# Patient Record
Sex: MALE | Race: WHITE | NOT HISPANIC OR LATINO | URBAN - METROPOLITAN AREA
[De-identification: names, ages, dates, MRNs, and addresses within clinical notes are randomized per-mention and may not be internally consistent; named-entity substitution may affect disease eponyms.]

---

## 2017-04-18 ENCOUNTER — OUTPATIENT (OUTPATIENT)
Dept: OUTPATIENT SERVICES | Facility: HOSPITAL | Age: 72
LOS: 1 days | End: 2017-04-18
Payer: MEDICARE

## 2017-04-18 VITALS
HEIGHT: 70 IN | SYSTOLIC BLOOD PRESSURE: 143 MMHG | RESPIRATION RATE: 14 BRPM | WEIGHT: 248.02 LBS | DIASTOLIC BLOOD PRESSURE: 81 MMHG | HEART RATE: 66 BPM | TEMPERATURE: 98 F

## 2017-04-18 DIAGNOSIS — M17.12 UNILATERAL PRIMARY OSTEOARTHRITIS, LEFT KNEE: ICD-10-CM

## 2017-04-18 DIAGNOSIS — Z01.818 ENCOUNTER FOR OTHER PREPROCEDURAL EXAMINATION: ICD-10-CM

## 2017-04-18 DIAGNOSIS — Z98.1 ARTHRODESIS STATUS: Chronic | ICD-10-CM

## 2017-04-18 DIAGNOSIS — Z98.890 OTHER SPECIFIED POSTPROCEDURAL STATES: Chronic | ICD-10-CM

## 2017-04-18 LAB
ALBUMIN SERPL ELPH-MCNC: 3.6 G/DL — SIGNIFICANT CHANGE UP (ref 3.3–5)
ALP SERPL-CCNC: 54 U/L — SIGNIFICANT CHANGE UP (ref 40–120)
ALT FLD-CCNC: 17 U/L — SIGNIFICANT CHANGE UP (ref 12–78)
ANION GAP SERPL CALC-SCNC: 4 MMOL/L — LOW (ref 5–17)
APTT BLD: 49.7 SEC — HIGH (ref 27.5–37.4)
AST SERPL-CCNC: 21 U/L — SIGNIFICANT CHANGE UP (ref 15–37)
BILIRUB SERPL-MCNC: 0.6 MG/DL — SIGNIFICANT CHANGE UP (ref 0.2–1.2)
BUN SERPL-MCNC: 26 MG/DL — HIGH (ref 7–23)
CALCIUM SERPL-MCNC: 9.2 MG/DL — SIGNIFICANT CHANGE UP (ref 8.5–10.1)
CHLORIDE SERPL-SCNC: 104 MMOL/L — SIGNIFICANT CHANGE UP (ref 96–108)
CO2 SERPL-SCNC: 33 MMOL/L — HIGH (ref 22–31)
CREAT SERPL-MCNC: 1.2 MG/DL — SIGNIFICANT CHANGE UP (ref 0.5–1.3)
GLUCOSE SERPL-MCNC: 75 MG/DL — SIGNIFICANT CHANGE UP (ref 70–99)
HCT VFR BLD CALC: 43.5 % — SIGNIFICANT CHANGE UP (ref 39–50)
HGB BLD-MCNC: 13.7 G/DL — SIGNIFICANT CHANGE UP (ref 13–17)
INR BLD: 1.22 RATIO — HIGH (ref 0.88–1.16)
MCHC RBC-ENTMCNC: 30.4 PG — SIGNIFICANT CHANGE UP (ref 27–34)
MCHC RBC-ENTMCNC: 31.5 GM/DL — LOW (ref 32–36)
MCV RBC AUTO: 96.5 FL — SIGNIFICANT CHANGE UP (ref 80–100)
MRSA PCR RESULT.: SIGNIFICANT CHANGE UP
PLATELET # BLD AUTO: 239 K/UL — SIGNIFICANT CHANGE UP (ref 150–400)
POTASSIUM SERPL-MCNC: 5.1 MMOL/L — SIGNIFICANT CHANGE UP (ref 3.5–5.3)
POTASSIUM SERPL-SCNC: 5.1 MMOL/L — SIGNIFICANT CHANGE UP (ref 3.5–5.3)
PROT SERPL-MCNC: 6.9 G/DL — SIGNIFICANT CHANGE UP (ref 6–8.3)
PROTHROM AB SERPL-ACNC: 13.3 SEC — HIGH (ref 9.8–12.7)
RBC # BLD: 4.5 M/UL — SIGNIFICANT CHANGE UP (ref 4.2–5.8)
RBC # FLD: 12.9 % — SIGNIFICANT CHANGE UP (ref 10.3–14.5)
S AUREUS DNA NOSE QL NAA+PROBE: SIGNIFICANT CHANGE UP
SODIUM SERPL-SCNC: 141 MMOL/L — SIGNIFICANT CHANGE UP (ref 135–145)
WBC # BLD: 6.5 K/UL — SIGNIFICANT CHANGE UP (ref 3.8–10.5)
WBC # FLD AUTO: 6.5 K/UL — SIGNIFICANT CHANGE UP (ref 3.8–10.5)

## 2017-04-18 PROCEDURE — 71020: CPT | Mod: 26

## 2017-04-18 PROCEDURE — 73560 X-RAY EXAM OF KNEE 1 OR 2: CPT | Mod: 26,LT

## 2017-04-18 PROCEDURE — 93010 ELECTROCARDIOGRAM REPORT: CPT | Mod: NC

## 2017-04-18 NOTE — H&P PST ADULT - ANESTHESIA, PREVIOUS REACTION, PROFILE
pt also notes "tend to fight" however notes this happened once age 16 after receiving Sodium Pentathol and has never happened since then but does get nausea/nausea/vomiting

## 2017-04-18 NOTE — H&P PST ADULT - PMH
Afib    Bilateral leg edema    GERD (gastroesophageal reflux disease)    Herniated lumbar intervertebral disc  10 years ago  Hyperlipidemia  diet controlled  Hypertension    Osteoarthrosis    PC (prostate cancer)  s/p radiation 61y/o  Phlebitis  1977 - right leg related to wound  Pulmonary embolism  related to trauma - 1977, leg injury  Seasonal allergies    Spinal stenosis    Unilateral primary osteoarthritis, left knee    Unilateral primary osteoarthritis, right knee

## 2017-04-18 NOTE — H&P PST ADULT - FAMILY HISTORY
Mother  Still living? No  Family history of basal cell carcinoma, Age at diagnosis: Age Unknown     Father  Still living? No  Hypertension, Age at diagnosis: Age Unknown

## 2017-04-18 NOTE — H&P PST ADULT - PROBLEM SELECTOR PLAN 1
PST Labs; CBC, CMP, Type & Screen, Coags, Nose Culture, EKG, (CXR on chart from October 2016) - medical and cardiac clearance needed - appts scheduled - pre-op instructions as well as pre-op wash instructions given to pt with understanding verbalized -

## 2017-04-18 NOTE — H&P PST ADULT - HISTORY OF PRESENT ILLNESS
71 year old male presents for PST prior to LEFT Total Knee Replacement with Dr Darryl Chau on 5/1/17 - pt notes h/o osteoarthritis for many years - pt is s/p right knee replacement October 19 2016  which he notes was supposed to be followed by the left knee replacement however notes he developed cellulitis on right leg post op therefore LEFT Knee Replacement was postponed at that time - pt is now electing for scheduled procedure.

## 2017-04-18 NOTE — H&P PST ADULT - MUSCULOSKELETAL
details… detailed exam no joint warmth/no calf tenderness/decreased ROM due to pain/diminished strength/no joint erythema

## 2017-04-18 NOTE — H&P PST ADULT - RS GEN PE MLT RESP DETAILS PC
good air movement/breath sounds equal/airway patent/clear to auscultation bilaterally/respirations non-labored

## 2017-04-18 NOTE — H&P PST ADULT - PRO PAIN LIFE ADAPT
inability to enjoy life/decreased activity level/inability or reluctance to perform ADLs/inability to sleep

## 2017-04-18 NOTE — H&P PST ADULT - PSH
arthroscopy  bilateral knees 58y/o  arthroscopy  right shoulder 2005  bunionectomy  right 2009  H/O colonoscopy    H/O endoscopy    H/O knee surgery  RIGHT Total Knee Replacement October 2016  H/O spinal fusion  September 15th 2014  hip replacement  left 2007, RIGHT May 2015  History of shoulder replacement  right 6/2012 - [facility]  Left 2013 - [facility]  inguinal hernia repair  right 15y/o, 57y/o, left 45y/o

## 2017-04-18 NOTE — H&P PST ADULT - NSANTHOSAYNRD_GEN_A_CORE
No. FRANKIE screening performed.  STOP BANG Legend: 0-2 = LOW Risk; 3-4 = INTERMEDIATE Risk; 5-8 = HIGH Risk

## 2017-04-18 NOTE — H&P PST ADULT - ASSESSMENT
71 year old male with  Unilateral Primary Osteoarthritis; LEFT Knee - scheduled for LEFT Total Knee Replacement with Dr Darryl Chau on 5/1/17

## 2017-04-30 ENCOUNTER — RESULT REVIEW (OUTPATIENT)
Age: 72
End: 2017-04-30

## 2017-04-30 PROCEDURE — 86901 BLOOD TYPING SEROLOGIC RH(D): CPT

## 2017-04-30 PROCEDURE — 73560 X-RAY EXAM OF KNEE 1 OR 2: CPT

## 2017-04-30 PROCEDURE — 85027 COMPLETE CBC AUTOMATED: CPT

## 2017-04-30 PROCEDURE — 86920 COMPATIBILITY TEST SPIN: CPT

## 2017-04-30 PROCEDURE — 71046 X-RAY EXAM CHEST 2 VIEWS: CPT

## 2017-04-30 PROCEDURE — 86900 BLOOD TYPING SEROLOGIC ABO: CPT

## 2017-04-30 PROCEDURE — 86850 RBC ANTIBODY SCREEN: CPT

## 2017-04-30 PROCEDURE — 85610 PROTHROMBIN TIME: CPT

## 2017-04-30 PROCEDURE — 80053 COMPREHEN METABOLIC PANEL: CPT

## 2017-04-30 PROCEDURE — 87640 STAPH A DNA AMP PROBE: CPT

## 2017-04-30 PROCEDURE — 85730 THROMBOPLASTIN TIME PARTIAL: CPT

## 2017-04-30 PROCEDURE — 93005 ELECTROCARDIOGRAM TRACING: CPT

## 2017-04-30 PROCEDURE — G0463: CPT

## 2017-05-01 ENCOUNTER — TRANSCRIPTION ENCOUNTER (OUTPATIENT)
Age: 72
End: 2017-05-01

## 2017-05-01 ENCOUNTER — INPATIENT (INPATIENT)
Facility: HOSPITAL | Age: 72
LOS: 2 days | Discharge: HOME HEALTH | DRG: 470 | End: 2017-05-04
Attending: ORTHOPAEDIC SURGERY | Admitting: ORTHOPAEDIC SURGERY
Payer: MEDICARE

## 2017-05-01 VITALS
HEART RATE: 72 BPM | HEIGHT: 70 IN | WEIGHT: 248.02 LBS | SYSTOLIC BLOOD PRESSURE: 132 MMHG | RESPIRATION RATE: 16 BRPM | DIASTOLIC BLOOD PRESSURE: 61 MMHG | TEMPERATURE: 99 F | OXYGEN SATURATION: 97 %

## 2017-05-01 DIAGNOSIS — Z98.1 ARTHRODESIS STATUS: Chronic | ICD-10-CM

## 2017-05-01 DIAGNOSIS — I48.91 UNSPECIFIED ATRIAL FIBRILLATION: ICD-10-CM

## 2017-05-01 DIAGNOSIS — I10 ESSENTIAL (PRIMARY) HYPERTENSION: ICD-10-CM

## 2017-05-01 DIAGNOSIS — M17.12 UNILATERAL PRIMARY OSTEOARTHRITIS, LEFT KNEE: ICD-10-CM

## 2017-05-01 DIAGNOSIS — Z98.890 OTHER SPECIFIED POSTPROCEDURAL STATES: Chronic | ICD-10-CM

## 2017-05-01 PROCEDURE — 88305 TISSUE EXAM BY PATHOLOGIST: CPT | Mod: 26

## 2017-05-01 PROCEDURE — 73562 X-RAY EXAM OF KNEE 3: CPT | Mod: 26,LT

## 2017-05-01 PROCEDURE — 88311 DECALCIFY TISSUE: CPT | Mod: 26

## 2017-05-01 RX ORDER — FAMOTIDINE 10 MG/ML
20 INJECTION INTRAVENOUS AT BEDTIME
Qty: 0 | Refills: 0 | Status: DISCONTINUED | OUTPATIENT
Start: 2017-05-01 | End: 2017-05-04

## 2017-05-01 RX ORDER — ENOXAPARIN SODIUM 100 MG/ML
40 INJECTION SUBCUTANEOUS DAILY
Qty: 0 | Refills: 0 | Status: DISCONTINUED | OUTPATIENT
Start: 2017-05-02 | End: 2017-05-02

## 2017-05-01 RX ORDER — SODIUM CHLORIDE 9 MG/ML
1000 INJECTION, SOLUTION INTRAVENOUS
Qty: 0 | Refills: 0 | Status: DISCONTINUED | OUTPATIENT
Start: 2017-05-01 | End: 2017-05-02

## 2017-05-01 RX ORDER — GABAPENTIN 400 MG/1
300 CAPSULE ORAL
Qty: 0 | Refills: 0 | Status: DISCONTINUED | OUTPATIENT
Start: 2017-05-01 | End: 2017-05-04

## 2017-05-01 RX ORDER — SODIUM CHLORIDE 9 MG/ML
1000 INJECTION, SOLUTION INTRAVENOUS
Qty: 0 | Refills: 0 | Status: DISCONTINUED | OUTPATIENT
Start: 2017-05-01 | End: 2017-05-01

## 2017-05-01 RX ORDER — HYDROMORPHONE HYDROCHLORIDE 2 MG/ML
0.5 INJECTION INTRAMUSCULAR; INTRAVENOUS; SUBCUTANEOUS
Qty: 0 | Refills: 0 | Status: DISCONTINUED | OUTPATIENT
Start: 2017-05-01 | End: 2017-05-01

## 2017-05-01 RX ORDER — BUPIVACAINE 13.3 MG/ML
20 INJECTION, SUSPENSION, LIPOSOMAL INFILTRATION ONCE
Qty: 0 | Refills: 0 | Status: DISCONTINUED | OUTPATIENT
Start: 2017-05-01 | End: 2017-05-01

## 2017-05-01 RX ORDER — ONDANSETRON 8 MG/1
4 TABLET, FILM COATED ORAL ONCE
Qty: 0 | Refills: 0 | Status: DISCONTINUED | OUTPATIENT
Start: 2017-05-01 | End: 2017-05-01

## 2017-05-01 RX ORDER — ACETAMINOPHEN 500 MG
1000 TABLET ORAL ONCE
Qty: 0 | Refills: 0 | Status: COMPLETED | OUTPATIENT
Start: 2017-05-01 | End: 2017-05-01

## 2017-05-01 RX ORDER — FOLIC ACID 0.8 MG
1 TABLET ORAL DAILY
Qty: 0 | Refills: 0 | Status: DISCONTINUED | OUTPATIENT
Start: 2017-05-01 | End: 2017-05-04

## 2017-05-01 RX ORDER — CELECOXIB 200 MG/1
200 CAPSULE ORAL
Qty: 0 | Refills: 0 | Status: DISCONTINUED | OUTPATIENT
Start: 2017-05-01 | End: 2017-05-04

## 2017-05-01 RX ORDER — HYDROMORPHONE HYDROCHLORIDE 2 MG/ML
4 INJECTION INTRAMUSCULAR; INTRAVENOUS; SUBCUTANEOUS
Qty: 0 | Refills: 0 | Status: DISCONTINUED | OUTPATIENT
Start: 2017-05-01 | End: 2017-05-04

## 2017-05-01 RX ORDER — DOCUSATE SODIUM 100 MG
100 CAPSULE ORAL THREE TIMES A DAY
Qty: 0 | Refills: 0 | Status: DISCONTINUED | OUTPATIENT
Start: 2017-05-01 | End: 2017-05-04

## 2017-05-01 RX ORDER — ASCORBIC ACID 60 MG
500 TABLET,CHEWABLE ORAL
Qty: 0 | Refills: 0 | Status: DISCONTINUED | OUTPATIENT
Start: 2017-05-01 | End: 2017-05-04

## 2017-05-01 RX ORDER — CEFAZOLIN SODIUM 1 G
1000 VIAL (EA) INJECTION EVERY 6 HOURS
Qty: 0 | Refills: 0 | Status: COMPLETED | OUTPATIENT
Start: 2017-05-01 | End: 2017-05-02

## 2017-05-01 RX ORDER — PANTOPRAZOLE SODIUM 20 MG/1
40 TABLET, DELAYED RELEASE ORAL
Qty: 0 | Refills: 0 | Status: DISCONTINUED | OUTPATIENT
Start: 2017-05-01 | End: 2017-05-04

## 2017-05-01 RX ORDER — FERROUS SULFATE 325(65) MG
325 TABLET ORAL
Qty: 0 | Refills: 0 | Status: DISCONTINUED | OUTPATIENT
Start: 2017-05-01 | End: 2017-05-04

## 2017-05-01 RX ORDER — HYDROMORPHONE HYDROCHLORIDE 2 MG/ML
2 INJECTION INTRAMUSCULAR; INTRAVENOUS; SUBCUTANEOUS
Qty: 0 | Refills: 0 | Status: DISCONTINUED | OUTPATIENT
Start: 2017-05-01 | End: 2017-05-04

## 2017-05-01 RX ORDER — VERAPAMIL HCL 240 MG
180 CAPSULE, EXTENDED RELEASE PELLETS 24 HR ORAL DAILY
Qty: 0 | Refills: 0 | Status: DISCONTINUED | OUTPATIENT
Start: 2017-05-01 | End: 2017-05-02

## 2017-05-01 RX ORDER — METOPROLOL TARTRATE 50 MG
25 TABLET ORAL
Qty: 0 | Refills: 0 | Status: DISCONTINUED | OUTPATIENT
Start: 2017-05-01 | End: 2017-05-04

## 2017-05-01 RX ORDER — ONDANSETRON 8 MG/1
4 TABLET, FILM COATED ORAL EVERY 6 HOURS
Qty: 0 | Refills: 0 | Status: DISCONTINUED | OUTPATIENT
Start: 2017-05-01 | End: 2017-05-04

## 2017-05-01 RX ORDER — LACTOBACILLUS ACIDOPHILUS 100MM CELL
1 CAPSULE ORAL
Qty: 0 | Refills: 0 | Status: DISCONTINUED | OUTPATIENT
Start: 2017-05-01 | End: 2017-05-04

## 2017-05-01 RX ORDER — POTASSIUM CHLORIDE 20 MEQ
20 PACKET (EA) ORAL DAILY
Qty: 0 | Refills: 0 | Status: DISCONTINUED | OUTPATIENT
Start: 2017-05-01 | End: 2017-05-02

## 2017-05-01 RX ORDER — COLCHICINE 0.6 MG
0.6 TABLET ORAL DAILY
Qty: 0 | Refills: 0 | Status: DISCONTINUED | OUTPATIENT
Start: 2017-05-01 | End: 2017-05-04

## 2017-05-01 RX ORDER — MORPHINE SULFATE 50 MG/1
2 CAPSULE, EXTENDED RELEASE ORAL EVERY 4 HOURS
Qty: 0 | Refills: 0 | Status: DISCONTINUED | OUTPATIENT
Start: 2017-05-01 | End: 2017-05-04

## 2017-05-01 RX ORDER — CEFAZOLIN SODIUM 1 G
2000 VIAL (EA) INJECTION ONCE
Qty: 0 | Refills: 0 | Status: COMPLETED | OUTPATIENT
Start: 2017-05-01 | End: 2017-05-01

## 2017-05-01 RX ORDER — ACETAMINOPHEN 500 MG
650 TABLET ORAL EVERY 8 HOURS
Qty: 0 | Refills: 0 | Status: DISCONTINUED | OUTPATIENT
Start: 2017-05-03 | End: 2017-05-04

## 2017-05-01 RX ADMIN — SODIUM CHLORIDE 75 MILLILITER(S): 9 INJECTION, SOLUTION INTRAVENOUS at 10:48

## 2017-05-01 RX ADMIN — HYDROMORPHONE HYDROCHLORIDE 0.5 MILLIGRAM(S): 2 INJECTION INTRAMUSCULAR; INTRAVENOUS; SUBCUTANEOUS at 13:25

## 2017-05-01 RX ADMIN — Medication 500 MILLIGRAM(S): at 18:39

## 2017-05-01 RX ADMIN — FAMOTIDINE 20 MILLIGRAM(S): 10 INJECTION INTRAVENOUS at 21:48

## 2017-05-01 RX ADMIN — Medication 100 MILLIGRAM(S): at 19:41

## 2017-05-01 RX ADMIN — Medication 100 MILLIGRAM(S): at 13:30

## 2017-05-01 RX ADMIN — GABAPENTIN 300 MILLIGRAM(S): 400 CAPSULE ORAL at 18:40

## 2017-05-01 RX ADMIN — HYDROMORPHONE HYDROCHLORIDE 0.5 MILLIGRAM(S): 2 INJECTION INTRAMUSCULAR; INTRAVENOUS; SUBCUTANEOUS at 10:46

## 2017-05-01 RX ADMIN — CELECOXIB 200 MILLIGRAM(S): 200 CAPSULE ORAL at 18:41

## 2017-05-01 RX ADMIN — Medication 325 MILLIGRAM(S): at 18:39

## 2017-05-01 RX ADMIN — HYDROMORPHONE HYDROCHLORIDE 0.5 MILLIGRAM(S): 2 INJECTION INTRAMUSCULAR; INTRAVENOUS; SUBCUTANEOUS at 10:25

## 2017-05-01 RX ADMIN — HYDROMORPHONE HYDROCHLORIDE 0.5 MILLIGRAM(S): 2 INJECTION INTRAMUSCULAR; INTRAVENOUS; SUBCUTANEOUS at 11:09

## 2017-05-01 RX ADMIN — Medication 100 MILLIGRAM(S): at 16:05

## 2017-05-01 RX ADMIN — Medication 25 MILLIGRAM(S): at 18:39

## 2017-05-01 RX ADMIN — Medication 100 MILLIGRAM(S): at 21:47

## 2017-05-01 RX ADMIN — Medication 400 MILLIGRAM(S): at 18:28

## 2017-05-01 RX ADMIN — MORPHINE SULFATE 2 MILLIGRAM(S): 50 CAPSULE, EXTENDED RELEASE ORAL at 17:57

## 2017-05-01 RX ADMIN — HYDROMORPHONE HYDROCHLORIDE 0.5 MILLIGRAM(S): 2 INJECTION INTRAMUSCULAR; INTRAVENOUS; SUBCUTANEOUS at 11:05

## 2017-05-01 RX ADMIN — HYDROMORPHONE HYDROCHLORIDE 0.5 MILLIGRAM(S): 2 INJECTION INTRAMUSCULAR; INTRAVENOUS; SUBCUTANEOUS at 10:37

## 2017-05-01 RX ADMIN — SODIUM CHLORIDE 50 MILLILITER(S): 9 INJECTION, SOLUTION INTRAVENOUS at 06:49

## 2017-05-01 RX ADMIN — Medication 1000 MILLIGRAM(S): at 18:31

## 2017-05-01 RX ADMIN — CELECOXIB 200 MILLIGRAM(S): 200 CAPSULE ORAL at 18:39

## 2017-05-01 RX ADMIN — Medication 200 MILLIGRAM(S): at 10:46

## 2017-05-01 RX ADMIN — Medication 1 TABLET(S): at 18:40

## 2017-05-01 RX ADMIN — MORPHINE SULFATE 2 MILLIGRAM(S): 50 CAPSULE, EXTENDED RELEASE ORAL at 18:20

## 2017-05-01 NOTE — PATIENT PROFILE ADULT. - PSH
arthroscopy  bilateral knees 58y/o  arthroscopy  right shoulder 2005  bunionectomy  right 2009  H/O colonoscopy    H/O endoscopy    H/O knee surgery  RIGHT Total Knee Replacement October 2016  H/O spinal fusion  September 15th 2014  hip replacement  left 2007, RIGHT May 2015  History of shoulder replacement  right 6/2012 - [facility]  Left 2013 - [facility]  inguinal hernia repair  right 15y/o, 55y/o, left 45y/o

## 2017-05-01 NOTE — CONSULT NOTE ADULT - SUBJECTIVE AND OBJECTIVE BOX
Patient is a 71y old  Male who presents with a chief complaint of Pt states "I am having my LEFT Knee Replaced." (01 May 2017 06:33)      INTERVAL HPI/OVERNIGHT EVENTS: pod number 0. no complaints  T(C): 36.4, Max: 37.2 (05-01 @ 06:25)  HR: 69 (64 - 78)  BP: 129/76 (105/55 - 134/74)  RR: 18 (12 - 18)  SpO2: 100% (97% - 100%)  Wt(kg): --  I&O's Summary    I & Os for current day (as of 01 May 2017 16:31)  =============================================  IN: 0 ml / OUT: 100 ml / NET: -100 ml      PAST MEDICAL & SURGICAL HISTORY:  Unilateral primary osteoarthritis, left knee  Unilateral primary osteoarthritis, right knee  Herniated lumbar intervertebral disc: 10 years ago  Spinal stenosis  Phlebitis: 1977 - right leg related to wound  Pulmonary embolism: related to trauma - 1977, leg injury  Hyperlipidemia: diet controlled  Seasonal allergies  Bilateral leg edema  Hypertension  GERD (gastroesophageal reflux disease)  Afib  Osteoarthrosis  PC (prostate cancer): s/p radiation 61y/o  H/O knee surgery: RIGHT Total Knee Replacement October 2016  H/O spinal fusion: September 15th 2014  H/O endoscopy  H/O colonoscopy  History of shoulder replacement: right 6/2012 - [facility]  Left 2013 - [facility]  arthroscopy: bilateral knees 58y/o  arthroscopy: right shoulder 2005  bunionectomy: right 2009  hip replacement: left 2007, RIGHT May 2015  inguinal hernia repair: right 15y/o, 57y/o, left 47y/o      SOCIAL HISTORY  Alcohol:  Tobacco:  Illicit substance use:      FAMILY HISTORY:      LABS:                        13.1   x     )-----------( x        ( 01 May 2017 10:48 )             40.6     05-01    147<H>  |  112<H>  |  20  ----------------------------<  123<H>  4.9   |  30  |  1.10    Ca    8.8      01 May 2017 10:48          CAPILLARY BLOOD GLUCOSE            MEDICATIONS  (STANDING):  lactated ringers. 1000milliLiter(s) IV Continuous <Continuous>  celecoxib 200milliGRAM(s) Oral two times a day after meals  docusate sodium 100milliGRAM(s) Oral three times a day  ferrous    sulfate 325milliGRAM(s) Oral three times a day with meals  folic acid 1milliGRAM(s) Oral daily  multivitamin 1Tablet(s) Oral daily  ascorbic acid 500milliGRAM(s) Oral two times a day  ceFAZolin   IVPB 1000milliGRAM(s) IV Intermittent every 6 hours  verapamil SR 180milliGRAM(s) Oral daily  gabapentin 300milliGRAM(s) Oral two times a day  colchicine 0.6milliGRAM(s) Oral daily  metoprolol succinate ER 25milliGRAM(s) Oral two times a day  famotidine    Tablet 20milliGRAM(s) Oral at bedtime  potassium chloride    Tablet ER 20milliEquivalent(s) Oral daily  lactobacillus acidophilus 1Tablet(s) Oral two times a day with meals  pantoprazole    Tablet 40milliGRAM(s) Oral before breakfast  acetaminophen  IVPB. 1000milliGRAM(s) IV Intermittent once    MEDICATIONS  (PRN):  ondansetron Injectable 4milliGRAM(s) IV Push every 6 hours PRN Nausea and/or Vomiting  HYDROmorphone   Tablet 2milliGRAM(s) Oral every 3 hours PRN Moderate Pain (4 - 6)  HYDROmorphone   Tablet 4milliGRAM(s) Oral every 3 hours PRN Severe Pain (7 - 10)  morphine  - Injectable 2milliGRAM(s) IV Push every 4 hours PRN Severe Pain (7 - 10)      REVIEW OF SYSTEMS:  CONSTITUTIONAL: No fever, weight loss, or fatigue  EYES: No eye pain, visual disturbances, or discharge  ENMT:  No difficulty hearing, tinnitus, vertigo; No sinus or throat pain  NECK: No pain or stiffness  RESPIRATORY: No cough, wheezing, chills or hemoptysis; No shortness of breath  CARDIOVASCULAR: No chest pain, palpitations, dizziness, or leg swelling  GASTROINTESTINAL: No abdominal or epigastric pain. No nausea, vomiting, or hematemesis; No diarrhea or constipation. No melena or hematochezia.  GENITOURINARY: No dysuria, frequency, hematuria, or incontinence  NEUROLOGICAL: No headaches, memory loss, loss of strength, numbness, or tremors  SKIN: No itching, burning, rashes, or lesions   LYMPH NODES: No enlarged glands  ENDOCRINE: No heat or cold intolerance; No hair loss  MUSCULOSKELETAL: joint pain sec to severe oa  PSYCHIATRIC: No depression, anxiety, mood swings, or difficulty sleeping  HEME/LYMPH: No easy bruising, or bleeding gums  ALLERY AND IMMUNOLOGIC: No hives or eczema    RADIOLOGY & ADDITIONAL TESTS:    Imaging Personally Reviewed:  [ ] YES  [ ] NO    Consultant(s) Notes Reviewed:  [ ] YES  [ ] NO    PHYSICAL EXAM:  GENERAL: NAD, well-groomed, well-developed  HEAD:  Atraumatic, Normocephalic  EYES: EOMI, PERRLA, conjunctiva and sclera clear  ENMT: No tonsillar erythema, exudates, or enlargement; Moist mucous membranes, Good dentition, No lesions  NECK: Supple, No JVD, Normal thyroid  NERVOUS SYSTEM:  Alert & Oriented X3, Good concentration; Motor Strength 5/5 B/L upper and lower extremities; DTRs 2+ intact and symmetric  CHEST/LUNG: Clear to percussion bilaterally; No rales, rhonchi, wheezing, or rubs  HEART: Regular rate and rhythm; No murmurs, rubs, or gallops  ABDOMEN: Soft, Nontender, Nondistended; Bowel sounds present  EXTREMITIES:  2+ Peripheral Pulses, mild chronic stasis changes  LYMPH: No lymphadenopathy noted  SKIN: No rashes or lesions    Care Discussed with Consultants/Other Providers [X ] YES  [ ] NO

## 2017-05-01 NOTE — PATIENT PROFILE ADULT. - PMH
Afib    Bilateral leg edema    GERD (gastroesophageal reflux disease)    Herniated lumbar intervertebral disc  10 years ago  Hyperlipidemia  diet controlled  Hypertension    Osteoarthrosis    PC (prostate cancer)  s/p radiation 59y/o  Phlebitis  1977 - right leg related to wound  Pulmonary embolism  related to trauma - 1977, leg injury  Seasonal allergies    Spinal stenosis    Unilateral primary osteoarthritis, left knee    Unilateral primary osteoarthritis, right knee

## 2017-05-01 NOTE — CONSULT NOTE ADULT - PROBLEM SELECTOR RECOMMENDATION 2
on pradaxa for afib.  dw ortho- proph dose lovenox tomorrow, then starting post op day 2, if hemostatic status completely stable, can start full dose pradaxa.  if has post op acute blood loss anemia, will likely start half dose pradaxa day 2 and continue til post  op day 5, then increase to full dose.

## 2017-05-01 NOTE — PROGRESS NOTE ADULT - SUBJECTIVE AND OBJECTIVE BOX
Patient seen and examined. Pain controlled.    Physical exam  VS: Afebrile, vital signs stable  Gen: NAD  LLE: Dressing clean, dry, and intact. +EHL/FHL/TA/GSC. SILT L3-S1. +Dorsalis pedis, capillary refill brisk. Compartments soft and nontender.    71M s/p L TKA POD# 0    WBAT  Pain control  DVT prophylaxis  PT  Discharge planning

## 2017-05-01 NOTE — PATIENT PROFILE ADULT. - PRO PAIN LIFE ADAPT
inability to enjoy life/inability or reluctance to perform ADLs/inability to sleep/decreased activity level

## 2017-05-02 ENCOUNTER — TRANSCRIPTION ENCOUNTER (OUTPATIENT)
Age: 72
End: 2017-05-02

## 2017-05-02 DIAGNOSIS — Z71.89 OTHER SPECIFIED COUNSELING: ICD-10-CM

## 2017-05-02 DIAGNOSIS — Z29.9 ENCOUNTER FOR PROPHYLACTIC MEASURES, UNSPECIFIED: ICD-10-CM

## 2017-05-02 LAB
ANION GAP SERPL CALC-SCNC: 7 MMOL/L — SIGNIFICANT CHANGE UP (ref 5–17)
BUN SERPL-MCNC: 23 MG/DL — SIGNIFICANT CHANGE UP (ref 7–23)
CALCIUM SERPL-MCNC: 8.5 MG/DL — SIGNIFICANT CHANGE UP (ref 8.5–10.1)
CHLORIDE SERPL-SCNC: 107 MMOL/L — SIGNIFICANT CHANGE UP (ref 96–108)
CO2 SERPL-SCNC: 29 MMOL/L — SIGNIFICANT CHANGE UP (ref 22–31)
CREAT SERPL-MCNC: 1.2 MG/DL — SIGNIFICANT CHANGE UP (ref 0.5–1.3)
GLUCOSE SERPL-MCNC: 90 MG/DL — SIGNIFICANT CHANGE UP (ref 70–99)
HCT VFR BLD CALC: 36.2 % — LOW (ref 39–50)
HGB BLD-MCNC: 11.6 G/DL — LOW (ref 13–17)
POTASSIUM SERPL-MCNC: 4.2 MMOL/L — SIGNIFICANT CHANGE UP (ref 3.5–5.3)
POTASSIUM SERPL-SCNC: 4.2 MMOL/L — SIGNIFICANT CHANGE UP (ref 3.5–5.3)
SODIUM SERPL-SCNC: 143 MMOL/L — SIGNIFICANT CHANGE UP (ref 135–145)

## 2017-05-02 RX ORDER — ENOXAPARIN SODIUM 100 MG/ML
30 INJECTION SUBCUTANEOUS
Qty: 0 | Refills: 0 | Status: DISCONTINUED | OUTPATIENT
Start: 2017-05-02 | End: 2017-05-04

## 2017-05-02 RX ORDER — ACETAMINOPHEN 500 MG
1000 TABLET ORAL ONCE
Qty: 0 | Refills: 0 | Status: COMPLETED | OUTPATIENT
Start: 2017-05-02 | End: 2017-05-02

## 2017-05-02 RX ORDER — SODIUM CHLORIDE 9 MG/ML
1000 INJECTION INTRAMUSCULAR; INTRAVENOUS; SUBCUTANEOUS ONCE
Qty: 0 | Refills: 0 | Status: COMPLETED | OUTPATIENT
Start: 2017-05-02 | End: 2017-05-02

## 2017-05-02 RX ORDER — SODIUM CHLORIDE 9 MG/ML
1000 INJECTION INTRAMUSCULAR; INTRAVENOUS; SUBCUTANEOUS
Qty: 0 | Refills: 0 | Status: DISCONTINUED | OUTPATIENT
Start: 2017-05-02 | End: 2017-05-03

## 2017-05-02 RX ADMIN — Medication 1 TABLET(S): at 08:45

## 2017-05-02 RX ADMIN — MORPHINE SULFATE 2 MILLIGRAM(S): 50 CAPSULE, EXTENDED RELEASE ORAL at 09:29

## 2017-05-02 RX ADMIN — CELECOXIB 200 MILLIGRAM(S): 200 CAPSULE ORAL at 18:59

## 2017-05-02 RX ADMIN — Medication 100 MILLIGRAM(S): at 01:29

## 2017-05-02 RX ADMIN — Medication 325 MILLIGRAM(S): at 19:00

## 2017-05-02 RX ADMIN — GABAPENTIN 300 MILLIGRAM(S): 400 CAPSULE ORAL at 19:00

## 2017-05-02 RX ADMIN — SODIUM CHLORIDE 100 MILLILITER(S): 9 INJECTION INTRAMUSCULAR; INTRAVENOUS; SUBCUTANEOUS at 16:36

## 2017-05-02 RX ADMIN — Medication 100 MILLIGRAM(S): at 14:27

## 2017-05-02 RX ADMIN — SODIUM CHLORIDE 1000 MILLILITER(S): 9 INJECTION INTRAMUSCULAR; INTRAVENOUS; SUBCUTANEOUS at 00:29

## 2017-05-02 RX ADMIN — CELECOXIB 200 MILLIGRAM(S): 200 CAPSULE ORAL at 10:00

## 2017-05-02 RX ADMIN — Medication 500 MILLIGRAM(S): at 05:05

## 2017-05-02 RX ADMIN — MORPHINE SULFATE 2 MILLIGRAM(S): 50 CAPSULE, EXTENDED RELEASE ORAL at 14:18

## 2017-05-02 RX ADMIN — HYDROMORPHONE HYDROCHLORIDE 4 MILLIGRAM(S): 2 INJECTION INTRAMUSCULAR; INTRAVENOUS; SUBCUTANEOUS at 19:43

## 2017-05-02 RX ADMIN — CELECOXIB 200 MILLIGRAM(S): 200 CAPSULE ORAL at 08:45

## 2017-05-02 RX ADMIN — Medication 100 MILLIGRAM(S): at 05:05

## 2017-05-02 RX ADMIN — Medication 1 MILLIGRAM(S): at 12:13

## 2017-05-02 RX ADMIN — Medication 1 TABLET(S): at 12:13

## 2017-05-02 RX ADMIN — MORPHINE SULFATE 2 MILLIGRAM(S): 50 CAPSULE, EXTENDED RELEASE ORAL at 10:00

## 2017-05-02 RX ADMIN — Medication 1 TABLET(S): at 19:00

## 2017-05-02 RX ADMIN — Medication 325 MILLIGRAM(S): at 12:13

## 2017-05-02 RX ADMIN — Medication 325 MILLIGRAM(S): at 08:45

## 2017-05-02 RX ADMIN — CELECOXIB 200 MILLIGRAM(S): 200 CAPSULE ORAL at 19:44

## 2017-05-02 RX ADMIN — ENOXAPARIN SODIUM 30 MILLIGRAM(S): 100 INJECTION SUBCUTANEOUS at 19:00

## 2017-05-02 RX ADMIN — PANTOPRAZOLE SODIUM 40 MILLIGRAM(S): 20 TABLET, DELAYED RELEASE ORAL at 05:05

## 2017-05-02 RX ADMIN — SODIUM CHLORIDE 100 MILLILITER(S): 9 INJECTION INTRAMUSCULAR; INTRAVENOUS; SUBCUTANEOUS at 05:51

## 2017-05-02 RX ADMIN — GABAPENTIN 300 MILLIGRAM(S): 400 CAPSULE ORAL at 05:05

## 2017-05-02 RX ADMIN — FAMOTIDINE 20 MILLIGRAM(S): 10 INJECTION INTRAVENOUS at 21:37

## 2017-05-02 RX ADMIN — HYDROMORPHONE HYDROCHLORIDE 4 MILLIGRAM(S): 2 INJECTION INTRAMUSCULAR; INTRAVENOUS; SUBCUTANEOUS at 19:00

## 2017-05-02 RX ADMIN — Medication 400 MILLIGRAM(S): at 05:55

## 2017-05-02 RX ADMIN — Medication 1000 MILLIGRAM(S): at 06:30

## 2017-05-02 RX ADMIN — MORPHINE SULFATE 2 MILLIGRAM(S): 50 CAPSULE, EXTENDED RELEASE ORAL at 13:43

## 2017-05-02 RX ADMIN — Medication 500 MILLIGRAM(S): at 19:00

## 2017-05-02 NOTE — DISCHARGE NOTE ADULT - MEDICATION SUMMARY - MEDICATIONS TO TAKE
I will START or STAY ON the medications listed below when I get home from the hospital:    celecoxib 200 mg oral capsule  -- 1 cap(s) by mouth 2 times a day (after meals)  -- Indication: For PAIN    HYDROmorphone 2 mg oral tablet  -- 1 tab(s) by mouth every 6 hours MDD:4  -- Indication: For PAIN    verapamil 180 mg/24 hours oral tablet, extended release  -- 1 tab(s) by mouth once a day  -- Indication: For HOME MEDICATION     Pradaxa 150 mg oral capsule  -- 1 cap(s) by mouth 2 times a day  -- Indication: For HOME MEDICATION     gabapentin 300 mg oral capsule  -- 1 cap(s) by mouth 2 times a day  -- Indication: For HOME MEDICATION     colchicine 0.6 mg oral tablet  -- 1 tab(s) by mouth once  -- Indication: For HOME MEDICATION     metoprolol succinate 25 mg oral tablet, extended release  -- 1 tab(s) by mouth 2 times a day  -- Indication: For HOME MEDICATION     torsemide 20 mg oral tablet  -- 1 tab(s) by mouth once a day  -- Indication: For HOME MEDICATION     Pepcid 20 mg oral tablet  -- 1 tab(s) by mouth once a day (at bedtime)  -- Indication: For HOME MEDICATION     Klor-Con M20 oral tablet, extended release  -- 1 tab(s) by mouth once a day  -- Indication: For HOME MEDICATION     Probiotic Formula oral capsule  -- 1 cap(s) by mouth once a day  -- Indication: For HOME MEDICATION     pantoprazole 40 mg oral delayed release tablet  -- 1 tab(s) by mouth once a day  -- Indication: For HOME MEDICATION     Centrum Silver oral tablet  -- 1 tab(s) by mouth once a day  -- Indication: For HOME MEDICATION     Vitamin D3 2000 intl units oral capsule  -- 1 cap(s) by mouth once a day  -- Indication: For HOME MEDICATION

## 2017-05-02 NOTE — DISCHARGE NOTE ADULT - SECONDARY DIAGNOSIS.
Afib Bilateral leg edema GERD (gastroesophageal reflux disease) Herniated lumbar intervertebral disc Hyperlipidemia Hypertension

## 2017-05-02 NOTE — PROGRESS NOTE ADULT - PROBLEM SELECTOR PLAN 2
holding pradaxa  cont metoprolol w parameters  hold verapamil  will reevaluate consider resuming tomorrow if hh stable holding pradaxa  cont metoprolol w parameters  hold verapamil til bp reevaluated tomorrow  will reevaluate consider resuming tomorrow if hh stable

## 2017-05-02 NOTE — PROGRESS NOTE ADULT - SUBJECTIVE AND OBJECTIVE BOX
YOLIS CARROLL 71y Male  MRN-[id number]       ORTHOPEDIC SURGERY / DR. DEAN    POD # 1    Vital Signs Last 24 Hrs  T(C): 36.8, Max: 37.2 (05-01 @ 06:25)  T(F): 98.2, Max: 99 (05-01 @ 06:25)  HR: 62 (58 - 79)  BP: 91/58 (85/57 - 134/74)  BP(mean): --  RR: 16 (12 - 18)  SpO2: 100% (97% - 100%)      RIGHT KNEE :    DRESSING DRY AND INTACT  GOOD MOTOR TO  RIGHT LOWER EXTREMITY  NEURO-VASCULAR STATUS INTACT  NO CALF TENDERNESS    POST OP    Hemoglobin: 13.1 (05-01 @ 10:48)  Hematocrit: 40.6 (05-01 @ 10:48)    05-01    147<H>  |  112<H>  |  20  ----------------------------<  123<H>  4.9   |  30  |  1.10    Ca    8.8      01 May 2017 10:48      ASSESSMENT AND PLAN  POD # 1 S/P  RIGHT TOTAL KNEE  REPLACEMENT    WEIGHT  BEARING AS TOLERATED, OOB AND AMBULATE, PT  DVT PROPHYLAXIS PATIENT IS ON PRADAXA AT HOME FOR AFIB , AS PER PROTOCOL WILL GIVE LOVENOX 40 MG SQ TODAY, WILL INCREASE LOVENOX DOSE FOR THE NEXT 4 DAYS THEN RESUME PRADAXA   INCENTIVE SPIROMETRY     HYPOTENSION  STAT H/H, START NS , WILL F/U LABS     DISCHARGE PLANNING TO HOME WITH HOME CARE

## 2017-05-02 NOTE — DISCHARGE NOTE ADULT - PLAN OF CARE
PHYSICAL THERAPY WEIGHT BEARING AS TOLERATED.  FOLLOW UP WITH DR. DEAN   THURSDAY 05/10/2017 CALL  [phone] FOR AN APPOINTMENT KEEP KNEE AQUACEL  DRESSING  ON DRY AND CLEAN, DRESSING WILL BE REMOVED OR CHANGED ON YOUR OFFICE VISITS

## 2017-05-02 NOTE — DISCHARGE NOTE ADULT - CARE PLAN
Principal Discharge DX:	Osteoarthritis of left knee, unspecified osteoarthritis type  Goal:	PHYSICAL THERAPY  Instructions for follow-up, activity and diet:	WEIGHT BEARING AS TOLERATED.  FOLLOW UP WITH DR. DEAN   THURSDAY 05/10/2017 CALL  [phone] FOR AN APPOINTMENT KEEP KNEE AQUACEL  DRESSING  ON DRY AND CLEAN, DRESSING WILL BE REMOVED OR CHANGED ON YOUR OFFICE VISITS  Secondary Diagnosis:	Afib  Secondary Diagnosis:	Bilateral leg edema  Secondary Diagnosis:	GERD (gastroesophageal reflux disease)  Secondary Diagnosis:	Herniated lumbar intervertebral disc  Secondary Diagnosis:	Hyperlipidemia  Secondary Diagnosis:	Hypertension Principal Discharge DX:	Osteoarthritis of left knee, unspecified osteoarthritis type  Goal:	PHYSICAL THERAPY  Instructions for follow-up, activity and diet:	WEIGHT BEARING AS TOLERATED.  FOLLOW UP WITH DR. DEAN   THURSDAY 05/10/2017 CALL  [phone] FOR AN APPOINTMENT KEEP KNEE AQUACEL  DRESSING  ON DRY AND CLEAN, DRESSING WILL BE REMOVED OR CHANGED ON YOUR OFFICE VISITS  Secondary Diagnosis:	Afib  Secondary Diagnosis:	Bilateral leg edema  Secondary Diagnosis:	GERD (gastroesophageal reflux disease)  Secondary Diagnosis:	Herniated lumbar intervertebral disc  Secondary Diagnosis:	Hyperlipidemia  Secondary Diagnosis:	Hypertension Principal Discharge DX:	Osteoarthritis of left knee, unspecified osteoarthritis type  Goal:	PHYSICAL THERAPY  Instructions for follow-up, activity and diet:	WEIGHT BEARING AS TOLERATED.  FOLLOW UP WITH DR. DEAN   THURSDAY 05/10/2017 CALL  [phone] FOR AN APPOINTMENT KEEP KNEE AQUACEL  DRESSING  ON DRY AND CLEAN, DRESSING WILL BE REMOVED OR CHANGED ON YOUR OFFICE VISITS  Secondary Diagnosis:	Afib  Secondary Diagnosis:	Bilateral leg edema  Secondary Diagnosis:	GERD (gastroesophageal reflux disease)  Secondary Diagnosis:	Herniated lumbar intervertebral disc  Secondary Diagnosis:	Hyperlipidemia  Secondary Diagnosis:	Hypertension Principal Discharge DX:	Osteoarthritis of left knee, unspecified osteoarthritis type  Goal:	PHYSICAL THERAPY  Instructions for follow-up, activity and diet:	WEIGHT BEARING AS TOLERATED.  FOLLOW UP WITH DR. DEAN   THURSDAY 05/10/2017 CALL  [phone] FOR AN APPOINTMENT KEEP KNEE AQUACEL  DRESSING  ON DRY AND CLEAN, DRESSING WILL BE REMOVED OR CHANGED ON YOUR OFFICE VISITS  Secondary Diagnosis:	Afib  Secondary Diagnosis:	Bilateral leg edema  Secondary Diagnosis:	GERD (gastroesophageal reflux disease)  Secondary Diagnosis:	Herniated lumbar intervertebral disc  Secondary Diagnosis:	Hyperlipidemia  Secondary Diagnosis:	Hypertension Principal Discharge DX:	Osteoarthritis of left knee, unspecified osteoarthritis type  Goal:	PHYSICAL THERAPY  Instructions for follow-up, activity and diet:	WEIGHT BEARING AS TOLERATED.  FOLLOW UP WITH DR. DEAN   THURSDAY 05/10/2017 CALL  [phone] FOR AN APPOINTMENT KEEP KNEE AQUACEL  DRESSING  ON DRY AND CLEAN, DRESSING WILL BE REMOVED OR CHANGED ON YOUR OFFICE VISITS  Secondary Diagnosis:	Afib  Secondary Diagnosis:	Bilateral leg edema  Secondary Diagnosis:	GERD (gastroesophageal reflux disease)  Secondary Diagnosis:	Herniated lumbar intervertebral disc  Secondary Diagnosis:	Hyperlipidemia  Secondary Diagnosis:	Hypertension Principal Discharge DX:	Osteoarthritis of left knee, unspecified osteoarthritis type  Goal:	PHYSICAL THERAPY  Instructions for follow-up, activity and diet:	WEIGHT BEARING AS TOLERATED.  FOLLOW UP WITH DR. DEAN   THURSDAY 05/10/2017 CALL  [phone] FOR AN APPOINTMENT KEEP KNEE AQUACEL  DRESSING  ON DRY AND CLEAN, DRESSING WILL BE REMOVED OR CHANGED ON YOUR OFFICE VISITS  Secondary Diagnosis:	Afib  Secondary Diagnosis:	Bilateral leg edema  Secondary Diagnosis:	GERD (gastroesophageal reflux disease)  Secondary Diagnosis:	Herniated lumbar intervertebral disc  Secondary Diagnosis:	Hyperlipidemia  Secondary Diagnosis:	Hypertension Principal Discharge DX:	Osteoarthritis of left knee, unspecified osteoarthritis type  Goal:	PHYSICAL THERAPY  Instructions for follow-up, activity and diet:	WEIGHT BEARING AS TOLERATED.  FOLLOW UP WITH DR. DEAN   THURSDAY 05/10/2017 CALL  [phone] FOR AN APPOINTMENT KEEP KNEE AQUACEL  DRESSING  ON DRY AND CLEAN, DRESSING WILL BE REMOVED OR CHANGED ON YOUR OFFICE VISITS  Secondary Diagnosis:	Afib  Secondary Diagnosis:	Bilateral leg edema  Secondary Diagnosis:	GERD (gastroesophageal reflux disease)  Secondary Diagnosis:	Herniated lumbar intervertebral disc  Secondary Diagnosis:	Hyperlipidemia  Secondary Diagnosis:	Hypertension

## 2017-05-02 NOTE — PROGRESS NOTE ADULT - SUBJECTIVE AND OBJECTIVE BOX
The patient was interviewed and evaluated  71y Male    T(C): 36.9, Max: 36.9 (05-02 @ 07:27)  HR: 71 (58 - 79)  BP: 100/70 (85/57 - 129/76)  RR: 16 (12 - 18)  SpO2: 99% (97% - 100%)  Wt(kg): --    Pt seen, doing well, no anesthesia complications or complaints noted or reported.   No Nausea    No additional recommendations.     Pain well controlled

## 2017-05-02 NOTE — DISCHARGE NOTE ADULT - PATIENT PORTAL LINK FT
“You can access the [facility] Patient Portal, offered by [facility], by registering with the following website: [email or web address]”

## 2017-05-02 NOTE — DISCHARGE NOTE ADULT - CARE PROVIDER_API CALL
Darryl Chau), Orthopaedic Surgery  [address] [place] [address]  Phone: [phone]  Fax: [phone]

## 2017-05-02 NOTE — PROGRESS NOTE ADULT - PROBLEM SELECTOR PLAN 3
sbp high 90's low 100's   dc torsemide   vs q 4 hrs sbp high 90's low 100's   holding  torsemide and potassium  vs q 4 hrs

## 2017-05-02 NOTE — CHART NOTE - NSCHARTNOTEFT_GEN_A_CORE
called by RN for drop of bp into 85/57   revied vitals: stable, drop in bp corresponds with mild drop in sat   pt asymptomatic, denies CP, SOB, palpitations, abd pain, n.v.d or diziness   was sleeping when vitals were taken   a/p   obese pt s/p sx lap knee replacement on tele monitor continuous pulse ox for afib   started 1 l ns  nc 2l for when sleeping   will monitor

## 2017-05-02 NOTE — PROGRESS NOTE ADULT - SUBJECTIVE AND OBJECTIVE BOX
Patient is a 71y old  Male who presents with a chief complaint of Pt states "I am having my LEFT Knee Replaced." (02 May 2017 05:32)      INTERVAL HPI/OVERNIGHT EVENTS:    MEDICATIONS  (STANDING):  celecoxib 200milliGRAM(s) Oral two times a day after meals  docusate sodium 100milliGRAM(s) Oral three times a day  ferrous    sulfate 325milliGRAM(s) Oral three times a day with meals  folic acid 1milliGRAM(s) Oral daily  multivitamin 1Tablet(s) Oral daily  ascorbic acid 500milliGRAM(s) Oral two times a day  verapamil SR 180milliGRAM(s) Oral daily  gabapentin 300milliGRAM(s) Oral two times a day  colchicine 0.6milliGRAM(s) Oral daily  metoprolol succinate ER 25milliGRAM(s) Oral two times a day  famotidine    Tablet 20milliGRAM(s) Oral at bedtime  potassium chloride    Tablet ER 20milliEquivalent(s) Oral daily  lactobacillus acidophilus 1Tablet(s) Oral two times a day with meals  pantoprazole    Tablet 40milliGRAM(s) Oral before breakfast  sodium chloride 0.9%. 1000milliLiter(s) IV Continuous <Continuous>  enoxaparin Injectable 30milliGRAM(s) SubCutaneous two times a day    MEDICATIONS  (PRN):  ondansetron Injectable 4milliGRAM(s) IV Push every 6 hours PRN Nausea and/or Vomiting  HYDROmorphone   Tablet 2milliGRAM(s) Oral every 3 hours PRN Moderate Pain (4 - 6)  HYDROmorphone   Tablet 4milliGRAM(s) Oral every 3 hours PRN Severe Pain (7 - 10)  morphine  - Injectable 2milliGRAM(s) IV Push every 4 hours PRN Severe Pain (7 - 10)      Allergies    ADHESIVE TAPE - WOULD PREFER PAPER TAPE (Other)  adhesives (Rash)  grass / pollen (Rhinorrhea; Rhinitis; Sneezing; Eye Irritation)  No Known Drug Allergies    Intolerances        REVIEW OF SYSTEMS:  CONSTITUTIONAL: No fever, No chills,No fatigue,No myalgia,No Body ache  EYES: No eye pain, visual disturbances, or discharge  ENMT:  No ear pain, No nose bleed, No vertigo; No sinus or throat pain  NECK: No pain, No stiffness  RESPIRATORY: No cough, wheezing, No  hemoptysis; No shortness of breath  CARDIOVASCULAR: No chest pain, palpitations, leg swelling  GASTROINTESTINAL: No abdominal or epigastric pain. No nausea, No vomiting; No diarrhea or constipation. [ ] BM  GENITOURINARY: No dysuria, No frequency, No urgency, No hematuria, or incontinence  NEUROLOGICAL: alert and oriented x 3,  No headaches, No dizziness, No numbness,  SKIN:   No itching, burning, rashes, or lesions   MUSCULOSKELETAL: No joint pain or swelling; No muscle pain, No back pain, No extremity pain  PSYCHIATRIC: No depression, anxiety, mood swings, or difficulty sleeping  ROS  [ ] Unable to obtain   REST OF REVIEW Of SYSTEM - [ ] Normal     Height (cm): 177.8 (05-01 @ 06:25), 177.8 (04-18 @ 09:58)  Weight (kg): 112.5 (05-01 @ 06:25), 112.5 (04-18 @ 09:58)  BMI (kg/m2): 35.6 (05-01 @ 06:25), 35.6 (04-18 @ 09:58)  BSA (m2): 2.29 (05-01 @ 06:25), 2.29 (04-18 @ 09:58)  Vital Signs Last 24 Hrs  T(C): 37.2, Max: 37.2 (05-02 @ 13:06)  T(F): 98.9, Max: 98.9 (05-02 @ 13:06)  HR: 77 (58 - 79)  BP: 112/65 (85/57 - 112/65)  BP(mean): --  RR: 16 (16 - 16)  SpO2: 100% (97% - 100%)  [ ] room air   [ ] 02    PHYSICAL EXAM:  GENERAL:  No acute distresss, well appearing, [ ] Agitated, [ ] Lethargy, [ ] confused   HEAD:  normal  ENMT: normal  NECK:  normal    NERVOUS SYSTEM:  Alert & Oriented X3, no focal deficits [ ]Confusion  [ ] Encephalopathic [ ] Sedated [ ] Other  CHEST/LUNG: Clear to auscultation bilaterally,  [ ] decreased breath sounds at bases  [ ] wheezing   [ ] rhonchi  [ ] crackles  HEART:  Regular rate and rhythm, No murmurs, rubs, or gallops,  [ ] irregular   ABDOMEN:  soft, nontender, nondistended, positive bowel sounds   [ ] obese  EXTREMITIES: No clubbing, cyanosis or edema  SKIN: [ ] venous stasis skin changes    LABS:                        11.6   x     )-----------( x        ( 02 May 2017 06:20 )             36.2     02 May 2017 06:20    143    |  107    |  23     ----------------------------<  90     4.2     |  29     |  1.20     Ca    8.5        02 May 2017 06:20            CAPILLARY BLOOD GLUCOSE    Cultures          RADIOLOGY & ADDITIONAL TESTS:      Care Discussed with [X] Consultants  [ ] Patient  [ ] Family  [X]   /   [ ] Other; RN  DVT prophylaxis [ ] lovenox   [ ] subq heparin  [ ] coumadin  [ ] venodynes [ ] ambulating frequently at how risk for vte and no pharm         or  mechanical prophylaxis required    [ ] other   Advanced directive:    [ ]pt has hcp     [ ] pt declined to assign hcp  Discussed with pt @ bedside Patient is a 71y old  Male who presents with a chief complaint of Pt states "I am having my LEFT Knee Replaced." (02 May 2017 05:32)  admitted for now s/p left tkr.       INTERVAL HPI/OVERNIGHT EVENTS: c/o pain surgical site     MEDICATIONS  (STANDING):  celecoxib 200milliGRAM(s) Oral two times a day after meals  docusate sodium 100milliGRAM(s) Oral three times a day  ferrous    sulfate 325milliGRAM(s) Oral three times a day with meals  folic acid 1milliGRAM(s) Oral daily  multivitamin 1Tablet(s) Oral daily  ascorbic acid 500milliGRAM(s) Oral two times a day  verapamil SR 180milliGRAM(s) Oral daily  gabapentin 300milliGRAM(s) Oral two times a day  colchicine 0.6milliGRAM(s) Oral daily  metoprolol succinate ER 25milliGRAM(s) Oral two times a day  famotidine    Tablet 20milliGRAM(s) Oral at bedtime  potassium chloride    Tablet ER 20milliEquivalent(s) Oral daily  lactobacillus acidophilus 1Tablet(s) Oral two times a day with meals  pantoprazole    Tablet 40milliGRAM(s) Oral before breakfast  sodium chloride 0.9%. 1000milliLiter(s) IV Continuous <Continuous>  enoxaparin Injectable 30milliGRAM(s) SubCutaneous two times a day    MEDICATIONS  (PRN):  ondansetron Injectable 4milliGRAM(s) IV Push every 6 hours PRN Nausea and/or Vomiting  HYDROmorphone   Tablet 2milliGRAM(s) Oral every 3 hours PRN Moderate Pain (4 - 6)  HYDROmorphone   Tablet 4milliGRAM(s) Oral every 3 hours PRN Severe Pain (7 - 10)  morphine  - Injectable 2milliGRAM(s) IV Push every 4 hours PRN Severe Pain (7 - 10)      Allergies    ADHESIVE TAPE - WOULD PREFER PAPER TAPE (Other)  adhesives (Rash)  grass / pollen (Rhinorrhea; Rhinitis; Sneezing; Eye Irritation)  No Known Drug Allergies    Intolerances        REVIEW OF SYSTEMS:  CONSTITUTIONAL: No fever, No chills,No fatigue,No myalgia,No Body ache  EYES: No eye pain, visual disturbances, or discharge  ENMT:  No ear pain, No nose bleed, No vertigo; No sinus or throat pain  NECK: No pain, No stiffness  RESPIRATORY: No cough, wheezing, No  hemoptysis; No shortness of breath  CARDIOVASCULAR: No chest pain, palpitations, leg swelling  GASTROINTESTINAL: No abdominal or epigastric pain. No nausea, No vomiting; No diarrhea or constipation. [ ] BM  GENITOURINARY: No dysuria, No frequency, No urgency, No hematuria, or incontinence  NEUROLOGICAL: alert and oriented x 3,  No headaches, No dizziness, No numbness,  SKIN:   No itching, burning, rashes, or lesions   MUSCULOSKELETAL: No joint pain or swelling; No muscle pain, No back pain, knee pain  PSYCHIATRIC: No depression, anxiety, mood swings, or difficulty sleeping  ROS  [ ] Unable to obtain   REST OF REVIEW Of SYSTEM - [ ] Normal     Height (cm): 177.8 (05-01 @ 06:25), 177.8 (04-18 @ 09:58)  Weight (kg): 112.5 (05-01 @ 06:25), 112.5 (04-18 @ 09:58)  BMI (kg/m2): 35.6 (05-01 @ 06:25), 35.6 (04-18 @ 09:58)  BSA (m2): 2.29 (05-01 @ 06:25), 2.29 (04-18 @ 09:58)  Vital Signs Last 24 Hrs  T(C): 37.2, Max: 37.2 (05-02 @ 13:06)  T(F): 98.9, Max: 98.9 (05-02 @ 13:06)  HR: 77 (58 - 79)  BP: 112/65 (85/57 - 112/65)  BP(mean): --  RR: 16 (16 - 16)  SpO2: 100% (97% - 100%)  [ ] room air   [x ] 02 2    PHYSICAL EXAM:  GENERAL:  No acute distresss, well appearing, [ ] Agitated, [ ] Lethargy, [ ] confused  mild anxiety  HEAD:  normal  ENMT: normal  NECK:  normal    NERVOUS SYSTEM:  Alert & Oriented X3, no focal deficits [ ]Confusion  [ ] Encephalopathic [ ] Sedated [ ] Other  CHEST/LUNG: Clear to auscultation bilaterally,  [ ] decreased breath sounds at bases  [ ] wheezing   [ ] rhonchi  [ ] crackles  HEART:  Regular rate and rhythm, No murmurs, rubs, or gallops,  [ ] irregular   ABDOMEN:  soft, nontender, nondistended, positive bowel sounds   [ x] obese  EXTREMITIES: No clubbing, cyanosis bilateral lower ext edema l> r  SKIN: [ ] venous stasis skin changes    LABS:                        11.6   x     )-----------( x        ( 02 May 2017 06:20 )             36.2     02 May 2017 06:20    143    |  107    |  23     ----------------------------<  90     4.2     |  29     |  1.20     Ca    8.5        02 May 2017 06:20            CAPILLARY BLOOD GLUCOSE    Cultures          RADIOLOGY & ADDITIONAL TESTS:      Care Discussed with [X] Consultants  [ ] Patient  [ ] Family  []   /   [ ] Other; RN  DVT prophylaxis [ ] lovenox   [ ] subq heparin  [ ] coumadin  [ ] venodynes [ ] ambulating frequently at how risk for vte and no pharm         or  mechanical prophylaxis required    [ ] other   Advanced directive:    [x]pt has hcp     [ ] pt declined to assign hcp  Discussed with pt @ bedside

## 2017-05-02 NOTE — PHARMACY COMMUNICATION NOTE - COMMENTS
Patient with PMH for afib and PE/DVT (1977) post-op TKR.  Patient will be placed on lovenox 30 mg q12h for 2-3 days, and then switched back to Pradaxa 150 mg BID.

## 2017-05-02 NOTE — GOALS OF CARE CONVERSATION - PERSONAL ADVANCE DIRECTIVE - CONVERSATION DETAILS
met pt, has hcp at home, unable to obtain copy at this time,wife is his hcp, she is presently at [facility], hoping she is to be d/c either today or tomorrow. pt son and daughter are on hcp also, unsure if they have a copy available. contact # given , encouraged to look for hcp once home and take picture w phone to email if needed.

## 2017-05-02 NOTE — DISCHARGE NOTE ADULT - HOSPITAL COURSE
THIS IS A CASE OF A 72 YO MALE EVALUATED IN THE OFFICE DUE TO RIGHT / LEFT KNEE PAIN.    HOSPITAL COURSE: AFTER THE RISK AND BENEFITS OF SURGICAL INTERVENTION IN DETAILS WERE DISCUSSED WITH THE PATIENT, A CONSENT WAS OBTAINED. AFTER OBTAINING MEDICAL CLEARANCE AND PREOPERATIVE EVALUATION, THE PATIENT WAS TAKEN TO THE OPERATING ROOM ON----------AND THE PATIENT UNDERWENT A  RIGHT / LEFT TOTAL KNEE REPLACEMENT. POSTOPERATIVE PHASE, THE PATIENT WAS ANTICOAGULATED AND WAS GIVEN 24 HOURS OF IV ANTIBIOTICS. A SOCIAL SERVICE CONSULT WAS OBTAINED FOR DISCHARGE PLANNING. A PHYSICAL THERAPY CONSULT WAS OBTAINED FOR  WEIGHT BEARING AS TOLERATED.   DUE TO ANEMIA OF ACUTE BLOOD LOSS POST OP  IRON SUPPLEMENT GIVEN.   DISPOSITION : HOME/REHAB AND FOLLOW UP WITH DR. DEAN AS OUTPATIENT. THIS IS A CASE OF A 70 YO MALE EVALUATED IN THE OFFICE DUE TO RIGHT  KNEE PAIN.    PAST MEDICAL HISTORY: AFIB, HTN, HYPERLIPIDEMIA, GERD, SPINAL STENOSIS, OA S/P BILATERAL HIP, SHOULDER AND RIGHT  KNEE REPLACEMENT  , HERNIATED LUMBAR DISC, CHRONIC BILATERAL LOWER EXT EDEMA WITH MULTIPLE EPISODES OF PHLEBITIS AND CELLULITIS IN THE PAST, H/O PE , PROSTATE CANCER, SEASONAL ALLERGY     HOSPITAL COURSE: AFTER THE RISK AND BENEFITS OF SURGICAL INTERVENTION IN DETAILS WERE DISCUSSED WITH THE PATIENT, A CONSENT WAS OBTAINED. AFTER OBTAINING MEDICAL CLEARANCE AND PREOPERATIVE EVALUATION, THE PATIENT WAS TAKEN TO THE OPERATING ROOM ON 05/01/2017 AND THE PATIENT UNDERWENT A  LEFT TOTAL KNEE REPLACEMENT. POSTOPERATIVE PHASE, THE PATIENT WAS ANTICOAGULATED WITH LOVENOX  AND WAS GIVEN 24 HOURS OF IV ANTIBIOTICS. A SOCIAL SERVICE CONSULT WAS OBTAINED FOR DISCHARGE PLANNING. A PHYSICAL THERAPY CONSULT WAS OBTAINED FOR  WEIGHT BEARING AS TOLERATED.   DUE TO ANEMIA OF ACUTE BLOOD LOSS POST OP  IRON SUPPLEMENT GIVEN.    PATIENT IS ON PRADAXA AT HOME WITH AFIB, AS PER PROTOCOL LOVENOX 30 CC BY MOUTH  FOR CONSTIPATION MG SC TWICE A DAY WAS GIVEN AND PRADAXA WILL BE RESUMED AT FULL DOSE ON THURSDAY 05/04/2017.    DISPOSITION : HOME AND FOLLOW UP WITH DR. DEAN AS OUTPATIENT. THIS IS A CASE OF A 70 YO MALE EVALUATED IN THE OFFICE DUE TO RIGHT  KNEE PAIN.    PAST MEDICAL HISTORY: AFIB, HTN, HYPERLIPIDEMIA, GERD, SPINAL STENOSIS, OA S/P BILATERAL HIP, SHOULDER AND RIGHT  KNEE REPLACEMENT  , HERNIATED LUMBAR DISC, CHRONIC BILATERAL LOWER EXT EDEMA WITH MULTIPLE EPISODES OF PHLEBITIS AND CELLULITIS IN THE PAST, H/O PE , PROSTATE CANCER, SEASONAL ALLERGY     HOSPITAL COURSE: AFTER THE RISK AND BENEFITS OF SURGICAL INTERVENTION IN DETAILS WERE DISCUSSED WITH THE PATIENT, A CONSENT WAS OBTAINED. AFTER OBTAINING MEDICAL CLEARANCE AND PREOPERATIVE EVALUATION, THE PATIENT WAS TAKEN TO THE OPERATING ROOM ON 05/01/2017 AND THE PATIENT UNDERWENT A  LEFT TOTAL KNEE REPLACEMENT. POSTOPERATIVE PHASE, THE PATIENT WAS ANTICOAGULATED WITH LOVENOX  AND WAS GIVEN 24 HOURS OF IV ANTIBIOTICS. A SOCIAL SERVICE CONSULT WAS OBTAINED FOR DISCHARGE PLANNING. A PHYSICAL THERAPY CONSULT WAS OBTAINED FOR  WEIGHT BEARING AS TOLERATED.   DUE TO ANEMIA OF ACUTE BLOOD LOSS POST OP  IRON SUPPLEMENT GIVEN.    PATIENT IS ON PRADAXA AT HOME WITH AFIB, AS PER PROTOCOL LOVENOX 30 CC BY MOUTH  FOR CONSTIPATION MG SC TWICE A DAY WAS GIVEN AND PRADAXA WILL BE RESUMED AT FULL DOSE ON THURSDAY 05/04/2017.    DISPOSITION : HOME WITH HOME CARE  AND FOLLOW UP WITH DR. DEAN AS OUTPATIENT.

## 2017-05-03 LAB
HCT VFR BLD CALC: 36.7 % — LOW (ref 39–50)
HGB BLD-MCNC: 12.2 G/DL — LOW (ref 13–17)
SURGICAL PATHOLOGY FINAL REPORT - CH: SIGNIFICANT CHANGE UP

## 2017-05-03 RX ORDER — VERAPAMIL HCL 240 MG
180 CAPSULE, EXTENDED RELEASE PELLETS 24 HR ORAL DAILY
Qty: 0 | Refills: 0 | Status: DISCONTINUED | OUTPATIENT
Start: 2017-05-03 | End: 2017-05-04

## 2017-05-03 RX ADMIN — CELECOXIB 200 MILLIGRAM(S): 200 CAPSULE ORAL at 17:51

## 2017-05-03 RX ADMIN — CELECOXIB 200 MILLIGRAM(S): 200 CAPSULE ORAL at 09:25

## 2017-05-03 RX ADMIN — Medication 1 MILLIGRAM(S): at 11:27

## 2017-05-03 RX ADMIN — HYDROMORPHONE HYDROCHLORIDE 2 MILLIGRAM(S): 2 INJECTION INTRAMUSCULAR; INTRAVENOUS; SUBCUTANEOUS at 16:19

## 2017-05-03 RX ADMIN — FAMOTIDINE 20 MILLIGRAM(S): 10 INJECTION INTRAVENOUS at 21:58

## 2017-05-03 RX ADMIN — Medication 1 TABLET(S): at 07:46

## 2017-05-03 RX ADMIN — CELECOXIB 200 MILLIGRAM(S): 200 CAPSULE ORAL at 18:36

## 2017-05-03 RX ADMIN — Medication 25 MILLIGRAM(S): at 17:51

## 2017-05-03 RX ADMIN — Medication 650 MILLIGRAM(S): at 22:58

## 2017-05-03 RX ADMIN — Medication 650 MILLIGRAM(S): at 13:42

## 2017-05-03 RX ADMIN — GABAPENTIN 300 MILLIGRAM(S): 400 CAPSULE ORAL at 06:33

## 2017-05-03 RX ADMIN — SODIUM CHLORIDE 100 MILLILITER(S): 9 INJECTION INTRAMUSCULAR; INTRAVENOUS; SUBCUTANEOUS at 07:44

## 2017-05-03 RX ADMIN — Medication 650 MILLIGRAM(S): at 06:33

## 2017-05-03 RX ADMIN — HYDROMORPHONE HYDROCHLORIDE 2 MILLIGRAM(S): 2 INJECTION INTRAMUSCULAR; INTRAVENOUS; SUBCUTANEOUS at 19:07

## 2017-05-03 RX ADMIN — Medication 100 MILLIGRAM(S): at 06:33

## 2017-05-03 RX ADMIN — Medication 100 MILLIGRAM(S): at 21:58

## 2017-05-03 RX ADMIN — Medication 100 MILLIGRAM(S): at 13:43

## 2017-05-03 RX ADMIN — Medication 1 TABLET(S): at 11:27

## 2017-05-03 RX ADMIN — Medication 25 MILLIGRAM(S): at 06:33

## 2017-05-03 RX ADMIN — HYDROMORPHONE HYDROCHLORIDE 2 MILLIGRAM(S): 2 INJECTION INTRAMUSCULAR; INTRAVENOUS; SUBCUTANEOUS at 12:08

## 2017-05-03 RX ADMIN — ENOXAPARIN SODIUM 30 MILLIGRAM(S): 100 INJECTION SUBCUTANEOUS at 17:51

## 2017-05-03 RX ADMIN — HYDROMORPHONE HYDROCHLORIDE 2 MILLIGRAM(S): 2 INJECTION INTRAMUSCULAR; INTRAVENOUS; SUBCUTANEOUS at 17:23

## 2017-05-03 RX ADMIN — ENOXAPARIN SODIUM 30 MILLIGRAM(S): 100 INJECTION SUBCUTANEOUS at 06:37

## 2017-05-03 RX ADMIN — Medication 1 TABLET(S): at 17:51

## 2017-05-03 RX ADMIN — HYDROMORPHONE HYDROCHLORIDE 4 MILLIGRAM(S): 2 INJECTION INTRAMUSCULAR; INTRAVENOUS; SUBCUTANEOUS at 09:27

## 2017-05-03 RX ADMIN — Medication 650 MILLIGRAM(S): at 07:33

## 2017-05-03 RX ADMIN — HYDROMORPHONE HYDROCHLORIDE 4 MILLIGRAM(S): 2 INJECTION INTRAMUSCULAR; INTRAVENOUS; SUBCUTANEOUS at 07:43

## 2017-05-03 RX ADMIN — Medication 500 MILLIGRAM(S): at 06:33

## 2017-05-03 RX ADMIN — GABAPENTIN 300 MILLIGRAM(S): 400 CAPSULE ORAL at 17:51

## 2017-05-03 RX ADMIN — Medication 650 MILLIGRAM(S): at 21:58

## 2017-05-03 RX ADMIN — PANTOPRAZOLE SODIUM 40 MILLIGRAM(S): 20 TABLET, DELAYED RELEASE ORAL at 06:33

## 2017-05-03 RX ADMIN — HYDROMORPHONE HYDROCHLORIDE 2 MILLIGRAM(S): 2 INJECTION INTRAMUSCULAR; INTRAVENOUS; SUBCUTANEOUS at 13:23

## 2017-05-03 RX ADMIN — Medication 500 MILLIGRAM(S): at 17:51

## 2017-05-03 RX ADMIN — Medication 650 MILLIGRAM(S): at 16:33

## 2017-05-03 RX ADMIN — CELECOXIB 200 MILLIGRAM(S): 200 CAPSULE ORAL at 08:39

## 2017-05-03 NOTE — PROGRESS NOTE ADULT - SUBJECTIVE AND OBJECTIVE BOX
YOLIS CARROLL 71y Male  MRN-[id number]       ORTHOPEDIC SURGERY / DR. DEAN    POD #  2    Vital Signs Last 24 Hrs  T(C): 36.8, Max: 37.2 (05-02 @ 13:06)  T(F): 98.3, Max: 98.9 (05-02 @ 13:06)  HR: 70 (70 - 92)  BP: 138/79 (92/59 - 138/79)  BP(mean): --  RR: 16 (16 - 16)  SpO2: 99% (97% - 100%)      LEFT KNEE :    DRESSING DRY AND INTACT  SOME EDEMA  GOOD MOTOR TO LEFT LOWER EXTREMITY  NEURO-VASCULAR STATUS INTACT  NO CALF TENDERNESS    Hemoglobin: 11.6 (05-02 @ 06:20)  Hemoglobin: 13.1 (05-01 @ 10:48)    Hematocrit: 36.2 (05-02 @ 06:20)  Hematocrit: 40.6 (05-01 @ 10:48)    05-02    143  |  107  |  23  ----------------------------<  90  4.2   |  29  |  1.20    Ca    8.5      02 May 2017 06:20        ASSESSMENT &  PLAN:  POD # 2 S/P LEFT TOTAL KNEE  REPLACEMENT    WEIGHT  BEARING AS TOLERATED, OOB AND AMBULATE, PT  INCENTIVE SPIROMETRY     H/O AFIB ON PRADAXA AT HOME , ON LOVENOX 30 MG BID, WILL CONTINUE AND SWITCH TO FULL DOSE PRADAXA ON THURSDAY/ FRIDAY PENDING H/H RESULTS TODAY      DISCHARGE PLANNING TO HOME WITH HOME CARE

## 2017-05-03 NOTE — PROGRESS NOTE ADULT - PROBLEM SELECTOR PLAN 2
holding pradaxa  cont metoprolol w parameters  hold verapamil til bp reevaluated tomorrow  will reevaluate consider resuming tomorrow if hh stable

## 2017-05-03 NOTE — PROGRESS NOTE ADULT - SUBJECTIVE AND OBJECTIVE BOX
Patient is a 71y old  Male who presents with a chief complaint of Pt states "I am having my LEFT Knee Replaced." (02 May 2017 05:32)      INTERVAL HPI/OVERNIGHT EVENTS: feels well, less pain  T(C): 36.8, Max: 37.2 (05-02 @ 13:06)  HR: 71 (70 - 92)  BP: 121/72 (100/60 - 138/79)  RR: 16 (16 - 16)  SpO2: 98% (97% - 100%)  Wt(kg): --  I&O's Summary  I & Os for 24h ending 03 May 2017 07:00  =============================================  IN: 1360 ml / OUT: 1200 ml / NET: 160 ml    I & Os for current day (as of 03 May 2017 11:50)  =============================================  IN: 240 ml / OUT: 150 ml / NET: 90 ml      LABS:                        12.2   x     )-----------( x        ( 03 May 2017 09:16 )             36.7     05-02    143  |  107  |  23  ----------------------------<  90  4.2   |  29  |  1.20    Ca    8.5      02 May 2017 06:20        MEDICATIONS  (STANDING):  acetaminophen   Tablet. 650milliGRAM(s) Oral every 8 hours  celecoxib 200milliGRAM(s) Oral two times a day after meals  docusate sodium 100milliGRAM(s) Oral three times a day  ferrous    sulfate 325milliGRAM(s) Oral three times a day with meals  folic acid 1milliGRAM(s) Oral daily  multivitamin 1Tablet(s) Oral daily  ascorbic acid 500milliGRAM(s) Oral two times a day  gabapentin 300milliGRAM(s) Oral two times a day  colchicine 0.6milliGRAM(s) Oral daily  metoprolol succinate ER 25milliGRAM(s) Oral two times a day  famotidine    Tablet 20milliGRAM(s) Oral at bedtime  lactobacillus acidophilus 1Tablet(s) Oral two times a day with meals  pantoprazole    Tablet 40milliGRAM(s) Oral before breakfast  enoxaparin Injectable 30milliGRAM(s) SubCutaneous two times a day    MEDICATIONS  (PRN):  ondansetron Injectable 4milliGRAM(s) IV Push every 6 hours PRN Nausea and/or Vomiting  HYDROmorphone   Tablet 2milliGRAM(s) Oral every 3 hours PRN Moderate Pain (4 - 6)  HYDROmorphone   Tablet 4milliGRAM(s) Oral every 3 hours PRN Severe Pain (7 - 10)  morphine  - Injectable 2milliGRAM(s) IV Push every 4 hours PRN Severe Pain (7 - 10)      REVIEW OF SYSTEMS:  CONSTITUTIONAL: No fever, weight loss, or fatigue  EYES: No eye pain, visual disturbances, or discharge  ENMT:  No difficulty hearing, tinnitus, vertigo; No sinus or throat pain  NECK: No pain or stiffness  RESPIRATORY: No cough, wheezing, chills or hemoptysis; No shortness of breath  CARDIOVASCULAR: No chest pain, palpitations, dizziness, or leg swelling  GASTROINTESTINAL: No abdominal or epigastric pain. No nausea, vomiting, or hematemesis; No diarrhea or constipation. No melena or hematochezia.  GENITOURINARY: No dysuria, frequency, hematuria, or incontinence  NEUROLOGICAL: No headaches, memory loss, loss of strength, numbness, or tremors  SKIN: No itching, burning, rashes, or lesions   LYMPH NODES: No enlarged glands  ENDOCRINE: No heat or cold intolerance; No hair loss  MUSCULOSKELETAL: No joint pain or swelling; No muscle, back, or extremity pain  PSYCHIATRIC: No depression, anxiety, mood swings, or difficulty sleeping  HEME/LYMPH: No easy bruising, or bleeding gums  ALLERY AND IMMUNOLOGIC: No hives or eczema    RADIOLOGY & ADDITIONAL TESTS:    Imaging Personally Reviewed:  [ ] YES  [ ] NO    Consultant(s) Notes Reviewed:  [ ] YES  [ ] NO    PHYSICAL EXAM:  GENERAL: NAD, well-groomed, well-developed  HEAD:  Atraumatic, Normocephalic  EYES: EOMI, PERRLA, conjunctiva and sclera clear  ENMT: No tonsillar erythema, exudates, or enlargement; Moist mucous membranes, Good dentition, No lesions  NECK: Supple, No JVD, Normal thyroid  NERVOUS SYSTEM:  Alert & Oriented X3, Good concentration; Motor Strength 5/5 B/L upper and lower extremities; DTRs 2+ intact and symmetric  CHEST/LUNG: Clear to percussion bilaterally; No rales, rhonchi, wheezing, or rubs  HEART: Regular rate and rhythm; No murmurs, rubs, or gallops  ABDOMEN: Soft, Nontender, Nondistended; Bowel sounds present  EXTREMITIES:  2+ Peripheral Pulses, No clubbing, cyanosis, or edema  LYMPH: No lymphadenopathy noted  SKIN: No rashes or lesions    Care Discussed with Consultants/Other Providers [ ] YES  [ ] NO

## 2017-05-04 VITALS
SYSTOLIC BLOOD PRESSURE: 110 MMHG | HEART RATE: 70 BPM | OXYGEN SATURATION: 98 % | DIASTOLIC BLOOD PRESSURE: 77 MMHG | RESPIRATION RATE: 16 BRPM | TEMPERATURE: 98 F

## 2017-05-04 PROCEDURE — 97162 PT EVAL MOD COMPLEX 30 MIN: CPT

## 2017-05-04 PROCEDURE — 73562 X-RAY EXAM OF KNEE 3: CPT

## 2017-05-04 PROCEDURE — 88311 DECALCIFY TISSUE: CPT

## 2017-05-04 PROCEDURE — C1889: CPT

## 2017-05-04 PROCEDURE — 85018 HEMOGLOBIN: CPT

## 2017-05-04 PROCEDURE — 80048 BASIC METABOLIC PNL TOTAL CA: CPT

## 2017-05-04 PROCEDURE — 86920 COMPATIBILITY TEST SPIN: CPT

## 2017-05-04 PROCEDURE — C1713: CPT

## 2017-05-04 PROCEDURE — C1776: CPT

## 2017-05-04 PROCEDURE — 97116 GAIT TRAINING THERAPY: CPT

## 2017-05-04 PROCEDURE — 97530 THERAPEUTIC ACTIVITIES: CPT

## 2017-05-04 PROCEDURE — 88305 TISSUE EXAM BY PATHOLOGIST: CPT

## 2017-05-04 RX ORDER — HYDROMORPHONE HYDROCHLORIDE 2 MG/ML
1 INJECTION INTRAMUSCULAR; INTRAVENOUS; SUBCUTANEOUS
Qty: 30 | Refills: 0
Start: 2017-05-04

## 2017-05-04 RX ORDER — TRAMADOL HYDROCHLORIDE 50 MG/1
0.5 TABLET ORAL
Qty: 0 | Refills: 0 | COMMUNITY

## 2017-05-04 RX ORDER — DABIGATRAN ETEXILATE MESYLATE 150 MG/1
150 CAPSULE ORAL EVERY 12 HOURS
Qty: 0 | Refills: 0 | Status: DISCONTINUED | OUTPATIENT
Start: 2017-05-04 | End: 2017-05-04

## 2017-05-04 RX ADMIN — PANTOPRAZOLE SODIUM 40 MILLIGRAM(S): 20 TABLET, DELAYED RELEASE ORAL at 06:14

## 2017-05-04 RX ADMIN — HYDROMORPHONE HYDROCHLORIDE 4 MILLIGRAM(S): 2 INJECTION INTRAMUSCULAR; INTRAVENOUS; SUBCUTANEOUS at 09:02

## 2017-05-04 RX ADMIN — Medication 100 MILLIGRAM(S): at 06:13

## 2017-05-04 RX ADMIN — Medication 500 MILLIGRAM(S): at 06:13

## 2017-05-04 RX ADMIN — CELECOXIB 200 MILLIGRAM(S): 200 CAPSULE ORAL at 08:01

## 2017-05-04 RX ADMIN — DABIGATRAN ETEXILATE MESYLATE 150 MILLIGRAM(S): 150 CAPSULE ORAL at 06:16

## 2017-05-04 RX ADMIN — CELECOXIB 200 MILLIGRAM(S): 200 CAPSULE ORAL at 09:01

## 2017-05-04 RX ADMIN — Medication 1 TABLET(S): at 08:03

## 2017-05-04 RX ADMIN — Medication 650 MILLIGRAM(S): at 07:14

## 2017-05-04 RX ADMIN — Medication 25 MILLIGRAM(S): at 06:13

## 2017-05-04 RX ADMIN — GABAPENTIN 300 MILLIGRAM(S): 400 CAPSULE ORAL at 06:13

## 2017-05-04 RX ADMIN — Medication 650 MILLIGRAM(S): at 06:14

## 2017-05-04 RX ADMIN — HYDROMORPHONE HYDROCHLORIDE 4 MILLIGRAM(S): 2 INJECTION INTRAMUSCULAR; INTRAVENOUS; SUBCUTANEOUS at 08:03

## 2017-05-04 NOTE — PROGRESS NOTE ADULT - SUBJECTIVE AND OBJECTIVE BOX
YOLIS CARROLL 71y Male  MRN-[id number]       ORTHOPEDIC SURGERY / DR. DEAN    POD # 3    Vital Signs Last 24 Hrs  T(C): 36.7, Max: 36.8 (05-03 @ 07:21)  T(F): 98.1, Max: 98.3 (05-03 @ 07:21)  HR: 70 (70 - 77)  BP: 110/77 (109/72 - 123/73)  BP(mean): --  RR: 16 (16 - 16)  SpO2: 98% (90% - 98%)      LEFT KNEE :    WOUND DRY AND INTACT  SOME EDEMA  GOOD MOTOR TO LEFT LOWER EXTREMITY  NEURO-VASCULAR STATUS INTACT  NO CALF TENDERNESS    Hemoglobin: 12.2 (05-03 @ 09:16)  Hemoglobin: 11.6 (05-02 @ 06:20)  Hemoglobin: 13.1 (05-01 @ 10:48)    Hematocrit: 36.7 (05-03 @ 09:16)  Hematocrit: 36.2 (05-02 @ 06:20)  Hematocrit: 40.6 (05-01 @ 10:48)    05-02    143  |  107  |  23  ----------------------------<  90  4.2   |  29  |  1.20    Ca    8.5      02 May 2017 06:20        ASSESSMENT &  PLAN:  POD # 3  S/P LEFT TOTAL KNEE  REPLACEMENT    WEIGHT  BEARING AS TOLERATED, OOB AND AMBULATE, PT,  INCENTIVE SPIROMETRY     H/O AFIB ON PRADAXA AT HOME, D/C LOVENOX AND RESUME PRADAXA TODAY     DISCHARGE PLANNING TO HOME WITH HOME CARE   NEW AQUACEL DRESSING APPLIED

## 2017-05-04 NOTE — PROGRESS NOTE ADULT - PROBLEM SELECTOR PROBLEM 4
Advanced care planning/counseling discussion

## 2017-05-04 NOTE — PROGRESS NOTE ADULT - PROBLEM SELECTOR PLAN 1
s/p left tkr  pod 2  continue phys therapy  dc planning   ortho follow up
s/p left tkr  pod 3  continue phys therapy  dc planning   ortho follow up
s/p left tkr  pod 1  continue phys therapy  dc planning   ortho follow up

## 2017-05-09 DIAGNOSIS — Z86.711 PERSONAL HISTORY OF PULMONARY EMBOLISM: ICD-10-CM

## 2017-05-09 DIAGNOSIS — K21.9 GASTRO-ESOPHAGEAL REFLUX DISEASE WITHOUT ESOPHAGITIS: ICD-10-CM

## 2017-05-09 DIAGNOSIS — E78.5 HYPERLIPIDEMIA, UNSPECIFIED: ICD-10-CM

## 2017-05-09 DIAGNOSIS — I10 ESSENTIAL (PRIMARY) HYPERTENSION: ICD-10-CM

## 2017-05-09 DIAGNOSIS — I48.91 UNSPECIFIED ATRIAL FIBRILLATION: ICD-10-CM

## 2017-05-09 DIAGNOSIS — Z87.891 PERSONAL HISTORY OF NICOTINE DEPENDENCE: ICD-10-CM

## 2017-05-09 DIAGNOSIS — M17.12 UNILATERAL PRIMARY OSTEOARTHRITIS, LEFT KNEE: ICD-10-CM

## 2017-05-09 DIAGNOSIS — Z85.46 PERSONAL HISTORY OF MALIGNANT NEOPLASM OF PROSTATE: ICD-10-CM

## 2017-05-09 DIAGNOSIS — D62 ACUTE POSTHEMORRHAGIC ANEMIA: ICD-10-CM

## 2017-05-09 DIAGNOSIS — M48.00 SPINAL STENOSIS, SITE UNSPECIFIED: ICD-10-CM

## 2019-02-06 ENCOUNTER — APPOINTMENT (OUTPATIENT)
Dept: GASTROENTEROLOGY | Facility: CLINIC | Age: 74
End: 2019-02-06

## 2019-02-20 ENCOUNTER — APPOINTMENT (OUTPATIENT)
Dept: GASTROENTEROLOGY | Facility: CLINIC | Age: 74
End: 2019-02-20

## 2020-05-22 DIAGNOSIS — Z01.818 ENCOUNTER FOR OTHER PREPROCEDURAL EXAMINATION: ICD-10-CM

## 2020-05-25 ENCOUNTER — APPOINTMENT (OUTPATIENT)
Dept: DISASTER EMERGENCY | Facility: CLINIC | Age: 75
End: 2020-05-25

## 2020-05-25 ENCOUNTER — LABORATORY RESULT (OUTPATIENT)
Age: 75
End: 2020-05-25

## 2022-02-17 NOTE — DISCHARGE NOTE ADULT - NSTOBACCOREFERRAL_GEN_A_CS
Patient called about making an appt with Dr. Michel, he stated he is experiencing headaches, as well as hallucinating, he would like an appt soon. 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 He is having surgery on 02/18/2022 for a hernia. He would like a call back to discuss his headaches and making an appt with Dr. Michel.     Call back number for patient is [phone], [facility] number where patient is currently residing.    Yes Patient declined information

## 2022-12-01 NOTE — PATIENT PROFILE ADULT. - NS TRANSFER DISPOSITION PATIENT BELONGINGS
You can access the FollowMyHealth Patient Portal offered by [facility] by registering at the following website: [email or web address]. By joining [employer]’s FollowMyHealth portal, you will also be able to view your health information using other applications (apps) compatible with our system. with patient/son took cane

## 2023-09-01 NOTE — PHYSICAL THERAPY INITIAL EVALUATION ADULT - RANGE OF MOTION, PT EVAL
Patient yelling and screaming that she cannot breath. Patient assessed. NAD   3-5 Sessions:  Increase knee flexion 20 degrees

## 2023-09-21 NOTE — PHYSICAL THERAPY INITIAL EVALUATION ADULT - WEIGHT-BEARING RESTRICTIONS: SIT/STAND, REHAB EVAL
Eusebio Santizo MD please advise on labs from 9/18/23    Wondering if he needs to see nephrologist   weight-bearing as tolerated

## 2023-10-10 DIAGNOSIS — Z82.49 FAMILY HISTORY OF ISCHEMIC HEART DISEASE AND OTHER DISEASES OF THE CIRCULATORY SYSTEM: ICD-10-CM

## 2023-10-10 DIAGNOSIS — Z86.69 PERSONAL HISTORY OF OTHER DISEASES OF THE NERVOUS SYSTEM AND SENSE ORGANS: ICD-10-CM

## 2023-10-10 DIAGNOSIS — I48.91 UNSPECIFIED ATRIAL FIBRILLATION: ICD-10-CM

## 2023-10-10 DIAGNOSIS — Z83.3 FAMILY HISTORY OF DIABETES MELLITUS: ICD-10-CM

## 2023-10-10 DIAGNOSIS — I25.10 ATHEROSCLEROTIC HEART DISEASE OF NATIVE CORONARY ARTERY W/OUT ANGINA PECTORIS: ICD-10-CM

## 2023-10-10 DIAGNOSIS — J84.9 INTERSTITIAL PULMONARY DISEASE, UNSPECIFIED: ICD-10-CM

## 2023-10-10 DIAGNOSIS — Z86.39 PERSONAL HISTORY OF OTHER ENDOCRINE, NUTRITIONAL AND METABOLIC DISEASE: ICD-10-CM

## 2023-10-10 DIAGNOSIS — I10 ESSENTIAL (PRIMARY) HYPERTENSION: ICD-10-CM

## 2023-10-10 DIAGNOSIS — R60.9 EDEMA, UNSPECIFIED: ICD-10-CM

## 2023-10-10 RX ORDER — ROSUVASTATIN CALCIUM 10 MG/1
10 TABLET, FILM COATED ORAL
Qty: 90 | Refills: 1 | Status: ACTIVE | COMMUNITY
Start: 2023-10-10

## 2023-10-10 RX ORDER — RIBOFLAVIN (VITAMIN B2) 100 MG
100 TABLET ORAL DAILY
Refills: 0 | Status: ACTIVE | COMMUNITY
Start: 2023-10-10

## 2023-10-10 RX ORDER — COLCHICINE 0.6 MG/1
0.6 TABLET ORAL DAILY
Refills: 0 | Status: ACTIVE | COMMUNITY
Start: 2023-10-10

## 2023-10-10 RX ORDER — ALLOPURINOL 300 MG/1
300 TABLET ORAL DAILY
Refills: 0 | Status: ACTIVE | COMMUNITY
Start: 2023-10-10

## 2023-10-10 RX ORDER — TORSEMIDE 10 MG/1
10 TABLET ORAL DAILY
Refills: 0 | Status: ACTIVE | COMMUNITY
Start: 2023-10-10

## 2023-10-10 RX ORDER — DULOXETINE HYDROCHLORIDE 30 MG/1
30 CAPSULE, DELAYED RELEASE PELLETS ORAL DAILY
Refills: 0 | Status: ACTIVE | COMMUNITY
Start: 2023-10-10

## 2023-10-10 RX ORDER — POTASSIUM CHLORIDE 750 MG/1
10 TABLET, FILM COATED, EXTENDED RELEASE ORAL DAILY
Refills: 0 | Status: ACTIVE | COMMUNITY
Start: 2023-10-10

## 2023-10-10 RX ORDER — TRAMADOL HYDROCHLORIDE 50 MG/1
50 TABLET, COATED ORAL 3 TIMES DAILY
Refills: 0 | Status: ACTIVE | COMMUNITY
Start: 2023-10-10

## 2023-10-10 RX ORDER — FAMOTIDINE 20 MG/1
20 TABLET, FILM COATED ORAL
Refills: 0 | Status: ACTIVE | COMMUNITY
Start: 2023-10-10

## 2023-10-10 RX ORDER — METOPROLOL TARTRATE 25 MG/1
25 TABLET, FILM COATED ORAL TWICE DAILY
Qty: 180 | Refills: 3 | Status: ACTIVE | COMMUNITY
Start: 2023-10-10

## 2023-10-10 RX ORDER — GABAPENTIN 300 MG/1
300 CAPSULE ORAL TWICE DAILY
Refills: 0 | Status: ACTIVE | COMMUNITY
Start: 2023-10-10

## 2023-10-10 RX ORDER — DABIGATRAN ETEXILATE MESYLATE 150 MG/1
150 CAPSULE ORAL TWICE DAILY
Refills: 0 | Status: ACTIVE | COMMUNITY
Start: 2023-10-10

## 2023-10-10 RX ORDER — VITAMIN K2 90 MCG
1000 CAPSULE ORAL
Refills: 0 | Status: ACTIVE | COMMUNITY
Start: 2023-10-10

## 2023-10-11 ENCOUNTER — APPOINTMENT (OUTPATIENT)
Dept: ELECTROPHYSIOLOGY | Facility: CLINIC | Age: 78
End: 2023-10-11
Payer: MEDICARE

## 2023-10-11 VITALS
DIASTOLIC BLOOD PRESSURE: 80 MMHG | HEART RATE: 51 BPM | WEIGHT: 250 LBS | SYSTOLIC BLOOD PRESSURE: 134 MMHG | HEIGHT: 70 IN | BODY MASS INDEX: 35.79 KG/M2

## 2023-10-11 PROCEDURE — 93000 ELECTROCARDIOGRAM COMPLETE: CPT

## 2023-10-11 PROCEDURE — 99205 OFFICE O/P NEW HI 60 MIN: CPT

## 2023-11-01 ENCOUNTER — APPOINTMENT (OUTPATIENT)
Dept: CT IMAGING | Facility: CLINIC | Age: 78
End: 2023-11-01
Payer: MEDICARE

## 2023-11-01 PROCEDURE — 75572 CT HRT W/3D IMAGE: CPT

## 2023-11-06 ENCOUNTER — OUTPATIENT (OUTPATIENT)
Dept: OUTPATIENT SERVICES | Facility: HOSPITAL | Age: 78
LOS: 1 days | End: 2023-11-06
Payer: MEDICARE

## 2023-11-06 VITALS
WEIGHT: 264.55 LBS | RESPIRATION RATE: 16 BRPM | SYSTOLIC BLOOD PRESSURE: 130 MMHG | HEIGHT: 70 IN | TEMPERATURE: 97 F | DIASTOLIC BLOOD PRESSURE: 74 MMHG | OXYGEN SATURATION: 96 % | HEART RATE: 96 BPM

## 2023-11-06 DIAGNOSIS — Z96.653 PRESENCE OF ARTIFICIAL KNEE JOINT, BILATERAL: Chronic | ICD-10-CM

## 2023-11-06 DIAGNOSIS — Z96.611 PRESENCE OF RIGHT ARTIFICIAL SHOULDER JOINT: Chronic | ICD-10-CM

## 2023-11-06 DIAGNOSIS — I48.91 UNSPECIFIED ATRIAL FIBRILLATION: ICD-10-CM

## 2023-11-06 DIAGNOSIS — Z01.818 ENCOUNTER FOR OTHER PREPROCEDURAL EXAMINATION: ICD-10-CM

## 2023-11-06 DIAGNOSIS — Z29.9 ENCOUNTER FOR PROPHYLACTIC MEASURES, UNSPECIFIED: ICD-10-CM

## 2023-11-06 DIAGNOSIS — Z98.1 ARTHRODESIS STATUS: Chronic | ICD-10-CM

## 2023-11-06 DIAGNOSIS — Z96.643 PRESENCE OF ARTIFICIAL HIP JOINT, BILATERAL: Chronic | ICD-10-CM

## 2023-11-06 DIAGNOSIS — Z98.890 OTHER SPECIFIED POSTPROCEDURAL STATES: Chronic | ICD-10-CM

## 2023-11-06 LAB
ANION GAP SERPL CALC-SCNC: 6 MMOL/L — SIGNIFICANT CHANGE UP (ref 5–17)
ANION GAP SERPL CALC-SCNC: 6 MMOL/L — SIGNIFICANT CHANGE UP (ref 5–17)
APTT BLD: 58 SEC — HIGH (ref 24.5–35.6)
APTT BLD: 58 SEC — HIGH (ref 24.5–35.6)
BASOPHILS # BLD AUTO: 0.05 K/UL — SIGNIFICANT CHANGE UP (ref 0–0.2)
BASOPHILS # BLD AUTO: 0.05 K/UL — SIGNIFICANT CHANGE UP (ref 0–0.2)
BASOPHILS NFR BLD AUTO: 0.7 % — SIGNIFICANT CHANGE UP (ref 0–2)
BASOPHILS NFR BLD AUTO: 0.7 % — SIGNIFICANT CHANGE UP (ref 0–2)
BLD GP AB SCN SERPL QL: SIGNIFICANT CHANGE UP
BLD GP AB SCN SERPL QL: SIGNIFICANT CHANGE UP
BUN SERPL-MCNC: 17.3 MG/DL — SIGNIFICANT CHANGE UP (ref 8–20)
BUN SERPL-MCNC: 17.3 MG/DL — SIGNIFICANT CHANGE UP (ref 8–20)
CALCIUM SERPL-MCNC: 9.8 MG/DL — SIGNIFICANT CHANGE UP (ref 8.4–10.5)
CALCIUM SERPL-MCNC: 9.8 MG/DL — SIGNIFICANT CHANGE UP (ref 8.4–10.5)
CHLORIDE SERPL-SCNC: 103 MMOL/L — SIGNIFICANT CHANGE UP (ref 96–108)
CHLORIDE SERPL-SCNC: 103 MMOL/L — SIGNIFICANT CHANGE UP (ref 96–108)
CO2 SERPL-SCNC: 33 MMOL/L — HIGH (ref 22–29)
CO2 SERPL-SCNC: 33 MMOL/L — HIGH (ref 22–29)
CREAT SERPL-MCNC: 0.98 MG/DL — SIGNIFICANT CHANGE UP (ref 0.5–1.3)
CREAT SERPL-MCNC: 0.98 MG/DL — SIGNIFICANT CHANGE UP (ref 0.5–1.3)
EGFR: 78 ML/MIN/1.73M2 — SIGNIFICANT CHANGE UP
EGFR: 78 ML/MIN/1.73M2 — SIGNIFICANT CHANGE UP
EOSINOPHIL # BLD AUTO: 0.21 K/UL — SIGNIFICANT CHANGE UP (ref 0–0.5)
EOSINOPHIL # BLD AUTO: 0.21 K/UL — SIGNIFICANT CHANGE UP (ref 0–0.5)
EOSINOPHIL NFR BLD AUTO: 3 % — SIGNIFICANT CHANGE UP (ref 0–6)
EOSINOPHIL NFR BLD AUTO: 3 % — SIGNIFICANT CHANGE UP (ref 0–6)
GLUCOSE SERPL-MCNC: 196 MG/DL — HIGH (ref 70–99)
GLUCOSE SERPL-MCNC: 196 MG/DL — HIGH (ref 70–99)
HCT VFR BLD CALC: 40 % — SIGNIFICANT CHANGE UP (ref 39–50)
HCT VFR BLD CALC: 40 % — SIGNIFICANT CHANGE UP (ref 39–50)
HGB BLD-MCNC: 12.8 G/DL — LOW (ref 13–17)
HGB BLD-MCNC: 12.8 G/DL — LOW (ref 13–17)
IMM GRANULOCYTES NFR BLD AUTO: 0.3 % — SIGNIFICANT CHANGE UP (ref 0–0.9)
IMM GRANULOCYTES NFR BLD AUTO: 0.3 % — SIGNIFICANT CHANGE UP (ref 0–0.9)
INR BLD: 1.23 RATIO — HIGH (ref 0.85–1.18)
INR BLD: 1.23 RATIO — HIGH (ref 0.85–1.18)
LYMPHOCYTES # BLD AUTO: 1.29 K/UL — SIGNIFICANT CHANGE UP (ref 1–3.3)
LYMPHOCYTES # BLD AUTO: 1.29 K/UL — SIGNIFICANT CHANGE UP (ref 1–3.3)
LYMPHOCYTES # BLD AUTO: 18.3 % — SIGNIFICANT CHANGE UP (ref 13–44)
LYMPHOCYTES # BLD AUTO: 18.3 % — SIGNIFICANT CHANGE UP (ref 13–44)
MAGNESIUM SERPL-MCNC: 2.3 MG/DL — SIGNIFICANT CHANGE UP (ref 1.8–2.6)
MAGNESIUM SERPL-MCNC: 2.3 MG/DL — SIGNIFICANT CHANGE UP (ref 1.8–2.6)
MCHC RBC-ENTMCNC: 30.5 PG — SIGNIFICANT CHANGE UP (ref 27–34)
MCHC RBC-ENTMCNC: 30.5 PG — SIGNIFICANT CHANGE UP (ref 27–34)
MCHC RBC-ENTMCNC: 32 GM/DL — SIGNIFICANT CHANGE UP (ref 32–36)
MCHC RBC-ENTMCNC: 32 GM/DL — SIGNIFICANT CHANGE UP (ref 32–36)
MCV RBC AUTO: 95.2 FL — SIGNIFICANT CHANGE UP (ref 80–100)
MCV RBC AUTO: 95.2 FL — SIGNIFICANT CHANGE UP (ref 80–100)
MONOCYTES # BLD AUTO: 0.64 K/UL — SIGNIFICANT CHANGE UP (ref 0–0.9)
MONOCYTES # BLD AUTO: 0.64 K/UL — SIGNIFICANT CHANGE UP (ref 0–0.9)
MONOCYTES NFR BLD AUTO: 9.1 % — SIGNIFICANT CHANGE UP (ref 2–14)
MONOCYTES NFR BLD AUTO: 9.1 % — SIGNIFICANT CHANGE UP (ref 2–14)
NEUTROPHILS # BLD AUTO: 4.85 K/UL — SIGNIFICANT CHANGE UP (ref 1.8–7.4)
NEUTROPHILS # BLD AUTO: 4.85 K/UL — SIGNIFICANT CHANGE UP (ref 1.8–7.4)
NEUTROPHILS NFR BLD AUTO: 68.6 % — SIGNIFICANT CHANGE UP (ref 43–77)
NEUTROPHILS NFR BLD AUTO: 68.6 % — SIGNIFICANT CHANGE UP (ref 43–77)
PLATELET # BLD AUTO: 241 K/UL — SIGNIFICANT CHANGE UP (ref 150–400)
PLATELET # BLD AUTO: 241 K/UL — SIGNIFICANT CHANGE UP (ref 150–400)
POTASSIUM SERPL-MCNC: 4.8 MMOL/L — SIGNIFICANT CHANGE UP (ref 3.5–5.3)
POTASSIUM SERPL-MCNC: 4.8 MMOL/L — SIGNIFICANT CHANGE UP (ref 3.5–5.3)
POTASSIUM SERPL-SCNC: 4.8 MMOL/L — SIGNIFICANT CHANGE UP (ref 3.5–5.3)
POTASSIUM SERPL-SCNC: 4.8 MMOL/L — SIGNIFICANT CHANGE UP (ref 3.5–5.3)
PROTHROM AB SERPL-ACNC: 13.6 SEC — HIGH (ref 9.5–13)
PROTHROM AB SERPL-ACNC: 13.6 SEC — HIGH (ref 9.5–13)
RBC # BLD: 4.2 M/UL — SIGNIFICANT CHANGE UP (ref 4.2–5.8)
RBC # BLD: 4.2 M/UL — SIGNIFICANT CHANGE UP (ref 4.2–5.8)
RBC # FLD: 13.4 % — SIGNIFICANT CHANGE UP (ref 10.3–14.5)
RBC # FLD: 13.4 % — SIGNIFICANT CHANGE UP (ref 10.3–14.5)
SODIUM SERPL-SCNC: 142 MMOL/L — SIGNIFICANT CHANGE UP (ref 135–145)
SODIUM SERPL-SCNC: 142 MMOL/L — SIGNIFICANT CHANGE UP (ref 135–145)
WBC # BLD: 7.06 K/UL — SIGNIFICANT CHANGE UP (ref 3.8–10.5)
WBC # BLD: 7.06 K/UL — SIGNIFICANT CHANGE UP (ref 3.8–10.5)
WBC # FLD AUTO: 7.06 K/UL — SIGNIFICANT CHANGE UP (ref 3.8–10.5)
WBC # FLD AUTO: 7.06 K/UL — SIGNIFICANT CHANGE UP (ref 3.8–10.5)

## 2023-11-06 PROCEDURE — G0463: CPT

## 2023-11-06 PROCEDURE — 93005 ELECTROCARDIOGRAM TRACING: CPT

## 2023-11-06 PROCEDURE — 93010 ELECTROCARDIOGRAM REPORT: CPT

## 2023-11-06 NOTE — H&P PST ADULT - NSICDXPASTMEDICALHX_GEN_ALL_CORE_FT
PAST MEDICAL HISTORY:  Bilateral leg edema     GERD (gastroesophageal reflux disease)     Herniated lumbar intervertebral disc 10 years ago    Hyperlipidemia diet controlled    Hypertension     Osteoarthrosis     PC (prostate cancer) s/p radiation 61y/o    Phlebitis 1977 - right leg related to wound    Pulmonary embolism related to trauma - 1977, leg injury    Seasonal allergies     Spinal stenosis     Unilateral primary osteoarthritis, left knee     Unilateral primary osteoarthritis, right knee     Unspecified atrial fibrillation      PAST MEDICAL HISTORY:  Bilateral leg edema     Chronic kidney disease (CKD)     GERD (gastroesophageal reflux disease)     Herniated lumbar intervertebral disc 10 years ago    History of pulmonary embolism     HLD (hyperlipidemia)     HTN (hypertension)     OA (osteoarthritis)     PC (prostate cancer) s/p radiation 61y/o    Seasonal allergies     Spinal stenosis     Unspecified atrial fibrillation

## 2023-11-06 NOTE — H&P PST ADULT - PROBLEM SELECTOR PLAN 2
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

## 2023-11-06 NOTE — H&P PST ADULT - MS GEN HX ROS MEA POS PC
[facility], [address]  Phone [phone]   Fax [phone]      OFFICE VISIT: 2022    Inez Orlando-: 2002      HPI  Reason For Visit:  Inez Colon is a 20 y.o. No chief complaint on file. Patient presents via Mychart video conference visit  She has multiple issues to discuss. She notes that she fell a week prior to last Saturday. She fell backwards onto her buttock. She felt pain in her lower back and her tailbone. Xray was negative. She notes that she was sore for about 4 days. This is mostly better with some mild soreness in her low back. She has a knot on the back of her Right leg. This is something that her mother felt on the back of her leg. This is in the hamstring area and is sore with walking. She has has some abdominal pain. She describes this as a cramp like pain. She thinks that she was constipated   She has not had any pain or problem since. She does not typically have much trouble with constipation. She is not sleeping at night. Caffeine: minimal  We discussed trying to keep sleep in 1 large block  She does have atarax that she can take for prn insomnia. She is having some panic attacks. She is taking Effexor  mg daily  We discussed increasing this to 225 mg daily. vitals were not taken for this visit. There is no height or weight on file to calculate BMI. I have reviewed the following with the Ms. Orlando   Lab Review  Orders Only on 2022   Component Date Value    WBC 2022 6.7     RBC 2022 4.43     Hemoglobin 2022 13.5     Hematocrit 2022 42.7     MCV 2022 96.4     MCH 2022 30.5     MCHC 2022 31.6*    RDW 2022 14.0     Platelets  358     MPV 2022 9.6     Neutrophils % 2022 60.2     Lymphocytes % 2022 33.0     Monocytes % 2022 5.2     Eosinophils % 2022 0.9     Basophils % 04/14/2022 0.4     Neutrophils Absolute 04/14/2022 4.0     Immature Granulocytes # 04/14/2022 0.0     Lymphocytes Absolute 04/14/2022 2.2     Monocytes Absolute 04/14/2022 0.40     Eosinophils Absolute 04/14/2022 0.10     Basophils Absolute 04/14/2022 0.00     Sodium 04/14/2022 143     Potassium 04/14/2022 4.3     Chloride 04/14/2022 105     CO2 04/14/2022 20*    Anion Gap 04/14/2022 18     Glucose 04/14/2022 90     BUN 04/14/2022 7     CREATININE 04/14/2022 0.7     GFR Non- 04/14/2022 >60     GFR  04/14/2022 >59     Calcium 04/14/2022 9.9     Total Protein 04/14/2022 6.7     Albumin 04/14/2022 4.4     Total Bilirubin 04/14/2022 0.6     Alkaline Phosphatase 04/14/2022 22*    ALT 04/14/2022 14     AST 04/14/2022 16     Cholesterol, Total 04/14/2022 237*    Triglycerides 04/14/2022 88     HDL 04/14/2022 61*    LDL Calculated 04/14/2022 158     Microalbumin, Random Uri* 04/14/2022 4.50     Creatinine, Ur 04/14/2022 500.1     Microalbumin Creatinine * 04/14/2022 9.0     T4 Free 04/14/2022 1.27     TSH 04/14/2022 0.998      Copies of these are in the chart.     Current Outpatient Medications   Medication Sig Dispense Refill    venlafaxine (EFFEXOR XR) 75 MG extended release capsule Take 1 capsule by mouth daily 30 capsule 3    amitriptyline (ELAVIL) 25 MG tablet Take 1 tablet by mouth nightly 30 tablet 0    fluticasone (FLONASE) 50 MCG/ACT nasal spray 2 sprays by Each Nostril route daily 48 g 1    naproxen (NAPROSYN) 500 MG tablet TAKE ONE TABLET BY MOUTH TWICE DAILY 60 tablet 3    amLODIPine (NORVASC) 5 MG tablet TAKE ONE TABLET BY MOUTH IN THE EVENING 180 tablet 0    piroxicam (FELDENE) 20 MG capsule TAKE ONE CAPSULE BY MOUTH DAILY 30 capsule 3    hydrOXYzine (ATARAX) 25 MG tablet TAKE ONE TABLET BY MOUTH EVERY 6 HOURS AS NEEDED FOR ANXIETY 90 tablet 5    omeprazole (PRILOSEC) 40 MG delayed release capsule Take 1 capsule by mouth every morning (before breakfast) 60 capsule 5    methylPREDNISolone (MEDROL, MIRA,) 4 MG tablet Take po as directed 1 kit 0    montelukast (SINGULAIR) 10 MG tablet TAKE ONE TABLET BY MOUTH DAILY 90 tablet 3    atorvastatin (LIPITOR) 40 MG tablet TAKE ONE TABLET BY MOUTH DAILY 90 tablet 3    busPIRone (BUSPAR) 5 MG tablet Take 1 tablet by mouth 2 times daily 180 tablet 3    metoprolol succinate (TOPROL XL) 50 MG extended release tablet Take 1 tablet by mouth daily 30 tablet 11    norgestimate-ethinyl estradiol (PREVIFEM) 0.25-35 MG-MCG per tablet Take 1 tablet by mouth daily 84 tablet 3    ibuprofen (ADVIL;MOTRIN) 800 MG tablet Take 1 tablet by mouth every 8 hours as needed for Pain With food or milk 120 tablet 3    venlafaxine (EFFEXOR XR) 150 MG extended release capsule Take 1 capsule by mouth daily 90 capsule 2    lamoTRIgine (LAMICTAL) 200 MG tablet Take 1 tablet by mouth 2 times daily 60 tablet 11    ipratropium-albuterol (DUONEB) 0.5-2.5 (3) MG/3ML SOLN nebulizer solution Use 1 vial in nebulizer every 6 hours 360 mL 1    Multiple Vitamin (MULTI-DAY VITAMINS PO) Take 1 tablet by mouth daily. No current facility-administered medications for this visit.        Allergies: Eggs or egg-derived products, Bactrim [sulfamethoxazole-trimethoprim], Elemental sulfur, Other, Pcn [penicillins], and Sulfa antibiotics     Past Medical History:   Diagnosis Date    Asthma     History of Chiari malformation     Seizures ([facility] [address]     VSD (ventricular septal defect)        Family History   Problem Relation Age of Onset    Asthma Mother     Lupus Mother     High Blood Pressure Mother     Asthma Maternal Grandmother     Asthma Maternal Grandfather     Cancer Maternal Grandfather     Stroke Maternal Grandfather     Diabetes Maternal Grandfather     Asthma Paternal Grandmother     Stroke Paternal Grandmother     Diabetes Paternal Grandmother     Asthma Paternal Grandfather        Past Surgical History:   Procedure Laterality Date    ADENOIDECTOMY      OTHER SURGICAL HISTORY      chiari decompression    TONSILLECTOMY      TYMPANOSTOMY TUBE PLACEMENT      VENTRICULOPERITONEAL SHUNT         Social History     Tobacco Use    Smoking status: Never Smoker    Smokeless tobacco: Never Used   Substance Use Topics    Alcohol use: No        Review of Systems   Constitutional: Negative for appetite change, chills, fatigue and fever. HENT: Negative for congestion, ear discharge, ear pain, postnasal drip, sinus pressure and sore throat. Eyes: Negative for discharge and visual disturbance. Respiratory: Negative for cough, chest tightness, shortness of breath and wheezing. Asthma is stable. Cardiovascular: Negative for chest pain, palpitations and leg swelling. Blood pressure consistently running high   Gastrointestinal: Negative for abdominal pain, constipation, diarrhea, nausea and vomiting. Genitourinary: Negative for difficulty urinating, dysuria and menstrual problem. Musculoskeletal: Positive for arthralgias (left knee ). Negative for back pain, joint swelling and myalgias. Skin: Negative for rash. Skin lesion on anterior chest wall  Not painful, not itchy, not changing. Neurological: Positive for headaches ( Mostly when blood pressure is up). Negative for dizziness and weakness. She fell but slipped on wet floor. Hematological: Negative for adenopathy. Does not bruise/bleed easily. Psychiatric/Behavioral: Negative for agitation (doing better), sleep disturbance and suicidal ideas. The patient is nervous/anxious (but tolerating). Some rare panic attacks       Physical Exam  PE was not done today as this was a video confernce visit       ASSESSMENT      ICD-10-CM    1. Primary insomnia  F51.01 amitriptyline (ELAVIL) 25 MG tablet   2. Anxiety and depression  F41.9 venlafaxine (EFFEXOR XR) 75 MG extended release capsule    F32. A    3.  Environmental allergies  Z91.09 fluticasone (FLONASE) 50 MCG/ACT nasal spray         PLAN    1. Primary insomnia  We are going to try elavil to see if this is helpful  - amitriptyline (ELAVIL) 25 MG tablet; Take 1 tablet by mouth nightly  Dispense: 30 tablet; Refill: 0    2. Anxiety and depression  Will increase effexor xr to 225 mg daily  - venlafaxine (EFFEXOR XR) 75 MG extended release capsule; Take 1 capsule by mouth daily  Dispense: 30 capsule; Refill: 3    3. Environmental allergies  Will rx flonase.  - fluticasone (FLONASE) 50 MCG/ACT nasal spray; 2 sprays by Each Nostril route daily  Dispense: 48 g; Refill: 1      No orders of the defined types were placed in this encounter. Return in about 1 month (around 7/22/2022) for [facility], was evaluated through a synchronous (real-time) audio-video encounter. The patient (or guardian if applicable) is aware that this is a billable service, which includes applicable co-pays. This Virtual Visit was conducted with patient's (and/or legal guardian's) consent. The visit was conducted pursuant to the emergency declaration under the [address] [place] [address], [address] [place] [address] authority and the [employer] General Act. Patient identification was verified, and a caregiver was present when appropriate. The patient was located at Home: [address] [place] [address]. Total time spent for this encounter: 30+m    --DANIELA Bravo DO on 6/22/2022 at 3:18 PM    An electronic signature was used to authenticate this note. arthritis/back pain

## 2023-11-06 NOTE — H&P PST ADULT - WEIGHT IN KG
Pt called stated gastro did not receive referral. Resent referral and let pt know someone will call once they receive it.   120

## 2023-11-06 NOTE — H&P PST ADULT - MUSCULOSKELETAL
no calf tenderness/decreased ROM/strength 5/5 bilateral upper extremities/strength 5/5 bilateral lower extremities/abnormal gait details…

## 2023-11-06 NOTE — H&P PST ADULT - PROBLEM SELECTOR PROBLEM 2
04-03    133<L>  |  97<L>  |  25<H>  ----------------------------<  170<H>  3.8   |  20<L>  |  1.45<H>    Ca    9.2      03 Apr 2020 16:46    TPro  8.0  /  Alb  3.5  /  TBili  0.6  /  DBili  x   /  AST  58<H>  /  ALT  35<H>  /  AlkPhos  52  04-03                            13.1   7.04  )-----------( 189      ( 03 Apr 2020 16:46 )             36.9             PT/INR - ( 03 Apr 2020 16:46 )   PT: 11.8 SEC;   INR: 1.03          PTT - ( 03 Apr 2020 16:46 )  PTT:31.2 SEC      < from: Xray Chest 1 View-PORTABLE IMMEDIATE (04.03.20 @ 17:27) >    The mediastinal cardiac silhouette is unremarkable.    Bilateral airspace opacities suspicious for infection.    No acute osseous finding.     < end of copied text > 04-03    133<L>  |  97<L>  |  25<H>  ----------------------------<  170<H>  3.8   |  20<L>  |  1.45<H>    Ca    9.2      03 Apr 2020 16:46    TPro  8.0  /  Alb  3.5  /  TBili  0.6  /  DBili  x   /  AST  58<H>  /  ALT  35<H>  /  AlkPhos  52  04-03                            13.1   7.04  )-----------( 189      ( 03 Apr 2020 16:46 )             36.9             PT/INR - ( 03 Apr 2020 16:46 )   PT: 11.8 SEC;   INR: 1.03          PTT - ( 03 Apr 2020 16:46 )  PTT:31.2 SEC      < from: Xray Chest 1 View-PORTABLE IMMEDIATE (04.03.20 @ 17:27) >    The mediastinal cardiac silhouette is unremarkable.    Bilateral airspace opacities suspicious for infection.    No acute osseous finding.     < end of copied text >    EKG tracing reviewed: NSR -[id number] Unspecified atrial fibrillation

## 2023-11-06 NOTE — H&P PST ADULT - EKG AND INTERPRETATION
The patient is a 89y Female complaining of hip pain/injury. afib with slow ventricular response, HR 59, official reading pending

## 2023-11-06 NOTE — H&P PST ADULT - PROBLEM SELECTOR PLAN 1
caprini score risk for dvt, SCD ordered, surgical team to assess for dvt prophylaxis caprini score 12, high risk for dvt, surgical team to assess for dvt prophylaxis

## 2023-11-06 NOTE — H&P PST ADULT - ASSESSMENT
77 yo M PMH of AFib, HTN, CKD (Cr 1.07 in 7/23), obesity, OA s/p multiple orthopedic procedures, presents to PST prior to left atrial appendage occlusion procedure. Patient has long Hx of AFib, which is now considered permanent. He has been on rate control with metoprolol 25 mg bid, and anticoagulation with Pradaxa. He previously was on coumadin but  switched to Pradaxa several years ago. Patient c/o mild exertional dyspnea which has been stable. He has a long history of arthritis and has had multiple hip replacements, bilateral knee replacements and also has neck and back pain, but is unable to take NSAIDs due to bleeding risks. He has had an unsteady gait and uses a cane for assistance. He has had several falls over the years, occurring usually when he is rushed or not being careful. Though he has not had major trauma, he has hit his head and had minor associated injuries. He has easy bruising, but denies prior major bleeding. ECG 10/11/2023: AF with average rate 51 bpm. Current meds include Pradaxa 150 mg bid, metoprolol 25 mg bid, prednisone 5 mg, torsemide 10 mg, tramadol 50 prn, duloxetine, colchicine. Patient now presents in anticipation of elective left atrial appendage occlusion with Watchman device/HARJINDER w/Dr Bobo scheduled for 11/13/2023    Plan:   Labs pending.   Pre-procedure instructions provided (verbal & written) as follows:  Watchman implant 1/13/2023   - Last dose Pradaxa 11/10/2023 PM (NO a/c 2 days prior to procedure).    - NPO after midnight prior except meds w/sips of water.    - Hold the following medications the morning of the procedure:  79 yo M PMH of HTN, HLD, AFib, CKD, obesity, gout, prostate cancer, DVT/PE, OA s/p multiple orthopedic procedures, spinal stenosis, presents to PST prior to left atrial appendage occlusion procedure. Patient has a long Hx of AFib, which is now considered permanent. He is on rate control with metoprolol 25 mg bid, and anticoagulation with Pradaxa. He previously was on coumadin but switched to Pradaxa several years ago. He has a long Hx of arthritis and had multiple hip replacements, bilateral knee replacements and also has neck and back pain, but is unable to take NSAIDs due to bleeding risks. He has an unsteady gait and uses a cane for assistance. He had several falls over the years, occurring when he not being careful. Though he has not had a major trauma, he hit his head on occasions and had minor associated injuries. He reports easy bruising, but denies prior major bleeding. Current meds include Pradaxa 150 mg bid, metoprolol 25 mg bid, prednisone 5 mg, torsemide 10 mg, tramadol 50 mg prn. Today patient c/o generalized fatigue, mild exertional dyspnea and b/l LE edema which has been stable. He denies fevers, chills, chest pain, dizziness, syncope. Patient now presents in anticipation of elective left atrial appendage occlusion with Watchman device/HARJINDER w/Dr Bobo scheduled for 2023    Plan:   Labs pending.   Pre-procedure instructions provided (verbal & written) as follows:  Watchman implant 2023  - Last dose Pradaxa 11/10/2023 PM (NO a/c 2 days prior to procedure per Dr Bobo).   - NPO after midnight prior except meds w/sips of water.   - Hold the following medications the morning of the procedure: torsemide  - Hold multivitamins 7 days prior to surgery    Patient was educated on preop preparation with written and verbal instructions. Pt was educated on NSAIDs, multivitamins and herbals that increase the risk of bleeding and need to be stopped 7 days before procedure. Pt verbalized understanding of the above.     OPIOID RISK TOOL    CHONG EACH BOX THAT APPLIES AND ADD TOTALS AT THE END    FAMILY HISTORY OF SUBSTANCE ABUSE                 FEMALE         MALE                                                Alcohol                             [  ]1 pt          [  ]3pts                                               Illegal Durgs                     [  ]2 pts        [  ]3pts                                               Rx Drugs                           [  ]4 pts        [  ]4 pts    PERSONAL HISTORY OF SUBSTANCE ABUSE                                                                                          Alcohol                             [  ]3 pts       [  ]3 pts                                               Illegal Drugs                     [  ]4 pts        [  ]4 pts                                               Rx Drugs                           [  ]5 pts        [  ]5 pts    AGE BETWEEN 16-45 YEARS                                      [  ]1 pt         [  ]1 pt    HISTORY OF PREADOLESCENT   SEXUAL ABUSE                                                             [  ]3 pts        [  ]0pts    PSYCHOLOGICAL DISEASE                     ADD, OCD, Bipolar, Schizophrenia        [  ]2 pts         [  ]2 pts                      Depression                                               [  ]1 pt           [  ]1 pt           SCORING TOTAL   (add numbers and type here)              ( 0 )                                     A score of 3 or lower indicated LOW risk for future opioid abuse  A score of 4 to 7 indicated moderate risk for future opioid abuse  A score of 8 or higher indicates a high risk for opioid abuse    CAPRINI VTE 2.0 SCORE [CLOT updated 2019]    AGE RELATED RISK FACTORS                                                       MOBILITY RELATED FACTORS  [ ] Age 41-60 years                                            (1 Point)                    [ ] Bed rest                                                        (1 Point)  [ ] Age: 61-74 years                                           (2 Points)                  [ ] Plaster cast                                                   (2 Points)  [ x] Age= 75 years                                              (3 Points)                    [ ] Bed bound for more than 72 hours                 (2 Points)    DISEASE RELATED RISK FACTORS                                               GENDER SPECIFIC FACTORS  [x ] Edema in the lower extremities                       (1 Point)              [ ] Pregnancy                                                     (1 Point)  [ ] Varicose veins                                               (1 Point)                     [ ] Post-partum < 6 weeks                                   (1 Point)             [x ] BMI > 25 Kg/m2                                            (1 Point)                     [ ] Hormonal therapy  or oral contraception          (1 Point)                 [ ] Sepsis (in the previous month)                        (1 Point)               [ ] History of pregnancy complications                 (1 point)  [ ] Pneumonia or serious lung disease                                               [ ] Unexplained or recurrent                     (1 Point)           (in the previous month)                               (1 Point)  [ ] Abnormal pulmonary function test                     (1 Point)                 SURGERY RELATED RISK FACTORS  [ ] Acute myocardial infarction                              (1 Point)               [ ]  Section                                             (1 Point)  [ ] Congestive heart failure (in the previous month)  (1 Point)      [ ] Minor surgery                                                  (1 Point)   [ ] Inflammatory bowel disease                             (1 Point)               [ ] Arthroscopic surgery                                        (2 Points)  [ ] Central venous access                                      (2 Points)                [x ] General surgery lasting more than 45 minutes (2 points)  [x ] Malignancy- Present or previous                   (2 Points)                [ ] Elective arthroplasty                                         (5 points)    [ ] Stroke (in the previous month)                          (5 Points)                                                                                                                                                           HEMATOLOGY RELATED FACTORS                                                 TRAUMA RELATED RISK FACTORS  [x ] Prior episodes of VTE                                     (3 Points)                [ ] Fracture of the hip, pelvis, or leg                       (5 Points)  [ ] Positive family history for VTE                         (3 Points)             [ ] Acute spinal cord injury (in the previous month)  (5 Points)  [ ] Prothrombin 48978 A                                     (3 Points)               [ ] Paralysis  (less than 1 month)                             (5 Points)  [ ] Factor V Leiden                                             (3 Points)                  [ ] Multiple Trauma within 1 month                        (5 Points)  [ ] Lupus anticoagulants                                     (3 Points)                                                           [ ] Anticardiolipin antibodies                               (3 Points)                                                       [ ] High homocysteine in the blood                      (3 Points)                                             [ ] Other congenital or acquired thrombophilia      (3 Points)                                                [ ] Heparin induced thrombocytopenia                  (3 Points)                                     Total Score [    12      ]

## 2023-11-06 NOTE — H&P PST ADULT - NSICDXFAMILYHX_GEN_ALL_CORE_FT
FAMILY HISTORY:  Father  Still living? No  Hypertension, Age at diagnosis: Age Unknown    Mother  Still living? No  Family history of basal cell carcinoma, Age at diagnosis: Age Unknown

## 2023-11-06 NOTE — H&P PST ADULT - HISTORY OF PRESENT ILLNESS
77 yo M PMH of AFib, HTN, CKD (Cr 1.07 in 7/23), obesity, OA s/p multiple orthopedic procedures, presents to PST prior to left atrial appendage occlusion procedure. Patient has long Hx of AFib, which is now considered permanent. He has been on rate control with metoprolol 25 mg bid, and anticoagulation with Pradaxa. He previously was on coumadin but  switched to Pradaxa several years ago. Patient c/o mild exertional dyspnea which has been stable. He has a long history of arthritis and has had multiple hip replacements, bilateral knee replacements and also has neck and back pain, but is unable to take NSAIDs due to bleeding risks. He has had an unsteady gait and uses a cane for assistance. He has had several falls over the years, occurring usually when he is rushed or not being careful. Though he has not had major trauma, he has hit his head and had minor associated injuries. He has easy bruising, but denies prior major bleeding. ECG 10/11/2023: AF with average rate 51 bpm. Current meds include Pradaxa 150 mg bid, metoprolol 25 mg bid, prednisone 5 mg, torsemide 10 mg, tramadol 50 prn, duloxetine, colchicine. Patient now presents in anticipation of elective left atrial appendage occlusion with Watchman device/HARJINDER w/Dr Bobo scheduled for 11/13/2023    Cardiology Summary:       ECG 10/11/2023: atrial fibrillation (fine) 51 bpm, narrow QRS    Stress Test 7/20/2023: normal nuclear stress test, normal LVEF    Echo/TTE 8/9/2023: LVEF 57%, LA 5.3 cm, trace AI, mild MR, mild TR, RVSP <35, diastolic dysfunction, AV sclerosis  77 yo M PMH of HTN, AFib, CKD (Cr 1.07 in 7/23), obesity, prostate cancer, DVT/PE, OA s/p multiple orthopedic procedures, presents to PST prior to left atrial appendage occlusion procedure. Patient has a long Hx of AFib, which is now considered permanent. He has been on rate control with metoprolol 25 mg bid, and anticoagulation with Pradaxa. He previously was on coumadin but switched to Pradaxa several years ago. Patient c/o mild exertional dyspnea which has been stable. He has a long Hx of arthritis and has had multiple hip replacements, bilateral knee replacements and also has neck and back pain, but is unable to take NSAIDs due to bleeding risks. He has had an unsteady gait and uses a cane for assistance. He has had several falls over the years, occurring usually when he is rushed or not being careful. Though he has not had major trauma, he has hit his head and had minor associated injuries. He has easy bruising, but denies prior major bleeding. ECG 10/11/2023: AF with average rate 51 bpm. Current meds include Pradaxa 150 mg bid, metoprolol 25 mg bid, prednisone 5 mg, torsemide 10 mg, tramadol 50 prn, duloxetine, colchicine. Patient now presents in anticipation of elective left atrial appendage occlusion with Watchman device/HARJINDER w/Dr Bobo scheduled for 11/13/2023    Cardiology Summary:       ECG 10/11/2023: atrial fibrillation (fine) 51 bpm, narrow QRS    Stress Test 7/20/2023: normal nuclear stress test, normal LVEF    Echo/TTE 8/9/2023: LVEF 57%, LA 5.3 cm, trace AI, mild MR, mild TR, RVSP <35, diastolic dysfunction, AV sclerosis  77 yo M PMH of HTN, HLD, AFib, CKD, obesity, gout, prostate cancer, DVT/PE, OA s/p multiple orthopedic procedures, spinal stenosis, presents to PST prior to left atrial appendage occlusion procedure. Patient has a long Hx of AFib, which is now considered permanent. He is on rate control with metoprolol 25 mg bid, and anticoagulation with Pradaxa. He previously was on coumadin but switched to Pradaxa several years ago. He has a long Hx of arthritis and had multiple hip replacements, bilateral knee replacements and also has neck and back pain, but is unable to take NSAIDs due to bleeding risks. He has an unsteady gait and uses a cane for assistance. He had several falls over the years, occurring when he not being careful. Though he has not had a major trauma, he hit his head on occasions and had minor associated injuries. He reports easy bruising, but denies prior major bleeding. Current meds include Pradaxa 150 mg bid, metoprolol 25 mg bid, prednisone 5 mg, torsemide 10 mg, tramadol 50 mg prn. Today patient c/o generalized fatigue, mild exertional dyspnea and b/l LE edema which has been stable. He denies fevers, chills, chest pain, dizziness, syncope. Patient now presents in anticipation of elective left atrial appendage occlusion with Watchman device/HARJINDER w/Dr Bobo scheduled for 11/13/2023    Cardiology Summary:       ECG 10/11/2023: atrial fibrillation (fine) 51 bpm, narrow QRS    Stress Test 7/20/2023: normal nuclear stress test, normal LVEF    Echo/TTE 8/9/2023: LVEF 57%, LA 5.3 cm, trace AI, mild MR, mild TR, RVSP <35, diastolic dysfunction, AV sclerosis

## 2023-11-06 NOTE — H&P PST ADULT - NSICDXPASTSURGICALHX_GEN_ALL_CORE_FT
PAST SURGICAL HISTORY:  arthroscopy right shoulder 2005    arthroscopy bilateral knees 58y/o    bunionectomy right 2009    H/O colonoscopy     H/O endoscopy     H/O knee surgery RIGHT Total Knee Replacement October 2016    H/O spinal fusion September 15th 2014    hip replacement left 2007, RIGHT May 2015    History of shoulder replacement right 6/2012 - [facility]  Left 2013 - [facility]    inguinal hernia repair right 15y/o, 55y/o, left 45y/o     PAST SURGICAL HISTORY:  arthroscopy bilateral knees 58y/o    bunionectomy right 2009    H/O bilateral hip replacements     H/O colonoscopy     H/O endoscopy     H/O spinal fusion September 15th 2014    H/O total knee replacement, bilateral     inguinal hernia repair right 17y/o, 57y/o, left 45y/o    S/p bilateral shoulder joint replacement

## 2023-11-13 ENCOUNTER — TRANSCRIPTION ENCOUNTER (OUTPATIENT)
Age: 78
End: 2023-11-13

## 2023-11-13 ENCOUNTER — APPOINTMENT (OUTPATIENT)
Dept: NEUROLOGY | Facility: HOSPITAL | Age: 78
End: 2023-11-13

## 2023-11-13 ENCOUNTER — INPATIENT (INPATIENT)
Facility: HOSPITAL | Age: 78
LOS: 20 days | Discharge: NURSING, REHAB OR LONG-TERM CARE | DRG: 252 | End: 2023-12-04
Attending: STUDENT IN AN ORGANIZED HEALTH CARE EDUCATION/TRAINING PROGRAM | Admitting: STUDENT IN AN ORGANIZED HEALTH CARE EDUCATION/TRAINING PROGRAM
Payer: MEDICARE

## 2023-11-13 VITALS
RESPIRATION RATE: 16 BRPM | SYSTOLIC BLOOD PRESSURE: 133 MMHG | OXYGEN SATURATION: 97 % | HEIGHT: 70 IN | HEART RATE: 66 BPM | DIASTOLIC BLOOD PRESSURE: 79 MMHG | TEMPERATURE: 98 F | WEIGHT: 263.89 LBS

## 2023-11-13 DIAGNOSIS — Z96.653 PRESENCE OF ARTIFICIAL KNEE JOINT, BILATERAL: Chronic | ICD-10-CM

## 2023-11-13 DIAGNOSIS — I48.91 UNSPECIFIED ATRIAL FIBRILLATION: ICD-10-CM

## 2023-11-13 DIAGNOSIS — Z98.1 ARTHRODESIS STATUS: Chronic | ICD-10-CM

## 2023-11-13 DIAGNOSIS — Z96.611 PRESENCE OF RIGHT ARTIFICIAL SHOULDER JOINT: Chronic | ICD-10-CM

## 2023-11-13 DIAGNOSIS — Z96.643 PRESENCE OF ARTIFICIAL HIP JOINT, BILATERAL: Chronic | ICD-10-CM

## 2023-11-13 LAB
ALBUMIN SERPL ELPH-MCNC: 3.9 G/DL — SIGNIFICANT CHANGE UP (ref 3.3–5.2)
ALBUMIN SERPL ELPH-MCNC: 3.9 G/DL — SIGNIFICANT CHANGE UP (ref 3.3–5.2)
ALP SERPL-CCNC: 46 U/L — SIGNIFICANT CHANGE UP (ref 40–120)
ALP SERPL-CCNC: 46 U/L — SIGNIFICANT CHANGE UP (ref 40–120)
ALT FLD-CCNC: 10 U/L — SIGNIFICANT CHANGE UP
ALT FLD-CCNC: 10 U/L — SIGNIFICANT CHANGE UP
ANION GAP SERPL CALC-SCNC: 10 MMOL/L — SIGNIFICANT CHANGE UP (ref 5–17)
ANION GAP SERPL CALC-SCNC: 10 MMOL/L — SIGNIFICANT CHANGE UP (ref 5–17)
ANION GAP SERPL CALC-SCNC: 14 MMOL/L — SIGNIFICANT CHANGE UP (ref 5–17)
ANION GAP SERPL CALC-SCNC: 14 MMOL/L — SIGNIFICANT CHANGE UP (ref 5–17)
APTT BLD: 29.4 SEC — SIGNIFICANT CHANGE UP (ref 24.5–35.6)
APTT BLD: 29.4 SEC — SIGNIFICANT CHANGE UP (ref 24.5–35.6)
AST SERPL-CCNC: 20 U/L — SIGNIFICANT CHANGE UP
AST SERPL-CCNC: 20 U/L — SIGNIFICANT CHANGE UP
BASOPHILS # BLD AUTO: 0.06 K/UL — SIGNIFICANT CHANGE UP (ref 0–0.2)
BASOPHILS # BLD AUTO: 0.06 K/UL — SIGNIFICANT CHANGE UP (ref 0–0.2)
BASOPHILS NFR BLD AUTO: 0.7 % — SIGNIFICANT CHANGE UP (ref 0–2)
BASOPHILS NFR BLD AUTO: 0.7 % — SIGNIFICANT CHANGE UP (ref 0–2)
BILIRUB SERPL-MCNC: 0.4 MG/DL — SIGNIFICANT CHANGE UP (ref 0.4–2)
BILIRUB SERPL-MCNC: 0.4 MG/DL — SIGNIFICANT CHANGE UP (ref 0.4–2)
BLD GP AB SCN SERPL QL: SIGNIFICANT CHANGE UP
BLD GP AB SCN SERPL QL: SIGNIFICANT CHANGE UP
BUN SERPL-MCNC: 13.7 MG/DL — SIGNIFICANT CHANGE UP (ref 8–20)
BUN SERPL-MCNC: 13.7 MG/DL — SIGNIFICANT CHANGE UP (ref 8–20)
BUN SERPL-MCNC: 16.5 MG/DL — SIGNIFICANT CHANGE UP (ref 8–20)
BUN SERPL-MCNC: 16.5 MG/DL — SIGNIFICANT CHANGE UP (ref 8–20)
CALCIUM SERPL-MCNC: 8.4 MG/DL — SIGNIFICANT CHANGE UP (ref 8.4–10.5)
CALCIUM SERPL-MCNC: 8.4 MG/DL — SIGNIFICANT CHANGE UP (ref 8.4–10.5)
CALCIUM SERPL-MCNC: 9.3 MG/DL — SIGNIFICANT CHANGE UP (ref 8.4–10.5)
CALCIUM SERPL-MCNC: 9.3 MG/DL — SIGNIFICANT CHANGE UP (ref 8.4–10.5)
CHLORIDE SERPL-SCNC: 101 MMOL/L — SIGNIFICANT CHANGE UP (ref 96–108)
CHLORIDE SERPL-SCNC: 101 MMOL/L — SIGNIFICANT CHANGE UP (ref 96–108)
CHLORIDE SERPL-SCNC: 103 MMOL/L — SIGNIFICANT CHANGE UP (ref 96–108)
CHLORIDE SERPL-SCNC: 103 MMOL/L — SIGNIFICANT CHANGE UP (ref 96–108)
CK SERPL-CCNC: 60 U/L — SIGNIFICANT CHANGE UP (ref 30–200)
CK SERPL-CCNC: 60 U/L — SIGNIFICANT CHANGE UP (ref 30–200)
CO2 SERPL-SCNC: 26 MMOL/L — SIGNIFICANT CHANGE UP (ref 22–29)
CO2 SERPL-SCNC: 26 MMOL/L — SIGNIFICANT CHANGE UP (ref 22–29)
CO2 SERPL-SCNC: 30 MMOL/L — HIGH (ref 22–29)
CO2 SERPL-SCNC: 30 MMOL/L — HIGH (ref 22–29)
CREAT SERPL-MCNC: 0.97 MG/DL — SIGNIFICANT CHANGE UP (ref 0.5–1.3)
CREAT SERPL-MCNC: 0.97 MG/DL — SIGNIFICANT CHANGE UP (ref 0.5–1.3)
CREAT SERPL-MCNC: 1.07 MG/DL — SIGNIFICANT CHANGE UP (ref 0.5–1.3)
CREAT SERPL-MCNC: 1.07 MG/DL — SIGNIFICANT CHANGE UP (ref 0.5–1.3)
EGFR: 71 ML/MIN/1.73M2 — SIGNIFICANT CHANGE UP
EGFR: 71 ML/MIN/1.73M2 — SIGNIFICANT CHANGE UP
EGFR: 79 ML/MIN/1.73M2 — SIGNIFICANT CHANGE UP
EGFR: 79 ML/MIN/1.73M2 — SIGNIFICANT CHANGE UP
EOSINOPHIL # BLD AUTO: 0.07 K/UL — SIGNIFICANT CHANGE UP (ref 0–0.5)
EOSINOPHIL # BLD AUTO: 0.07 K/UL — SIGNIFICANT CHANGE UP (ref 0–0.5)
EOSINOPHIL NFR BLD AUTO: 0.8 % — SIGNIFICANT CHANGE UP (ref 0–6)
EOSINOPHIL NFR BLD AUTO: 0.8 % — SIGNIFICANT CHANGE UP (ref 0–6)
GLUCOSE BLDC GLUCOMTR-MCNC: 138 MG/DL — HIGH (ref 70–99)
GLUCOSE BLDC GLUCOMTR-MCNC: 138 MG/DL — HIGH (ref 70–99)
GLUCOSE BLDC GLUCOMTR-MCNC: 141 MG/DL — HIGH (ref 70–99)
GLUCOSE BLDC GLUCOMTR-MCNC: 141 MG/DL — HIGH (ref 70–99)
GLUCOSE BLDC GLUCOMTR-MCNC: 160 MG/DL — HIGH (ref 70–99)
GLUCOSE BLDC GLUCOMTR-MCNC: 160 MG/DL — HIGH (ref 70–99)
GLUCOSE SERPL-MCNC: 144 MG/DL — HIGH (ref 70–99)
GLUCOSE SERPL-MCNC: 144 MG/DL — HIGH (ref 70–99)
GLUCOSE SERPL-MCNC: 148 MG/DL — HIGH (ref 70–99)
GLUCOSE SERPL-MCNC: 148 MG/DL — HIGH (ref 70–99)
HCT VFR BLD CALC: 34.9 % — LOW (ref 39–50)
HCT VFR BLD CALC: 34.9 % — LOW (ref 39–50)
HCT VFR BLD CALC: 39 % — SIGNIFICANT CHANGE UP (ref 39–50)
HCT VFR BLD CALC: 39 % — SIGNIFICANT CHANGE UP (ref 39–50)
HGB BLD-MCNC: 11.2 G/DL — LOW (ref 13–17)
HGB BLD-MCNC: 11.2 G/DL — LOW (ref 13–17)
HGB BLD-MCNC: 12.6 G/DL — LOW (ref 13–17)
HGB BLD-MCNC: 12.6 G/DL — LOW (ref 13–17)
IMM GRANULOCYTES NFR BLD AUTO: 0.5 % — SIGNIFICANT CHANGE UP (ref 0–0.9)
IMM GRANULOCYTES NFR BLD AUTO: 0.5 % — SIGNIFICANT CHANGE UP (ref 0–0.9)
INR BLD: 1 RATIO — SIGNIFICANT CHANGE UP (ref 0.85–1.18)
INR BLD: 1 RATIO — SIGNIFICANT CHANGE UP (ref 0.85–1.18)
INR BLD: 1.12 RATIO — SIGNIFICANT CHANGE UP (ref 0.85–1.18)
INR BLD: 1.12 RATIO — SIGNIFICANT CHANGE UP (ref 0.85–1.18)
LYMPHOCYTES # BLD AUTO: 1.18 K/UL — SIGNIFICANT CHANGE UP (ref 1–3.3)
LYMPHOCYTES # BLD AUTO: 1.18 K/UL — SIGNIFICANT CHANGE UP (ref 1–3.3)
LYMPHOCYTES # BLD AUTO: 13.5 % — SIGNIFICANT CHANGE UP (ref 13–44)
LYMPHOCYTES # BLD AUTO: 13.5 % — SIGNIFICANT CHANGE UP (ref 13–44)
MAGNESIUM SERPL-MCNC: 1.9 MG/DL — SIGNIFICANT CHANGE UP (ref 1.6–2.6)
MAGNESIUM SERPL-MCNC: 1.9 MG/DL — SIGNIFICANT CHANGE UP (ref 1.6–2.6)
MCHC RBC-ENTMCNC: 30.4 PG — SIGNIFICANT CHANGE UP (ref 27–34)
MCHC RBC-ENTMCNC: 30.4 PG — SIGNIFICANT CHANGE UP (ref 27–34)
MCHC RBC-ENTMCNC: 30.6 PG — SIGNIFICANT CHANGE UP (ref 27–34)
MCHC RBC-ENTMCNC: 30.6 PG — SIGNIFICANT CHANGE UP (ref 27–34)
MCHC RBC-ENTMCNC: 32.1 GM/DL — SIGNIFICANT CHANGE UP (ref 32–36)
MCHC RBC-ENTMCNC: 32.1 GM/DL — SIGNIFICANT CHANGE UP (ref 32–36)
MCHC RBC-ENTMCNC: 32.3 GM/DL — SIGNIFICANT CHANGE UP (ref 32–36)
MCHC RBC-ENTMCNC: 32.3 GM/DL — SIGNIFICANT CHANGE UP (ref 32–36)
MCV RBC AUTO: 94.7 FL — SIGNIFICANT CHANGE UP (ref 80–100)
MCV RBC AUTO: 94.7 FL — SIGNIFICANT CHANGE UP (ref 80–100)
MCV RBC AUTO: 94.8 FL — SIGNIFICANT CHANGE UP (ref 80–100)
MCV RBC AUTO: 94.8 FL — SIGNIFICANT CHANGE UP (ref 80–100)
MONOCYTES # BLD AUTO: 0.45 K/UL — SIGNIFICANT CHANGE UP (ref 0–0.9)
MONOCYTES # BLD AUTO: 0.45 K/UL — SIGNIFICANT CHANGE UP (ref 0–0.9)
MONOCYTES NFR BLD AUTO: 5.1 % — SIGNIFICANT CHANGE UP (ref 2–14)
MONOCYTES NFR BLD AUTO: 5.1 % — SIGNIFICANT CHANGE UP (ref 2–14)
NEUTROPHILS # BLD AUTO: 6.96 K/UL — SIGNIFICANT CHANGE UP (ref 1.8–7.4)
NEUTROPHILS # BLD AUTO: 6.96 K/UL — SIGNIFICANT CHANGE UP (ref 1.8–7.4)
NEUTROPHILS NFR BLD AUTO: 79.4 % — HIGH (ref 43–77)
NEUTROPHILS NFR BLD AUTO: 79.4 % — HIGH (ref 43–77)
PHOSPHATE SERPL-MCNC: 2.8 MG/DL — SIGNIFICANT CHANGE UP (ref 2.4–4.7)
PHOSPHATE SERPL-MCNC: 2.8 MG/DL — SIGNIFICANT CHANGE UP (ref 2.4–4.7)
PLATELET # BLD AUTO: 186 K/UL — SIGNIFICANT CHANGE UP (ref 150–400)
PLATELET # BLD AUTO: 186 K/UL — SIGNIFICANT CHANGE UP (ref 150–400)
PLATELET # BLD AUTO: 194 K/UL — SIGNIFICANT CHANGE UP (ref 150–400)
PLATELET # BLD AUTO: 194 K/UL — SIGNIFICANT CHANGE UP (ref 150–400)
POTASSIUM SERPL-MCNC: 4 MMOL/L — SIGNIFICANT CHANGE UP (ref 3.5–5.3)
POTASSIUM SERPL-MCNC: 4 MMOL/L — SIGNIFICANT CHANGE UP (ref 3.5–5.3)
POTASSIUM SERPL-MCNC: 4.3 MMOL/L — SIGNIFICANT CHANGE UP (ref 3.5–5.3)
POTASSIUM SERPL-MCNC: 4.3 MMOL/L — SIGNIFICANT CHANGE UP (ref 3.5–5.3)
POTASSIUM SERPL-SCNC: 4 MMOL/L — SIGNIFICANT CHANGE UP (ref 3.5–5.3)
POTASSIUM SERPL-SCNC: 4 MMOL/L — SIGNIFICANT CHANGE UP (ref 3.5–5.3)
POTASSIUM SERPL-SCNC: 4.3 MMOL/L — SIGNIFICANT CHANGE UP (ref 3.5–5.3)
POTASSIUM SERPL-SCNC: 4.3 MMOL/L — SIGNIFICANT CHANGE UP (ref 3.5–5.3)
PROT SERPL-MCNC: 6.3 G/DL — LOW (ref 6.6–8.7)
PROT SERPL-MCNC: 6.3 G/DL — LOW (ref 6.6–8.7)
PROTHROM AB SERPL-ACNC: 11.1 SEC — SIGNIFICANT CHANGE UP (ref 9.5–13)
PROTHROM AB SERPL-ACNC: 11.1 SEC — SIGNIFICANT CHANGE UP (ref 9.5–13)
PROTHROM AB SERPL-ACNC: 12.4 SEC — SIGNIFICANT CHANGE UP (ref 9.5–13)
PROTHROM AB SERPL-ACNC: 12.4 SEC — SIGNIFICANT CHANGE UP (ref 9.5–13)
RBC # BLD: 3.68 M/UL — LOW (ref 4.2–5.8)
RBC # BLD: 3.68 M/UL — LOW (ref 4.2–5.8)
RBC # BLD: 4.12 M/UL — LOW (ref 4.2–5.8)
RBC # BLD: 4.12 M/UL — LOW (ref 4.2–5.8)
RBC # FLD: 13.6 % — SIGNIFICANT CHANGE UP (ref 10.3–14.5)
RBC # FLD: 13.6 % — SIGNIFICANT CHANGE UP (ref 10.3–14.5)
RBC # FLD: 13.7 % — SIGNIFICANT CHANGE UP (ref 10.3–14.5)
RBC # FLD: 13.7 % — SIGNIFICANT CHANGE UP (ref 10.3–14.5)
SODIUM SERPL-SCNC: 141 MMOL/L — SIGNIFICANT CHANGE UP (ref 135–145)
SODIUM SERPL-SCNC: 141 MMOL/L — SIGNIFICANT CHANGE UP (ref 135–145)
SODIUM SERPL-SCNC: 143 MMOL/L — SIGNIFICANT CHANGE UP (ref 135–145)
SODIUM SERPL-SCNC: 143 MMOL/L — SIGNIFICANT CHANGE UP (ref 135–145)
TROPONIN T, HIGH SENSITIVITY RESULT: 24 NG/L — SIGNIFICANT CHANGE UP (ref 0–51)
TROPONIN T, HIGH SENSITIVITY RESULT: 24 NG/L — SIGNIFICANT CHANGE UP (ref 0–51)
WBC # BLD: 7.76 K/UL — SIGNIFICANT CHANGE UP (ref 3.8–10.5)
WBC # BLD: 7.76 K/UL — SIGNIFICANT CHANGE UP (ref 3.8–10.5)
WBC # BLD: 8.76 K/UL — SIGNIFICANT CHANGE UP (ref 3.8–10.5)
WBC # BLD: 8.76 K/UL — SIGNIFICANT CHANGE UP (ref 3.8–10.5)
WBC # FLD AUTO: 7.76 K/UL — SIGNIFICANT CHANGE UP (ref 3.8–10.5)
WBC # FLD AUTO: 7.76 K/UL — SIGNIFICANT CHANGE UP (ref 3.8–10.5)
WBC # FLD AUTO: 8.76 K/UL — SIGNIFICANT CHANGE UP (ref 3.8–10.5)
WBC # FLD AUTO: 8.76 K/UL — SIGNIFICANT CHANGE UP (ref 3.8–10.5)

## 2023-11-13 PROCEDURE — 0042T: CPT

## 2023-11-13 PROCEDURE — 99291 CRITICAL CARE FIRST HOUR: CPT

## 2023-11-13 PROCEDURE — 71045 X-RAY EXAM CHEST 1 VIEW: CPT | Mod: 26

## 2023-11-13 PROCEDURE — 76377 3D RENDER W/INTRP POSTPROCES: CPT | Mod: 26

## 2023-11-13 PROCEDURE — 99291 CRITICAL CARE FIRST HOUR: CPT | Mod: 25

## 2023-11-13 PROCEDURE — 92977: CPT | Mod: 59

## 2023-11-13 PROCEDURE — 70460 CT HEAD/BRAIN W/DYE: CPT | Mod: 26

## 2023-11-13 PROCEDURE — 70498 CT ANGIOGRAPHY NECK: CPT | Mod: 26

## 2023-11-13 PROCEDURE — 70450 CT HEAD/BRAIN W/O DYE: CPT | Mod: 26,XU

## 2023-11-13 PROCEDURE — 70496 CT ANGIOGRAPHY HEAD: CPT | Mod: 26

## 2023-11-13 PROCEDURE — 61645 PERQ ART M-THROMBECT &/NFS: CPT | Mod: LT

## 2023-11-13 PROCEDURE — 93010 ELECTROCARDIOGRAM REPORT: CPT

## 2023-11-13 RX ORDER — MAGNESIUM SULFATE 500 MG/ML
1 VIAL (ML) INJECTION ONCE
Refills: 0 | Status: COMPLETED | OUTPATIENT
Start: 2023-11-13 | End: 2023-11-13

## 2023-11-13 RX ORDER — FENTANYL CITRATE 50 UG/ML
50 INJECTION INTRAVENOUS ONCE
Refills: 0 | Status: DISCONTINUED | OUTPATIENT
Start: 2023-11-13 | End: 2023-11-13

## 2023-11-13 RX ORDER — NOREPINEPHRINE BITARTRATE/D5W 8 MG/250ML
0.05 PLASTIC BAG, INJECTION (ML) INTRAVENOUS
Qty: 8 | Refills: 0 | Status: DISCONTINUED | OUTPATIENT
Start: 2023-11-13 | End: 2023-11-14

## 2023-11-13 RX ORDER — SODIUM CHLORIDE 9 MG/ML
1000 INJECTION INTRAMUSCULAR; INTRAVENOUS; SUBCUTANEOUS
Refills: 0 | Status: DISCONTINUED | OUTPATIENT
Start: 2023-11-13 | End: 2023-11-15

## 2023-11-13 RX ORDER — PHENYLEPHRINE HYDROCHLORIDE 10 MG/ML
0.1 INJECTION INTRAVENOUS
Qty: 40 | Refills: 0 | Status: DISCONTINUED | OUTPATIENT
Start: 2023-11-13 | End: 2023-11-13

## 2023-11-13 RX ORDER — CHLORHEXIDINE GLUCONATE 213 G/1000ML
15 SOLUTION TOPICAL EVERY 12 HOURS
Refills: 0 | Status: DISCONTINUED | OUTPATIENT
Start: 2023-11-13 | End: 2023-11-15

## 2023-11-13 RX ORDER — TENECTEPLASE 50 MG
25 KIT INTRAVENOUS ONCE
Refills: 0 | Status: COMPLETED | OUTPATIENT
Start: 2023-11-13 | End: 2023-11-13

## 2023-11-13 RX ORDER — NYSTATIN CREAM 100000 [USP'U]/G
1 CREAM TOPICAL
Refills: 0 | Status: DISCONTINUED | OUTPATIENT
Start: 2023-11-13 | End: 2023-11-15

## 2023-11-13 RX ORDER — CHLORHEXIDINE GLUCONATE 213 G/1000ML
1 SOLUTION TOPICAL DAILY
Refills: 0 | Status: DISCONTINUED | OUTPATIENT
Start: 2023-11-13 | End: 2023-11-30

## 2023-11-13 RX ORDER — PROPOFOL 10 MG/ML
25 INJECTION, EMULSION INTRAVENOUS
Qty: 1000 | Refills: 0 | Status: DISCONTINUED | OUTPATIENT
Start: 2023-11-13 | End: 2023-11-13

## 2023-11-13 RX ORDER — ALLOPURINOL 300 MG
1 TABLET ORAL
Refills: 0 | DISCHARGE

## 2023-11-13 RX ORDER — DEXMEDETOMIDINE HYDROCHLORIDE IN 0.9% SODIUM CHLORIDE 4 UG/ML
0.2 INJECTION INTRAVENOUS
Qty: 200 | Refills: 0 | Status: DISCONTINUED | OUTPATIENT
Start: 2023-11-13 | End: 2023-11-14

## 2023-11-13 RX ORDER — PHENYLEPHRINE HYDROCHLORIDE 10 MG/ML
0.1 INJECTION INTRAVENOUS
Qty: 40 | Refills: 0 | Status: DISCONTINUED | OUTPATIENT
Start: 2023-11-13 | End: 2023-11-14

## 2023-11-13 RX ORDER — SODIUM CHLORIDE 9 MG/ML
10 INJECTION INTRAMUSCULAR; INTRAVENOUS; SUBCUTANEOUS ONCE
Refills: 0 | Status: COMPLETED | OUTPATIENT
Start: 2023-11-13 | End: 2023-11-13

## 2023-11-13 RX ADMIN — TENECTEPLASE 3600 MILLIGRAM(S): KIT at 11:16

## 2023-11-13 RX ADMIN — SODIUM CHLORIDE 10 MILLILITER(S): 9 INJECTION INTRAMUSCULAR; INTRAVENOUS; SUBCUTANEOUS at 11:16

## 2023-11-13 RX ADMIN — Medication 63.75 MILLIMOLE(S): at 18:43

## 2023-11-13 RX ADMIN — FENTANYL CITRATE 50 MICROGRAM(S): 50 INJECTION INTRAVENOUS at 16:03

## 2023-11-13 RX ADMIN — NYSTATIN CREAM 1 APPLICATION(S): 100000 CREAM TOPICAL at 18:43

## 2023-11-13 RX ADMIN — Medication 100 GRAM(S): at 18:03

## 2023-11-13 RX ADMIN — FENTANYL CITRATE 50 MICROGRAM(S): 50 INJECTION INTRAVENOUS at 16:18

## 2023-11-13 RX ADMIN — CHLORHEXIDINE GLUCONATE 1 APPLICATION(S): 213 SOLUTION TOPICAL at 18:43

## 2023-11-13 RX ADMIN — CHLORHEXIDINE GLUCONATE 15 MILLILITER(S): 213 SOLUTION TOPICAL at 18:03

## 2023-11-13 NOTE — DISCHARGE NOTE NURSING/CASE MANAGEMENT/SOCIAL WORK - NSDCCRNAME_GEN_ALL_CORE_FT
[facility]  [address] [place] [id number] [facility]  [address] [place] [id number] [facility]  [address] [place] [id number]

## 2023-11-13 NOTE — H&P ADULT - NSICDXPASTMEDICALHX_GEN_ALL_CORE_FT
PAST MEDICAL HISTORY:  Bilateral leg edema     Chronic kidney disease (CKD)     GERD (gastroesophageal reflux disease)     Herniated lumbar intervertebral disc 10 years ago    History of pulmonary embolism     HLD (hyperlipidemia)     HTN (hypertension)     OA (osteoarthritis)     PC (prostate cancer) s/p radiation 61y/o    Seasonal allergies     Spinal stenosis     Unspecified atrial fibrillation

## 2023-11-13 NOTE — CHART NOTE - NSCHARTNOTEFT_GEN_A_CORE
Neurointerventional Surgery  Pre-Procedure Note     HPI:      77 yo M PMH of HTN, HLD, AFib, CKD, obesity, gout, prostate cancer, DVT/PE, OA s/p multiple orthopedic procedures, spinal stenosis, presented to facility on the morning of 23 prior to left atrial appendage occlusion procedure. Patient has a long Hx of AFib, which is now considered permanent. He is on rate control with metoprolol 25 mg bid, and anticoagulation with Pradaxa. He previously was on coumadin but switched to Pradaxa several years ago. He has a long Hx of arthritis and had multiple hip replacements, bilateral knee replacements and also has neck and back pain, but is unable to take NSAIDs due to bleeding risks. He has an unsteady gait and uses a cane for assistance. He had several falls over the years, occurring when he not being careful. Current meds include Pradaxa 150 mg bid, metoprolol 25 mg bid, prednisone 5 mg, torsemide 10 mg, tramadol 50 mg prn. Patient presented for elective left atrial appendage occlusion with Watchman device/HARJINDER w/Dr Bobo and Pradaxa was held for 2 days prior to procedure today. Whilst in holding patient developed a L MCA syndrome due to acute L M1 occlusion as demonstrated on CT H and Neck. Patient provided tenecteplase at 11:16 and consent for emergent mechanical thrombectomy with Dr. Soliman.    Allergies: adhesives (Rash)  No Known Drug Allergies  grass / pollen (Rhinorrhea; Rhinitis; Sneezing; Eye Irritation)  ADHESIVE TAPE - WOULD PREFER PAPER TAPE (Other)      PMH/PSH:  PAST MEDICAL & SURGICAL HISTORY:  PC (prostate cancer)  s/p radiation 61y/o      GERD (gastroesophageal reflux disease)      Bilateral leg edema      Seasonal allergies      Spinal stenosis      Herniated lumbar intervertebral disc  10 years ago      Unspecified atrial fibrillation      OA (osteoarthritis)      HTN (hypertension)      HLD (hyperlipidemia)      Chronic kidney disease (CKD)      History of pulmonary embolism      inguinal hernia repair  right 17y/o, 57y/o, left 47y/o      bunionectomy  right       arthroscopy  bilateral knees 60y/o      H/O colonoscopy      H/O endoscopy      H/O spinal fusion  2014      S/p bilateral shoulder joint replacement      H/O bilateral hip replacements      H/O total knee replacement, bilateral          Social History:   Social History:    FAMILY HISTORY:  Family history of basal cell carcinoma (Mother)  Mother -  age 96 - old age    Hypertension (Father)  Father -  age 96        Current Medications:   sodium chloride 0.9% lock flush 10 milliLiter(s) IV Push once  sodium chloride 0.9% lock flush 10 milliLiter(s) IV Push once  tenecteplase Injectable. 25 milliGRAM(s) IV Push. once      Physical Exam:  Constitutional: NAD        NIH SS: 26  DATE:   TIME: 11:15  1A: Level of consciousness (0-3): 0  1B: Questions (0-2): 2    1C: Commands (0-2): 2  2: Gaze (0-2): 2  3: Visual fields (0-3): 2  4: Facial palsy (0-3): 2  MOTOR:  5A: Left arm motor drift (0-4): 0  5B: Right arm motor drift (0-4): 4  6A: Left leg motor drift (0-4): 0  6B: Right leg motor drift (0-4): 4  7: Limb ataxia (0-2): 0  SENSORY:  8: Sensation (0-2): 2  SPEECH:  9: Language (0-3): 3  10: Dysarthria (0-2): 2  EXTINCTION:  11: Extinction/inattention (0-2): 1    TOTAL SCORE: 26    Labs:                         12.6   8.76  )-----------( 194      ( 2023 10:57 )             39.0           143  |  103  |  16.5  ----------------------------<  144<H>  4.3   |  30.0<H>  |  1.07    Ca    9.3      2023 10:57    TPro  6.3<L>  /  Alb  3.9  /  TBili  0.4  /  DBili  x   /  AST  20  /  ALT  10  /  AlkPhos  46  11-13          PT/INR - ( 2023 10:57 )   PT: 11.1 sec;   INR: 1.00 ratio         PTT - ( 2023 10:57 )  PTT:29.4 sec      Assessment/Plan:   This is a 78y  year old right hand dominant Male  who presents to neuro-IR for selective cerebral angiography.     Procedure, goals, risks, benefits and alternatives  were discussed with patients wife.  All questions were answered.  Risks include but are not limited to stroke, vessel injury, hemorrhage, and or groin hematoma.  Patients wife demonstrates understanding  of all risks involved with this procedure and wishes to continue.   Appropriate consent was obtained from patients wife and consent is in the patient's chart.

## 2023-11-13 NOTE — PROGRESS NOTE ADULT - SUBJECTIVE AND OBJECTIVE BOX
78 year old male with HTN, hyperlipidemia, gout, obesity, CKD, prostate cancer, remote DVT/PE, OA s/p multiple orthopedic procedures, spinal stenosis, CKD, and permanent atrial fibrillation. He has significant arthritis with unsteady gait, and is unable to take NSAIDs for pain relief due to increased bleeding risk while on Pradaxa. However, he is at high risk for thromboembolic event due to his permanent AF. He presents today for elective LAAO.     PST and labs from PST reviewed; denies interval change.   Confirms NPO > 8 hrs, last dose Pradaxa 11/10/23 PM     - iv heplock  - confirmatory type and screen  - 2 units PRBC on hold  - consent w/ MD

## 2023-11-13 NOTE — DISCHARGE NOTE NURSING/CASE MANAGEMENT/SOCIAL WORK - NSDCPEFALRISK_GEN_ALL_CORE
For information on Fall & Injury Prevention, visit: [email or web address] OR  [email or web address] OR  https://www.cdc.gov/steadi/patient.html For information on Fall & Injury Prevention, visit: [email or web address]-[email or web address]-mobility OR  [email or web address]/[email or web address]-[email or web address]-[email or web address]-avoid-injury OR  https://www.cdc.gov/steadi/patient.html For information on Fall & Injury Prevention, visit: [email or web address]://www.[email or web address]/news/[email or web address]-[email or web address]-[email or web address]-mobility OR  https://[email or web address]/[email or web address]-[email or web address]-[email or web address]-avoid-injury OR  https://www.cdc.gov/steadi/patient.html

## 2023-11-13 NOTE — H&P ADULT - NSHPLABSRESULTS_GEN_ALL_CORE
LABS:                         11.2   7.76  )-----------( 186      ( 13 Nov 2023 15:50 )             34.9     11-13    143  |  103  |  16.5  ----------------------------<  144<H>  4.3   |  30.0<H>  |  1.07    Ca    9.3      13 Nov 2023 10:57    TPro  6.3<L>  /  Alb  3.9  /  TBili  0.4  /  DBili  x   /  AST  20  /  ALT  10  /  AlkPhos  46  11-13    PT/INR - ( 13 Nov 2023 16:00 )   PT: 12.4 sec;   INR: 1.12 ratio         PTT - ( 13 Nov 2023 10:57 )  PTT:29.4 sec  Urinalysis Basic - ( 13 Nov 2023 10:57 )    Color: x / Appearance: x / SG: x / pH: x  Gluc: 144 mg/dL / Ketone: x  / Bili: x / Urobili: x   Blood: x / Protein: x / Nitrite: x   Leuk Esterase: x / RBC: x / WBC x   Sq Epi: x / Non Sq Epi: x / Bacteria: x            RADIOLOGY:  < from: CT Brain Stroke Protocol (11.13.23 @ 11:15) >     Dense left MCA M1 segment suggesting intraluminal thrombus.   Mild chronic microvascular changes without evidence of an acute   transcortical infarction or hemorrhage.     < end of copied text >      < from: CT Brain Perfusion Maps Stroke (11.13.23 @ 11:16) >    Thrombus in the distal left MCA M1 segment with diminished flow in the   distal MCA segments. 167 mL area of penumbra in the left MCA territory.    < end of copied text >

## 2023-11-13 NOTE — PATIENT PROFILE ADULT - FUNCTIONAL ASSESSMENT - BASIC MOBILITY 6.
3-calculated by average/Not able to assess (calculate score using [facility] averaging method)  3-calculated by average/Not able to assess (calculate score using [facility] averaging method)  3-calculated by average/Not able to assess (calculate score using [facility] averaging method)

## 2023-11-13 NOTE — H&P ADULT - NSHPPHYSICALEXAM_GEN_ALL_CORE
VITALS:   T(C): 36.1 (11-13-23 @ 16:25), Max: 36.7 (11-13-23 @ 09:01)  HR: 61 (11-13-23 @ 16:25) (60 - 88)  BP: 146/93 (11-13-23 @ 16:25) (129/89 - 150/97)  RR: 15 (11-13-23 @ 16:25) (11 - 20)  SpO2: 100% (11-13-23 @ 16:25) (97% - 100%)    GENERAL: NAD, intubated and sedated  HEAD:  Atraumatic, Normocephalic  EYES: PERRL, conjunctiva and sclera clear  ENT: Moist mucous membranes  NECK: Supple, No JVD  CHEST/LUNG: Clear to auscultation bilaterally; No rales, rhonchi, wheezing, or rubs. Unlabored respirations  HEART: Irregular rhythm; No murmurs, rubs, or gallops  ABDOMEN: BSx4; Soft, nontender, nondistended  EXTREMITIES:  2+ Peripheral Pulses, brisk capillary refill. No clubbing, cyanosis, or edema  NERVOUS SYSTEM:  Spontaneously OE, follows commands, no movement in LUE; LLE 3/5,  RUE and RLE 5/5  SKIN: No rashes or lesions  Psych: Unable to assess VITALS:   T(C): 36.1 (11-13-23 @ 16:25), Max: 36.7 (11-13-23 @ 09:01)  HR: 61 (11-13-23 @ 16:25) (60 - 88)  BP: 146/93 (11-13-23 @ 16:25) (129/89 - 150/97)  RR: 15 (11-13-23 @ 16:25) (11 - 20)  SpO2: 100% (11-13-23 @ 16:25) (97% - 100%)    GENERAL: NAD, intubated and sedated  HEAD:  Atraumatic, Normocephalic  EYES: PERRL, conjunctiva and sclera clear  ENT: Moist mucous membranes  NECK: Supple, No JVD  CHEST/LUNG: Clear to auscultation bilaterally; No rales, rhonchi, wheezing, or rubs. Unlabored respirations  HEART: Irregular rhythm; No murmurs, rubs, or gallops  ABDOMEN: BSx4; Soft, nontender, nondistended  EXTREMITIES:  2+ Peripheral Pulses, brisk capillary refill. No clubbing, cyanosis, or edema  NERVOUS SYSTEM:  Spontaneously OE, follows commands, no movement in RUE; RLE 3/5,  LUE and LLE 5/5  SKIN: No rashes or lesions  Psych: Unable to assess

## 2023-11-13 NOTE — CONSULT NOTE ADULT - SUBJECTIVE AND OBJECTIVE BOX
[facility]                                     [facility]                                 Augusto Otoole, & Scott                                  [address]                                        [place], [place], [address]                                             [phone]  Stroke Team consult    CC: acute stroke/code stroke  HPI: The patient is a 78y Male who presented with acute stroke while waiting to go for watchman procedure.  he has chronic a fib and was here today for a watchman procedure, He stopped pradaxa on 11/10/23- confirmed by wife.  He was last seen normal at [id number], he came back from bathroom and when in stretcher became acutely aphasic with right hemiparesis. A code stroke was called at 1050 and the stroke team arrived at 1052.  He was found to have the right hemiparesis, global aphasia and we suspected left MCA embolic stroke.   He was taken for STAT CT head and CTA angio head/neck with perfusion.  As soon as head CT showed no hemorrhage tenecteplase was ordered with request to bring to cath lab (where he was at  time of code stroke).  He was brought back to cath lab after CT Angio/perfusion and tenecteplase arrived just before he got there.  I spoke with his wife to discuss that we suspected stroke as cause of his acute symptoms, explained risk/ benefit of giving tenecteplase ( bleeding risk vs recovery of symtpoms) as well as alternative of not giving tenecteplase.  After cycling his BP (132/78) I administered tenecteplase at 1116.  After this was administered the CT angiogram showed left MCA occlusion with large penumbra and Dr Soliman consented his wifefor thrombectomy.  Right after tenecteplase he was taken to the suite for emergent thrombectomy.    Stroke risk factors: A fib, HTN    PAST MEDICAL & SURGICAL HISTORY:  PC (prostate cancer)  s/p radiation 59y/o      GERD (gastroesophageal reflux disease)      Bilateral leg edema      Seasonal allergies      Spinal stenosis      Herniated lumbar intervertebral disc  10 years ago      Unspecified atrial fibrillation      OA (osteoarthritis)      HTN (hypertension)      HLD (hyperlipidemia)      Chronic kidney disease (CKD)      History of pulmonary embolism      inguinal hernia repair  right 17y/o, 55y/o, left 47y/o      bunionectomy  right       arthroscopy  bilateral knees 60y/o      H/O colonoscopy      H/O endoscopy      H/O spinal fusion  2014      S/p bilateral shoulder joint replacement      H/O bilateral hip replacements      H/O total knee replacement, bilateral          MEDICATIONS  (STANDING):    MEDICATIONS  (PRN):      Allergies    adhesives (Rash)  No Known Drug Allergies  grass / pollen (Rhinorrhea; Rhinitis; Sneezing; Eye Irritation)  ADHESIVE TAPE - WOULD PREFER PAPER TAPE (Other)    Intolerances        SOCIAL HISTORY:  no tob,   no alcohol   no drugs    FAMILY HISTORY:  Family history of basal cell carcinoma (Mother)  Mother -  age 96 - old age    Hypertension (Father)  Father -  age 96    no stroke in either parent    ROS: 14 point ROS negative other than what is present in HPI or below    Vital Signs Last 24 Hrs  T(C): 36.7 (2023 09:01), Max: 36.7 (2023 09:01)  T(F): 98.1 (2023 09:01), Max: 98.1 (2023 09:01)  HR: 60 (2023 11:16) (60 - 66)  BP: 132/78 (2023 11:16) (132/78 - 133/79)  BP(mean): --  RR: 16 (2023 11:16) (16 - 20)  SpO2: 98% (2023 11:16) (97% - 98%)    Parameters below as of 2023 11:16  Patient On (Oxygen Delivery Method): room air          General: NAD    Detailed Neurologic Exam:    Mental status: The patient is awake and alert and has dininshed attention span.  The patient is globally aphasic    Cranial nerves: Pupils equal and react symmetrically to light, sluggish 5 to 3. There is no blink on right.  Extraocular motion shows left preference. There is no ptosis. Facial sensation is intact. Facial musculature is asymmetric central right weakness.     Motor: There is normal bulk and tone.  There is no tremor.  Strength is 0/5 in the right arm and leg.   Strength is at least 4+/5 in the left arm and leg.    Sensation: Intact to pinch left in 4 extremities    Cerebellar: unable to assess dysmetria on finger to nose testing.    Gait : deferred    NIH SS:  DATE: 2023  TIME: 1053  1A: Level of consciousness (0-3): 1  1B: Questions (0-2): 2  1C: Commands (0-2): 2  2: Gaze (0-2): 2  3: Visual fields (0-3): 2  4: Facial palsy (0-3): 1  MOTOR:  5A: Left arm motor drift (0-4): 0  5B: Right arm motor drift (0-4): 4  6A: Left leg motor drift (0-4): 0  6B: Right leg motor drift (0-4): 4  7: Limb ataxia (0-2): 0  SENSORY:  8: Sensation (0-2): 2  SPEECH:  9: Language (0-3): 3  10: Dysarthria (0-2): 2  EXTINCTION:  11: Extinction/inattention (0-2): 2    TOTAL SCORE: 26    prehospital mRS= 1      LABS:                         12.6   8.76  )-----------( 194      ( 2023 10:57 )             39.0           143  |  103  |  16.5  ----------------------------<  144<H>  4.3   |  30.0<H>  |  1.07    Ca    9.3      2023 10:57    TPro  6.3<L>  /  Alb  3.9  /  TBili  0.4  /  DBili  x   /  AST  20  /  ALT  10  /  AlkPhos  46        PT/INR - ( 2023 10:57 )   PT: 11.1 sec;   INR: 1.00 ratio         PTT - ( 2023 10:57 )  PTT:29.4 sec    Lipid panel: pending    HgbA1c: pending      RADIOLOGY & ADDITIONAL STUDIES (independently reviewed unless otherwise noted):  CT Brain Stroke Protocol (23 @ 11:15)    IMPRESSION:  Dense left MCA M1 segment suggesting intraluminal thrombus.   Mild chronic microvascular changes without evidence of an acute   transcortical infarction or hemorrhage. Findings discussedwith findings   discussed with a OLU Mckeon  and Sondra Espana at 11:06 AM..    CT Angio Brain, Neck and perfusion Stroke Protocol  w/ IV Cont (23 @ 11:18)   CTA BRAIN:  Acute thrombus in the distal left MCA M1 segment. Diminished flow in the   distal branches.  Severe bilateral cavernous carotid artery calcifications. Fetal origins   the bilateral posterior cerebral arteries. The [place] and   vertebrobasilar system are otherwise unremarkable without evidence of   stenosis, additional occlusion or saccular aneurysm dilation. No evidence   for arterial venous malformation. The vertebral arteries are codominant.    CTA NECK:  Mild bilateral carotid bulb calcifications. Mild stenosis in the left   carotid bulb. The right internal carotid arteries tortuous.  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 There is normal relationship to   the great vessels. The common carotid arteries, internal carotid arteries   and vertebral arteries shows no other evidence of significant stenosis,   occlusion or saccular aneurysm dilation. The vertebral arteries are   codominant.    CT PERFUSION:  Patient has only had less than 2 hours of symptoms may influence the CT   perfusion.    CBF<30% volume: 26 ml left MCA territory.  Tmax>6.0 s volume: 193 ml left MCA territory  Mismatch volume: 167 ml  Mismatch ratio: 7.4    IMPRESSION:    Thrombus in the distal left MCA M1 segment with diminished flow in the   distal MCA segments. 167 mL area of penumbra in the left MCA territory.   Findings discussed with Dr. Bobo at 11:11 AM.

## 2023-11-13 NOTE — CHART NOTE - NSCHARTNOTEFT_GEN_A_CORE
Neurointerventional Surgery Post Procedure Note    Procedure: Selective Cerebral Angiography     Pre- Procedure Diagnosis: LM1 occlusion  Post- Procedure Diagnosis: LICA terminus occlusion, TICI 1A revascularization    : Dr. Soliman  Fellow: Salvatore Hill  Physician Assistant: Irina Steel  Nurse: Nando Mitchell Jeannette, Melanie, Jennie  Anesthesiologist: Dr. Walls       Radiology Tech: Michael Mendoza Seth Franco     Sheath:  6 Tajik Sheath    I/Os: estimated blood loss 75cc,  IV fluids cc, Urine output cc, Contrast: Omnipaque 240   210cc    Vitals:  /70  HR 65  Spo2 100 %    Preliminary Report:  Under a 6 Tajik BMX sheath via the right groin under MAC sedation via left internal carotid artery, a selective cerebral angiography  was performed and reveals LICA terminus occlusion, TICI 1A revascularization. ( Official note to follow).    Patient tolerated procedure well.  Patient remains hemodynamically stable, remains intubated and sedated post procedure.   Results were discussed with patient, patient's family and Neurosurgery.  Right groin sheath  was discontinued, angio seal device did not deploy. Hemostasis was obtained with approximately __ minutes of manual compression.     No active bleeding, no hematoma, no ecchymosis.   Quick clot and safeguard balloon dressing applied at   Patient transferred to neuro ICU

## 2023-11-13 NOTE — H&P ADULT - ASSESSMENT
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 Soliman, admitted to the NeuroICU for continued care, intubated and on phenylephrine.     NEURO:  #Lt MCA M1 thrombus, s/p TNK and TICI 2A thrombectomy   - Q1 hour neuro checks  - CTH in AM (11/14), 24 hours post TNK  - Continue sedation with Precedex  - Stat CT head if neurological decline  - MRI non contrast ordered    CV:  #H/o HTN, HLD, AFib  - SBP goal 140-180 mmHg  - Currently rate-controlled, on Metoprolol 25 BID at home  - On phenylephrine, will titrate as tolerated  - TTE ordered     PULM:  - Intubated, current vent settings: 16/500/6/100  - SBT as tolerated  - Will titrate vent settings, aim to extubate tomorrow     RENAL:  - Morales  - Monitor UOP and CMP  - Replete electrolytes prn, goal Mag > 2, Phos > 3, K >4  - NS @ 75 while NPO    GI:  - Diet: NPO  - Bowel regimen as indicated  - LBM: outpatient     ENDO:  - F/u A1c, TSH, lipid panel   - Goal glucose <180    HEMATOLOGIC:  - Monitor CBC and coags   - F/u LE duplex    #DVT prophylaxis  - SCDs only for now    ID:  - Pt without strong objective or clinical evidence of infection, will observe off antibiotics    DISPO: NeuroICU

## 2023-11-13 NOTE — CONSULT NOTE ADULT - CRITICAL CARE ATTENDING COMMENT
I spent 40minutes providing critical care to evaluate for possible acute stroke with risk of acute neurological decompensation and the administration of tenecteplase and arranging for neurointervention.  This time was spent obtaining the history, examining patient, reviewing lab work and radiological images and discussion with the clinical team and their family.

## 2023-11-13 NOTE — H&P ADULT - NSICDXPASTSURGICALHX_GEN_ALL_CORE_FT
PAST SURGICAL HISTORY:  arthroscopy bilateral knees 60y/o    bunionectomy right 2009    H/O bilateral hip replacements     H/O colonoscopy     H/O endoscopy     H/O spinal fusion September 15th 2014    H/O total knee replacement, bilateral     inguinal hernia repair right 17y/o, 57y/o, left 47y/o    S/p bilateral shoulder joint replacement

## 2023-11-13 NOTE — STROKE CODE NOTE - NSMDCONSULT QTN_Y FT
see formal Stroke Neurology consult note  timeout performed prior to tenecteplase administration, confirmed right patient, right time, SBP <185, absolute and relative contraindications reviewed, risk benefits discussion including but not limited to hemorrhage discussed with wife and son, who expressed understanding and had no objection to receiving tenecteplase, in agreement. IR at bedside, to consent for endovascular treatment as warranted.

## 2023-11-13 NOTE — DISCHARGE NOTE NURSING/CASE MANAGEMENT/SOCIAL WORK - NSDCPEELIQUISFU_GEN_ALL_CORE
DISPLAY PLAN FREE TEXT DISPLAY PLAN FREE TEXT DISPLAY PLAN FREE TEXT DISPLAY PLAN FREE TEXT DISPLAY PLAN FREE TEXT DISPLAY PLAN FREE TEXT Go for blood tests as directed. Your doctor will do lab tests at regular visits to monitor the effects of this medicine. Please follow up with your doctor and keep your health care provider appointments.

## 2023-11-13 NOTE — DISCHARGE NOTE NURSING/CASE MANAGEMENT/SOCIAL WORK - PATIENT PORTAL LINK FT
You can access the FollowMyHealth Patient Portal offered by [facility] by registering at the following website: [email or web address]. By joining [employer]’s FollowMyHealth portal, you will also be able to view your health information using other applications (apps) compatible with our system. You can access the FollowMyHealth Patient Portal offered by [facility] by registering at the following website: [email or web address]. By joining [employer]’s FollowMyHealth portal, you will also be able to view your health information using other applications (apps) compatible with our system. You can access the FollowMyHealth Patient Portal offered by [facility] by registering at the following website: [email or web address]. By joining [employer]’s FollowMyHealth portal, you will also be able to view your health information using other applications (apps) compatible with our system.

## 2023-11-13 NOTE — H&P ADULT - HISTORY OF PRESENT ILLNESS
77 yo M PMH of HTN, HLD, AFib, CKD, obesity, gout, prostate cancer, DVT/PE, OA s/p multiple orthopedic procedures, spinal stenosis, presented on 11/13/23 for elective left atrial appendage occlusion procedure, and 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  Patient has a long h/o AFib, which is now considered permanent. He is on rate control with metoprolol 25 mg bid, and anticoagulation with Pradaxa.  He has a long Hx of arthritis and had multiple hip replacements, bilateral knee replacements and also has neck and back pain, but is unable to take NSAIDs due to bleeding risks. He has an unsteady gait and uses a cane for assistance.  Patient presented for elective left atrial appendage occlusion with Watchman device/HARJINDER w/ Dr Bobo, and Pradaxa was held for 2 days prior to procedure. Whilst in holding patient developed a L MCA syndrome due to acute L M1 occlusion as demonstrated on CT H and Neck.   Reported per neurology that on admission, NIHSS 27, MRS 1.  Patient is now s/p TNK administration at 11:16, and TICI 2A thrombectomy with Dr. Soliman, admitted to the NeuroICU intubated and on phenylephrine.     NIH SS: 11/13 at 11AM  1A: Level of consciousness (0-3): 1  1B: Questions (0-2): 2    1C: Commands (0-2): 2  2: Gaze (0-2): 2  3: Visual fields (0-3): 2  4: Facial palsy (0-3): 1  MOTOR:  5A: Left arm motor drift (0-4): 0  5B: Right arm motor drift (0-4): 4  6A: Left leg motor drift (0-4): 0  6B: Right leg motor drift (0-4): 4  7: Limb ataxia (0-2): 0  SENSORY:  8: Sensation (0-2): 2  SPEECH:  9: Language (0-3): 3  10: Dysarthria (0-2): 2  EXTINCTION:  11: Extinction/inattention (0-2): 2    TOTAL SCORE: 27  79 yo M PMH of HTN, HLD, AFib, CKD, obesity, gout, prostate cancer, DVT/PE, OA s/p multiple orthopedic procedures, spinal stenosis, presented on 11/13/23 for elective left atrial appendage occlusion procedure, and 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  Patient has a long h/o AFib, which is now considered permanent. He is on rate control with metoprolol 25 mg bid, and anticoagulation with Pradaxa.  He has a long Hx of arthritis and had multiple hip replacements, bilateral knee replacements and also has neck and back pain, but is unable to take NSAIDs due to bleeding risks. He has an unsteady gait and uses a cane for assistance.  Patient presented for elective left atrial appendage occlusion with Watchman device/HARJINDER w/ Dr Bobo, and Pradaxa was held for 2 days prior to procedure. 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   Reported per neurology that on admission, NIHSS 26, MRS 1.  Patient is now s/p TNK administration at 11:16, and TICI 2A thrombectomy with Dr. Soliman, admitted to the NeuroICU intubated and on phenylephrine.     NIH SS: 11/13 at 10:53 per neurology  1A: Level of consciousness (0-3): 1  1B: Questions (0-2): 2  1C: Commands (0-2): 2  2: Gaze (0-2): 2  3: Visual fields (0-3): 2  4: Facial palsy (0-3): 1  MOTOR:  5A: Left arm motor drift (0-4): 0  5B: Right arm motor drift (0-4): 4  6A: Left leg motor drift (0-4): 0  6B: Right leg motor drift (0-4): 4  7: Limb ataxia (0-2): 0  SENSORY:  8: Sensation (0-2): 2  SPEECH:  9: Language (0-3): 3  10: Dysarthria (0-2): 2  EXTINCTION:  11: Extinction/inattention (0-2): 2    TOTAL SCORE: 26

## 2023-11-13 NOTE — PATIENT PROFILE ADULT - FALL HARM RISK - HARM RISK INTERVENTIONS
Assistance OOB with selected safe patient handling equipment/Communicate Risk of Fall with Harm to all staff/Discuss with provider need for PT consult/Monitor gait and stability/Provide patient with walking aids - walker, cane, crutches/Reinforce activity limits and safety measures with patient and family/Tailored Fall Risk Interventions/Visual Cue: Yellow wristband and red socks/Bed in lowest position, wheels locked, appropriate side rails in place/Call bell, personal items and telephone in reach/Instruct patient to call for assistance before getting out of bed or chair/Non-slip footwear when patient is out of bed/[place] to call system/Physically safe environment - no spills, clutter or unnecessary equipment/Purposeful Proactive Rounding/Room/bathroom lighting operational, light cord in reach Assistance OOB with selected safe patient handling equipment/Communicate Risk of Fall with Harm to all staff/Discuss with provider need for PT consult/Monitor gait and stability/Provide patient with walking aids - walker, cane, crutches/Reinforce activity limits and safety measures with patient and family/Tailored Fall Risk Interventions/Visual Cue: Yellow wristband and red socks/Bed in lowest position, wheels locked, appropriate side rails in place/Call bell, personal items and telephone in reach/Instruct patient to call for assistance before getting out of bed or chair/Non-slip footwear when patient is out of bed/[place] to call system/Physically safe environment - no spills, clutter or unnecessary equipment/Purposeful Proactive Rounding/Room/bathroom lighting operational, light cord in reach Assistance OOB with selected safe patient handling equipment/Communicate Risk of Fall with Harm to all staff/Discuss with provider need for PT consult/Monitor gait and stability/Provide patient with walking aids - walker, cane, crutches/Reinforce activity limits and safety measures with patient and family/Tailored Fall Risk Interventions/Visual Cue: Yellow wristband and red socks/Bed in lowest position, wheels locked, appropriate side rails in place/Call bell, personal items and telephone in reach/Instruct patient to call for assistance before getting out of bed or chair/Non-slip footwear when patient is out of bed/[place] to call system/Physically safe environment - no spills, clutter or unnecessary equipment/Purposeful Proactive Rounding/Room/bathroom lighting operational, light cord in reach

## 2023-11-14 LAB
A1C WITH ESTIMATED AVERAGE GLUCOSE RESULT: 6.2 % — HIGH (ref 4–5.6)
A1C WITH ESTIMATED AVERAGE GLUCOSE RESULT: 6.2 % — HIGH (ref 4–5.6)
ANION GAP SERPL CALC-SCNC: 11 MMOL/L — SIGNIFICANT CHANGE UP (ref 5–17)
ANION GAP SERPL CALC-SCNC: 11 MMOL/L — SIGNIFICANT CHANGE UP (ref 5–17)
BUN SERPL-MCNC: 13.7 MG/DL — SIGNIFICANT CHANGE UP (ref 8–20)
BUN SERPL-MCNC: 13.7 MG/DL — SIGNIFICANT CHANGE UP (ref 8–20)
CALCIUM SERPL-MCNC: 8.5 MG/DL — SIGNIFICANT CHANGE UP (ref 8.4–10.5)
CALCIUM SERPL-MCNC: 8.5 MG/DL — SIGNIFICANT CHANGE UP (ref 8.4–10.5)
CHLORIDE SERPL-SCNC: 104 MMOL/L — SIGNIFICANT CHANGE UP (ref 96–108)
CHLORIDE SERPL-SCNC: 104 MMOL/L — SIGNIFICANT CHANGE UP (ref 96–108)
CHOLEST SERPL-MCNC: 151 MG/DL — SIGNIFICANT CHANGE UP
CHOLEST SERPL-MCNC: 151 MG/DL — SIGNIFICANT CHANGE UP
CO2 SERPL-SCNC: 26 MMOL/L — SIGNIFICANT CHANGE UP (ref 22–29)
CO2 SERPL-SCNC: 26 MMOL/L — SIGNIFICANT CHANGE UP (ref 22–29)
CREAT SERPL-MCNC: 0.99 MG/DL — SIGNIFICANT CHANGE UP (ref 0.5–1.3)
CREAT SERPL-MCNC: 0.99 MG/DL — SIGNIFICANT CHANGE UP (ref 0.5–1.3)
EGFR: 77 ML/MIN/1.73M2 — SIGNIFICANT CHANGE UP
EGFR: 77 ML/MIN/1.73M2 — SIGNIFICANT CHANGE UP
ESTIMATED AVERAGE GLUCOSE: 131 MG/DL — HIGH (ref 68–114)
ESTIMATED AVERAGE GLUCOSE: 131 MG/DL — HIGH (ref 68–114)
GLUCOSE BLDC GLUCOMTR-MCNC: 191 MG/DL — HIGH (ref 70–99)
GLUCOSE BLDC GLUCOMTR-MCNC: 191 MG/DL — HIGH (ref 70–99)
GLUCOSE SERPL-MCNC: 243 MG/DL — HIGH (ref 70–99)
GLUCOSE SERPL-MCNC: 243 MG/DL — HIGH (ref 70–99)
HCT VFR BLD CALC: 34.1 % — LOW (ref 39–50)
HCT VFR BLD CALC: 34.1 % — LOW (ref 39–50)
HDLC SERPL-MCNC: 84 MG/DL — SIGNIFICANT CHANGE UP
HDLC SERPL-MCNC: 84 MG/DL — SIGNIFICANT CHANGE UP
HGB BLD-MCNC: 11.5 G/DL — LOW (ref 13–17)
HGB BLD-MCNC: 11.5 G/DL — LOW (ref 13–17)
LIPID PNL WITH DIRECT LDL SERPL: 54 MG/DL — SIGNIFICANT CHANGE UP
LIPID PNL WITH DIRECT LDL SERPL: 54 MG/DL — SIGNIFICANT CHANGE UP
MAGNESIUM SERPL-MCNC: 2.2 MG/DL — SIGNIFICANT CHANGE UP (ref 1.6–2.6)
MAGNESIUM SERPL-MCNC: 2.2 MG/DL — SIGNIFICANT CHANGE UP (ref 1.6–2.6)
MCHC RBC-ENTMCNC: 31.2 PG — SIGNIFICANT CHANGE UP (ref 27–34)
MCHC RBC-ENTMCNC: 31.2 PG — SIGNIFICANT CHANGE UP (ref 27–34)
MCHC RBC-ENTMCNC: 33.7 GM/DL — SIGNIFICANT CHANGE UP (ref 32–36)
MCHC RBC-ENTMCNC: 33.7 GM/DL — SIGNIFICANT CHANGE UP (ref 32–36)
MCV RBC AUTO: 92.4 FL — SIGNIFICANT CHANGE UP (ref 80–100)
MCV RBC AUTO: 92.4 FL — SIGNIFICANT CHANGE UP (ref 80–100)
MRSA PCR RESULT.: SIGNIFICANT CHANGE UP
MRSA PCR RESULT.: SIGNIFICANT CHANGE UP
NON HDL CHOLESTEROL: 67 MG/DL — SIGNIFICANT CHANGE UP
NON HDL CHOLESTEROL: 67 MG/DL — SIGNIFICANT CHANGE UP
PHOSPHATE SERPL-MCNC: 3.3 MG/DL — SIGNIFICANT CHANGE UP (ref 2.4–4.7)
PHOSPHATE SERPL-MCNC: 3.3 MG/DL — SIGNIFICANT CHANGE UP (ref 2.4–4.7)
PLATELET # BLD AUTO: 253 K/UL — SIGNIFICANT CHANGE UP (ref 150–400)
PLATELET # BLD AUTO: 253 K/UL — SIGNIFICANT CHANGE UP (ref 150–400)
POTASSIUM SERPL-MCNC: 3.9 MMOL/L — SIGNIFICANT CHANGE UP (ref 3.5–5.3)
POTASSIUM SERPL-MCNC: 3.9 MMOL/L — SIGNIFICANT CHANGE UP (ref 3.5–5.3)
POTASSIUM SERPL-SCNC: 3.9 MMOL/L — SIGNIFICANT CHANGE UP (ref 3.5–5.3)
POTASSIUM SERPL-SCNC: 3.9 MMOL/L — SIGNIFICANT CHANGE UP (ref 3.5–5.3)
RBC # BLD: 3.69 M/UL — LOW (ref 4.2–5.8)
RBC # BLD: 3.69 M/UL — LOW (ref 4.2–5.8)
RBC # FLD: 13.5 % — SIGNIFICANT CHANGE UP (ref 10.3–14.5)
RBC # FLD: 13.5 % — SIGNIFICANT CHANGE UP (ref 10.3–14.5)
S AUREUS DNA NOSE QL NAA+PROBE: SIGNIFICANT CHANGE UP
S AUREUS DNA NOSE QL NAA+PROBE: SIGNIFICANT CHANGE UP
SODIUM SERPL-SCNC: 141 MMOL/L — SIGNIFICANT CHANGE UP (ref 135–145)
SODIUM SERPL-SCNC: 141 MMOL/L — SIGNIFICANT CHANGE UP (ref 135–145)
TRIGL SERPL-MCNC: 66 MG/DL — SIGNIFICANT CHANGE UP
TRIGL SERPL-MCNC: 66 MG/DL — SIGNIFICANT CHANGE UP
TSH SERPL-MCNC: 1.09 UIU/ML — SIGNIFICANT CHANGE UP (ref 0.27–4.2)
TSH SERPL-MCNC: 1.09 UIU/ML — SIGNIFICANT CHANGE UP (ref 0.27–4.2)
WBC # BLD: 15.37 K/UL — HIGH (ref 3.8–10.5)
WBC # BLD: 15.37 K/UL — HIGH (ref 3.8–10.5)
WBC # FLD AUTO: 15.37 K/UL — HIGH (ref 3.8–10.5)
WBC # FLD AUTO: 15.37 K/UL — HIGH (ref 3.8–10.5)

## 2023-11-14 PROCEDURE — 99233 SBSQ HOSP IP/OBS HIGH 50: CPT

## 2023-11-14 PROCEDURE — 99223 1ST HOSP IP/OBS HIGH 75: CPT

## 2023-11-14 PROCEDURE — 93306 TTE W/DOPPLER COMPLETE: CPT | Mod: 26

## 2023-11-14 PROCEDURE — 99291 CRITICAL CARE FIRST HOUR: CPT

## 2023-11-14 PROCEDURE — 93926 LOWER EXTREMITY STUDY: CPT | Mod: 26,RT

## 2023-11-14 PROCEDURE — 70450 CT HEAD/BRAIN W/O DYE: CPT | Mod: 26

## 2023-11-14 PROCEDURE — 93970 EXTREMITY STUDY: CPT | Mod: 26

## 2023-11-14 PROCEDURE — 99232 SBSQ HOSP IP/OBS MODERATE 35: CPT

## 2023-11-14 PROCEDURE — 99292 CRITICAL CARE ADDL 30 MIN: CPT

## 2023-11-14 PROCEDURE — 70450 CT HEAD/BRAIN W/O DYE: CPT | Mod: 26,77

## 2023-11-14 RX ORDER — DEXTROSE 50 % IN WATER 50 %
25 SYRINGE (ML) INTRAVENOUS ONCE
Refills: 0 | Status: DISCONTINUED | OUTPATIENT
Start: 2023-11-14 | End: 2023-12-04

## 2023-11-14 RX ORDER — ALBUMIN HUMAN 25 %
250 VIAL (ML) INTRAVENOUS ONCE
Refills: 0 | Status: COMPLETED | OUTPATIENT
Start: 2023-11-14 | End: 2023-11-14

## 2023-11-14 RX ORDER — METOPROLOL TARTRATE 50 MG
1 TABLET ORAL
Refills: 0 | DISCHARGE

## 2023-11-14 RX ORDER — FENTANYL CITRATE 50 UG/ML
25 INJECTION INTRAVENOUS
Refills: 0 | Status: DISCONTINUED | OUTPATIENT
Start: 2023-11-14 | End: 2023-11-14

## 2023-11-14 RX ORDER — NOREPINEPHRINE BITARTRATE/D5W 8 MG/250ML
0.05 PLASTIC BAG, INJECTION (ML) INTRAVENOUS
Qty: 8 | Refills: 0 | Status: DISCONTINUED | OUTPATIENT
Start: 2023-11-14 | End: 2023-11-15

## 2023-11-14 RX ORDER — NOREPINEPHRINE BITARTRATE/D5W 8 MG/250ML
0.05 PLASTIC BAG, INJECTION (ML) INTRAVENOUS
Qty: 8 | Refills: 0 | Status: DISCONTINUED | OUTPATIENT
Start: 2023-11-14 | End: 2023-11-14

## 2023-11-14 RX ORDER — PANTOPRAZOLE SODIUM 20 MG/1
40 TABLET, DELAYED RELEASE ORAL DAILY
Refills: 0 | Status: DISCONTINUED | OUTPATIENT
Start: 2023-11-14 | End: 2023-11-16

## 2023-11-14 RX ORDER — DEXTROSE 50 % IN WATER 50 %
15 SYRINGE (ML) INTRAVENOUS ONCE
Refills: 0 | Status: DISCONTINUED | OUTPATIENT
Start: 2023-11-14 | End: 2023-12-04

## 2023-11-14 RX ORDER — FENTANYL CITRATE 50 UG/ML
50 INJECTION INTRAVENOUS ONCE
Refills: 0 | Status: DISCONTINUED | OUTPATIENT
Start: 2023-11-14 | End: 2023-11-14

## 2023-11-14 RX ORDER — ONDANSETRON 8 MG/1
4 TABLET, FILM COATED ORAL ONCE
Refills: 0 | Status: COMPLETED | OUTPATIENT
Start: 2023-11-14 | End: 2023-11-14

## 2023-11-14 RX ORDER — FAMOTIDINE 10 MG/ML
1 INJECTION INTRAVENOUS
Refills: 0 | DISCHARGE

## 2023-11-14 RX ORDER — HYDROCORTISONE 20 MG
20 TABLET ORAL EVERY 8 HOURS
Refills: 0 | Status: COMPLETED | OUTPATIENT
Start: 2023-11-14 | End: 2023-11-15

## 2023-11-14 RX ORDER — FUROSEMIDE 40 MG
40 TABLET ORAL ONCE
Refills: 0 | Status: DISCONTINUED | OUTPATIENT
Start: 2023-11-14 | End: 2023-11-14

## 2023-11-14 RX ORDER — FUROSEMIDE 40 MG
20 TABLET ORAL ONCE
Refills: 0 | Status: COMPLETED | OUTPATIENT
Start: 2023-11-14 | End: 2023-11-14

## 2023-11-14 RX ORDER — DULOXETINE HYDROCHLORIDE 30 MG/1
1 CAPSULE, DELAYED RELEASE ORAL
Refills: 0 | DISCHARGE

## 2023-11-14 RX ORDER — HYDROCORTISONE 20 MG
TABLET ORAL
Refills: 0 | Status: DISCONTINUED | OUTPATIENT
Start: 2023-11-14 | End: 2023-11-16

## 2023-11-14 RX ORDER — SODIUM CHLORIDE 9 MG/ML
1000 INJECTION, SOLUTION INTRAVENOUS
Refills: 0 | Status: DISCONTINUED | OUTPATIENT
Start: 2023-11-14 | End: 2023-12-04

## 2023-11-14 RX ORDER — HYDROCORTISONE 20 MG
20 TABLET ORAL DAILY
Refills: 0 | Status: DISCONTINUED | OUTPATIENT
Start: 2023-11-16 | End: 2023-11-16

## 2023-11-14 RX ORDER — DEXTROSE 50 % IN WATER 50 %
12.5 SYRINGE (ML) INTRAVENOUS ONCE
Refills: 0 | Status: DISCONTINUED | OUTPATIENT
Start: 2023-11-14 | End: 2023-12-04

## 2023-11-14 RX ORDER — INSULIN LISPRO 100/ML
VIAL (ML) SUBCUTANEOUS EVERY 6 HOURS
Refills: 0 | Status: DISCONTINUED | OUTPATIENT
Start: 2023-11-14 | End: 2023-11-16

## 2023-11-14 RX ORDER — HYDROCORTISONE 20 MG
20 TABLET ORAL EVERY 12 HOURS
Refills: 0 | Status: COMPLETED | OUTPATIENT
Start: 2023-11-15 | End: 2023-11-16

## 2023-11-14 RX ORDER — GLUCAGON INJECTION, SOLUTION 0.5 MG/.1ML
1 INJECTION, SOLUTION SUBCUTANEOUS ONCE
Refills: 0 | Status: DISCONTINUED | OUTPATIENT
Start: 2023-11-14 | End: 2023-12-04

## 2023-11-14 RX ADMIN — FENTANYL CITRATE 50 MICROGRAM(S): 50 INJECTION INTRAVENOUS at 09:30

## 2023-11-14 RX ADMIN — CHLORHEXIDINE GLUCONATE 1 APPLICATION(S): 213 SOLUTION TOPICAL at 11:21

## 2023-11-14 RX ADMIN — NYSTATIN CREAM 1 APPLICATION(S): 100000 CREAM TOPICAL at 18:27

## 2023-11-14 RX ADMIN — Medication 20 MILLIGRAM(S): at 12:58

## 2023-11-14 RX ADMIN — FENTANYL CITRATE 50 MICROGRAM(S): 50 INJECTION INTRAVENOUS at 08:31

## 2023-11-14 RX ADMIN — FENTANYL CITRATE 25 MICROGRAM(S): 50 INJECTION INTRAVENOUS at 06:21

## 2023-11-14 RX ADMIN — NYSTATIN CREAM 1 APPLICATION(S): 100000 CREAM TOPICAL at 05:44

## 2023-11-14 RX ADMIN — FENTANYL CITRATE 25 MICROGRAM(S): 50 INJECTION INTRAVENOUS at 12:00

## 2023-11-14 RX ADMIN — FENTANYL CITRATE 25 MICROGRAM(S): 50 INJECTION INTRAVENOUS at 04:00

## 2023-11-14 RX ADMIN — FENTANYL CITRATE 25 MICROGRAM(S): 50 INJECTION INTRAVENOUS at 04:30

## 2023-11-14 RX ADMIN — CHLORHEXIDINE GLUCONATE 15 MILLILITER(S): 213 SOLUTION TOPICAL at 05:44

## 2023-11-14 RX ADMIN — ONDANSETRON 4 MILLIGRAM(S): 8 TABLET, FILM COATED ORAL at 06:21

## 2023-11-14 RX ADMIN — Medication 20 MILLIGRAM(S): at 15:14

## 2023-11-14 RX ADMIN — FENTANYL CITRATE 25 MICROGRAM(S): 50 INJECTION INTRAVENOUS at 11:18

## 2023-11-14 RX ADMIN — Medication 5 MILLIGRAM(S): at 21:15

## 2023-11-14 RX ADMIN — Medication 1: at 23:48

## 2023-11-14 RX ADMIN — Medication 125 MILLILITER(S): at 13:03

## 2023-11-14 RX ADMIN — Medication 20 MILLIGRAM(S): at 22:50

## 2023-11-14 RX ADMIN — PANTOPRAZOLE SODIUM 40 MILLIGRAM(S): 20 TABLET, DELAYED RELEASE ORAL at 12:56

## 2023-11-14 NOTE — PHARMACOTHERAPY INTERVENTION NOTE - COMMENTS
Spoke with patient's wife over the phone regarding patient's home medication list. Patient's wife reports, patient takes prednisone daily for pain prescribed by his orthopedist.

## 2023-11-14 NOTE — PROGRESS NOTE ADULT - ASSESSMENT
77 yo M with acute CVA due to L M1 occlusion, s/p IV thrombolysis and TICI 2a MT. On admission, NIHSS 26, MRS 1.   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   Acute resp failure due to cerebral injury.  PMH of HTN, HLD, AFib on Pradaxa, CKD, gout, prostate cancer, remote DVT/PE on AC, OA s/p multiple orthopedic procedures, spinal stenosis    Plan:  Neurochecks  SBP >100 or MAP >60 off pressors  maintain Osats>92%, chest PT  NPO  Hydrocort 20 q8hrs, taper over 3 days  maintain -180, ISS  SCDs, hold any antithrombotics in view of HI-2

## 2023-11-14 NOTE — PROGRESS NOTE ADULT - SUBJECTIVE AND OBJECTIVE BOX
EVENING ATTENDING PROGRESS NOTE    CC: LICA, LMCA LVO 2p TNK with ET TICI 2A    EVENING EVENTS  - LUE PIV running Levo infiltrated, administered Phentolamine  - Discussed with NSGY to liberalize SBP parameters and transition off pressors  - 6p stability CTH unchanged    EO to verbal, tracks, crosses midline, PERRLA,    R neglect   Global Aphasia, Mimics   L full strength  R Hemplegia    CTAB  S1S2 present  Abd soft, NT, ND  No peripheral swelling  R groin with superficial intraderm. hematoma, non-tender; signs of inguinal candidiasis noted.

## 2023-11-14 NOTE — CONSULT NOTE ADULT - SUBJECTIVE AND OBJECTIVE BOX
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  Patient has a long h/o AFib, which is now considered permanent. He is on rate control with metoprolol 25 mg bid, and anticoagulation with Pradaxa.  He has a long Hx of arthritis and had multiple hip replacements, bilateral knee replacements and also has neck and back pain, but is unable to take NSAIDs due to bleeding risks. He has an unsteady gait and uses a cane for assistance.  Patient presented for elective left atrial appendage occlusion with Watchman device/HARJINDER w/ Dr Bobo, and Pradaxa was held for 2 days prior to procedure. 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   Reported per neurology that on admission, NIHSS 26, MRS 1.  Patient is now s/p TNK administration at 11:16, and TICI 2A thrombectomy with Dr. Soliman, admitted to the NeuroICU intubated and on phenylephrine.     NIH SS:  at 10:53 per neurology  1A: Level of consciousness (0-3): 1  1B: Questions (0-2): 2  1C: Commands (0-2): 2  2: Gaze (0-2): 2  3: Visual fields (0-3): 2  4: Facial palsy (0-3): 1  MOTOR:  5A: Left arm motor drift (0-4): 0  5B: Right arm motor drift (0-4): 4  6A: Left leg motor drift (0-4): 0  6B: Right leg motor drift (0-4): 4  7: Limb ataxia (0-2): 0  SENSORY:  8: Sensation (0-2): 2  SPEECH:  9: Language (0-3): 3  10: Dysarthria (0-2): 2  EXTINCTION:  11: Extinction/inattention (0-2): 2  TOTAL SCORE: 26 (2023 16:32)    PAST MEDICAL & SURGICAL HISTORY:  PC (prostate cancer) s/p radiation 59y/o, GERD (gastroesophageal reflux disease), Bilateral leg edema, Seasonal allergies, Spinal stenosis, Herniated lumbar intervertebral disc, Unspecified atrial fibrillation, OA (osteoarthritis), HTN (hypertension), HLD (hyperlipidemia), Chronic kidney disease (CKD), History of pulmonary embolism, inguinal hernia repair (right 17y/o, 57y/o, left 45y/o), bunionectomy right , arthroscopy bilateral knees 60y/o, H/O colonoscopy, H/O endoscopy, H/O spinal fusion, 2014, S/p bilateral shoulder joint replacement, H/O bilateral hip replacements, H/O total knee replacement, bilateral,     FAMILY HISTORY:  Family history of basal cell carcinoma (Mother)  Mother -  age 96 - old age    Hypertension (Father)  Father -  age 96    SOCIAL HISTORY:  Tobacco Use:  EtOH use:   Substance:    Allergies    adhesives (Rash)  No Known Drug Allergies  grass / pollen (Rhinorrhea; Rhinitis; Sneezing; Eye Irritation)  ADHESIVE TAPE - WOULD PREFER PAPER TAPE (Other)    Intolerances    REVIEW OF SYSTEMS  unable to assess due to aphasia    HOME MEDICATIONS:  Home Medications:  allopurinol 300 mg oral tablet: 1 tab(s) orally once a day (2023 08:59)  Centrum Silver oral tablet: 1 tab(s) orally once a day (2023 08:59)  Cymbalta 30 mg oral delayed release capsule: 1 cap(s) orally once a day (2023 08:12)  famotidine 20 mg oral tablet: 1 tab(s) orally 2 times a day (2023 12:03)  gabapentin 300 mg oral capsule: 1 cap(s) orally 2 times a day (2023 08:59)  Klor-Con 10 mEq oral tablet, extended release: 1 tab(s) orally once a day (2023 08:59)  Metoprolol Tartrate 25 mg oral tablet: 1 tab(s) orally 2 times a day (2023 08:08)  Pradaxa 150 mg oral capsule: 1 cap(s) orally 2 times a day (2023 08:59)  predniSONE 5 mg oral tablet: 1 tab(s) orally once a day (at bedtime) (2023 08:59)  Probiotic Formula oral capsule: 1 cap(s) orally once a day (2023 08:59)  rosuvastatin 10 mg oral tablet: 1 tab(s) orally once a day (at bedtime) (2023 08:59)  torsemide 10 mg oral tablet: 1 tab(s) orally once a day (2023 08:59)  traMADol 50 mg oral tablet: 1 tab(s) orally prn (2023 08:59)  Vitamin C 1000 mg:  (2023 08:59)  Vitamin D 1000 mg:  (2023 08:59)      MEDICATIONS:  Antibiotics:    Neuro:  dexMEDEtomidine Infusion 0.2 MICROgram(s)/kG/Hr IV Continuous <Continuous>  fentaNYL    Injectable 25 MICROGram(s) IV Push every 2 hours PRN    Anticoagulation:    OTHER:  chlorhexidine 0.12% Liquid 15 milliLiter(s) Oral Mucosa every 12 hours  chlorhexidine 2% Cloths 1 Application(s) Topical daily  hydrocortisone sodium succinate Injectable 20 milliGRAM(s) IV Push every 8 hours  hydrocortisone sodium succinate Injectable   IV Push   norepinephrine Infusion 0.05 MICROgram(s)/kG/Min IV Continuous <Continuous>  nystatin Cream 1 Application(s) Topical two times a day  pantoprazole  Injectable 40 milliGRAM(s) IV Push daily    IVF:  sodium chloride 0.9%. 1000 milliLiter(s) IV Continuous <Continuous>      Vital Signs Last 24 Hrs  T(C): 38 (2023 17:00), Max: 38 (2023 14:15)  T(F): 100.4 (2023 17:00), Max: 100.4 (2023 14:15)  HR: 92 (2023 17:00) (44 - 113)  BP: 143/88 (2023 17:00) (125/71 - 167/88)  BP(mean): 105 (2023 17:00) (76 - 119)  RR: 18 (2023 17:00) (13 - 27)  SpO2: 95% (2023 17:00) (95% - 100%)    Parameters below as of 2023 16:00  Patient On (Oxygen Delivery Method): nasal cannula  O2 Flow (L/min): 4      Physical Exam:  Constitutional: NAD, lying in bed  Neuro  * Mental Status:  Awake, alert, expressive aphasia, following simple commands (lifting arm/leg, squeezing hand)  * Cranial Nerves: Lt gaze, lt facial droop, Pupils 3 R B/L  * Motor: RUE 0/5, LUE 4/5, RLE WD, LLE 3/5,   * Sensory: Sensation dec on right  * Reflexes: not assessed     LABS:             11.5   15.37 )-----------( 253      ( 2023 04:39 )             34.1     141  |  104  |  13.7  ----------------------------<  243<H>  3.9   |  26.0  |  0.99    Ca    8.5      2023 04:39  Phos  3.3     11-14  Mg     2.2     11-14    TPro  6.3<L>  /  Alb  3.9  /  TBili  0.4  /  DBili  x   /  AST  20  /  ALT  10  /  AlkPhos  46  11-13  PT/INR - ( 2023 16:00 )   PT: 12.4 sec;   INR: 1.12 ratio    PTT - ( 2023 10:57 )  PTT:29.4 sec  Urinalysis Basic - ( 2023 04:39 )    Color: x / Appearance: x / SG: x / pH: x  Gluc: 243 mg/dL / Ketone: x  / Bili: x / Urobili: x   Blood: x / Protein: x / Nitrite: x   Leuk Esterase: x / RBC: x / WBC x   Sq Epi: x / Non Sq Epi: x / Bacteria: x    CULTURES:      RADIOLOGY & ADDITIONAL STUDIES: HPI: 77 yo M PMH of HTN, HLD, AFib, CKD, obesity, gout, prostate cancer, DVT/PE, OA s/p multiple orthopedic procedures, spinal stenosis, presented on 23 for elective left atrial appendage occlusion procedure, and 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 Patient has a long h/o AFib, which is now considered permanent. He is on rate control with metoprolol 25 mg bid, and anticoagulation with Pradaxa.  He has a long Hx of arthritis and had multiple hip replacements, bilateral knee replacements and also has neck and back pain, but is unable to take NSAIDs due to bleeding risks. He has an unsteady gait and uses a cane for assistance.  Patient presented for elective left atrial appendage occlusion with Watchman device/HARJINDER w/ Dr Bobo, and Pradaxa was held for 2 days prior to procedure. 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 Reported per neurology that on admission, NIHSS 26, MRS 1.  Patient is now s/p TNK administration at 11:16, and TICI 2A thrombectomy with Dr. Soliman, admitted to the NeuroICU intubated and on phenylephrine.     Interval Events: extubated, following commands on right side. CTH with with evolving Large LMCA infact, small area of hemorrhagic transformation with 3.5 mm of shift    NIH SS:  at 10:53 per neurology  1A: Level of consciousness (0-3): 1  1B: Questions (0-2): 2  1C: Commands (0-2): 2  2: Gaze (0-2): 2  3: Visual fields (0-3): 2  4: Facial palsy (0-3): 1  MOTOR:  5A: Left arm motor drift (0-4): 0  5B: Right arm motor drift (0-4): 4  6A: Left leg motor drift (0-4): 0  6B: Right leg motor drift (0-4): 4  7: Limb ataxia (0-2): 0  SENSORY:  8: Sensation (0-2): 2  SPEECH:  9: Language (0-3): 3  10: Dysarthria (0-2): 2  EXTINCTION:  11: Extinction/inattention (0-2): 2  TOTAL SCORE: 26 (2023 16:32)    PAST MEDICAL & SURGICAL HISTORY:  PC (prostate cancer) s/p radiation 61y/o, GERD (gastroesophageal reflux disease), Bilateral leg edema, Seasonal allergies, Spinal stenosis, Herniated lumbar intervertebral disc, Unspecified atrial fibrillation, OA (osteoarthritis), HTN (hypertension), HLD (hyperlipidemia), Chronic kidney disease (CKD), History of pulmonary embolism, inguinal hernia repair (right 17y/o, 55y/o, left 47y/o), bunionectomy right , arthroscopy bilateral knees 60y/o, H/O colonoscopy, H/O endoscopy, H/O spinal fusion, 2014, S/p bilateral shoulder joint replacement, H/O bilateral hip replacements, H/O total knee replacement, bilateral,     FAMILY HISTORY:  Family history of basal cell carcinoma (Mother)  Mother -  age 96 - old age    Hypertension (Father)  Father -  age 96    SOCIAL HISTORY:  Tobacco Use:  EtOH use:   Substance:    Allergies    adhesives (Rash)  No Known Drug Allergies  grass / pollen (Rhinorrhea; Rhinitis; Sneezing; Eye Irritation)  ADHESIVE TAPE - WOULD PREFER PAPER TAPE (Other)    Intolerances    REVIEW OF SYSTEMS  unable to assess due to aphasia    HOME MEDICATIONS:  Home Medications:  allopurinol 300 mg oral tablet: 1 tab(s) orally once a day (2023 08:59)  Centrum Silver oral tablet: 1 tab(s) orally once a day (2023 08:59)  Cymbalta 30 mg oral delayed release capsule: 1 cap(s) orally once a day (2023 08:12)  famotidine 20 mg oral tablet: 1 tab(s) orally 2 times a day (2023 12:03)  gabapentin 300 mg oral capsule: 1 cap(s) orally 2 times a day (2023 08:59)  Klor-Con 10 mEq oral tablet, extended release: 1 tab(s) orally once a day (2023 08:59)  Metoprolol Tartrate 25 mg oral tablet: 1 tab(s) orally 2 times a day (2023 08:08)  Pradaxa 150 mg oral capsule: 1 cap(s) orally 2 times a day (2023 08:59)  predniSONE 5 mg oral tablet: 1 tab(s) orally once a day (at bedtime) (2023 08:59)  Probiotic Formula oral capsule: 1 cap(s) orally once a day (2023 08:59)  rosuvastatin 10 mg oral tablet: 1 tab(s) orally once a day (at bedtime) (2023 08:59)  torsemide 10 mg oral tablet: 1 tab(s) orally once a day (2023 08:59)  traMADol 50 mg oral tablet: 1 tab(s) orally prn (2023 08:59)  Vitamin C 1000 mg:  (2023 08:59)  Vitamin D 1000 mg:  (2023 08:59)      MEDICATIONS:  Antibiotics:    Neuro:  dexMEDEtomidine Infusion 0.2 MICROgram(s)/kG/Hr IV Continuous <Continuous>  fentaNYL    Injectable 25 MICROGram(s) IV Push every 2 hours PRN    Anticoagulation:    OTHER:  chlorhexidine 0.12% Liquid 15 milliLiter(s) Oral Mucosa every 12 hours  chlorhexidine 2% Cloths 1 Application(s) Topical daily  hydrocortisone sodium succinate Injectable 20 milliGRAM(s) IV Push every 8 hours  hydrocortisone sodium succinate Injectable   IV Push   norepinephrine Infusion 0.05 MICROgram(s)/kG/Min IV Continuous <Continuous>  nystatin Cream 1 Application(s) Topical two times a day  pantoprazole  Injectable 40 milliGRAM(s) IV Push daily    IVF:  sodium chloride 0.9%. 1000 milliLiter(s) IV Continuous <Continuous>      Vital Signs Last 24 Hrs  T(C): 38 (2023 17:00), Max: 38 (2023 14:15)  T(F): 100.4 (2023 17:00), Max: 100.4 (2023 14:15)  HR: 92 (2023 17:00) (44 - 113)  BP: 143/88 (2023 17:00) (125/71 - 167/88)  BP(mean): 105 (2023 17:00) (76 - 119)  RR: 18 (2023 17:00) (13 - 27)  SpO2: 95% (2023 17:00) (95% - 100%)    Parameters below as of 2023 16:00  Patient On (Oxygen Delivery Method): nasal cannula  O2 Flow (L/min): 4      Physical Exam:  Constitutional: NAD, lying in bed  Neuro  * Mental Status:  Awake, alert, expressive aphasia, following simple commands (lifting arm/leg, squeezing hand)  * Cranial Nerves: Lt gaze, lt facial droop, Pupils 3 R B/L  * Motor: RUE 0/5, LUE 4/5, RLE WD, LLE 3/5,   * Sensory: Sensation dec on right  * Reflexes: not assessed     LABS:             11.5   15.37 )-----------( 253      ( 2023 04:39 )             34.1     141  |  104  |  13.7  ----------------------------<  243<H>  3.9   |  26.0  |  0.99    Ca    8.5      2023 04:39  Phos  3.3     11-14  Mg     2.2     11-14    TPro  6.3<L>  /  Alb  3.9  /  TBili  0.4  /  DBili  x   /  AST  20  /  ALT  10  /  AlkPhos  46  11-13  PT/INR - ( 2023 16:00 )   PT: 12.4 sec;   INR: 1.12 ratio    PTT - ( 2023 10:57 )  PTT:29.4 sec  Urinalysis Basic - ( 2023 04:39 )    Color: x / Appearance: x / SG: x / pH: x  Gluc: 243 mg/dL / Ketone: x  / Bili: x / Urobili: x   Blood: x / Protein: x / Nitrite: x   Leuk Esterase: x / RBC: x / WBC x   Sq Epi: x / Non Sq Epi: x / Bacteria: x    CULTURES:      RADIOLOGY & ADDITIONAL STUDIES:

## 2023-11-14 NOTE — PROGRESS NOTE ADULT - SUBJECTIVE AND OBJECTIVE BOX
Preliminary note, offical recommendations pending attending review/signature   [facility] Stroke Team  Progress Note     HPI:   77 yo M PMH of HTN, HLD, AFib, CKD, obesity, gout, prostate cancer, DVT/PE, OA s/p multiple orthopedic procedures, spinal stenosis, presented on 11/13/23 for elective left atrial appendage occlusion procedure, and 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  Patient has a long h/o AFib, which is now considered permanent. He is on rate control with metoprolol 25 mg bid, and anticoagulation with Pradaxa.  He has a long Hx of arthritis and had multiple hip replacements, bilateral knee replacements and also has neck and back pain, but is unable to take NSAIDs due to bleeding risks. He has an unsteady gait and uses a cane for assistance.  Patient presented on 11/13 for elective left atrial appendage occlusion with Watchman device/HARJINDER w/ Dr Bobo, and Pradaxa was held for 2 days prior to procedure. 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   Reported per neurology that on admission, NIHSS 26, MRS 1.  Patient is now s/p TNK administration at 11:16 11/13,, and TICI 2A thrombectomy with Dr. Soliman. Stroke team consulted for further work up.     SUBJECTIVE: No events overnight. Unable to obtain ROS secondary to patient's mental status.    chlorhexidine 0.12% Liquid 15 milliLiter(s) Oral Mucosa every 12 hours  chlorhexidine 2% Cloths 1 Application(s) Topical daily  dexMEDEtomidine Infusion 0.2 MICROgram(s)/kG/Hr IV Continuous <Continuous>  fentaNYL    Injectable 25 MICROGram(s) IV Push every 2 hours PRN  fentaNYL    Injectable 50 MICROGram(s) IV Push once  norepinephrine Infusion 0.05 MICROgram(s)/kG/Min IV Continuous <Continuous>  nystatin Cream 1 Application(s) Topical two times a day  phenylephrine    Infusion 0.1 MICROgram(s)/kG/Min IV Continuous <Continuous>  sodium chloride 0.9%. 1000 milliLiter(s) IV Continuous <Continuous>      PHYSICAL EXAM:   Vital Signs Last 24 Hrs  T(C): 37.4 (14 Nov 2023 07:10), Max: 37.6 (14 Nov 2023 03:55)  T(F): 99.3 (14 Nov 2023 07:10), Max: 99.7 (14 Nov 2023 03:55)  HR: 78 (14 Nov 2023 07:10) (44 - 88)  BP: 143/93 (14 Nov 2023 07:10) (129/76 - 167/88)  BP(mean): 107 (14 Nov 2023 07:10) (88 - 116)  RR: 16 (14 Nov 2023 07:10) (11 - 22)  SpO2: 100% (14 Nov 2023 07:10) (97% - 100%)    Parameters below as of 13 Nov 2023 15:30  Patient On (Oxygen Delivery Method): ventilator    O2 Concentration (%): 100    COMPLETE EXAM PENDING     General: NAD    Detailed Neurologic Exam:    Mental status: The patient is intubated.    Cranial nerves: Pupils equal and react symmetrically to light, sluggish 5 to 3. There is no blink on right.  Extraocular motion shows left preference. There is no ptosis. Facial sensation is intact. Facial musculature is asymmetric central right weakness.     Motor: There is normal bulk and tone.  There is no tremor.  Strength is 0/5 in the right arm and leg.   Strength is at least 4+/5 in the left arm and leg.    Sensation: Intact to pinch left in 4 extremities    Cerebellar: unable to assess dysmetria on finger to nose testing.    Gait : deferred  LABS:                        11.5   15.37 )-----------( 253      ( 14 Nov 2023 04:39 )             34.1    11-14    141  |  104  |  13.7  ----------------------------<  243<H>  3.9   |  26.0  |  0.99    Ca    8.5      14 Nov 2023 04:39  Phos  3.3     11-14  Mg     2.2     11-14    TPro  6.3<L>  /  Alb  3.9  /  TBili  0.4  /  DBili  x   /  AST  20  /  ALT  10  /  AlkPhos  46  11-13  PT/INR - ( 13 Nov 2023 16:00 )   PT: 12.4 sec;   INR: 1.12 ratio         PTT - ( 13 Nov 2023 10:57 )  PTT:29.4 sec    IMAGING: Reviewed by me.     CT Angio Brain, Neck and perfusion Stroke Protocol  w/ IV Cont (11.13.23 @ 11:18)   CTA BRAIN:  Acute thrombus in the distal left MCA M1 segment. Diminished flow in the   distal branches.  Severe bilateral cavernous carotid artery calcifications. Fetal origins   the bilateral posterior cerebral arteries. The [place] and   vertebrobasilar system are otherwise unremarkable without evidence of   stenosis, additional occlusion or saccular aneurysm dilation. No evidence   for arterial venous malformation. The vertebral arteries are codominant.    CTA NECK:  Mild bilateral carotid bulb calcifications. Mild stenosis in the left   carotid bulb. The right internal carotid arteries tortuous.  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 There is normal relationship to   the great vessels. The common carotid arteries, internal carotid arteries   and vertebral arteries shows no other evidence of significant stenosis,   occlusion or saccular aneurysm dilation. The vertebral arteries are   codominant.    CT PERFUSION:  Patient has only had less than 2 hours of symptoms may influence the CT   perfusion.    CBF<30% volume: 26 ml left MCA territory.  Tmax>6.0 s volume: 193 ml left MCA territory  Mismatch volume: 167 ml  Mismatch ratio: 7.4    IMPRESSION:    Thrombus in the distal left MCA M1 segment with diminished flow in the   distal MCA segments. 167 mL area of penumbra in the left MCA territory.     CT Brain Stroke Protocol (11.13.23 @ 11:15)   IMPRESSION:  Dense left MCA M1 segment suggesting intraluminal thrombus.   Mild chronic microvascular changes without evidence of an acute   transcortical infarction or hemorrhage.   Preliminary note, offical recommendations pending attending review/signature   [facility] Stroke Team  Progress Note     HPI:   79 yo M PMH of HTN, HLD, AFib, CKD, obesity, gout, prostate cancer, DVT/PE, OA s/p multiple orthopedic procedures, spinal stenosis, presented on 11/13/23 for elective left atrial appendage occlusion procedure, and 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  Patient has a long h/o AFib, which is now considered permanent. He is on rate control with metoprolol 25 mg bid, and anticoagulation with Pradaxa.  He has a long Hx of arthritis and had multiple hip replacements, bilateral knee replacements and also has neck and back pain, but is unable to take NSAIDs due to bleeding risks. He has an unsteady gait and uses a cane for assistance.  Patient presented on 11/13 for elective left atrial appendage occlusion with Watchman device/HARJINDER w/ Dr Bobo, and Pradaxa was held for 2 days prior to procedure. 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   Reported per neurology that on admission, NIHSS 26, MRS 1.  Patient is now s/p TNK administration at 11:16 11/13,, and TICI 2A thrombectomy with Dr. Soliman. Stroke team consulted for further work up.     SUBJECTIVE: No events overnight. Unable to obtain ROS secondary to patient's mental status.    chlorhexidine 0.12% Liquid 15 milliLiter(s) Oral Mucosa every 12 hours  chlorhexidine 2% Cloths 1 Application(s) Topical daily  dexMEDEtomidine Infusion 0.2 MICROgram(s)/kG/Hr IV Continuous <Continuous>  fentaNYL    Injectable 25 MICROGram(s) IV Push every 2 hours PRN  fentaNYL    Injectable 50 MICROGram(s) IV Push once  norepinephrine Infusion 0.05 MICROgram(s)/kG/Min IV Continuous <Continuous>  nystatin Cream 1 Application(s) Topical two times a day  phenylephrine    Infusion 0.1 MICROgram(s)/kG/Min IV Continuous <Continuous>  sodium chloride 0.9%. 1000 milliLiter(s) IV Continuous <Continuous>      PHYSICAL EXAM:   Vital Signs Last 24 Hrs  T(C): 37.4 (14 Nov 2023 07:10), Max: 37.6 (14 Nov 2023 03:55)  T(F): 99.3 (14 Nov 2023 07:10), Max: 99.7 (14 Nov 2023 03:55)  HR: 78 (14 Nov 2023 07:10) (44 - 88)  BP: 143/93 (14 Nov 2023 07:10) (129/76 - 167/88)  BP(mean): 107 (14 Nov 2023 07:10) (88 - 116)  RR: 16 (14 Nov 2023 07:10) (11 - 22)  SpO2: 100% (14 Nov 2023 07:10) (97% - 100%)    Parameters below as of 13 Nov 2023 15:30  Patient On (Oxygen Delivery Method): ventilator    O2 Concentration (%): 100    General: NAD    Detailed Neurologic Exam:    Mental status: The patient is awake, intubated.  Responds to voice. Right sided visual willow-neglect present. Global aphasia noted.     Cranial nerves: Pupils equal and react symmetrically to light, sluggish 5 to 3. There is no blink on right.  Extraocular movement intact, gaze crosses midline bilaterally. Difficult to assess for facial droop secondary to presence of ET tube, however there appears to be flattening of the nasolabial fold on the right.     Motor: There is normal bulk and tone.  There is no tremor.  Right lower extremity with trace movement, 0/5 strength in RUE    Strength is at least 4+/5 in the left arm and leg.    Sensation: Intact to pinch left in 4 extremities    Cerebellar: unable to assess dysmetria on finger to nose testing.    Gait : deferred    LABS:                        11.5   15.37 )-----------( 253      ( 14 Nov 2023 04:39 )             34.1    11-14    141  |  104  |  13.7  ----------------------------<  243<H>  3.9   |  26.0  |  0.99    Ca    8.5      14 Nov 2023 04:39  Phos  3.3     11-14  Mg     2.2     11-14    TPro  6.3<L>  /  Alb  3.9  /  TBili  0.4  /  DBili  x   /  AST  20  /  ALT  10  /  AlkPhos  46  11-13  PT/INR - ( 13 Nov 2023 16:00 )   PT: 12.4 sec;   INR: 1.12 ratio         PTT - ( 13 Nov 2023 10:57 )  PTT:29.4 sec    Lipid Profile in AM (11.14.23 @ 04:39)    Cholesterol: 151 mg/dL   Triglycerides, Serum: 66 mg/dL   HDL Cholesterol: 84 mg/dL   Non HDL Cholesterol: 67   LDL Cholesterol Calculated: 54 mg/dL    A1C with Estimated Average Glucose in AM (11.14.23 @ 04:39)    A1C with Estimated Average Glucose Result: 6.2 %   Estimated Average Glucose: 131 mg/dL    IMAGING: Reviewed by me.      CT Head No Cont (11.14.23 @ 11:36)   IMPRESSION: Evolving left MCA infarct is identified with hemorrhagic   transformation now seen with mass effect on left lateral ventricles and   left-to-right shift.    CT Angio Brain, Neck and perfusion Stroke Protocol  w/ IV Cont (11.13.23 @ 11:18)   CTA BRAIN:  Acute thrombus in the distal left MCA M1 segment. Diminished flow in the   distal branches.  Severe bilateral cavernous carotid artery calcifications. Fetal origins   the bilateral posterior cerebral arteries. The [place] and   vertebrobasilar system are otherwise unremarkable without evidence of   stenosis, additional occlusion or saccular aneurysm dilation. No evidence   for arterial venous malformation. The vertebral arteries are codominant.    CTA NECK:  Mild bilateral carotid bulb calcifications. Mild stenosis in the left   carotid bulb. The right internal carotid arteries tortuous.  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 There is normal relationship to   the great vessels. The common carotid arteries, internal carotid arteries   and vertebral arteries shows no other evidence of significant stenosis,   occlusion or saccular aneurysm dilation. The vertebral arteries are   codominant.    CT PERFUSION:  Patient has only had less than 2 hours of symptoms may influence the CT   perfusion.    CBF<30% volume: 26 ml left MCA territory.  Tmax>6.0 s volume: 193 ml left MCA territory  Mismatch volume: 167 ml  Mismatch ratio: 7.4    IMPRESSION:    Thrombus in the distal left MCA M1 segment with diminished flow in the   distal MCA segments. 167 mL area of penumbra in the left MCA territory.     CT Brain Stroke Protocol (11.13.23 @ 11:15)   IMPRESSION:  Dense left MCA M1 segment suggesting intraluminal thrombus.   Mild chronic microvascular changes without evidence of an acute   transcortical infarction or hemorrhage.   [facility] Stroke Team  Progress Note     HPI:   79 yo M PMH of HTN, HLD, AFib, CKD, obesity, gout, prostate cancer, DVT/PE, OA s/p multiple orthopedic procedures, spinal stenosis, presented on 11/13/23 for elective left atrial appendage occlusion procedure, and developed a L MCA syndrome due to acute L M1 occlusion as demonstrated on CTA Head and Neck.  Patient has a long h/o AFib, which is now considered permanent. He is on rate control with metoprolol 25 mg bid, and anticoagulation with Pradaxa.  He has a long Hx of arthritis and had multiple hip replacements, bilateral knee replacements and also has neck and back pain, but is unable to take NSAIDs due to bleeding risks. He has an unsteady gait and uses a cane for assistance.  Patient presented on 11/13 for elective left atrial appendage occlusion with Watchman device/HARJINDER w/ Dr Bobo, and Pradaxa was held for 2 days prior to procedure. 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   Initial NIHSS 26, MRS 1.  Patient is now s/p tenecteplase administration at 11:16 11/13,, and TICI 2A thrombectomy with Dr. Soliman.    SUBJECTIVE: No events overnight. Unable to obtain ROS secondary to patient's mental status.    chlorhexidine 0.12% Liquid 15 milliLiter(s) Oral Mucosa every 12 hours  chlorhexidine 2% Cloths 1 Application(s) Topical daily  dexMEDEtomidine Infusion 0.2 MICROgram(s)/kG/Hr IV Continuous <Continuous>  fentaNYL    Injectable 25 MICROGram(s) IV Push every 2 hours PRN  fentaNYL    Injectable 50 MICROGram(s) IV Push once  norepinephrine Infusion 0.05 MICROgram(s)/kG/Min IV Continuous <Continuous>  nystatin Cream 1 Application(s) Topical two times a day  phenylephrine    Infusion 0.1 MICROgram(s)/kG/Min IV Continuous <Continuous>  sodium chloride 0.9%. 1000 milliLiter(s) IV Continuous <Continuous>      PHYSICAL EXAM:   Vital Signs Last 24 Hrs  T(C): 37.4 (14 Nov 2023 07:10), Max: 37.6 (14 Nov 2023 03:55)  T(F): 99.3 (14 Nov 2023 07:10), Max: 99.7 (14 Nov 2023 03:55)  HR: 78 (14 Nov 2023 07:10) (44 - 88)  BP: 143/93 (14 Nov 2023 07:10) (129/76 - 167/88)  BP(mean): 107 (14 Nov 2023 07:10) (88 - 116)  RR: 16 (14 Nov 2023 07:10) (11 - 22)  SpO2: 100% (14 Nov 2023 07:10) (97% - 100%)    Parameters below as of 13 Nov 2023 15:30  Patient On (Oxygen Delivery Method): ventilator    O2 Concentration (%): 100    General: NAD    Detailed Neurologic Exam:    Mental status: The patient is awake, intubated.  Responds to voice. Right sided visual willow-neglect present. Global aphasia noted.     Cranial nerves: Pupils equal and react symmetrically to light, sluggish 5 to 3. There is no blink on right.  Extraocular movement intact, gaze crosses midline bilaterally. Difficult to assess for facial droop secondary to presence of ET tube, however there appears to be flattening of the nasolabial fold on the right.     Motor: There is normal bulk and tone.  There is no tremor.  Right lower extremity with trace movement, 0/5 strength in RUE    Strength is at least 4+/5 in the left arm and leg.    Sensation: Intact to pinch left in 4 extremities    Cerebellar: unable to assess dysmetria on finger to nose testing.    Gait : deferred    LABS:                        11.5   15.37 )-----------( 253      ( 14 Nov 2023 04:39 )             34.1    11-14    141  |  104  |  13.7  ----------------------------<  243<H>  3.9   |  26.0  |  0.99    Ca    8.5      14 Nov 2023 04:39  Phos  3.3     11-14  Mg     2.2     11-14    TPro  6.3<L>  /  Alb  3.9  /  TBili  0.4  /  DBili  x   /  AST  20  /  ALT  10  /  AlkPhos  46  11-13  PT/INR - ( 13 Nov 2023 16:00 )   PT: 12.4 sec;   INR: 1.12 ratio         PTT - ( 13 Nov 2023 10:57 )  PTT:29.4 sec    Lipid Profile in AM (11.14.23 @ 04:39)    Cholesterol: 151 mg/dL   Triglycerides, Serum: 66 mg/dL   HDL Cholesterol: 84 mg/dL   Non HDL Cholesterol: 67   LDL Cholesterol Calculated: 54 mg/dL    A1C with Estimated Average Glucose in AM (11.14.23 @ 04:39)    A1C with Estimated Average Glucose Result: 6.2 %   Estimated Average Glucose: 131 mg/dL    IMAGING: Reviewed by me.      CT Head No Cont (11.14.23 @ 11:36)   IMPRESSION: Evolving left MCA infarct is identified with hemorrhagic   transformation now seen with mass effect on left lateral ventricles and   left-to-right shift.    CT Angio Brain, Neck and perfusion Stroke Protocol  w/ IV Cont (11.13.23 @ 11:18)   CTA BRAIN:  Acute thrombus in the distal left MCA M1 segment. Diminished flow in the   distal branches.  Severe bilateral cavernous carotid artery calcifications. Fetal origins   the bilateral posterior cerebral arteries. The [place] and   vertebrobasilar system are otherwise unremarkable without evidence of   stenosis, additional occlusion or saccular aneurysm dilation. No evidence   for arterial venous malformation. The vertebral arteries are codominant.    CTA NECK:  Mild bilateral carotid bulb calcifications. Mild stenosis in the left   carotid bulb. The right internal carotid arteries tortuous.  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 There is normal relationship to   the great vessels. The common carotid arteries, internal carotid arteries   and vertebral arteries shows no other evidence of significant stenosis,   occlusion or saccular aneurysm dilation. The vertebral arteries are   codominant.    CT PERFUSION:  Patient has only had less than 2 hours of symptoms may influence the CT   perfusion.    CBF<30% volume: 26 ml left MCA territory.  Tmax>6.0 s volume: 193 ml left MCA territory  Mismatch volume: 167 ml  Mismatch ratio: 7.4    IMPRESSION:    Thrombus in the distal left MCA M1 segment with diminished flow in the   distal MCA segments. 167 mL area of penumbra in the left MCA territory.     CT Brain Stroke Protocol (11.13.23 @ 11:15)   IMPRESSION:  Dense left MCA M1 segment suggesting intraluminal thrombus.   Mild chronic microvascular changes without evidence of an acute   transcortical infarction or hemorrhage.

## 2023-11-14 NOTE — PROGRESS NOTE ADULT - ASSESSMENT
77 yo M with acute CVA due to L M1 occlusion, s/p IV thrombolysis and TICI 2a MT. On admission, NIHSS 26, MRS 1.   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   Acute resp failure due to cerebral injury.  PMH of HTN, HLD, AFib on Pradaxa, CKD, gout, prostate cancer, remote DVT/PE on AC, OA s/p multiple orthopedic procedures, spinal stenosis    Plan:  neurochecks  stability CTh tonight  NSGy eval, concern for possible worsening cytotoxic edema and need for decompression  d/c Precedex ggt and Fentanyl IVp  stroke core measures  maintain -180, on LEvo ggt, weaning; on Metoprolol at home - hold for now as on Levo ggt, monitor HR  maintain Osats>92%, chest PT  keep NPO for now, cont IV hydration, monitor e-lytes, UOP; cont PPI  on Prednisone at home - unclear indication, start on Hydrocort 20 q8hrs, taper over 3 days  maintain -180, ISS  SCDs, hold any antithrombotics in view of heme and large stroke

## 2023-11-14 NOTE — PROGRESS NOTE ADULT - ASSESSMENT
78 year old male with HTN, hyperlipidemia, gout, obesity, CKD, prostate cancer, remote DVT/PE, OA s/p multiple orthopedic procedures, spinal stenosis, CKD, and permanent atrial fibrillation.   He presented for planned left atrial appendage occlusion yesterday, but prior to the procedure he developed an acute stroke and therefore did not have the planned procedure.   Of note he had been on anticoagulation with dabigatran 150mg bid, but due to difficulty tolerating long-term anticoagulation, concerns about risks of bleeding in setting of recurrent falls and orthopedic issues, as well as concern about medication interactions with his anticoagulation, he had elected to proceed with left atrial appendage occlusion. He did hold dabigatran for 2 days prior to the procedure, as instructed. On CT imaging before the procedure, there was a possibility of left atrial appendage thrombus (vs slow flow) and plan was to assess this with HARJINDER at the time of FROY occlusion procedure (and if no thrombus noted would then close FROY with Amulet device, due to the FROY anatomy).     Unfortunately he was noted to be acutely aphasic and have right sided weakness on evaluation prior to the procedure, and urgent code stroke was called. Neurology team assessed patient immediately, and TNK was administered, followed by attempted thrombectomy with interventional neurology. Unfortunately the MCA occlusion could not be completely opened at the time of this procedure.   The patient now has an evolving left MCA territory stroke, and remains aphasic with right sided weakness. There is hemorrhagic conversion and some mid line shift noted.     I have spoken with the family, along with the neurology team, at length.     No plans to reconsider cardiac intervention at this time.   Would restart anticoagulation once cleared to do so by neurology.   AF is rate controlled.     EP team to be available for support as needed.

## 2023-11-14 NOTE — PROGRESS NOTE ADULT - ASSESSMENT
COMPLETE A/P PENDING   ASSESSMENT:   77 yo M PMH of HTN, HLD, AFib, CKD, obesity, gout, prostate cancer, DVT/PE, OA s/p multiple orthopedic procedures, spinal stenosis, presented on 11/13/23 for elective left atrial appendage occlusion procedure, and 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  Patient presented on 11/13 for elective left atrial appendage occlusion with Watchman device/HARJINDER w/ Dr Bobo, and Pradaxa was held for 2 days prior to procedure. 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 On admission, NIHSS 26, MRS 1. Patient is now s/p TNK administration at 11:16 11/13, and TICI 2A thrombectomy with Dr. Soliman. Suspected etiology of stroke is cardioembolic from atrial fibrillation when off anticoagulation for recent procedure.     NEURO:   -Neurologically ---   -Continue close monitoring for neurologic deterioration    - Stroke neuro checks per post TNK protocol  - Permissive HTN or  SBP goal <180/105  -ANTITHROMBOTIC THERAPY: on hold s/p TNK, repeat head CT 24 hours to assess for bleed, if no hemorrhage will need restart pradaxa  -titrate statin to LDL goal less than 70  -MRI Brain w/o  -Dysphagia screen: pending   -Physical therapy/OT/Speech eval/treatment.     CARDIOVASCULAR:   -check TTE  -cardiac monitoring w/ telemetry for now, further evaluation pending findings of noted workup                              HEMATOLOGY:   -H/H 11.5/34.1, Platelets 253, monitor anemia per primary team, patient should have all age and risk appropriate malignancy screenings with PCP or sooner if clinically suspected   -DVT ppx: Heparin s.c [] LMWH [] SCDs[x]    PULMONARY:   -Patient intubated    RENAL:   -BUN/Cr 13.7/0.99, monitor urine output, maintain adequate hydration    -Na Goal:  135-145  -Morales: present     ID:   -WBC=15.37, patient afebrile, possibly reactionary leukocytosis from recent procedure; monitor for si/sx of infection     OTHER:    -condition and plan of care d/w patient, questions and concerns addressed.     DISPOSITION: Rehab or home depending on PT eval once stable and workup is complete    CORE MEASURES:        Admission NIHSS: 26     Tenecteplase : [x] YES [] NO      LDL/HDL/A1C: 54/67/6.2     Depression Screen- if depression hx and/or present      Statin Therapy: on hold until dysphagia screen      Dysphagia Screen: [] PASS [] FAIL [x] pending      Smoking [] YES [x] NO      Afib [x] YES [] NO     Stroke Education [x] YES [] NO    Obtain screening lower extremity venous ultrasound in patients who meet 1 or more of the following criteria as patient is high risk for DVT/PE on admission:   [] History of DVT/PE  []Hypercoagulable states (Factor V Leiden, Cancer, OCP, etc. )  []Prolonged immobility (hemiplegia/hemiparesis/post operative or any other extended immobilization)  [] Transferred from outside facility (Rehab or Long term care)  [] Age </= to 50   COMPLETE A/P PENDING   ASSESSMENT:   77 yo M PMH of HTN, HLD, AFib, CKD, obesity, gout, prostate cancer, DVT/PE, OA s/p multiple orthopedic procedures, spinal stenosis, presented on 11/13/23 for elective left atrial appendage occlusion procedure, and 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  Patient presented on 11/13 for elective left atrial appendage occlusion with Watchman device/HARJINDER w/ Dr Bobo, and Pradaxa was held for 2 days prior to procedure. 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 On admission, NIHSS 26, MRS 1. Patient is now s/p TNK administration at 11:16 11/13, and TICI 2A thrombectomy with Dr. Soliman. 24 hour repeat CT head s/p tenecteplase administration showed evolving left MCA stroke with hemorrhagic transformation vs contrast extravagation, and mass effects with left to right shift. Suspected etiology of stroke is cardioembolic from atrial fibrillation when off anticoagulation for recent procedure.     NEURO:   -Neurologically still with global aphasia, right sided visual neglect; right lower extremity weakness mildly improved, otherwise no acute neurologic changes   -Continue close monitoring for neurologic deterioration    -Stroke neuro checks q1hrs  -Permissive HTN or  SBP goal up to 180 mmHg, avoid hypotension or rapid fluctuations in blood pressure   -ANTITHROMBOTIC THERAPY: continue to hold in the setting of large L MCA stroke with hemorrhagic transformation, obtain repeat head CT tomorrow AM to assess for stability   -titrate statin to LDL goal less than 70  -MRI Brain w/o ordered, pending   -Dysphagia screen: pending   -Physical therapy/OT/Speech eval/treatment.     CARDIOVASCULAR:   -check TTE  -cardiac monitoring w/ telemetry for now, further evaluation pending findings of noted workup                              HEMATOLOGY:   -H/H 11.5/34.1, Platelets 253, monitor anemia per primary team, patient should have all age and risk appropriate malignancy screenings with PCP or sooner if clinically suspected   -DVT ppx: Heparin s.c [] LMWH [] SCDs[x]    PULMONARY:   -Patient intubated    RENAL:   -BUN/Cr 13.7/0.99, monitor urine output, maintain adequate hydration    -Na Goal:  135-145  -Morales: present     ID:   -WBC=15.37, patient afebrile, possibly reactionary leukocytosis from recent procedure; monitor for si/sx of infection     OTHER:    -condition and plan of care d/w patient, questions and concerns addressed.     DISPOSITION: Rehab or home depending on PT eval once stable and workup is complete    CORE MEASURES:        Admission NIHSS: 26     Tenecteplase : [x] YES [] NO      LDL/HDL/A1C: 54/67/6.2     Depression Screen- if depression hx and/or present      Statin Therapy: on hold until dysphagia screen      Dysphagia Screen: [] PASS [] FAIL [x] pending      Smoking [] YES [x] NO      Afib [x] YES [] NO     Stroke Education [x] YES [] NO    Obtain screening lower extremity venous ultrasound in patients who meet 1 or more of the following criteria as patient is high risk for DVT/PE on admission:   [] History of DVT/PE  []Hypercoagulable states (Factor V Leiden, Cancer, OCP, etc. )  []Prolonged immobility (hemiplegia/hemiparesis/post operative or any other extended immobilization)  [] Transferred from outside facility (Rehab or Long term care)  [] Age </= to 50   COMPLETE A/P PENDING   ASSESSMENT:   77 yo M PMH of HTN, HLD, AFib, CKD, obesity, gout, prostate cancer, DVT/PE, OA s/p multiple orthopedic procedures, spinal stenosis, presented on 11/13/23 for elective left atrial appendage occlusion procedure, and 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  Patient presented on 11/13 for elective left atrial appendage occlusion with Watchman device/HARJINDER w/ Dr Bobo, and Pradaxa was held for 2 days prior to procedure. 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 On admission, NIHSS 26, MRS 1. Patient is now s/p TNK administration at 11:16 11/13, and TICI 2A thrombectomy with Dr. Soliman. 24 hour repeat CT head s/p tenecteplase administration showed evolving left MCA stroke with hemorrhagic transformation vs contrast extravagation, and mass effects with left to right shift. Suspected etiology of stroke is cardioembolic from atrial fibrillation when off anticoagulation for recent procedure.     NEURO:   -Neurologically still with global aphasia, right sided visual neglect; right lower extremity weakness mildly improved, otherwise no acute neurologic changes   -Continue close monitoring for neurologic deterioration    -Stroke neuro checks q1hrs  -Permissive HTN or  SBP goal up to 180 mmHg, avoid hypotension or rapid fluctuations in blood pressure   -ANTITHROMBOTIC THERAPY: continue to hold in the setting of large L MCA stroke with hemorrhagic transformation, obtain repeat head CT tomorrow AM to assess for stability   -titrate statin to LDL goal less than 70  -MRI Brain w/o ordered, pending   -Dysphagia screen: pending   -Physical therapy/OT/Speech eval/treatment.   -Consider neurosurgery consult for hemicrani watch in the setting of large evolving L MCA stroke with hemorrhagic transformation and mass effect     CARDIOVASCULAR:   -check TTE  -cardiac monitoring w/ telemetry for now, further evaluation pending findings of noted workup                              HEMATOLOGY:   -H/H 11.5/34.1, Platelets 253, monitor anemia per primary team, patient should have all age and risk appropriate malignancy screenings with PCP or sooner if clinically suspected   -DVT ppx: Heparin s.c [] LMWH [] SCDs[x]    PULMONARY:   -Patient intubated    RENAL:   -BUN/Cr 13.7/0.99, monitor urine output, maintain adequate hydration    -Na Goal:  145-150  -Morales: present     ID:   -WBC=15.37, patient afebrile, possibly reactionary leukocytosis from recent procedure; monitor for si/sx of infection     OTHER:    -condition and plan of care d/w patient, questions and concerns addressed.     DISPOSITION: Rehab or home depending on PT eval once stable and workup is complete    CORE MEASURES:        Admission NIHSS: 26     Tenecteplase : [x] YES [] NO      LDL/HDL/A1C: 54/67/6.2     Depression Screen- if depression hx and/or present      Statin Therapy: on hold until dysphagia screen      Dysphagia Screen: [] PASS [] FAIL [x] pending      Smoking [] YES [x] NO      Afib [x] YES [] NO     Stroke Education [x] YES [] NO    Obtain screening lower extremity venous ultrasound in patients who meet 1 or more of the following criteria as patient is high risk for DVT/PE on admission:   [] History of DVT/PE  []Hypercoagulable states (Factor V Leiden, Cancer, OCP, etc. )  []Prolonged immobility (hemiplegia/hemiparesis/post operative or any other extended immobilization)  [] Transferred from outside facility (Rehab or Long term care)  [] Age </= to 50   ASSESSMENT:   79 yo M PMH of HTN, HLD, AFib, CKD, obesity, gout, prostate cancer, DVT/PE, OA s/p multiple orthopedic procedures, spinal stenosis, presented on 11/13/23 for elective left atrial appendage occlusion procedure, and 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  Patient presented on 11/13 for elective left atrial appendage occlusion with Watchman device/HARJINDER w/ Dr Bobo, and Pradaxa was held for 2 days prior to procedure. 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 On admission, NIHSS 26, MRS 1. Patient is now s/p TNK administration at 11:16 11/13, and TICI 2A thrombectomy with Dr. Soliman. 24 hour repeat CT head s/p tenecteplase administration showed evolving left MCA stroke with hemorrhagic transformation vs contrast extravasation, and mass effects with left to right shift. Suspected etiology of stroke is cardioembolic from atrial fibrillation when off anticoagulation for recent procedure.     NEURO:   -Neurologically still with global aphasia, right sided visual neglect; right lower extremity weakness mildly improved, otherwise no acute neurologic changes   -Continue close monitoring for neurologic deterioration    -Stroke neuro checks q1hrs  -Permissive HTN or  SBP goal up to 180 mmHg, avoid hypotension or rapid fluctuations in blood pressure   -ANTITHROMBOTIC THERAPY: continue to hold in the setting of large L MCA stroke with hemorrhagic transformation, obtain repeat head CT tomorrow (11/15) to assess for stability   -titrate statin to LDL goal less than 70  -MRI Brain w/o ordered, pending   -Dysphagia screen: pending   -Physical therapy/OT/Speech eval/treatment.   -Consider neurosurgery consult for hemicrani watch in the setting of large evolving L MCA stroke with hemorrhagic transformation and mass effect     CARDIOVASCULAR:   -check TTE  -cardiac monitoring w/ telemetry for now, further evaluation pending findings of noted workup                              HEMATOLOGY:   -H/H 11.5/34.1, Platelets 253, monitor anemia per primary team, patient should have all age and risk appropriate malignancy screenings with PCP or sooner if clinically suspected   -DVT ppx: Heparin s.c [] LMWH [] SCDs[x]    PULMONARY:   -Patient intubated    RENAL:   -BUN/Cr 13.7/0.99, monitor urine output, maintain adequate hydration    -Na Goal:  145-150  -Morales: present     ID:   -WBC=15.37, patient afebrile, possibly reactionary leukocytosis from recent procedure; monitor for si/sx of infection     OTHER:    -condition and plan of care d/w patient, questions and concerns addressed.     DISPOSITION: Rehab or home depending on PT eval once stable and workup is complete    CORE MEASURES:        Admission NIHSS: 26     Tenecteplase : [x] YES [] NO      LDL/HDL/A1C: 54/67/6.2     Depression Screen- if depression hx and/or present      Statin Therapy: on hold until dysphagia screen      Dysphagia Screen: [] PASS [] FAIL [x] pending      Smoking [] YES [x] NO      Afib [x] YES [] NO     Stroke Education [x] YES [] NO    Obtain screening lower extremity venous ultrasound in patients who meet 1 or more of the following criteria as patient is high risk for DVT/PE on admission:   [] History of DVT/PE  []Hypercoagulable states (Factor V Leiden, Cancer, OCP, etc. )  []Prolonged immobility (hemiplegia/hemiparesis/post operative or any other extended immobilization)  [] Transferred from outside facility (Rehab or Long term care)  [] Age </= to 50

## 2023-11-14 NOTE — PROGRESS NOTE ADULT - SUBJECTIVE AND OBJECTIVE BOX
Subjective:  Pt with evolving stroke. Extubated but remains aphasic.   Tele - remains in AF with controlled ventricular ates.     MEDICATIONS  (STANDING):  chlorhexidine 0.12% Liquid 15 milliLiter(s) Oral Mucosa every 12 hours  chlorhexidine 2% Cloths 1 Application(s) Topical daily  hydrocortisone sodium succinate Injectable 20 milliGRAM(s) IV Push every 8 hours  hydrocortisone sodium succinate Injectable   IV Push   norepinephrine Infusion 0.05 MICROgram(s)/kG/Min (11.2 mL/Hr) IV Continuous <Continuous>  nystatin Cream 1 Application(s) Topical two times a day  pantoprazole  Injectable 40 milliGRAM(s) IV Push daily  sodium chloride 0.9%. 1000 milliLiter(s) (75 mL/Hr) IV Continuous <Continuous>    MEDICATIONS  (PRN):      Allergies    adhesives (Rash)  No Known Drug Allergies  grass / pollen (Rhinorrhea; Rhinitis; Sneezing; Eye Irritation)  ADHESIVE TAPE - WOULD PREFER PAPER TAPE (Other)    Intolerances        Vital Signs Last 24 Hrs  T(C): 38 (14 Nov 2023 17:00), Max: 38 (14 Nov 2023 14:15)  T(F): 100.4 (14 Nov 2023 17:00), Max: 100.4 (14 Nov 2023 14:15)  HR: 92 (14 Nov 2023 17:00) (44 - 113)  BP: 143/88 (14 Nov 2023 17:00) (125/71 - 167/88)  BP(mean): 105 (14 Nov 2023 17:00) (76 - 119)  RR: 18 (14 Nov 2023 17:00) (13 - 27)  SpO2: 95% (14 Nov 2023 17:00) (95% - 100%)    Parameters below as of 14 Nov 2023 16:00  Patient On (Oxygen Delivery Method): nasal cannula  O2 Flow (L/min): 4      Physical Exam:  Constitutional: unresponsive to verbal  Cardiovascular: irregularly irregular  Pulmonary: CTA b/l, unlabored  GI: soft NTND +BS  Extremities: no pedal edema, +distal pulses b/l  Neuro: right sided weak, aphasic    LABS:                        11.5   15.37 )-----------( 253      ( 14 Nov 2023 04:39 )             34.1     11-14    141  |  104  |  13.7  ----------------------------<  243<H>  3.9   |  26.0  |  0.99    Ca    8.5      14 Nov 2023 04:39  Phos  3.3     11-14  Mg     2.2     11-14    TPro  6.3<L>  /  Alb  3.9  /  TBili  0.4  /  DBili  x   /  AST  20  /  ALT  10  /  AlkPhos  46  11-13    PT/INR - ( 13 Nov 2023 16:00 )   PT: 12.4 sec;   INR: 1.12 ratio         PTT - ( 13 Nov 2023 10:57 )  PTT:29.4 sec  Urinalysis Basic - ( 14 Nov 2023 04:39 )    Color: x / Appearance: x / SG: x / pH: x  Gluc: 243 mg/dL / Ketone: x  / Bili: x / Urobili: x   Blood: x / Protein: x / Nitrite: x   Leuk Esterase: x / RBC: x / WBC x   Sq Epi: x / Non Sq Epi: x / Bacteria: x        RADIOLOGY & ADDITIONAL TESTS:  < from: CT Head No Cont (11.14.23 @ 11:36) >  IMPRESSION: Evolving left MCA infarct is identified with hemorrhagic   transformation now seen with mass effect on left lateral ventricles and   left-to-right shift.    < end of copied text >

## 2023-11-14 NOTE — PROGRESS NOTE ADULT - ASSESSMENT
Assessment:  79M with PMH afib on Eliquis stopped for watchman procedure who had acute onset R sided weakness, aphasia, found to have L ICA terminus occlusion. Received TNK. Underwent mechanical thrombectomy on 11/13, TICI 2A revascularization.   - POD1  - Extubated today  - Groin soft with large ecchymoses       Plan:  - Discussed with Dr. Soliman  - Q2 hour neuro checks  - SBP goal 130-180, requiring phenylephrine  - Na 140-150 2/2 cerebral edema   - Stroke neurology following  - Neurosurgery consulted for possible hemicrani pending   - PT/OT/ST  - Further care per primary team

## 2023-11-14 NOTE — AIRWAY REMOVAL NOTE  ADULT & PEDS - ARTIFICAL AIRWAY REMOVAL COMMENTS
Patient extubated.  No occurrences noted.  Patient tolerated well.  Nursing at bedside.  Patient is currently on 4lpm nc 02 sat=99%   No stridor present.

## 2023-11-14 NOTE — PROGRESS NOTE ADULT - NS ATTEND AMEND GEN_ALL_CORE FT
Seen and examined with the ACP team. Plan discussed with ACPs.    I agree with assessment and plan  as written with modifications made above.    will follow with you    Link Casas MD PhD   [id number]

## 2023-11-14 NOTE — PROGRESS NOTE ADULT - SUBJECTIVE AND OBJECTIVE BOX
Patient is a 78y old  Male who presents with a chief complaint of Lt MCA M1 thrombus (14 Nov 2023 17:05)    HPI:   77 yo M PMH of HTN, HLD, AFib, CKD, obesity, gout, prostate cancer, DVT/PE, OA s/p multiple orthopedic procedures, spinal stenosis, presented on 11/13/23 for elective left atrial appendage occlusion procedure, and 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  Patient has a long h/o AFib, which is now considered permanent. He is on rate control with metoprolol 25 mg bid, and anticoagulation with Pradaxa.  He has a long Hx of arthritis and had multiple hip replacements, bilateral knee replacements and also has neck and back pain, but is unable to take NSAIDs due to bleeding risks. He has an unsteady gait and uses a cane for assistance.  Patient presented for elective left atrial appendage occlusion with Watchman device/HARJINDER w/ Dr Bobo, and Pradaxa was held for 2 days prior to procedure. 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   Reported per neurology that on admission, NIHSS 26, MRS 1.  Patient is now s/p TNK administration at 11:16, and TICI 2A thrombectomy with Dr. Soliman, admitted to the NeuroICU intubated and on phenylephrine.      TOTAL SCORE: 26 (13 Nov 2023 16:32)      Interval history:  Patient seen and examined by neuro IR team. Patient POD 1 from mechanical thrombectomy for LICA terminus occlusion, TICI 2A revascularization. Patient extubated today by ICU. CTH today shows evolving stroke with small area of hemorrhagic conversion. No other acute events reported.       Vital Signs Last 24 Hrs  T(C): 38 (14 Nov 2023 17:00), Max: 38 (14 Nov 2023 14:15)  T(F): 100.4 (14 Nov 2023 17:00), Max: 100.4 (14 Nov 2023 14:15)  HR: 92 (14 Nov 2023 17:00) (44 - 113)  BP: 143/88 (14 Nov 2023 17:00) (125/71 - 167/88)  BP(mean): 105 (14 Nov 2023 17:00) (76 - 119)  RR: 18 (14 Nov 2023 17:00) (13 - 27)  SpO2: 95% (14 Nov 2023 17:00) (95% - 100%)    Parameters below as of 14 Nov 2023 16:00  Patient On (Oxygen Delivery Method): nasal cannula  O2 Flow (L/min): 4      Physical Exam:  Constitutional: NAD, lying in bed  Neuro  * Mental Status: EO spontaneously, following simple commands on L side, expressive > receptive aphasia   * Cranial Nerves: PERRL, L gaze deviation unable to cross, R homonymous hemianopsia, R facial droop   * Motor: LUE AG, LLE moving in plane of bed, RUE weak WD, RLE brisk WD  * Sensory: Sensation grossly intact to noxious stimuli   * Reflexes: not tested  * Groin: soft, nontender, no hematoma, large area of ecchymoses      LABS:                        11.5   15.37 )-----------( 253      ( 14 Nov 2023 04:39 )             34.1     11-14    141  |  104  |  13.7  ----------------------------<  243<H>  3.9   |  26.0  |  0.99    Ca    8.5      14 Nov 2023 04:39  Phos  3.3     11-14  Mg     2.2     11-14    TPro  6.3<L>  /  Alb  3.9  /  TBili  0.4  /  DBili  x   /  AST  20  /  ALT  10  /  AlkPhos  46  11-13    PT/INR - ( 13 Nov 2023 16:00 )   PT: 12.4 sec;   INR: 1.12 ratio    PTT - ( 13 Nov 2023 10:57 )  PTT:29.4 sec    Urinalysis Basic - ( 14 Nov 2023 04:39 )  Color: x / Appearance: x / SG: x / pH: x  Gluc: 243 mg/dL / Ketone: x  / Bili: x / Urobili: x   Blood: x / Protein: x / Nitrite: x   Leuk Esterase: x / RBC: x / WBC x   Sq Epi: x / Non Sq Epi: x / Bacteria: x      Medications:  MEDICATIONS  (STANDING):  chlorhexidine 0.12% Liquid 15 milliLiter(s) Oral Mucosa every 12 hours  chlorhexidine 2% Cloths 1 Application(s) Topical daily  hydrocortisone sodium succinate Injectable 20 milliGRAM(s) IV Push every 8 hours  hydrocortisone sodium succinate Injectable   IV Push   norepinephrine Infusion 0.05 MICROgram(s)/kG/Min (11.2 mL/Hr) IV Continuous <Continuous>  nystatin Cream 1 Application(s) Topical two times a day  pantoprazole  Injectable 40 milliGRAM(s) IV Push daily  sodium chloride 0.9%. 1000 milliLiter(s) (75 mL/Hr) IV Continuous <Continuous>    MEDICATIONS  (PRN):      RADIOLOGY & ADDITIONAL STUDIES:  < from: CT Head No Cont (11.14.23 @ 11:36) >  IMPRESSION: Evolving left MCA infarct is identified with hemorrhagic   transformation now seen with mass effect on left lateral ventricles and   left-to-right shift.    --- End of Report ---  LIZETTE ANDRADE MD; Attending Radiologist  This document has been electronically signed. Nov 14 2023 11:41AM    < end of copied text >

## 2023-11-14 NOTE — PROGRESS NOTE ADULT - SUBJECTIVE AND OBJECTIVE BOX
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  Patient has a long h/o AFib, which is now considered permanent. He is on rate control with metoprolol 25 mg bid, and anticoagulation with Pradaxa.  He has a long Hx of arthritis and had multiple hip replacements, bilateral knee replacements and also has neck and back pain, but is unable to take NSAIDs due to bleeding risks. He has an unsteady gait and uses a cane for assistance.  Patient presented for elective left atrial appendage occlusion with Watchman device/HARJINDER w/ Dr Bobo, and Pradaxa was held for 2 days prior to procedure. 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   Reported per neurology that on admission, NIHSS 26, MRS 1.  Patient is now s/p TNK administration at 11:16, and TICI 2A thrombectomy with Dr. Soliman, admitted to the NeuroICU intubated and on phenylephrine.     NIH SS: 11/13 at 10:53 per neurology  1A: Level of consciousness (0-3): 1  1B: Questions (0-2): 2  1C: Commands (0-2): 2  2: Gaze (0-2): 2  3: Visual fields (0-3): 2  4: Facial palsy (0-3): 1  MOTOR:  5A: Left arm motor drift (0-4): 0  5B: Right arm motor drift (0-4): 4  6A: Left leg motor drift (0-4): 0  6B: Right leg motor drift (0-4): 4  7: Limb ataxia (0-2): 0  SENSORY:  8: Sensation (0-2): 2  SPEECH:  9: Language (0-3): 3  10: Dysarthria (0-2): 2  EXTINCTION:  11: Extinction/inattention (0-2): 2    TOTAL SCORE: 26 (13 Nov 2023 16:32)    24 hr events:  Received IV thrombolysis, underwent TICI 2a MT.  Extubated this am.    ICU Vital Signs Last 24 Hrs  T(C): 38 (14 Nov 2023 16:45), Max: 38 (14 Nov 2023 14:15)  T(F): 100.4 (14 Nov 2023 16:45), Max: 100.4 (14 Nov 2023 14:15)  HR: 82 (14 Nov 2023 16:45) (44 - 113)  BP: 132/87 (14 Nov 2023 16:45) (125/71 - 167/88)  BP(mean): 100 (14 Nov 2023 16:45) (76 - 119)  ABP: --  ABP(mean): --  RR: 19 (14 Nov 2023 16:45) (13 - 27)  SpO2: 100% (14 Nov 2023 16:45) (98% - 100%)    O2 Parameters below as of 14 Nov 2023 16:00  Patient On (Oxygen Delivery Method): nasal cannula  O2 Flow (L/min): 4    Exam: pre-extubation, off sedation  EO to verbal, tracks, crosses midline, PERRLA,  component of neglect noted, follows some simple commands but seems with limited comprehension, motor - moves L side spont, RUE wdrl, RLE wdrl.  CTAB  S1S2 present  Abd soft, NT, ND  No peripheral swelling  R groin with superficial intraderm. hematoma, non-tender; signs of inguinal candidiasis noted.

## 2023-11-15 PROBLEM — E78.5 HYPERLIPIDEMIA, UNSPECIFIED: Chronic | Status: ACTIVE | Noted: 2023-11-06

## 2023-11-15 PROBLEM — I48.91 UNSPECIFIED ATRIAL FIBRILLATION: Chronic | Status: ACTIVE | Noted: 2023-11-06

## 2023-11-15 PROBLEM — I10 ESSENTIAL (PRIMARY) HYPERTENSION: Chronic | Status: ACTIVE | Noted: 2023-11-06

## 2023-11-15 PROBLEM — N18.9 CHRONIC KIDNEY DISEASE, UNSPECIFIED: Chronic | Status: ACTIVE | Noted: 2023-11-06

## 2023-11-15 PROBLEM — M19.90 UNSPECIFIED OSTEOARTHRITIS, UNSPECIFIED SITE: Chronic | Status: ACTIVE | Noted: 2023-11-06

## 2023-11-15 PROBLEM — Z86.711 PERSONAL HISTORY OF PULMONARY EMBOLISM: Chronic | Status: ACTIVE | Noted: 2023-11-06

## 2023-11-15 LAB
ANION GAP SERPL CALC-SCNC: 8 MMOL/L — SIGNIFICANT CHANGE UP (ref 5–17)
ANION GAP SERPL CALC-SCNC: 8 MMOL/L — SIGNIFICANT CHANGE UP (ref 5–17)
BUN SERPL-MCNC: 16.2 MG/DL — SIGNIFICANT CHANGE UP (ref 8–20)
BUN SERPL-MCNC: 16.2 MG/DL — SIGNIFICANT CHANGE UP (ref 8–20)
CALCIUM SERPL-MCNC: 8 MG/DL — LOW (ref 8.4–10.5)
CALCIUM SERPL-MCNC: 8 MG/DL — LOW (ref 8.4–10.5)
CHLORIDE SERPL-SCNC: 109 MMOL/L — HIGH (ref 96–108)
CHLORIDE SERPL-SCNC: 109 MMOL/L — HIGH (ref 96–108)
CO2 SERPL-SCNC: 27 MMOL/L — SIGNIFICANT CHANGE UP (ref 22–29)
CO2 SERPL-SCNC: 27 MMOL/L — SIGNIFICANT CHANGE UP (ref 22–29)
CREAT SERPL-MCNC: 0.84 MG/DL — SIGNIFICANT CHANGE UP (ref 0.5–1.3)
CREAT SERPL-MCNC: 0.84 MG/DL — SIGNIFICANT CHANGE UP (ref 0.5–1.3)
EGFR: 89 ML/MIN/1.73M2 — SIGNIFICANT CHANGE UP
EGFR: 89 ML/MIN/1.73M2 — SIGNIFICANT CHANGE UP
GLUCOSE BLDC GLUCOMTR-MCNC: 149 MG/DL — HIGH (ref 70–99)
GLUCOSE BLDC GLUCOMTR-MCNC: 149 MG/DL — HIGH (ref 70–99)
GLUCOSE BLDC GLUCOMTR-MCNC: 155 MG/DL — HIGH (ref 70–99)
GLUCOSE BLDC GLUCOMTR-MCNC: 155 MG/DL — HIGH (ref 70–99)
GLUCOSE BLDC GLUCOMTR-MCNC: 216 MG/DL — HIGH (ref 70–99)
GLUCOSE BLDC GLUCOMTR-MCNC: 216 MG/DL — HIGH (ref 70–99)
GLUCOSE BLDC GLUCOMTR-MCNC: 225 MG/DL — HIGH (ref 70–99)
GLUCOSE BLDC GLUCOMTR-MCNC: 225 MG/DL — HIGH (ref 70–99)
GLUCOSE SERPL-MCNC: 155 MG/DL — HIGH (ref 70–99)
GLUCOSE SERPL-MCNC: 155 MG/DL — HIGH (ref 70–99)
HCT VFR BLD CALC: 27.9 % — LOW (ref 39–50)
HCT VFR BLD CALC: 27.9 % — LOW (ref 39–50)
HCT VFR BLD CALC: 29.7 % — LOW (ref 39–50)
HCT VFR BLD CALC: 29.7 % — LOW (ref 39–50)
HGB BLD-MCNC: 8.7 G/DL — LOW (ref 13–17)
HGB BLD-MCNC: 8.7 G/DL — LOW (ref 13–17)
HGB BLD-MCNC: 9.6 G/DL — LOW (ref 13–17)
HGB BLD-MCNC: 9.6 G/DL — LOW (ref 13–17)
MAGNESIUM SERPL-MCNC: 2.2 MG/DL — SIGNIFICANT CHANGE UP (ref 1.6–2.6)
MAGNESIUM SERPL-MCNC: 2.2 MG/DL — SIGNIFICANT CHANGE UP (ref 1.6–2.6)
MCHC RBC-ENTMCNC: 29.7 PG — SIGNIFICANT CHANGE UP (ref 27–34)
MCHC RBC-ENTMCNC: 29.7 PG — SIGNIFICANT CHANGE UP (ref 27–34)
MCHC RBC-ENTMCNC: 30.8 PG — SIGNIFICANT CHANGE UP (ref 27–34)
MCHC RBC-ENTMCNC: 30.8 PG — SIGNIFICANT CHANGE UP (ref 27–34)
MCHC RBC-ENTMCNC: 31.2 GM/DL — LOW (ref 32–36)
MCHC RBC-ENTMCNC: 31.2 GM/DL — LOW (ref 32–36)
MCHC RBC-ENTMCNC: 32.3 GM/DL — SIGNIFICANT CHANGE UP (ref 32–36)
MCHC RBC-ENTMCNC: 32.3 GM/DL — SIGNIFICANT CHANGE UP (ref 32–36)
MCV RBC AUTO: 95.2 FL — SIGNIFICANT CHANGE UP (ref 80–100)
PHOSPHATE SERPL-MCNC: 2.2 MG/DL — LOW (ref 2.4–4.7)
PHOSPHATE SERPL-MCNC: 2.2 MG/DL — LOW (ref 2.4–4.7)
PLATELET # BLD AUTO: 149 K/UL — LOW (ref 150–400)
PLATELET # BLD AUTO: 149 K/UL — LOW (ref 150–400)
PLATELET # BLD AUTO: 154 K/UL — SIGNIFICANT CHANGE UP (ref 150–400)
PLATELET # BLD AUTO: 154 K/UL — SIGNIFICANT CHANGE UP (ref 150–400)
POTASSIUM SERPL-MCNC: 3.9 MMOL/L — SIGNIFICANT CHANGE UP (ref 3.5–5.3)
POTASSIUM SERPL-MCNC: 3.9 MMOL/L — SIGNIFICANT CHANGE UP (ref 3.5–5.3)
POTASSIUM SERPL-SCNC: 3.9 MMOL/L — SIGNIFICANT CHANGE UP (ref 3.5–5.3)
POTASSIUM SERPL-SCNC: 3.9 MMOL/L — SIGNIFICANT CHANGE UP (ref 3.5–5.3)
RAPID RVP RESULT: SIGNIFICANT CHANGE UP
RAPID RVP RESULT: SIGNIFICANT CHANGE UP
RBC # BLD: 2.93 M/UL — LOW (ref 4.2–5.8)
RBC # BLD: 2.93 M/UL — LOW (ref 4.2–5.8)
RBC # BLD: 3.12 M/UL — LOW (ref 4.2–5.8)
RBC # BLD: 3.12 M/UL — LOW (ref 4.2–5.8)
RBC # FLD: 14.1 % — SIGNIFICANT CHANGE UP (ref 10.3–14.5)
RBC # FLD: 14.1 % — SIGNIFICANT CHANGE UP (ref 10.3–14.5)
RBC # FLD: 14.2 % — SIGNIFICANT CHANGE UP (ref 10.3–14.5)
RBC # FLD: 14.2 % — SIGNIFICANT CHANGE UP (ref 10.3–14.5)
SARS-COV-2 RNA SPEC QL NAA+PROBE: SIGNIFICANT CHANGE UP
SARS-COV-2 RNA SPEC QL NAA+PROBE: SIGNIFICANT CHANGE UP
SODIUM SERPL-SCNC: 144 MMOL/L — SIGNIFICANT CHANGE UP (ref 135–145)
SODIUM SERPL-SCNC: 144 MMOL/L — SIGNIFICANT CHANGE UP (ref 135–145)
WBC # BLD: 8.97 K/UL — SIGNIFICANT CHANGE UP (ref 3.8–10.5)
WBC # BLD: 8.97 K/UL — SIGNIFICANT CHANGE UP (ref 3.8–10.5)
WBC # BLD: 9.26 K/UL — SIGNIFICANT CHANGE UP (ref 3.8–10.5)
WBC # BLD: 9.26 K/UL — SIGNIFICANT CHANGE UP (ref 3.8–10.5)
WBC # FLD AUTO: 8.97 K/UL — SIGNIFICANT CHANGE UP (ref 3.8–10.5)
WBC # FLD AUTO: 8.97 K/UL — SIGNIFICANT CHANGE UP (ref 3.8–10.5)
WBC # FLD AUTO: 9.26 K/UL — SIGNIFICANT CHANGE UP (ref 3.8–10.5)
WBC # FLD AUTO: 9.26 K/UL — SIGNIFICANT CHANGE UP (ref 3.8–10.5)

## 2023-11-15 PROCEDURE — 99291 CRITICAL CARE FIRST HOUR: CPT

## 2023-11-15 PROCEDURE — 93010 ELECTROCARDIOGRAM REPORT: CPT | Mod: 76

## 2023-11-15 PROCEDURE — 99233 SBSQ HOSP IP/OBS HIGH 50: CPT

## 2023-11-15 RX ORDER — NYSTATIN CREAM 100000 [USP'U]/G
1 CREAM TOPICAL
Refills: 0 | Status: DISCONTINUED | OUTPATIENT
Start: 2023-11-15 | End: 2023-11-30

## 2023-11-15 RX ORDER — FUROSEMIDE 40 MG
40 TABLET ORAL ONCE
Refills: 0 | Status: COMPLETED | OUTPATIENT
Start: 2023-11-15 | End: 2023-11-15

## 2023-11-15 RX ORDER — ATORVASTATIN CALCIUM 80 MG/1
40 TABLET, FILM COATED ORAL AT BEDTIME
Refills: 0 | Status: DISCONTINUED | OUTPATIENT
Start: 2023-11-15 | End: 2023-12-04

## 2023-11-15 RX ORDER — SENNA PLUS 8.6 MG/1
1 TABLET ORAL AT BEDTIME
Refills: 0 | Status: DISCONTINUED | OUTPATIENT
Start: 2023-11-15 | End: 2023-11-17

## 2023-11-15 RX ORDER — GABAPENTIN 400 MG/1
300 CAPSULE ORAL
Refills: 0 | Status: DISCONTINUED | OUTPATIENT
Start: 2023-11-15 | End: 2023-12-04

## 2023-11-15 RX ORDER — SENNA PLUS 8.6 MG/1
1 TABLET ORAL DAILY
Refills: 0 | Status: DISCONTINUED | OUTPATIENT
Start: 2023-11-15 | End: 2023-11-15

## 2023-11-15 RX ORDER — POTASSIUM PHOSPHATE, MONOBASIC POTASSIUM PHOSPHATE, DIBASIC 236; 224 MG/ML; MG/ML
30 INJECTION, SOLUTION INTRAVENOUS ONCE
Refills: 0 | Status: COMPLETED | OUTPATIENT
Start: 2023-11-15 | End: 2023-11-15

## 2023-11-15 RX ORDER — METOPROLOL TARTRATE 50 MG
25 TABLET ORAL EVERY 12 HOURS
Refills: 0 | Status: DISCONTINUED | OUTPATIENT
Start: 2023-11-15 | End: 2023-11-15

## 2023-11-15 RX ORDER — NOREPINEPHRINE BITARTRATE/D5W 8 MG/250ML
0.05 PLASTIC BAG, INJECTION (ML) INTRAVENOUS
Qty: 8 | Refills: 0 | Status: DISCONTINUED | OUTPATIENT
Start: 2023-11-15 | End: 2023-11-15

## 2023-11-15 RX ORDER — POLYETHYLENE GLYCOL 3350 17 G/17G
17 POWDER, FOR SOLUTION ORAL DAILY
Refills: 0 | Status: DISCONTINUED | OUTPATIENT
Start: 2023-11-15 | End: 2023-12-04

## 2023-11-15 RX ORDER — ACETAMINOPHEN 500 MG
650 TABLET ORAL EVERY 6 HOURS
Refills: 0 | Status: DISCONTINUED | OUTPATIENT
Start: 2023-11-15 | End: 2023-11-22

## 2023-11-15 RX ADMIN — NYSTATIN CREAM 1 APPLICATION(S): 100000 CREAM TOPICAL at 05:06

## 2023-11-15 RX ADMIN — POTASSIUM PHOSPHATE, MONOBASIC POTASSIUM PHOSPHATE, DIBASIC 83.33 MILLIMOLE(S): 236; 224 INJECTION, SOLUTION INTRAVENOUS at 05:00

## 2023-11-15 RX ADMIN — Medication 650 MILLIGRAM(S): at 15:02

## 2023-11-15 RX ADMIN — Medication 650 MILLIGRAM(S): at 15:49

## 2023-11-15 RX ADMIN — Medication 20 MILLIGRAM(S): at 05:03

## 2023-11-15 RX ADMIN — GABAPENTIN 300 MILLIGRAM(S): 400 CAPSULE ORAL at 17:09

## 2023-11-15 RX ADMIN — PANTOPRAZOLE SODIUM 40 MILLIGRAM(S): 20 TABLET, DELAYED RELEASE ORAL at 11:49

## 2023-11-15 RX ADMIN — Medication 2: at 23:20

## 2023-11-15 RX ADMIN — POLYETHYLENE GLYCOL 3350 17 GRAM(S): 17 POWDER, FOR SOLUTION ORAL at 11:49

## 2023-11-15 RX ADMIN — Medication 25 MILLIGRAM(S): at 17:08

## 2023-11-15 RX ADMIN — Medication 2: at 17:07

## 2023-11-15 RX ADMIN — Medication 20 MILLIGRAM(S): at 17:08

## 2023-11-15 RX ADMIN — ATORVASTATIN CALCIUM 40 MILLIGRAM(S): 80 TABLET, FILM COATED ORAL at 22:23

## 2023-11-15 RX ADMIN — Medication 40 MILLIGRAM(S): at 11:49

## 2023-11-15 RX ADMIN — Medication 1: at 05:05

## 2023-11-15 RX ADMIN — SENNA PLUS 1 TABLET(S): 8.6 TABLET ORAL at 22:23

## 2023-11-15 RX ADMIN — SODIUM CHLORIDE 75 MILLILITER(S): 9 INJECTION INTRAMUSCULAR; INTRAVENOUS; SUBCUTANEOUS at 05:00

## 2023-11-15 RX ADMIN — NYSTATIN CREAM 1 APPLICATION(S): 100000 CREAM TOPICAL at 17:07

## 2023-11-15 NOTE — SWALLOW BEDSIDE ASSESSMENT ADULT - COMMENTS
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  Patient presented on 11/13 for elective left atrial appendage occlusion with Watchman device/HARJINDER w/ Dr Bobo, and Pradaxa was held for 2 days prior to procedure. 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 On admission, NIHSS 26, MRS 1. Patient is now s/p TNK administration at 11:16 11/13, and TICI 2A thrombectomy with Dr. Soliman. 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 Suspected etiology of stroke is cardioembolic from atrial fibrillation when off anticoagulation for recent procedure".

## 2023-11-15 NOTE — PROGRESS NOTE ADULT - SUBJECTIVE AND OBJECTIVE BOX
Preliminary note, offical recommendations pending attending review/signature   [facility] Stroke Team  Progress Note     HPI:  79 yo M PMH of HTN, HLD, AFib, CKD, obesity, gout, prostate cancer, DVT/PE, OA s/p multiple orthopedic procedures, spinal stenosis, presented on 11/13/23 for elective left atrial appendage occlusion procedure, and developed a L MCA syndrome due to acute L M1 occlusion as demonstrated on CTA Head and Neck.  Patient has a long h/o AFib, which is now considered permanent. He is on rate control with metoprolol 25 mg bid, and anticoagulation with Pradaxa.  He has a long Hx of arthritis and had multiple hip replacements, bilateral knee replacements and also has neck and back pain, but is unable to take NSAIDs due to bleeding risks. He has an unsteady gait and uses a cane for assistance.  Patient presented on 11/13 for elective left atrial appendage occlusion with Watchman device/HARJINDER w/ Dr Bobo, and Pradaxa was held for 2 days prior to procedure. 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   Initial NIHSS 26, MRS 1.  Patient is now s/p tenecteplase administration at 11:16 11/13,, and TICI 2A thrombectomy with Dr. Soliman.    SUBJECTIVE: Patient extubated overnight. No new neurologic complaints.  ROS reported negative unless otherwise noted.    chlorhexidine 2% Cloths 1 Application(s) Topical daily  dextrose 5%. 1000 milliLiter(s) IV Continuous <Continuous>  dextrose 5%. 1000 milliLiter(s) IV Continuous <Continuous>  dextrose 50% Injectable 25 Gram(s) IV Push once  dextrose 50% Injectable 12.5 Gram(s) IV Push once  dextrose 50% Injectable 25 Gram(s) IV Push once  dextrose Oral Gel 15 Gram(s) Oral once PRN  glucagon  Injectable 1 milliGRAM(s) IntraMuscular once  hydrocortisone sodium succinate Injectable   IV Push   hydrocortisone sodium succinate Injectable 20 milliGRAM(s) IV Push every 12 hours  insulin lispro (ADMELOG) corrective regimen sliding scale   SubCutaneous every 6 hours  norepinephrine Infusion 0.05 MICROgram(s)/kG/Min IV Continuous <Continuous>  nystatin Cream 1 Application(s) Topical two times a day  pantoprazole  Injectable 40 milliGRAM(s) IV Push daily  sodium chloride 0.9%. 1000 milliLiter(s) IV Continuous <Continuous>      PHYSICAL EXAM:   Vital Signs Last 24 Hrs  T(C): 37.9 (15 Nov 2023 08:00), Max: 38.1 (14 Nov 2023 17:45)  T(F): 100.2 (15 Nov 2023 08:00), Max: 100.6 (14 Nov 2023 17:45)  HR: 95 (15 Nov 2023 08:00) (69 - 106)  BP: 120/87 (15 Nov 2023 08:00) (101/63 - 156/80)  BP(mean): 93 (15 Nov 2023 08:00) (70 - 119)  RR: 25 (15 Nov 2023 08:00) (14 - 27)  SpO2: 100% (15 Nov 2023 08:00) (95% - 100%)    Parameters below as of 15 Nov 2023 04:00  Patient On (Oxygen Delivery Method): nasal cannula  O2 Flow (L/min): 2    COMPLETE EXAM PENDING  General: No acute distress    NEUROLOGICAL EXAM:  Mental status: Awake, alert, oriented x3, speech fluent, follows commands, no neglect, normal memory   Cranial Nerves: No facial asymmetry, no nystagmus, no dysarthria,  tongue midline  Motor exam: Normal tone, no drift, 5/5 RUE, 5/5 RLE, 5/5 LUE, 5/5 LLE, normal fine finger movements.  Sensation: Intact to light touch   Coordination/ Gait: No dysmetria, gait not tested    LABS:                        8.7    9.26  )-----------( 149      ( 15 Nov 2023 02:00 )             27.9    11-15    144  |  109<H>  |  16.2  ----------------------------<  155<H>  3.9   |  27.0  |  0.84    Ca    8.0<L>      15 Nov 2023 02:00  Phos  2.2     11-15  Mg     2.2     11-15    TPro  6.3<L>  /  Alb  3.9  /  TBili  0.4  /  DBili  x   /  AST  20  /  ALT  10  /  AlkPhos  46  11-13  PT/INR - ( 13 Nov 2023 16:00 )   PT: 12.4 sec;   INR: 1.12 ratio         PTT - ( 13 Nov 2023 10:57 )  PTT:29.4 sec    Lipid Profile in AM (11.14.23 @ 04:39)    Cholesterol: 151 mg/dL   Triglycerides, Serum: 66 mg/dL   HDL Cholesterol: 84 mg/dL   Non HDL Cholesterol: 67   LDL Cholesterol Calculated: 54 mg/dL    A1C with Estimated Average Glucose in AM (11.14.23 @ 04:39)    A1C with Estimated Average Glucose Result: 6.2 %   Estimated Average Glucose: 131 mg/dL    IMAGING: Reviewed by me.     CT Head No Cont (11.14.23 @ 18:57)   IMPRESSION:  Stable moderate to large acute left MCA infarct with mild hemorrhage    TTE Echo Complete w/o Contrast w/ Doppler (11.14.23 @ 15:57)   Summary:   1. Left ventricular ejection fraction, by visual estimation, is 55 to   60%.   2. Normal global left ventricular systolic function.   3. The mitral in-flow pattern reveals no discernable A-wave, therefore   no comment on diastolic function can be made.   4. Normal right ventricular size and function.   5. Mild to moderately enlarged right atrium.   6. Moderately enlarged left atrium.   7. Trace mitral valve regurgitation.   8. Mild-moderate tricuspid regurgitation.   9. Sclerotic aortic valve with decreased opening.  10. Estimated pulmonary artery systolic pressure is 41.6 mmHg assuming a   right atrial pressure of 5 mmHg, which is consistent with mild pulmonary   hypertension.    CT Head No Cont (11.14.23 @ 11:36)   IMPRESSION: Evolving left MCA infarct is identified with hemorrhagic   transformation now seen with mass effect on left lateral ventricles and   left-to-right shift.    US Duplex Venous Lower Ext Complete, Bilateral (11.14.23 @ 10:38)   IMPRESSION:  No evidence of deep venous thrombosis in either lower extremity.  Left calf veins not visualized.  Small right popliteal fossa cyst.    CT Angio Brain, Neck and perfusion Stroke Protocol  w/ IV Cont (11.13.23 @ 11:18)   CTA BRAIN:  Acute thrombus in the distal left MCA M1 segment. Diminished flow in the   distal branches.  Severe bilateral cavernous carotid artery calcifications. Fetal origins   the bilateral posterior cerebral arteries. The [place] and   vertebrobasilar system are otherwise unremarkable without evidence of   stenosis, additional occlusion or saccular aneurysm dilation. No evidence   for arterial venous malformation. The vertebral arteries are codominant.    CTA NECK:  Mild bilateral carotid bulb calcifications. Mild stenosis in the left   carotid bulb. The right internal carotid arteries tortuous.  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 There is normal relationship to   the great vessels. The common carotid arteries, internal carotid arteries   and vertebral arteries shows no other evidence of significant stenosis,   occlusion or saccular aneurysm dilation. The vertebral arteries are   codominant.    CT PERFUSION:  Patient has only had less than 2 hours of symptoms may influence the CT   perfusion.    CBF<30% volume: 26 ml left MCA territory.  Tmax>6.0 s volume: 193 ml left MCA territory  Mismatch volume: 167 ml  Mismatch ratio: 7.4    IMPRESSION:  Thrombus in the distal left MCA M1 segment with diminished flow in the   distal MCA segments. 167 mL area of penumbra in the left MCA territory.     CT Brain Stroke Protocol (11.13.23 @ 11:15)   IMPRESSION:  Dense left MCA M1 segment suggesting intraluminal thrombus.   Mild chronic microvascular changes without evidence of an acute   transcortical infarction or hemorrhage.   Preliminary note, offical recommendations pending attending review/signature   [facility] Stroke Team  Progress Note     HPI:  79 yo M PMH of HTN, HLD, AFib, CKD, obesity, gout, prostate cancer, DVT/PE, OA s/p multiple orthopedic procedures, spinal stenosis, presented on 11/13/23 for elective left atrial appendage occlusion procedure, and developed a L MCA syndrome due to acute L M1 occlusion as demonstrated on CTA Head and Neck.  Patient has a long h/o AFib, which is now considered permanent. He is on rate control with metoprolol 25 mg bid, and anticoagulation with Pradaxa.  He has a long Hx of arthritis and had multiple hip replacements, bilateral knee replacements and also has neck and back pain, but is unable to take NSAIDs due to bleeding risks. He has an unsteady gait and uses a cane for assistance.  Patient presented on 11/13 for elective left atrial appendage occlusion with Watchman device/HARJINDER w/ Dr Bobo, and Pradaxa was held for 2 days prior to procedure. 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   Initial NIHSS 26, MRS 1.  Patient is now s/p tenecteplase administration at 11:16 11/13,, and TICI 2A thrombectomy with Dr. Soliman.    SUBJECTIVE: Patient extubated overnight. No new neurologic complaints.  ROS reported negative unless otherwise noted.    chlorhexidine 2% Cloths 1 Application(s) Topical daily  dextrose 5%. 1000 milliLiter(s) IV Continuous <Continuous>  dextrose 5%. 1000 milliLiter(s) IV Continuous <Continuous>  dextrose 50% Injectable 25 Gram(s) IV Push once  dextrose 50% Injectable 12.5 Gram(s) IV Push once  dextrose 50% Injectable 25 Gram(s) IV Push once  dextrose Oral Gel 15 Gram(s) Oral once PRN  glucagon  Injectable 1 milliGRAM(s) IntraMuscular once  hydrocortisone sodium succinate Injectable   IV Push   hydrocortisone sodium succinate Injectable 20 milliGRAM(s) IV Push every 12 hours  insulin lispro (ADMELOG) corrective regimen sliding scale   SubCutaneous every 6 hours  norepinephrine Infusion 0.05 MICROgram(s)/kG/Min IV Continuous <Continuous>  nystatin Cream 1 Application(s) Topical two times a day  pantoprazole  Injectable 40 milliGRAM(s) IV Push daily  sodium chloride 0.9%. 1000 milliLiter(s) IV Continuous <Continuous>      PHYSICAL EXAM:   Vital Signs Last 24 Hrs  T(C): 37.9 (15 Nov 2023 08:00), Max: 38.1 (14 Nov 2023 17:45)  T(F): 100.2 (15 Nov 2023 08:00), Max: 100.6 (14 Nov 2023 17:45)  HR: 95 (15 Nov 2023 08:00) (69 - 106)  BP: 120/87 (15 Nov 2023 08:00) (101/63 - 156/80)  BP(mean): 93 (15 Nov 2023 08:00) (70 - 119)  RR: 25 (15 Nov 2023 08:00) (14 - 27)  SpO2: 100% (15 Nov 2023 08:00) (95% - 100%)    Parameters below as of 15 Nov 2023 04:00  Patient On (Oxygen Delivery Method): nasal cannula  O2 Flow (L/min): 2    General: NAD    Detailed Neurologic Exam:    Mental status: The patient is awake, alert, responds to voice. Right sided visual willow-neglect present. Global aphasia noted.     Cranial nerves: Pupils equal and react symmetrically to light, sluggish 5 to 3. There is no blink on right.  Extraocular movement intact, gaze crosses midline bilaterally. Right facial droop noted.     Motor: There is normal bulk and tone.  There is no tremor.  Right lower extremity with trace movement in the toes, 0/5 strength in RUE    Strength is at least 4+/5 in the left arm and leg.    Sensation: Intact to pinch left in 4 extremities    Cerebellar: unable to assess dysmetria on finger to nose testing.    Gait : deferred    LABS:                        8.7    9.26  )-----------( 149      ( 15 Nov 2023 02:00 )             27.9    11-15    144  |  109<H>  |  16.2  ----------------------------<  155<H>  3.9   |  27.0  |  0.84    Ca    8.0<L>      15 Nov 2023 02:00  Phos  2.2     11-15  Mg     2.2     11-15    TPro  6.3<L>  /  Alb  3.9  /  TBili  0.4  /  DBili  x   /  AST  20  /  ALT  10  /  AlkPhos  46  11-13  PT/INR - ( 13 Nov 2023 16:00 )   PT: 12.4 sec;   INR: 1.12 ratio         PTT - ( 13 Nov 2023 10:57 )  PTT:29.4 sec    Lipid Profile in AM (11.14.23 @ 04:39)    Cholesterol: 151 mg/dL   Triglycerides, Serum: 66 mg/dL   HDL Cholesterol: 84 mg/dL   Non HDL Cholesterol: 67   LDL Cholesterol Calculated: 54 mg/dL    A1C with Estimated Average Glucose in AM (11.14.23 @ 04:39)    A1C with Estimated Average Glucose Result: 6.2 %   Estimated Average Glucose: 131 mg/dL    IMAGING: Reviewed by me.     CT Head No Cont (11.14.23 @ 18:57)   IMPRESSION:  Stable moderate to large acute left MCA infarct with mild hemorrhage    TTE Echo Complete w/o Contrast w/ Doppler (11.14.23 @ 15:57)   Summary:   1. Left ventricular ejection fraction, by visual estimation, is 55 to   60%.   2. Normal global left ventricular systolic function.   3. The mitral in-flow pattern reveals no discernable A-wave, therefore   no comment on diastolic function can be made.   4. Normal right ventricular size and function.   5. Mild to moderately enlarged right atrium.   6. Moderately enlarged left atrium.   7. Trace mitral valve regurgitation.   8. Mild-moderate tricuspid regurgitation.   9. Sclerotic aortic valve with decreased opening.  10. Estimated pulmonary artery systolic pressure is 41.6 mmHg assuming a   right atrial pressure of 5 mmHg, which is consistent with mild pulmonary   hypertension.    CT Head No Cont (11.14.23 @ 11:36)   IMPRESSION: Evolving left MCA infarct is identified with hemorrhagic   transformation now seen with mass effect on left lateral ventricles and   left-to-right shift.    US Duplex Venous Lower Ext Complete, Bilateral (11.14.23 @ 10:38)   IMPRESSION:  No evidence of deep venous thrombosis in either lower extremity.  Left calf veins not visualized.  Small right popliteal fossa cyst.    CT Angio Brain, Neck and perfusion Stroke Protocol  w/ IV Cont (11.13.23 @ 11:18)   CTA BRAIN:  Acute thrombus in the distal left MCA M1 segment. Diminished flow in the   distal branches.  Severe bilateral cavernous carotid artery calcifications. Fetal origins   the bilateral posterior cerebral arteries. The [place] and   vertebrobasilar system are otherwise unremarkable without evidence of   stenosis, additional occlusion or saccular aneurysm dilation. No evidence   for arterial venous malformation. The vertebral arteries are codominant.    CTA NECK:  Mild bilateral carotid bulb calcifications. Mild stenosis in the left   carotid bulb. The right internal carotid arteries tortuous.  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 There is normal relationship to   the great vessels. The common carotid arteries, internal carotid arteries   and vertebral arteries shows no other evidence of significant stenosis,   occlusion or saccular aneurysm dilation. The vertebral arteries are   codominant.    CT PERFUSION:  Patient has only had less than 2 hours of symptoms may influence the CT   perfusion.    CBF<30% volume: 26 ml left MCA territory.  Tmax>6.0 s volume: 193 ml left MCA territory  Mismatch volume: 167 ml  Mismatch ratio: 7.4    IMPRESSION:  Thrombus in the distal left MCA M1 segment with diminished flow in the   distal MCA segments. 167 mL area of penumbra in the left MCA territory.     CT Brain Stroke Protocol (11.13.23 @ 11:15)   IMPRESSION:  Dense left MCA M1 segment suggesting intraluminal thrombus.   Mild chronic microvascular changes without evidence of an acute   transcortical infarction or hemorrhage.   [facility] Stroke Team  Progress Note     HPI:  77 yo M PMH of HTN, HLD, AFib, CKD, obesity, gout, prostate cancer, DVT/PE, OA s/p multiple orthopedic procedures, spinal stenosis, presented on 11/13/23 for elective left atrial appendage occlusion procedure, and developed a L MCA syndrome due to acute L M1 occlusion as demonstrated on CTA Head and Neck.  Patient has a long h/o AFib, which is now considered permanent. He is on rate control with metoprolol 25 mg bid, and anticoagulation with Pradaxa.  He has a long Hx of arthritis and had multiple hip replacements, bilateral knee replacements and also has neck and back pain, but is unable to take NSAIDs due to bleeding risks. He has an unsteady gait and uses a cane for assistance.  Patient presented on 11/13 for elective left atrial appendage occlusion with Watchman device/HARJINDER w/ Dr Bobo, and Pradaxa was held for 2 days prior to procedure. 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   Initial NIHSS 26, MRS 1.  Patient is now s/p tenecteplase administration at 11:16 11/13,, and TICI 2A thrombectomy with Dr. Soliman.    SUBJECTIVE: Patient extubated overnight. No new neurologic complaints.  ROS reported negative unless otherwise noted.    chlorhexidine 2% Cloths 1 Application(s) Topical daily  dextrose 5%. 1000 milliLiter(s) IV Continuous <Continuous>  dextrose 5%. 1000 milliLiter(s) IV Continuous <Continuous>  dextrose 50% Injectable 25 Gram(s) IV Push once  dextrose 50% Injectable 12.5 Gram(s) IV Push once  dextrose 50% Injectable 25 Gram(s) IV Push once  dextrose Oral Gel 15 Gram(s) Oral once PRN  glucagon  Injectable 1 milliGRAM(s) IntraMuscular once  hydrocortisone sodium succinate Injectable   IV Push   hydrocortisone sodium succinate Injectable 20 milliGRAM(s) IV Push every 12 hours  insulin lispro (ADMELOG) corrective regimen sliding scale   SubCutaneous every 6 hours  norepinephrine Infusion 0.05 MICROgram(s)/kG/Min IV Continuous <Continuous>  nystatin Cream 1 Application(s) Topical two times a day  pantoprazole  Injectable 40 milliGRAM(s) IV Push daily  sodium chloride 0.9%. 1000 milliLiter(s) IV Continuous <Continuous>      PHYSICAL EXAM:   Vital Signs Last 24 Hrs  T(C): 37.9 (15 Nov 2023 08:00), Max: 38.1 (14 Nov 2023 17:45)  T(F): 100.2 (15 Nov 2023 08:00), Max: 100.6 (14 Nov 2023 17:45)  HR: 95 (15 Nov 2023 08:00) (69 - 106)  BP: 120/87 (15 Nov 2023 08:00) (101/63 - 156/80)  BP(mean): 93 (15 Nov 2023 08:00) (70 - 119)  RR: 25 (15 Nov 2023 08:00) (14 - 27)  SpO2: 100% (15 Nov 2023 08:00) (95% - 100%)    Parameters below as of 15 Nov 2023 04:00  Patient On (Oxygen Delivery Method): nasal cannula  O2 Flow (L/min): 2    General: NAD    Detailed Neurologic Exam:    Mental status: The patient is awake, alert, responds to voice. Right sided visual willow-neglect present. Global aphasia noted.     Cranial nerves: Pupils equal and react symmetrically to light, sluggish 5 to 3. There is no blink on right.  Extraocular movement intact, gaze crosses midline bilaterally. Right facial droop noted.     Motor: There is normal bulk and tone.  There is no tremor.  Right lower extremity with trace movement in the toes, 0/5 strength in RUE    Strength is at least 4+/5 in the left arm and leg.    Sensation: Intact to pinch left in 4 extremities    Cerebellar: unable to assess dysmetria on finger to nose testing.    Gait : deferred    LABS:                        8.7    9.26  )-----------( 149      ( 15 Nov 2023 02:00 )             27.9    11-15    144  |  109<H>  |  16.2  ----------------------------<  155<H>  3.9   |  27.0  |  0.84    Ca    8.0<L>      15 Nov 2023 02:00  Phos  2.2     11-15  Mg     2.2     11-15    TPro  6.3<L>  /  Alb  3.9  /  TBili  0.4  /  DBili  x   /  AST  20  /  ALT  10  /  AlkPhos  46  11-13  PT/INR - ( 13 Nov 2023 16:00 )   PT: 12.4 sec;   INR: 1.12 ratio         PTT - ( 13 Nov 2023 10:57 )  PTT:29.4 sec    Lipid Profile in AM (11.14.23 @ 04:39)    Cholesterol: 151 mg/dL   Triglycerides, Serum: 66 mg/dL   HDL Cholesterol: 84 mg/dL   Non HDL Cholesterol: 67   LDL Cholesterol Calculated: 54 mg/dL    A1C with Estimated Average Glucose in AM (11.14.23 @ 04:39)    A1C with Estimated Average Glucose Result: 6.2 %   Estimated Average Glucose: 131 mg/dL    IMAGING: Reviewed by me.     CT Head No Cont (11.14.23 @ 18:57)   IMPRESSION:  Stable moderate to large acute left MCA infarct with mild hemorrhage    TTE Echo Complete w/o Contrast w/ Doppler (11.14.23 @ 15:57)   Summary:   1. Left ventricular ejection fraction, by visual estimation, is 55 to   60%.   2. Normal global left ventricular systolic function.   3. The mitral in-flow pattern reveals no discernable A-wave, therefore   no comment on diastolic function can be made.   4. Normal right ventricular size and function.   5. Mild to moderately enlarged right atrium.   6. Moderately enlarged left atrium.   7. Trace mitral valve regurgitation.   8. Mild-moderate tricuspid regurgitation.   9. Sclerotic aortic valve with decreased opening.  10. Estimated pulmonary artery systolic pressure is 41.6 mmHg assuming a   right atrial pressure of 5 mmHg, which is consistent with mild pulmonary   hypertension.    CT Head No Cont (11.14.23 @ 11:36)   IMPRESSION: Evolving left MCA infarct is identified with hemorrhagic   transformation now seen with mass effect on left lateral ventricles and   left-to-right shift.    US Duplex Venous Lower Ext Complete, Bilateral (11.14.23 @ 10:38)   IMPRESSION:  No evidence of deep venous thrombosis in either lower extremity.  Left calf veins not visualized.  Small right popliteal fossa cyst.    CT Angio Brain, Neck and perfusion Stroke Protocol  w/ IV Cont (11.13.23 @ 11:18)   CTA BRAIN:  Acute thrombus in the distal left MCA M1 segment. Diminished flow in the   distal branches.  Severe bilateral cavernous carotid artery calcifications. Fetal origins   the bilateral posterior cerebral arteries. The [place] and   vertebrobasilar system are otherwise unremarkable without evidence of   stenosis, additional occlusion or saccular aneurysm dilation. No evidence   for arterial venous malformation. The vertebral arteries are codominant.    CTA NECK:  Mild bilateral carotid bulb calcifications. Mild stenosis in the left   carotid bulb. The right internal carotid arteries tortuous.  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 There is normal relationship to   the great vessels. The common carotid arteries, internal carotid arteries   and vertebral arteries shows no other evidence of significant stenosis,   occlusion or saccular aneurysm dilation. The vertebral arteries are   codominant.    CT PERFUSION:  Patient has only had less than 2 hours of symptoms may influence the CT   perfusion.    CBF<30% volume: 26 ml left MCA territory.  Tmax>6.0 s volume: 193 ml left MCA territory  Mismatch volume: 167 ml  Mismatch ratio: 7.4    IMPRESSION:  Thrombus in the distal left MCA M1 segment with diminished flow in the   distal MCA segments. 167 mL area of penumbra in the left MCA territory.     CT Brain Stroke Protocol (11.13.23 @ 11:15)   IMPRESSION:  Dense left MCA M1 segment suggesting intraluminal thrombus.   Mild chronic microvascular changes without evidence of an acute   transcortical infarction or hemorrhage.

## 2023-11-15 NOTE — OCCUPATIONAL THERAPY INITIAL EVALUATION ADULT - VISUAL ACUITY
Visual assessments not appropriate. Pt appears to have right sided neglect, will benefit from further assessment.

## 2023-11-15 NOTE — PROGRESS NOTE ADULT - SUBJECTIVE AND OBJECTIVE BOX
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  Patient has a long h/o AFib, which is now considered permanent. He is on rate control with metoprolol 25 mg bid, and anticoagulation with Pradaxa.  He has a long Hx of arthritis and had multiple hip replacements, bilateral knee replacements and also has neck and back pain, but is unable to take NSAIDs due to bleeding risks. He has an unsteady gait and uses a cane for assistance.  Patient presented for elective left atrial appendage occlusion with Watchman device/HARJINDER w/ Dr Bobo, and Pradaxa was held for 2 days prior to procedure. 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   Reported per neurology that on admission, NIHSS 26, MRS 1.  Patient is now s/p TNK administration at 11:16, and TICI 2A thrombectomy with Dr. Soliman, admitted to the NeuroICU intubated and on phenylephrine.     NIH SS: 11/13 at 10:53 per neurology  1A: Level of consciousness (0-3): 1  1B: Questions (0-2): 2  1C: Commands (0-2): 2  2: Gaze (0-2): 2  3: Visual fields (0-3): 2  4: Facial palsy (0-3): 1  MOTOR:  5A: Left arm motor drift (0-4): 0  5B: Right arm motor drift (0-4): 4  6A: Left leg motor drift (0-4): 0  6B: Right leg motor drift (0-4): 4  7: Limb ataxia (0-2): 0  SENSORY:  8: Sensation (0-2): 2  SPEECH:  9: Language (0-3): 3  10: Dysarthria (0-2): 2  EXTINCTION:  11: Extinction/inattention (0-2): 2    TOTAL SCORE: 26 (13 Nov 2023 16:32)    24 hr events:  low grade fever noted.      ICU Vital Signs Last 24 Hrs  T(C): 37.9 (15 Nov 2023 16:00), Max: 38.1 (14 Nov 2023 17:45)  T(F): 100.2 (15 Nov 2023 16:00), Max: 100.6 (14 Nov 2023 17:45)  HR: 110 (15 Nov 2023 16:00) (69 - 110)  BP: 124/74 (15 Nov 2023 16:00) (101/63 - 153/71)  BP(mean): 88 (15 Nov 2023 16:00) (70 - 140)  ABP: --  ABP(mean): --  RR: 24 (15 Nov 2023 16:00) (14 - 30)  SpO2: 94% (15 Nov 2023 16:00) (93% - 100%)    O2 Parameters below as of 15 Nov 2023 16:00  Patient On (Oxygen Delivery Method): room air        Exam: cooperative, NAD, sitting in a chair.  EO spont, crosses midline, PERRLA,  component of R neglect noted, follows some simple commands but seems with limited comprehension, motor - moves L side spont, RUE wdrl, RLE at least 2/5.  CTAB  S1S2 present  Abd soft, NT, ND  No peripheral swelling  R groin with superficial intraderm. hematoma, non-tender; signs of inguinal candidiasis noted.

## 2023-11-15 NOTE — OCCUPATIONAL THERAPY INITIAL EVALUATION ADULT - PERTINENT HX OF CURRENT PROBLEM, REHAB EVAL
As per MD note: 79 yo M PMH of HTN, HLD, AFib, CKD, obesity, gout, prostate cancer, DVT/PE, OA s/p multiple orthopedic procedures, spinal stenosis, presented on 11/13/23 for elective left atrial appendage occlusion procedure, and developed a L MCA syndrome due to acute L M1 occlusion as demonstrated on CTA Head and Neck.

## 2023-11-15 NOTE — OCCUPATIONAL THERAPY INITIAL EVALUATION ADULT - ADDITIONAL COMMENTS
Findings limited secondary to cognitive impairments. Daughter-in-law in the room, able to assist with social history.   Pt lives in private home with his spouse. There are 5 BIPIN and a full flight once inside to reach the bedroom/bathroom.   Bathroom was newly renovated and daughter-in-law is unsure of set up.   Pt is right handed.   Pt uses a SAC.

## 2023-11-15 NOTE — SWALLOW BEDSIDE ASSESSMENT ADULT - SWALLOW EVAL: ORAL MUSCULATURE
noted informally due to poor command following for OME; reduced labial/lingual/buccal strength/ROM, asymmetry on R

## 2023-11-15 NOTE — SWALLOW BEDSIDE ASSESSMENT ADULT - ORAL PHASE
Within functional limits Decreased anterior-posterior movement of the bolus/Delayed oral transit time suspect posterior loss

## 2023-11-15 NOTE — DIETITIAN INITIAL EVALUATION ADULT - NSICDXPASTSURGICALHX_GEN_ALL_CORE_FT
PAST SURGICAL HISTORY:  arthroscopy bilateral knees 60y/o    bunionectomy right 2009    H/O bilateral hip replacements     H/O colonoscopy     H/O endoscopy     H/O spinal fusion September 15th 2014    H/O total knee replacement, bilateral     inguinal hernia repair right 15y/o, 57y/o, left 47y/o    S/p bilateral shoulder joint replacement

## 2023-11-15 NOTE — DIETITIAN INITIAL EVALUATION ADULT - PERTINENT MEDS FT
MEDICATIONS  (STANDING):  chlorhexidine 2% Cloths 1 Application(s) Topical daily  dextrose 5%. 1000 milliLiter(s) (50 mL/Hr) IV Continuous <Continuous>  dextrose 5%. 1000 milliLiter(s) (100 mL/Hr) IV Continuous <Continuous>  dextrose 50% Injectable 25 Gram(s) IV Push once  dextrose 50% Injectable 12.5 Gram(s) IV Push once  dextrose 50% Injectable 25 Gram(s) IV Push once  glucagon  Injectable 1 milliGRAM(s) IntraMuscular once  hydrocortisone sodium succinate Injectable   IV Push   hydrocortisone sodium succinate Injectable 20 milliGRAM(s) IV Push every 12 hours  insulin lispro (ADMELOG) corrective regimen sliding scale   SubCutaneous every 6 hours  norepinephrine Infusion 0.05 MICROgram(s)/kG/Min (11.2 mL/Hr) IV Continuous <Continuous>  nystatin Cream 1 Application(s) Topical two times a day  pantoprazole  Injectable 40 milliGRAM(s) IV Push daily  sodium chloride 0.9%. 1000 milliLiter(s) (75 mL/Hr) IV Continuous <Continuous>    MEDICATIONS  (PRN):  dextrose Oral Gel 15 Gram(s) Oral once PRN Blood Glucose LESS THAN 70 milliGRAM(s)/deciliter

## 2023-11-15 NOTE — PROGRESS NOTE ADULT - THIS PATIENT HAS THE FOLLOWING CONDITION(S)/DIAGNOSES ON THIS ADMISSION:
Encephalopathy/Acute Respiratory Failure

## 2023-11-15 NOTE — DIETITIAN INITIAL EVALUATION ADULT - PERTINENT LABORATORY DATA
11-15    144  |  109<H>  |  16.2  ----------------------------<  155<H>  3.9   |  27.0  |  0.84    Ca    8.0<L>      15 Nov 2023 02:00  Phos  2.2     11-15  Mg     2.2     11-15    TPro  6.3<L>  /  Alb  3.9  /  TBili  0.4  /  DBili  x   /  AST  20  /  ALT  10  /  AlkPhos  46  11-13  POCT Blood Glucose.: 155 mg/dL (11-15-23 @ 04:59)  A1C with Estimated Average Glucose Result: 6.2 % (11-14-23 @ 04:39)

## 2023-11-15 NOTE — SPEECH LANGUAGE PATHOLOGY EVALUATION - SLP DIAGNOSIS
Severe primary language deficits. Suspect cognitive dysfunction however difficult to assess due to severity of receptive/expressive language difficulty. Motor speech w/suspected dysarthria. Will further assess as schedule allows.

## 2023-11-15 NOTE — PHYSICAL THERAPY INITIAL EVALUATION ADULT - GENERAL OBSERVATIONS, REHAB EVAL
Pt received in chair, + IV, +Tele//BP monitoring, breathing on RA in NAD, in nonverbal indicators of pain, daughter in law present, PT bubba performed

## 2023-11-15 NOTE — PROGRESS NOTE ADULT - NS ATTEND AMEND GEN_ALL_CORE FT
Seen and examined with the ACP team. Plan discussed with ACPs.    I agree with assessment and plan  as written with modifications made above.    will follow with you    Link Casas MD PhD   [id number]

## 2023-11-15 NOTE — PHYSICAL THERAPY INITIAL EVALUATION ADULT - PERTINENT HX OF CURRENT PROBLEM, REHAB EVAL
77 yo M PMH of HTN, HLD, AFib, CKD, obesity, gout, prostate cancer, DVT/PE, OA s/p multiple orthopedic procedures, spinal stenosis, presented on 11/13/23 for elective left atrial appendage occlusion procedure, and 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  Patient presented on 11/13 for elective left atrial appendage occlusion with Watchman device/HARJNIDER w/ Dr Bobo, and Pradaxa was held for 2 days prior to procedure. 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 On admission, NIHSS 26, MRS 1. Patient is now s/p TNK administration at 11:16 11/13, and TICI 2A thrombectomy with Dr. Soliman. 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 Suspected etiology of stroke is cardioembolic from atrial fibrillation when off anticoagulation for recent procedure.

## 2023-11-15 NOTE — SWALLOW BEDSIDE ASSESSMENT ADULT - SWALLOW EVAL: DIAGNOSIS
Mild-moderate oral dysphagia impacted upon by oral musculature weakness, marked by effortful & prolonged mastication w/delayed A-P transfer of easy to chew solids; improved & timely/functional w/soft & bite-sized solids. Suspect additional posterior loss of thin/mildly thick liquids to pharyngeal space. Pharyngeal dysphagia suspected w/thin & mildly thick liquids, delayed cough post intake. No overt s/s penetration or aspiration demonstrated w/moderately thick liquids, or foods.

## 2023-11-15 NOTE — PROGRESS NOTE ADULT - ASSESSMENT
79 yo M with acute CVA due to L M1 occlusion, s/p IV thrombolysis and TICI 2a MT. On admission, NIHSS 26, MRS 1.   Small area of hemorrhagic transformation with some mass effect.   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 arthritis); on Hydrocort taper over 3 days, switch to home dose Prednisone after  maintain -180, ISS  check RVP/COVID  SCDs, hold any antithrombotics for now in view of heme and large stroke

## 2023-11-15 NOTE — PROGRESS NOTE ADULT - ASSESSMENT
COMPLETE A/P PENDING   ASSESSMENT:   77 yo M PMH of HTN, HLD, AFib, CKD, obesity, gout, prostate cancer, DVT/PE, OA s/p multiple orthopedic procedures, spinal stenosis, presented on 11/13/23 for elective left atrial appendage occlusion procedure, and 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  Patient presented on 11/13 for elective left atrial appendage occlusion with Watchman device/HARJINDER w/ Dr Bobo, and Pradaxa was held for 2 days prior to procedure. 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 On admission, NIHSS 26, MRS 1. Patient is now s/p TNK administration at 11:16 11/13, and TICI 2A thrombectomy with Dr. Soliman. 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 Suspected etiology of stroke is cardioembolic from atrial fibrillation when off anticoagulation for recent procedure.     NEURO:   -Neurologically ---   -Continue close monitoring for neurologic deterioration    -Stroke neuro checks q2hrs  -Permissive HTN or  SBP goal up to 180mmHg, avoid hypotension or rapid fluctuations in blood pressure   -ANTITHROMBOTIC THERAPY: ---  -titrate statin to LDL goal less than 70, LDL=54  -MRI Brain w/o ordered, pending   -Dysphagia screen: pending   -Physical therapy/OT/Speech eval/treatment.   -Neurosurgery input appreciated, pt not a hemicrani candidate     CARDIOVASCULAR:  -TTE findings as above, no acute findings   -EP consult appreciated, no plans to reconsider cardiac intervention at this time, recommending resuming AC once safe to do so from neurologic standpoint   -cardiac monitoring w/ telemetry for now, further evaluation pending findings of noted workup                              HEMATOLOGY:   -H/H 8.7/27.9 Platelets 149, monitor anemia/thrombocytopenia per primary team, patient should have all age and risk appropriate malignancy screenings with PCP or sooner if clinically suspected   -Bilateral lower extremity venous duplex negative for DVT   -DVT ppx: Heparin s.c [] LMWH [] SCDs [x]    PULMONARY:   -Patient extubated 11/14/23  -protecting airway, saturating well     RENAL:   -BUN/Cr 16.2/0.84, monitor urine output, maintain adequate hydration    -Na Goal:  145-150 secondary to cerebral edema     ID:   -afebrile, leukocytosis resolved, monitor for si/sx of infection     OTHER:    -condition and plan of care d/w patient, questions and concerns addressed.     DISPOSITION: Rehab or home depending on PT eval once stable and workup is complete    CORE MEASURES:        Admission NIHSS: 26     Tenecteplase : [x] YES [] NO      LDL/HDL/A1C: 54/67/6.2     Depression Screen- if depression hx and/or present      Statin Therapy: on hold until dysphagia screen      Dysphagia Screen: [] PASS [] FAIL [x] pending      Smoking [] YES [x] NO      Afib [x] YES [] NO     Stroke Education [x] YES [] NO    Obtain screening lower extremity venous ultrasound in patients who meet 1 or more of the following criteria as patient is high risk for DVT/PE on admission:   [] History of DVT/PE  []Hypercoagulable states (Factor V Leiden, Cancer, OCP, etc. )  []Prolonged immobility (hemiplegia/hemiparesis/post operative or any other extended immobilization)  [] Transferred from outside facility (Rehab or Long term care)  [] Age </= to 50 COMPLETE A/P PENDING   ASSESSMENT:   77 yo M PMH of HTN, HLD, AFib, CKD, obesity, gout, prostate cancer, DVT/PE, OA s/p multiple orthopedic procedures, spinal stenosis, presented on 11/13/23 for elective left atrial appendage occlusion procedure, and 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  Patient presented on 11/13 for elective left atrial appendage occlusion with Watchman device/HARJINDER w/ Dr Bobo, and Pradaxa was held for 2 days prior to procedure. 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 On admission, NIHSS 26, MRS 1. Patient is now s/p TNK administration at 11:16 11/13, and TICI 2A thrombectomy with Dr. Soliman. 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 Suspected etiology of stroke is cardioembolic from atrial fibrillation when off anticoagulation for recent procedure.     NEURO:   -Neurologically ---   -Continue close monitoring for neurologic deterioration    -Stroke neuro checks q2hrs  -Permissive HTN or  SBP goal up to 180mmHg, avoid hypotension or rapid fluctuations in blood pressure   -ANTITHROMBOTIC THERAPY: ---  -titrate statin to LDL goal less than 70, LDL=54, may resume home regimen of Rosuvastatin 10 mg PO QHS when patient able to tolerate PO   -MRI Brain w/o ordered, pending   -Dysphagia screen: pending   -Physical therapy/OT/Speech eval/treatment.   -Neurosurgery input appreciated, pt not a hemicrani candidate     CARDIOVASCULAR:  -TTE findings as above, no acute findings   -EP consult appreciated, no plans to reconsider cardiac intervention at this time, recommending resuming AC once safe to do so from neurologic standpoint   -cardiac monitoring w/ telemetry for now, further evaluation pending findings of noted workup                              HEMATOLOGY:   -H/H 8.7/27.9 Platelets 149, monitor anemia/thrombocytopenia per primary team, patient should have all age and risk appropriate malignancy screenings with PCP or sooner if clinically suspected   -Bilateral lower extremity venous duplex negative for DVT   -DVT ppx: Heparin s.c [] LMWH [] SCDs [x]    PULMONARY:   -Patient extubated 11/14/23  -protecting airway, saturating well     RENAL:   -BUN/Cr 16.2/0.84, monitor urine output, maintain adequate hydration    -Na Goal:  145-150 secondary to cerebral edema     ID:   -afebrile, leukocytosis resolved, monitor for si/sx of infection     OTHER:    -condition and plan of care d/w patient, questions and concerns addressed.     DISPOSITION: Rehab or home depending on PT eval once stable and workup is complete    CORE MEASURES:        Admission NIHSS: 26     Tenecteplase : [x] YES [] NO      LDL/HDL/A1C: 54/67/6.2     Depression Screen- if depression hx and/or present      Statin Therapy: on hold until dysphagia screen      Dysphagia Screen: [] PASS [] FAIL [x] pending      Smoking [] YES [x] NO      Afib [x] YES [] NO     Stroke Education [x] YES [] NO    Obtain screening lower extremity venous ultrasound in patients who meet 1 or more of the following criteria as patient is high risk for DVT/PE on admission:   [] History of DVT/PE  []Hypercoagulable states (Factor V Leiden, Cancer, OCP, etc. )  []Prolonged immobility (hemiplegia/hemiparesis/post operative or any other extended immobilization)  [] Transferred from outside facility (Rehab or Long term care)  [] Age </= to 50 ASSESSMENT:   77 yo M PMH of HTN, HLD, AFib, CKD, obesity, gout, prostate cancer, DVT/PE, OA s/p multiple orthopedic procedures, spinal stenosis, presented on 11/13/23 for elective left atrial appendage occlusion procedure, and 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  Patient presented on 11/13 for elective left atrial appendage occlusion with Watchman device/HARJINDER w/ Dr Bobo, and Pradaxa was held for 2 days prior to procedure. 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 On admission, NIHSS 26, MRS 1. Patient is now s/p TNK administration at 11:16 11/13, and TICI 2A thrombectomy with Dr. Soliman. 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 Suspected etiology of stroke is cardioembolic from atrial fibrillation when off anticoagulation for recent procedure.     NEURO:   -Neurologically now more awake/alert s/p extubation, otherwise no acute neurologic changes   -Continue close monitoring for neurologic deterioration    -Stroke neuro checks q2hrs  -Permissive HTN or  SBP goal up to 180mmHg, avoid hypotension or rapid fluctuations in blood pressure   -ANTITHROMBOTIC THERAPY:  On hold in setting of large ischemic stroke with hemorrhagic transformation, recommend obtaining repeat CT tomorrow (11/16) to assess for stability, if no acute changes then may start 81 mg ASA; further determination of when to resume AC will depend on clinical course and pending work up   -titrate statin to LDL goal less than 70, LDL=54, may resume home regimen of Rosuvastatin 10 mg PO QHS when patient able to tolerate PO   -MRI Brain w/o ordered, pending   -Dysphagia screen: pass  -Physical therapy/OT/Speech eval/treatment.   -Neurosurgery input appreciated, pt not a hemicrani candidate     CARDIOVASCULAR:  -TTE findings as above, no acute findings   -EP consult appreciated, no plans to reconsider cardiac intervention at this time, recommending resuming AC once safe to do so from neurologic standpoint   -cardiac monitoring w/ telemetry for now, further evaluation pending findings of noted workup                              HEMATOLOGY:   -H/H 8.7/27.9 Platelets 149, monitor anemia/thrombocytopenia per primary team, patient should have all age and risk appropriate malignancy screenings with PCP or sooner if clinically suspected   -Bilateral lower extremity venous duplex negative for DVT   -DVT ppx: Heparin s.c [] LMWH [] SCDs [x]    PULMONARY:   -Patient extubated 11/14/23  -protecting airway, saturating well     RENAL:   -BUN/Cr 16.2/0.84, monitor urine output, maintain adequate hydration    -Na Goal:  145-150 secondary to cerebral edema     ID:   -afebrile, leukocytosis resolved, monitor for si/sx of infection     OTHER:    -condition and plan of care d/w patient, questions and concerns addressed.     DISPOSITION: Rehab or home depending on PT eval once stable and workup is complete    CORE MEASURES:        Admission NIHSS: 26     Tenecteplase : [x] YES [] NO      LDL/HDL/A1C: 54/67/6.2     Depression Screen- if depression hx and/or present      Statin Therapy: Atorvastatin 40 mg PO Daily      Dysphagia Screen: [x] PASS [] FAIL      Smoking [] YES [x] NO      Afib [x] YES [] NO     Stroke Education [x] YES [] NO    Obtain screening lower extremity venous ultrasound in patients who meet 1 or more of the following criteria as patient is high risk for DVT/PE on admission:   [] History of DVT/PE  []Hypercoagulable states (Factor V Leiden, Cancer, OCP, etc. )  []Prolonged immobility (hemiplegia/hemiparesis/post operative or any other extended immobilization)  [] Transferred from outside facility (Rehab or Long term care)  [] Age </= to 50

## 2023-11-15 NOTE — SPEECH LANGUAGE PATHOLOGY EVALUATION - COMMENTS
BERTA As per MD note, "79 yo M PMH of HTN, HLD, AFib, CKD, obesity, gout, prostate cancer, DVT/PE, OA s/p multiple orthopedic procedures, spinal stenosis, presented on 11/13/23 for elective left atrial appendage occlusion procedure, and 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  Patient presented on 11/13 for elective left atrial appendage occlusion with Watchman device/HARJINDER w/ Dr Bobo, and Pradaxa was held for 2 days prior to procedure. 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 On admission, NIHSS 26, MRS 1. Patient is now s/p TNK administration at 11:16 11/13, and TICI 2A thrombectomy with Dr. Soliman. 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 Suspected etiology of stroke is cardioembolic from atrial fibrillation when off anticoagulation for recent procedure". BERTA due to severity of language deficits.

## 2023-11-15 NOTE — SWALLOW BEDSIDE ASSESSMENT ADULT - SLP GENERAL OBSERVATIONS
Recd awake/upright in bed, A&A Ox1 via yes/no prompt, however unreliable, aphasic, 0/10 nonverbal pain pre/post, tolerating RA NAD, daughter present for encounter.

## 2023-11-15 NOTE — PHYSICAL THERAPY INITIAL EVALUATION ADULT - IMPAIRMENTS FOUND, PT EVAL
aerobic capacity/endurance/arousal, attention, and cognition/decreased midline orientation/gait, locomotion, and balance/muscle strength/ROM

## 2023-11-15 NOTE — PHYSICAL THERAPY INITIAL EVALUATION ADULT - ADDITIONAL COMMENTS
pt unable to provide meaningful information obtained from daughter in law present. Pt lives in private home with his spouse. There are 5 + 2 BIPIN with no handrails and a full flight once inside to reach the bedroom/bathroom with no HR just wall support. Independent with use of cane prior to admission.

## 2023-11-15 NOTE — DIETITIAN INITIAL EVALUATION ADULT - OTHER INFO
Pt is a 79 yo M PMH of HTN, HLD, AFib, CKD, obesity, gout, prostate cancer, DVT/PE, OA s/p multiple orthopedic procedures, spinal stenosis, presented on 11/13/23 for elective left atrial appendage occlusion procedure, and 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

## 2023-11-16 LAB
ANION GAP SERPL CALC-SCNC: 10 MMOL/L — SIGNIFICANT CHANGE UP (ref 5–17)
ANION GAP SERPL CALC-SCNC: 10 MMOL/L — SIGNIFICANT CHANGE UP (ref 5–17)
ANION GAP SERPL CALC-SCNC: 12 MMOL/L — SIGNIFICANT CHANGE UP (ref 5–17)
ANION GAP SERPL CALC-SCNC: 12 MMOL/L — SIGNIFICANT CHANGE UP (ref 5–17)
BUN SERPL-MCNC: 17.5 MG/DL — SIGNIFICANT CHANGE UP (ref 8–20)
BUN SERPL-MCNC: 17.5 MG/DL — SIGNIFICANT CHANGE UP (ref 8–20)
BUN SERPL-MCNC: 18.1 MG/DL — SIGNIFICANT CHANGE UP (ref 8–20)
BUN SERPL-MCNC: 18.1 MG/DL — SIGNIFICANT CHANGE UP (ref 8–20)
CALCIUM SERPL-MCNC: 8.3 MG/DL — LOW (ref 8.4–10.5)
CALCIUM SERPL-MCNC: 8.3 MG/DL — LOW (ref 8.4–10.5)
CALCIUM SERPL-MCNC: 8.6 MG/DL — SIGNIFICANT CHANGE UP (ref 8.4–10.5)
CALCIUM SERPL-MCNC: 8.6 MG/DL — SIGNIFICANT CHANGE UP (ref 8.4–10.5)
CHLORIDE SERPL-SCNC: 111 MMOL/L — HIGH (ref 96–108)
CHLORIDE SERPL-SCNC: 111 MMOL/L — HIGH (ref 96–108)
CHLORIDE SERPL-SCNC: 113 MMOL/L — HIGH (ref 96–108)
CHLORIDE SERPL-SCNC: 113 MMOL/L — HIGH (ref 96–108)
CO2 SERPL-SCNC: 27 MMOL/L — SIGNIFICANT CHANGE UP (ref 22–29)
CREAT SERPL-MCNC: 0.81 MG/DL — SIGNIFICANT CHANGE UP (ref 0.5–1.3)
CREAT SERPL-MCNC: 0.81 MG/DL — SIGNIFICANT CHANGE UP (ref 0.5–1.3)
CREAT SERPL-MCNC: 0.82 MG/DL — SIGNIFICANT CHANGE UP (ref 0.5–1.3)
CREAT SERPL-MCNC: 0.82 MG/DL — SIGNIFICANT CHANGE UP (ref 0.5–1.3)
EGFR: 89 ML/MIN/1.73M2 — SIGNIFICANT CHANGE UP
EGFR: 89 ML/MIN/1.73M2 — SIGNIFICANT CHANGE UP
EGFR: 90 ML/MIN/1.73M2 — SIGNIFICANT CHANGE UP
EGFR: 90 ML/MIN/1.73M2 — SIGNIFICANT CHANGE UP
GLUCOSE BLDC GLUCOMTR-MCNC: 132 MG/DL — HIGH (ref 70–99)
GLUCOSE BLDC GLUCOMTR-MCNC: 132 MG/DL — HIGH (ref 70–99)
GLUCOSE BLDC GLUCOMTR-MCNC: 145 MG/DL — HIGH (ref 70–99)
GLUCOSE BLDC GLUCOMTR-MCNC: 145 MG/DL — HIGH (ref 70–99)
GLUCOSE BLDC GLUCOMTR-MCNC: 173 MG/DL — HIGH (ref 70–99)
GLUCOSE BLDC GLUCOMTR-MCNC: 173 MG/DL — HIGH (ref 70–99)
GLUCOSE BLDC GLUCOMTR-MCNC: 204 MG/DL — HIGH (ref 70–99)
GLUCOSE BLDC GLUCOMTR-MCNC: 204 MG/DL — HIGH (ref 70–99)
GLUCOSE SERPL-MCNC: 141 MG/DL — HIGH (ref 70–99)
GLUCOSE SERPL-MCNC: 141 MG/DL — HIGH (ref 70–99)
GLUCOSE SERPL-MCNC: 146 MG/DL — HIGH (ref 70–99)
GLUCOSE SERPL-MCNC: 146 MG/DL — HIGH (ref 70–99)
HCT VFR BLD CALC: 28.3 % — LOW (ref 39–50)
HCT VFR BLD CALC: 28.3 % — LOW (ref 39–50)
HGB BLD-MCNC: 9.1 G/DL — LOW (ref 13–17)
HGB BLD-MCNC: 9.1 G/DL — LOW (ref 13–17)
MAGNESIUM SERPL-MCNC: 2.4 MG/DL — SIGNIFICANT CHANGE UP (ref 1.6–2.6)
MAGNESIUM SERPL-MCNC: 2.4 MG/DL — SIGNIFICANT CHANGE UP (ref 1.6–2.6)
MCHC RBC-ENTMCNC: 30.5 PG — SIGNIFICANT CHANGE UP (ref 27–34)
MCHC RBC-ENTMCNC: 30.5 PG — SIGNIFICANT CHANGE UP (ref 27–34)
MCHC RBC-ENTMCNC: 32.2 GM/DL — SIGNIFICANT CHANGE UP (ref 32–36)
MCHC RBC-ENTMCNC: 32.2 GM/DL — SIGNIFICANT CHANGE UP (ref 32–36)
MCV RBC AUTO: 95 FL — SIGNIFICANT CHANGE UP (ref 80–100)
MCV RBC AUTO: 95 FL — SIGNIFICANT CHANGE UP (ref 80–100)
PHOSPHATE SERPL-MCNC: 2.6 MG/DL — SIGNIFICANT CHANGE UP (ref 2.4–4.7)
PHOSPHATE SERPL-MCNC: 2.6 MG/DL — SIGNIFICANT CHANGE UP (ref 2.4–4.7)
PLATELET # BLD AUTO: 144 K/UL — LOW (ref 150–400)
PLATELET # BLD AUTO: 144 K/UL — LOW (ref 150–400)
POTASSIUM SERPL-MCNC: 4 MMOL/L — SIGNIFICANT CHANGE UP (ref 3.5–5.3)
POTASSIUM SERPL-MCNC: 4 MMOL/L — SIGNIFICANT CHANGE UP (ref 3.5–5.3)
POTASSIUM SERPL-MCNC: 4.3 MMOL/L — SIGNIFICANT CHANGE UP (ref 3.5–5.3)
POTASSIUM SERPL-MCNC: 4.3 MMOL/L — SIGNIFICANT CHANGE UP (ref 3.5–5.3)
POTASSIUM SERPL-SCNC: 4 MMOL/L — SIGNIFICANT CHANGE UP (ref 3.5–5.3)
POTASSIUM SERPL-SCNC: 4 MMOL/L — SIGNIFICANT CHANGE UP (ref 3.5–5.3)
POTASSIUM SERPL-SCNC: 4.3 MMOL/L — SIGNIFICANT CHANGE UP (ref 3.5–5.3)
POTASSIUM SERPL-SCNC: 4.3 MMOL/L — SIGNIFICANT CHANGE UP (ref 3.5–5.3)
RBC # BLD: 2.98 M/UL — LOW (ref 4.2–5.8)
RBC # BLD: 2.98 M/UL — LOW (ref 4.2–5.8)
RBC # FLD: 14.2 % — SIGNIFICANT CHANGE UP (ref 10.3–14.5)
RBC # FLD: 14.2 % — SIGNIFICANT CHANGE UP (ref 10.3–14.5)
SODIUM SERPL-SCNC: 150 MMOL/L — HIGH (ref 135–145)
WBC # BLD: 7.77 K/UL — SIGNIFICANT CHANGE UP (ref 3.8–10.5)
WBC # BLD: 7.77 K/UL — SIGNIFICANT CHANGE UP (ref 3.8–10.5)
WBC # FLD AUTO: 7.77 K/UL — SIGNIFICANT CHANGE UP (ref 3.8–10.5)
WBC # FLD AUTO: 7.77 K/UL — SIGNIFICANT CHANGE UP (ref 3.8–10.5)

## 2023-11-16 PROCEDURE — 99232 SBSQ HOSP IP/OBS MODERATE 35: CPT

## 2023-11-16 PROCEDURE — 93010 ELECTROCARDIOGRAM REPORT: CPT

## 2023-11-16 PROCEDURE — 99233 SBSQ HOSP IP/OBS HIGH 50: CPT

## 2023-11-16 PROCEDURE — 99223 1ST HOSP IP/OBS HIGH 75: CPT

## 2023-11-16 PROCEDURE — 70450 CT HEAD/BRAIN W/O DYE: CPT | Mod: 26

## 2023-11-16 RX ORDER — ENOXAPARIN SODIUM 100 MG/ML
40 INJECTION SUBCUTANEOUS
Refills: 0 | Status: DISCONTINUED | OUTPATIENT
Start: 2023-11-16 | End: 2023-11-22

## 2023-11-16 RX ORDER — METOPROLOL TARTRATE 50 MG
25 TABLET ORAL
Refills: 0 | Status: DISCONTINUED | OUTPATIENT
Start: 2023-11-16 | End: 2023-12-04

## 2023-11-16 RX ORDER — IPRATROPIUM/ALBUTEROL SULFATE 18-103MCG
1 AEROSOL WITH ADAPTER (GRAM) INHALATION
Refills: 0 | Status: DISCONTINUED | OUTPATIENT
Start: 2023-11-16 | End: 2023-12-04

## 2023-11-16 RX ORDER — FAMOTIDINE 10 MG/ML
20 INJECTION INTRAVENOUS
Refills: 0 | Status: DISCONTINUED | OUTPATIENT
Start: 2023-11-17 | End: 2023-12-04

## 2023-11-16 RX ORDER — DULOXETINE HYDROCHLORIDE 30 MG/1
30 CAPSULE, DELAYED RELEASE ORAL DAILY
Refills: 0 | Status: DISCONTINUED | OUTPATIENT
Start: 2023-11-16 | End: 2023-12-04

## 2023-11-16 RX ORDER — ALLOPURINOL 300 MG
300 TABLET ORAL DAILY
Refills: 0 | Status: DISCONTINUED | OUTPATIENT
Start: 2023-11-16 | End: 2023-12-04

## 2023-11-16 RX ORDER — INSULIN LISPRO 100/ML
VIAL (ML) SUBCUTANEOUS
Refills: 0 | Status: DISCONTINUED | OUTPATIENT
Start: 2023-11-16 | End: 2023-12-04

## 2023-11-16 RX ORDER — POTASSIUM PHOSPHATE, MONOBASIC POTASSIUM PHOSPHATE, DIBASIC 236; 224 MG/ML; MG/ML
30 INJECTION, SOLUTION INTRAVENOUS ONCE
Refills: 0 | Status: COMPLETED | OUTPATIENT
Start: 2023-11-16 | End: 2023-11-16

## 2023-11-16 RX ORDER — SODIUM CHLORIDE 9 MG/ML
1000 INJECTION, SOLUTION INTRAVENOUS
Refills: 0 | Status: DISCONTINUED | OUTPATIENT
Start: 2023-11-16 | End: 2023-11-16

## 2023-11-16 RX ORDER — SODIUM CHLORIDE 9 MG/ML
500 INJECTION, SOLUTION INTRAVENOUS ONCE
Refills: 0 | Status: COMPLETED | OUTPATIENT
Start: 2023-11-16 | End: 2023-11-16

## 2023-11-16 RX ADMIN — SODIUM CHLORIDE 75 MILLILITER(S): 9 INJECTION, SOLUTION INTRAVENOUS at 18:51

## 2023-11-16 RX ADMIN — Medication 2: at 11:31

## 2023-11-16 RX ADMIN — Medication 25 MILLIGRAM(S): at 17:05

## 2023-11-16 RX ADMIN — POTASSIUM PHOSPHATE, MONOBASIC POTASSIUM PHOSPHATE, DIBASIC 83.33 MILLIMOLE(S): 236; 224 INJECTION, SOLUTION INTRAVENOUS at 05:01

## 2023-11-16 RX ADMIN — Medication 20 MILLIGRAM(S): at 05:01

## 2023-11-16 RX ADMIN — Medication 5 MILLIGRAM(S): at 13:15

## 2023-11-16 RX ADMIN — ENOXAPARIN SODIUM 40 MILLIGRAM(S): 100 INJECTION SUBCUTANEOUS at 17:05

## 2023-11-16 RX ADMIN — SODIUM CHLORIDE 1000 MILLILITER(S): 9 INJECTION, SOLUTION INTRAVENOUS at 12:47

## 2023-11-16 RX ADMIN — ATORVASTATIN CALCIUM 40 MILLIGRAM(S): 80 TABLET, FILM COATED ORAL at 22:25

## 2023-11-16 RX ADMIN — SENNA PLUS 1 TABLET(S): 8.6 TABLET ORAL at 22:25

## 2023-11-16 RX ADMIN — Medication 1: at 22:25

## 2023-11-16 RX ADMIN — GABAPENTIN 300 MILLIGRAM(S): 400 CAPSULE ORAL at 17:05

## 2023-11-16 RX ADMIN — Medication 10 MILLIGRAM(S): at 13:16

## 2023-11-16 RX ADMIN — NYSTATIN CREAM 1 APPLICATION(S): 100000 CREAM TOPICAL at 17:24

## 2023-11-16 RX ADMIN — Medication 300 MILLIGRAM(S): at 13:16

## 2023-11-16 RX ADMIN — NYSTATIN CREAM 1 APPLICATION(S): 100000 CREAM TOPICAL at 05:02

## 2023-11-16 RX ADMIN — CHLORHEXIDINE GLUCONATE 1 APPLICATION(S): 213 SOLUTION TOPICAL at 05:02

## 2023-11-16 RX ADMIN — GABAPENTIN 300 MILLIGRAM(S): 400 CAPSULE ORAL at 05:01

## 2023-11-16 NOTE — PROGRESS NOTE ADULT - SUBJECTIVE AND OBJECTIVE BOX
Preliminary note, offical recommendations pending attending review/signature   [facility] Stroke Team  Progress Note     HPI:  79 yo M PMH of HTN, HLD, AFib, CKD, obesity, gout, prostate cancer, DVT/PE, OA s/p multiple orthopedic procedures, spinal stenosis, presented on 11/13/23 for elective left atrial appendage occlusion procedure, and developed a L MCA syndrome due to acute L M1 occlusion as demonstrated on CTA Head and Neck.  Patient has a long h/o AFib, which is now considered permanent. He is on rate control with metoprolol 25 mg bid, and anticoagulation with Pradaxa.  He has a long Hx of arthritis and had multiple hip replacements, bilateral knee replacements and also has neck and back pain, but is unable to take NSAIDs due to bleeding risks. He has an unsteady gait and uses a cane for assistance.  Patient presented on 11/13 for elective left atrial appendage occlusion with Watchman device/HARJINDER w/ Dr Bobo, and Pradaxa was held for 2 days prior to procedure. 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   Initial NIHSS 26, MRS 1.  Patient is now s/p tenecteplase administration at 11:16 11/13,, and TICI 2A thrombectomy with Dr. Soliman.    SUBJECTIVE: No events overnight.  No new neurologic complaints.  Unable to obtain ROS secondary to patient's mental status.     acetaminophen     Tablet .. 650 milliGRAM(s) Oral every 6 hours PRN  atorvastatin 40 milliGRAM(s) Oral at bedtime  chlorhexidine 2% Cloths 1 Application(s) Topical daily  dextrose 5%. 1000 milliLiter(s) IV Continuous <Continuous>  dextrose 5%. 1000 milliLiter(s) IV Continuous <Continuous>  dextrose 50% Injectable 25 Gram(s) IV Push once  dextrose 50% Injectable 12.5 Gram(s) IV Push once  dextrose 50% Injectable 25 Gram(s) IV Push once  dextrose Oral Gel 15 Gram(s) Oral once PRN  gabapentin 300 milliGRAM(s) Oral two times a day  glucagon  Injectable 1 milliGRAM(s) IntraMuscular once  hydrocortisone sodium succinate Injectable 20 milliGRAM(s) IV Push daily  hydrocortisone sodium succinate Injectable   IV Push   insulin lispro (ADMELOG) corrective regimen sliding scale   SubCutaneous Before meals and at bedtime  nystatin Powder 1 Application(s) Topical two times a day  pantoprazole  Injectable 40 milliGRAM(s) IV Push daily  polyethylene glycol 3350 17 Gram(s) Oral daily  senna 1 Tablet(s) Oral at bedtime      PHYSICAL EXAM:   Vital Signs Last 24 Hrs  T(C): 37.4 (16 Nov 2023 08:00), Max: 38 (15 Nov 2023 14:00)  T(F): 99.3 (16 Nov 2023 08:00), Max: 100.4 (15 Nov 2023 14:00)  HR: 66 (16 Nov 2023 08:00) (56 - 110)  BP: 103/79 (16 Nov 2023 08:00) (103/61 - 153/71)  BP(mean): 89 (16 Nov 2023 08:00) (70 - 140)  RR: 23 (16 Nov 2023 08:00) (17 - 30)  SpO2: 96% (16 Nov 2023 08:00) (93% - 100%)    Parameters below as of 16 Nov 2023 08:00  Patient On (Oxygen Delivery Method): room air    COMPLETE EXAM PENDING     General: NAD    Detailed Neurologic Exam:    Mental status: The patient is awake, alert, responds to voice. Right sided visual willow-neglect present. Global aphasia noted.     Cranial nerves: Pupils equal and react symmetrically to light, sluggish 5 to 3. There is no blink on right.  Extraocular movement intact, gaze crosses midline bilaterally. Right facial droop noted.     Motor: There is normal bulk and tone.  There is no tremor.  Right lower extremity with trace movement in the toes, 0/5 strength in RUE    Strength is at least 4+/5 in the left arm and leg.    Sensation: Intact to pinch left in 4 extremities    Cerebellar: unable to assess dysmetria on finger to nose testing.    Gait : deferred    LABS:                        9.1    7.77  )-----------( 144      ( 16 Nov 2023 02:45 )             28.3    11-16    150<H>  |  113<H>  |  17.5  ----------------------------<  146<H>  4.0   |  27.0  |  0.81    Ca    8.3<L>      16 Nov 2023 02:45  Phos  2.6     11-16  Mg     2.4     11-16    Lipid Profile in AM (11.14.23 @ 04:39)    Cholesterol: 151 mg/dL   Triglycerides, Serum: 66 mg/dL   HDL Cholesterol: 84 mg/dL   Non HDL Cholesterol: 67   LDL Cholesterol Calculated: 54 mg/dL    A1C with Estimated Average Glucose in AM (11.14.23 @ 04:39)    A1C with Estimated Average Glucose Result: 6.2 %   Estimated Average Glucose: 131 mg/dL    IMAGING: Reviewed by me.   CT Head No Cont (11.14.23 @ 18:57)   IMPRESSION:  Stable moderate to large acute left MCA infarct with mild hemorrhage    TTE Echo Complete w/o Contrast w/ Doppler (11.14.23 @ 15:57)   Summary:   1. Left ventricular ejection fraction, by visual estimation, is 55 to   60%.   2. Normal global left ventricular systolic function.   3. The mitral in-flow pattern reveals no discernable A-wave, therefore   no comment on diastolic function can be made.   4. Normal right ventricular size and function.   5. Mild to moderately enlarged right atrium.   6. Moderately enlarged left atrium.   7. Trace mitral valve regurgitation.   8. Mild-moderate tricuspid regurgitation.   9. Sclerotic aortic valve with decreased opening.  10. Estimated pulmonary artery systolic pressure is 41.6 mmHg assuming a   right atrial pressure of 5 mmHg, which is consistent with mild pulmonary   hypertension.    CT Head No Cont (11.14.23 @ 11:36)   IMPRESSION: Evolving left MCA infarct is identified with hemorrhagic   transformation now seen with mass effect on left lateral ventricles and   left-to-right shift.    US Duplex Venous Lower Ext Complete, Bilateral (11.14.23 @ 10:38)   IMPRESSION:  No evidence of deep venous thrombosis in either lower extremity.  Left calf veins not visualized.  Small right popliteal fossa cyst.    CT Angio Brain, Neck and perfusion Stroke Protocol  w/ IV Cont (11.13.23 @ 11:18)   CTA BRAIN:  Acute thrombus in the distal left MCA M1 segment. Diminished flow in the   distal branches.  Severe bilateral cavernous carotid artery calcifications. Fetal origins   the bilateral posterior cerebral arteries. The [place] and   vertebrobasilar system are otherwise unremarkable without evidence of   stenosis, additional occlusion or saccular aneurysm dilation. No evidence   for arterial venous malformation. The vertebral arteries are codominant.    CTA NECK:  Mild bilateral carotid bulb calcifications. Mild stenosis in the left   carotid bulb. The right internal carotid arteries tortuous.  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 There is normal relationship to   the great vessels. The common carotid arteries, internal carotid arteries   and vertebral arteries shows no other evidence of significant stenosis,   occlusion or saccular aneurysm dilation. The vertebral arteries are   codominant.    CT PERFUSION:  Patient has only had less than 2 hours of symptoms may influence the CT   perfusion.    CBF<30% volume: 26 ml left MCA territory.  Tmax>6.0 s volume: 193 ml left MCA territory  Mismatch volume: 167 ml  Mismatch ratio: 7.4    IMPRESSION:  Thrombus in the distal left MCA M1 segment with diminished flow in the   distal MCA segments. 167 mL area of penumbra in the left MCA territory.     CT Brain Stroke Protocol (11.13.23 @ 11:15)   IMPRESSION:  Dense left MCA M1 segment suggesting intraluminal thrombus.   Mild chronic microvascular changes without evidence of an acute   transcortical infarction or hemorrhage.     Preliminary note, offical recommendations pending attending review/signature   [facility] Stroke Team  Progress Note     HPI:  77 yo M PMH of HTN, HLD, AFib, CKD, obesity, gout, prostate cancer, DVT/PE, OA s/p multiple orthopedic procedures, spinal stenosis, presented on 11/13/23 for elective left atrial appendage occlusion procedure, and developed a L MCA syndrome due to acute L M1 occlusion as demonstrated on CTA Head and Neck.  Patient has a long h/o AFib, which is now considered permanent. He is on rate control with metoprolol 25 mg bid, and anticoagulation with Pradaxa.  He has a long Hx of arthritis and had multiple hip replacements, bilateral knee replacements and also has neck and back pain, but is unable to take NSAIDs due to bleeding risks. He has an unsteady gait and uses a cane for assistance.  Patient presented on 11/13 for elective left atrial appendage occlusion with Watchman device/HARJINDER w/ Dr Bobo, and Pradaxa was held for 2 days prior to procedure. 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   Initial NIHSS 26, MRS 1.  Patient is now s/p tenecteplase administration at 11:16 11/13,, and TICI 2A thrombectomy with Dr. Soliman.    SUBJECTIVE: No events overnight.  No new neurologic complaints.  Unable to obtain ROS secondary to patient's mental status.     acetaminophen     Tablet .. 650 milliGRAM(s) Oral every 6 hours PRN  atorvastatin 40 milliGRAM(s) Oral at bedtime  chlorhexidine 2% Cloths 1 Application(s) Topical daily  dextrose 5%. 1000 milliLiter(s) IV Continuous <Continuous>  dextrose 5%. 1000 milliLiter(s) IV Continuous <Continuous>  dextrose 50% Injectable 25 Gram(s) IV Push once  dextrose 50% Injectable 12.5 Gram(s) IV Push once  dextrose 50% Injectable 25 Gram(s) IV Push once  dextrose Oral Gel 15 Gram(s) Oral once PRN  gabapentin 300 milliGRAM(s) Oral two times a day  glucagon  Injectable 1 milliGRAM(s) IntraMuscular once  hydrocortisone sodium succinate Injectable 20 milliGRAM(s) IV Push daily  hydrocortisone sodium succinate Injectable   IV Push   insulin lispro (ADMELOG) corrective regimen sliding scale   SubCutaneous Before meals and at bedtime  nystatin Powder 1 Application(s) Topical two times a day  pantoprazole  Injectable 40 milliGRAM(s) IV Push daily  polyethylene glycol 3350 17 Gram(s) Oral daily  senna 1 Tablet(s) Oral at bedtime      PHYSICAL EXAM:   Vital Signs Last 24 Hrs  T(C): 37.4 (16 Nov 2023 08:00), Max: 38 (15 Nov 2023 14:00)  T(F): 99.3 (16 Nov 2023 08:00), Max: 100.4 (15 Nov 2023 14:00)  HR: 66 (16 Nov 2023 08:00) (56 - 110)  BP: 103/79 (16 Nov 2023 08:00) (103/61 - 153/71)  BP(mean): 89 (16 Nov 2023 08:00) (70 - 140)  RR: 23 (16 Nov 2023 08:00) (17 - 30)  SpO2: 96% (16 Nov 2023 08:00) (93% - 100%)    Parameters below as of 16 Nov 2023 08:00  Patient On (Oxygen Delivery Method): room air    General: NAD    Detailed Neurologic Exam:    Mental status: The patient is awake, alert, responds to voice. Right sided visual willow-neglect present but improved compared to prior exam. Global aphasia noted.     Cranial nerves: Pupils equal and react symmetrically to light, sluggish 5 to 3. Blink reflex is present bilaterally.  Extraocular movement intact, gaze crosses midline bilaterally. Right facial droop noted.     Motor: There is normal bulk and tone.  There is no tremor.  Right lower extremity with trace movement in the toes, 0/5 strength in RUE    Strength is at least 4+/5 in the left arm and leg.    Sensation: Intact to pinch left in 4 extremities    Cerebellar: unable to assess dysmetria on finger to nose testing.    Gait : deferred    LABS:                        9.1    7.77  )-----------( 144      ( 16 Nov 2023 02:45 )             28.3    11-16    150<H>  |  113<H>  |  17.5  ----------------------------<  146<H>  4.0   |  27.0  |  0.81    Ca    8.3<L>      16 Nov 2023 02:45  Phos  2.6     11-16  Mg     2.4     11-16    Lipid Profile in AM (11.14.23 @ 04:39)    Cholesterol: 151 mg/dL   Triglycerides, Serum: 66 mg/dL   HDL Cholesterol: 84 mg/dL   Non HDL Cholesterol: 67   LDL Cholesterol Calculated: 54 mg/dL    A1C with Estimated Average Glucose in AM (11.14.23 @ 04:39)    A1C with Estimated Average Glucose Result: 6.2 %   Estimated Average Glucose: 131 mg/dL    IMAGING: Reviewed by me.   CT Head No Cont (11.14.23 @ 18:57)   IMPRESSION:  Stable moderate to large acute left MCA infarct with mild hemorrhage    TTE Echo Complete w/o Contrast w/ Doppler (11.14.23 @ 15:57)   Summary:   1. Left ventricular ejection fraction, by visual estimation, is 55 to   60%.   2. Normal global left ventricular systolic function.   3. The mitral in-flow pattern reveals no discernable A-wave, therefore   no comment on diastolic function can be made.   4. Normal right ventricular size and function.   5. Mild to moderately enlarged right atrium.   6. Moderately enlarged left atrium.   7. Trace mitral valve regurgitation.   8. Mild-moderate tricuspid regurgitation.   9. Sclerotic aortic valve with decreased opening.  10. Estimated pulmonary artery systolic pressure is 41.6 mmHg assuming a   right atrial pressure of 5 mmHg, which is consistent with mild pulmonary   hypertension.    CT Head No Cont (11.14.23 @ 11:36)   IMPRESSION: Evolving left MCA infarct is identified with hemorrhagic   transformation now seen with mass effect on left lateral ventricles and   left-to-right shift.    US Duplex Venous Lower Ext Complete, Bilateral (11.14.23 @ 10:38)   IMPRESSION:  No evidence of deep venous thrombosis in either lower extremity.  Left calf veins not visualized.  Small right popliteal fossa cyst.    CT Angio Brain, Neck and perfusion Stroke Protocol  w/ IV Cont (11.13.23 @ 11:18)   CTA BRAIN:  Acute thrombus in the distal left MCA M1 segment. Diminished flow in the   distal branches.  Severe bilateral cavernous carotid artery calcifications. Fetal origins   the bilateral posterior cerebral arteries. The [place] of [place] and   vertebrobasilar system are otherwise unremarkable without evidence of   stenosis, additional occlusion or saccular aneurysm dilation. No evidence   for arterial venous malformation. The vertebral arteries are codominant.    CTA NECK:  Mild bilateral carotid bulb calcifications. Mild stenosis in the left   carotid bulb. The right internal carotid arteries tortuous.  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 There is normal relationship to   the great vessels. The common carotid arteries, internal carotid arteries   and vertebral arteries shows no other evidence of significant stenosis,   occlusion or saccular aneurysm dilation. The vertebral arteries are   codominant.    CT PERFUSION:  Patient has only had less than 2 hours of symptoms may influence the CT   perfusion.    CBF<30% volume: 26 ml left MCA territory.  Tmax>6.0 s volume: 193 ml left MCA territory  Mismatch volume: 167 ml  Mismatch ratio: 7.4    IMPRESSION:  Thrombus in the distal left MCA M1 segment with diminished flow in the   distal MCA segments. 167 mL area of penumbra in the left MCA territory.     CT Brain Stroke Protocol (11.13.23 @ 11:15)   IMPRESSION:  Dense left MCA M1 segment suggesting intraluminal thrombus.   Mild chronic microvascular changes without evidence of an acute   transcortical infarction or hemorrhage.     [facility] Stroke Team  Progress Note     HPI:  79 yo M PMH of HTN, HLD, AFib, CKD, obesity, gout, prostate cancer, DVT/PE, OA s/p multiple orthopedic procedures, spinal stenosis, presented on 11/13/23 for elective left atrial appendage occlusion procedure, and developed a L MCA syndrome due to acute L M1 occlusion as demonstrated on CTA Head and Neck.  Patient has a long h/o AFib, which is now considered permanent. He is on rate control with metoprolol 25 mg bid, and anticoagulation with Pradaxa.  He has a long Hx of arthritis and had multiple hip replacements, bilateral knee replacements and also has neck and back pain, but is unable to take NSAIDs due to bleeding risks. He has an unsteady gait and uses a cane for assistance.  Patient presented on 11/13 for elective left atrial appendage occlusion with Watchman device/HARJINDER w/ Dr Bobo, and Pradaxa was held for 2 days prior to procedure. 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   Initial NIHSS 26, MRS 1.  Patient is now s/p tenecteplase administration at 11:16 11/13,, and TICI 2A thrombectomy with Dr. Soliman.    SUBJECTIVE: No events overnight.  No new neurologic complaints.  Unable to obtain ROS secondary to patient's mental status.     acetaminophen     Tablet .. 650 milliGRAM(s) Oral every 6 hours PRN  atorvastatin 40 milliGRAM(s) Oral at bedtime  chlorhexidine 2% Cloths 1 Application(s) Topical daily  dextrose 5%. 1000 milliLiter(s) IV Continuous <Continuous>  dextrose 5%. 1000 milliLiter(s) IV Continuous <Continuous>  dextrose 50% Injectable 25 Gram(s) IV Push once  dextrose 50% Injectable 12.5 Gram(s) IV Push once  dextrose 50% Injectable 25 Gram(s) IV Push once  dextrose Oral Gel 15 Gram(s) Oral once PRN  gabapentin 300 milliGRAM(s) Oral two times a day  glucagon  Injectable 1 milliGRAM(s) IntraMuscular once  hydrocortisone sodium succinate Injectable 20 milliGRAM(s) IV Push daily  hydrocortisone sodium succinate Injectable   IV Push   insulin lispro (ADMELOG) corrective regimen sliding scale   SubCutaneous Before meals and at bedtime  nystatin Powder 1 Application(s) Topical two times a day  pantoprazole  Injectable 40 milliGRAM(s) IV Push daily  polyethylene glycol 3350 17 Gram(s) Oral daily  senna 1 Tablet(s) Oral at bedtime      PHYSICAL EXAM:   Vital Signs Last 24 Hrs  T(C): 37.4 (16 Nov 2023 08:00), Max: 38 (15 Nov 2023 14:00)  T(F): 99.3 (16 Nov 2023 08:00), Max: 100.4 (15 Nov 2023 14:00)  HR: 66 (16 Nov 2023 08:00) (56 - 110)  BP: 103/79 (16 Nov 2023 08:00) (103/61 - 153/71)  BP(mean): 89 (16 Nov 2023 08:00) (70 - 140)  RR: 23 (16 Nov 2023 08:00) (17 - 30)  SpO2: 96% (16 Nov 2023 08:00) (93% - 100%)    Parameters below as of 16 Nov 2023 08:00  Patient On (Oxygen Delivery Method): room air    General: NAD    Detailed Neurologic Exam:    Mental status: The patient is awake, alert, responds to voice. Right sided visual willow-neglect present but improved compared to prior exam. Global aphasia noted.     Cranial nerves: Pupils equal and react symmetrically to light, sluggish 5 to 3. Blink reflex is present bilaterally.  Extraocular movement intact, gaze crosses midline bilaterally. Right facial droop noted.     Motor: There is normal bulk and tone.  There is no tremor.  Right lower extremity with trace movement in the toes, 0/5 strength in RUE    Strength is at least 4+/5 in the left arm and leg.    Sensation: Intact to pinch left in 4 extremities    Cerebellar: unable to assess dysmetria on finger to nose testing.    Gait : deferred    LABS:                        9.1    7.77  )-----------( 144      ( 16 Nov 2023 02:45 )             28.3    11-16    150<H>  |  113<H>  |  17.5  ----------------------------<  146<H>  4.0   |  27.0  |  0.81    Ca    8.3<L>      16 Nov 2023 02:45  Phos  2.6     11-16  Mg     2.4     11-16    Lipid Profile in AM (11.14.23 @ 04:39)    Cholesterol: 151 mg/dL   Triglycerides, Serum: 66 mg/dL   HDL Cholesterol: 84 mg/dL   Non HDL Cholesterol: 67   LDL Cholesterol Calculated: 54 mg/dL    A1C with Estimated Average Glucose in AM (11.14.23 @ 04:39)    A1C with Estimated Average Glucose Result: 6.2 %   Estimated Average Glucose: 131 mg/dL    IMAGING: Reviewed by me.   CT Head No Cont (11.14.23 @ 18:57)   IMPRESSION:  Stable moderate to large acute left MCA infarct with mild hemorrhage    TTE Echo Complete w/o Contrast w/ Doppler (11.14.23 @ 15:57)   Summary:   1. Left ventricular ejection fraction, by visual estimation, is 55 to   60%.   2. Normal global left ventricular systolic function.   3. The mitral in-flow pattern reveals no discernable A-wave, therefore   no comment on diastolic function can be made.   4. Normal right ventricular size and function.   5. Mild to moderately enlarged right atrium.   6. Moderately enlarged left atrium.   7. Trace mitral valve regurgitation.   8. Mild-moderate tricuspid regurgitation.   9. Sclerotic aortic valve with decreased opening.  10. Estimated pulmonary artery systolic pressure is 41.6 mmHg assuming a   right atrial pressure of 5 mmHg, which is consistent with mild pulmonary   hypertension.    CT Head No Cont (11.14.23 @ 11:36)   IMPRESSION: Evolving left MCA infarct is identified with hemorrhagic   transformation now seen with mass effect on left lateral ventricles and   left-to-right shift.    US Duplex Venous Lower Ext Complete, Bilateral (11.14.23 @ 10:38)   IMPRESSION:  No evidence of deep venous thrombosis in either lower extremity.  Left calf veins not visualized.  Small right popliteal fossa cyst.    CT Angio Brain, Neck and perfusion Stroke Protocol  w/ IV Cont (11.13.23 @ 11:18)   CTA BRAIN:  Acute thrombus in the distal left MCA M1 segment. Diminished flow in the   distal branches.  Severe bilateral cavernous carotid artery calcifications. Fetal origins   the bilateral posterior cerebral arteries. The [place] and   vertebrobasilar system are otherwise unremarkable without evidence of   stenosis, additional occlusion or saccular aneurysm dilation. No evidence   for arterial venous malformation. The vertebral arteries are codominant.    CTA NECK:  Mild bilateral carotid bulb calcifications. Mild stenosis in the left   carotid bulb. The right internal carotid arteries tortuous.  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 There is normal relationship to   the great vessels. The common carotid arteries, internal carotid arteries   and vertebral arteries shows no other evidence of significant stenosis,   occlusion or saccular aneurysm dilation. The vertebral arteries are   codominant.    CT PERFUSION:  Patient has only had less than 2 hours of symptoms may influence the CT   perfusion.    CBF<30% volume: 26 ml left MCA territory.  Tmax>6.0 s volume: 193 ml left MCA territory  Mismatch volume: 167 ml  Mismatch ratio: 7.4    IMPRESSION:  Thrombus in the distal left MCA M1 segment with diminished flow in the   distal MCA segments. 167 mL area of penumbra in the left MCA territory.     CT Brain Stroke Protocol (11.13.23 @ 11:15)   IMPRESSION:  Dense left MCA M1 segment suggesting intraluminal thrombus.   Mild chronic microvascular changes without evidence of an acute   transcortical infarction or hemorrhage.

## 2023-11-16 NOTE — PROGRESS NOTE ADULT - NS ATTEND AMEND GEN_ALL_CORE FT
Seen and examined with the ACP team. Plan discussed with ACPs.    I agree with assessment and plan  as written with modifications made above.    will follow with you    Link Casas MD PhD   [id number]

## 2023-11-16 NOTE — PROVIDER CONTACT NOTE (EICU) - ACTION/TREATMENT ORDERED:
have changed accucheck and ISS orders from every 6 hours to before meals and at bedtime, as patient was started on a diet by team yesterday

## 2023-11-16 NOTE — CONSULT NOTE ADULT - SUBJECTIVE AND OBJECTIVE BOX
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  Patient has a long h/o AFib, which is now considered permanent. He is on rate control with metoprolol 25 mg bid, and anticoagulation with Pradaxa.  He has a long Hx of arthritis and had multiple hip replacements, bilateral knee replacements and also has neck and back pain, but is unable to take NSAIDs due to bleeding risks. He has an unsteady gait and uses a cane for assistance.  Patient presented for elective left atrial appendage occlusion with Watchman device/HARJINDER w/ Dr Bobo, and Pradaxa was held for 2 days prior to procedure. 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   Reported per neurology that on admission, NIHSS 26, MRS 1.  Patient is now s/p TNK administration at 11:16, and TICI 2A thrombectomy with Dr. Soliman, admitted to the NeuroICU intubated and on phenylephrine.     NIH SS: 11/13 at 10:53 per neurology  1A: Level of consciousness (0-3): 1  1B: Questions (0-2): 2  1C: Commands (0-2): 2  2: Gaze (0-2): 2  3: Visual fields (0-3): 2  4: Facial palsy (0-3): 1  MOTOR:  5A: Left arm motor drift (0-4): 0  5B: Right arm motor drift (0-4): 4  6A: Left leg motor drift (0-4): 0  6B: Right leg motor drift (0-4): 4  7: Limb ataxia (0-2): 0  SENSORY:  8: Sensation (0-2): 2  SPEECH:  9: Language (0-3): 3  10: Dysarthria (0-2): 2  EXTINCTION:  11: Extinction/inattention (0-2): 2    TOTAL SCORE: 26 (13 Nov 2023 16:32)      SUBJECTIVE & OBJECTIVE: Pt seen and examined at bedside.     ICU Vital Signs Last 24 Hrs  T(C): 37.2 (16 Nov 2023 16:26), Max: 37.9 (16 Nov 2023 12:00)  T(F): 98.9 (16 Nov 2023 16:26), Max: 100.2 (16 Nov 2023 12:00)  HR: 77 (16 Nov 2023 16:00) (56 - 93)  BP: 138/85 (16 Nov 2023 16:00) (103/61 - 153/99)  BP(mean): 98 (16 Nov 2023 16:00) (72 - 117)  ABP: --  ABP(mean): --  RR: 20 (16 Nov 2023 16:00) (17 - 26)  SpO2: 94% (16 Nov 2023 16:00) (93% - 100%)    O2 Parameters below as of 16 Nov 2023 16:00  Patient On (Oxygen Delivery Method): room air              MEDICATIONS  (STANDING):  allopurinol 300 milliGRAM(s) Oral daily  atorvastatin 40 milliGRAM(s) Oral at bedtime  chlorhexidine 2% Cloths 1 Application(s) Topical daily  dextrose 5%. 1000 milliLiter(s) (100 mL/Hr) IV Continuous <Continuous>  dextrose 5%. 1000 milliLiter(s) (50 mL/Hr) IV Continuous <Continuous>  dextrose 50% Injectable 25 Gram(s) IV Push once  dextrose 50% Injectable 12.5 Gram(s) IV Push once  dextrose 50% Injectable 25 Gram(s) IV Push once  DULoxetine 30 milliGRAM(s) Oral daily  enoxaparin Injectable 40 milliGRAM(s) SubCutaneous <User Schedule>  gabapentin 300 milliGRAM(s) Oral two times a day  glucagon  Injectable 1 milliGRAM(s) IntraMuscular once  insulin lispro (ADMELOG) corrective regimen sliding scale   SubCutaneous Before meals and at bedtime  metoprolol tartrate 25 milliGRAM(s) Oral two times a day  nystatin Powder 1 Application(s) Topical two times a day  polyethylene glycol 3350 17 Gram(s) Oral daily  predniSONE   Tablet 5 milliGRAM(s) Oral daily  senna 1 Tablet(s) Oral at bedtime  torsemide 10 milliGRAM(s) Oral daily    MEDICATIONS  (PRN):  acetaminophen     Tablet .. 650 milliGRAM(s) Oral every 6 hours PRN Temp greater or equal to 38C (100.4F)  albuterol/ipratropium (CFC free) Inhaler. 1 Puff(s) Inhalation four times a day PRN Wheezing  dextrose Oral Gel 15 Gram(s) Oral once PRN Blood Glucose LESS THAN 70 milliGRAM(s)/deciliter      LABS:                        9.1    7.77  )-----------( 144      ( 16 Nov 2023 02:45 )             28.3     11-16    150<H>  |  113<H>  |  17.5  ----------------------------<  146<H>  4.0   |  27.0  |  0.81    Ca    8.3<L>      16 Nov 2023 02:45  Phos  2.6     11-16  Mg     2.4     11-16        Urinalysis Basic - ( 16 Nov 2023 02:45 )    Color: x / Appearance: x / SG: x / pH: x  Gluc: 146 mg/dL / Ketone: x  / Bili: x / Urobili: x   Blood: x / Protein: x / Nitrite: x   Leuk Esterase: x / RBC: x / WBC x   Sq Epi: x / Non Sq Epi: x / Bacteria: x        CAPILLARY BLOOD GLUCOSE      POCT Blood Glucose.: 132 mg/dL (16 Nov 2023 16:55)  POCT Blood Glucose.: 204 mg/dL (16 Nov 2023 11:21)  POCT Blood Glucose.: 145 mg/dL (16 Nov 2023 05:04)  POCT Blood Glucose.: 225 mg/dL (15 Nov 2023 23:18)      RECENT CULTURES:      RADIOLOGY & ADDITIONAL TESTS:      PHYSICAL EXAM:    GENERAL: NAD, well-groomed, well-developed  HEAD:  Atraumatic, Normocephalic  EYES: EOMI, PERRLA, conjunctiva and sclera clear  ENMT: Moist mucous membranes  NECK: Supple, No JVD  NERVOUS SYSTEM:  Alert & Oriented X3, Motor Strength 5/5 B/L upper and lower extremities; DTRs 2+ intact and symmetric  CHEST/LUNG: Clear to auscultation bilaterally; No rales, rhonchi, wheezing, or rubs  HEART: Regular rate and rhythm; No murmurs, rubs, or gallops  ABDOMEN: Soft, Nontender, Nondistended; Bowel sounds present  EXTREMITIES:  2+ Peripheral Pulses, No clubbing, cyanosis, or edema        CHIEF COMPLAINT/INTERVAL HISTORY:    Patient is a 78y old  Male who presents with a chief complaint of Lt MCA M1 thrombus (16 Nov 2023 08:31)      HPI:   79 yo M PMH of HTN, HLD, AFib, CKD, obesity, gout, prostate cancer, DVT/PE, OA s/p multiple orthopedic procedures, spinal stenosis, presented on 11/13/23 for elective left atrial appendage occlusion procedure, and 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  Patient has a long h/o AFib, which is now considered permanent. He is on rate control with metoprolol 25 mg bid, and anticoagulation with Pradaxa.  He has a long Hx of arthritis and had multiple hip replacements, bilateral knee replacements and also has neck and back pain, but is unable to take NSAIDs due to bleeding risks. He has an unsteady gait and uses a cane for assistance.  Patient presented for elective left atrial appendage occlusion with Watchman device/HARJINDER w/ Dr Bobo, and Pradaxa was held for 2 days prior to procedure. 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 Reported per neurology that on admission, NIHSS 26, MRS 1.  Patient is now s/p TNK administration at 11:16, and TICI 2A thrombectomy with Dr. Soliman, admitted to the Neuro ICU intubated and on phenylephrine. Patient was extubated and now is neurologically stable for downgrade to step down.    SUBJECTIVE & OBJECTIVE: Pt seen and examined at bedside. No overnight events. OOB to chair; aphasia. Following simple commands. Passed SLP evaluation.    ROS: Unobtainable    ICU Vital Signs Last 24 Hrs  T(C): 37.2 (16 Nov 2023 16:26), Max: 37.9 (16 Nov 2023 12:00)  T(F): 98.9 (16 Nov 2023 16:26), Max: 100.2 (16 Nov 2023 12:00)  HR: 77 (16 Nov 2023 16:00) (56 - 93)  BP: 138/85 (16 Nov 2023 16:00) (103/61 - 153/99)  BP(mean): 98 (16 Nov 2023 16:00) (72 - 117)  RR: 20 (16 Nov 2023 16:00) (17 - 26)  SpO2: 94% (16 Nov 2023 16:00) (93% - 100%)    O2 Parameters below as of 16 Nov 2023 16:00  Patient On (Oxygen Delivery Method): room air    MEDICATIONS  (STANDING):  allopurinol 300 milliGRAM(s) Oral daily  atorvastatin 40 milliGRAM(s) Oral at bedtime  chlorhexidine 2% Cloths 1 Application(s) Topical daily  dextrose 5%. 1000 milliLiter(s) (100 mL/Hr) IV Continuous <Continuous>  dextrose 5%. 1000 milliLiter(s) (50 mL/Hr) IV Continuous <Continuous>  dextrose 50% Injectable 25 Gram(s) IV Push once  dextrose 50% Injectable 12.5 Gram(s) IV Push once  dextrose 50% Injectable 25 Gram(s) IV Push once  DULoxetine 30 milliGRAM(s) Oral daily  enoxaparin Injectable 40 milliGRAM(s) SubCutaneous <User Schedule>  gabapentin 300 milliGRAM(s) Oral two times a day  glucagon  Injectable 1 milliGRAM(s) IntraMuscular once  insulin lispro (ADMELOG) corrective regimen sliding scale   SubCutaneous Before meals and at bedtime  metoprolol tartrate 25 milliGRAM(s) Oral two times a day  nystatin Powder 1 Application(s) Topical two times a day  polyethylene glycol 3350 17 Gram(s) Oral daily  predniSONE   Tablet 5 milliGRAM(s) Oral daily  senna 1 Tablet(s) Oral at bedtime  torsemide 10 milliGRAM(s) Oral daily    MEDICATIONS  (PRN):  acetaminophen     Tablet .. 650 milliGRAM(s) Oral every 6 hours PRN Temp greater or equal to 38C (100.4F)  albuterol/ipratropium (CFC free) Inhaler. 1 Puff(s) Inhalation four times a day PRN Wheezing  dextrose Oral Gel 15 Gram(s) Oral once PRN Blood Glucose LESS THAN 70 milliGRAM(s)/deciliter      LABS:                        9.1    7.77  )-----------( 144      ( 16 Nov 2023 02:45 )             28.3     11-16    150<H>  |  113<H>  |  17.5  ----------------------------<  146<H>  4.0   |  27.0  |  0.81    Ca    8.3<L>      16 Nov 2023 02:45  Phos  2.6     11-16  Mg     2.4     11-16        Urinalysis Basic - ( 16 Nov 2023 02:45 )    Color: x / Appearance: x / SG: x / pH: x  Gluc: 146 mg/dL / Ketone: x  / Bili: x / Urobili: x   Blood: x / Protein: x / Nitrite: x   Leuk Esterase: x / RBC: x / WBC x   Sq Epi: x / Non Sq Epi: x / Bacteria: x        CAPILLARY BLOOD GLUCOSE      POCT Blood Glucose.: 132 mg/dL (16 Nov 2023 16:55)  POCT Blood Glucose.: 204 mg/dL (16 Nov 2023 11:21)  POCT Blood Glucose.: 145 mg/dL (16 Nov 2023 05:04)  POCT Blood Glucose.: 225 mg/dL (15 Nov 2023 23:18)    PHYSICAL EXAM:    GENERAL: elderly male, sitting in chair, NAD  HEAD:  Atraumatic, Normocephalic  EYES: EOMI, PERRLA, conjunctiva and sclera clear  ENMT: Moist mucous membranes  NECK: Supple  NERVOUS SYSTEM:  Awake, aphasic, right sided weakness (RUE>RLE), withdraws to pain  CHEST/LUNG: coarse breath sounds  HEART: Irregular, +S1/S2  ABDOMEN: Soft, Nontender, obese  EXTREMITIES:  no pedal edema

## 2023-11-16 NOTE — CHART NOTE - NSCHARTNOTEFT_GEN_A_CORE
NSICU Transfer Note    Transfer from: NSICU    Transfer to: (X ) Medicine   (X ) Telemetry    Accepting Physician:    Signout given to:     HPI / CCU COURSE:  77 yo M with PMH of afib with RVR, was off AC (pradaxa) x2 days for watchmen procedure when p/w new onset R sided weakness while in holding room awaiting the procedure. Now acute CVA due to L M1 occlusion, s/p IV thrombolysis and TICI 2a MT.  : mechanical thrombectomy  : Extubated, steroid taper  11/15: Lasix  : TOV, SQL DVT proph. IVF bolus for hypernatremia      Vital Signs Last 24 Hrs  T(C): 37.9 (2023 12:00), Max: 37.9 (15 Nov 2023 15:00)  T(F): 100.2 (2023 12:00), Max: 100.2 (15 Nov 2023 15:00)  HR: 88 (:00) (56 - 110)  BP: 112/81 (2023 12:00) (103/61 - 153/99)  BP(mean): 91 (:00) (70 - 117)  RR: 24 (:00) (17 - 25)  SpO2: 99% (2023 12:00) (93% - 100%)    Parameters below as of 2023 12:00  Patient On (Oxygen Delivery Method): room air        I&O's Summary    15 Nov 2023 07:  -  2023 07:00  --------------------------------------------------------  IN: 1124.8 mL / OUT: 1805 mL / NET: -680.2 mL    2023 07:  -  2023 14:30  --------------------------------------------------------  IN: 446.6 mL / OUT: 200 mL / NET: 246.6 mL        Physical Exam:     Neuro: cooperative, NAD, sitting in a chair.  EO spont, crosses midline, PERRLA,  component of R neglect noted, follows some simple commands but seems with limited comprehension, motor - moves L side spont, RUE wdrl, RLE at least 2/5.  Pulm:CTAB  CV: S1S2 present  Abd: soft, NT, ND  No peripheral swelling      LABS:                             9.1    7.77  )-----------( 144      ( 2023 02:45 )             28.3           150<H>  |  113<H>  |  17.5  ----------------------------<  146<H>  4.0   |  27.0  |  0.81    Ca    8.3<L>      2023 02:45  Phos  2.6       Mg     2.4               Imagin/13 CTH negative    CTA: Thrombus distal left MCA M1 segment with diminished flow in the distal MCA segments. CTP: 167 mL area of penumbra in the left MCA territory.    CTH: evolving left MCA infarct w/ hem conversion now seen w/ ME left lateral vent w/ left to rt shift.    CTH 6hr: Stable     BLE Dopplers: negative     TTE: EF 55-60, Mild Pulm HTN     ASSESSMENT & PLAN:     77 yo M with PMH of afib with RVR, was off AC (pradaxa) x2 days for watchmen procedure when p/w new onset R sided weakness while in holding room awaiting the procedure. Now acute CVA due to L M1 occlusion, s/p IV thrombolysis and TICI 2a MT.     PLAN:   Neurologic: Q4 neuro checks, gabapentin, cymbalta    Respiratory: IS, O2 prn, nebs    Cardiovascular: tele monitoring SBP goal 100-160, metoprolol. atorvastatin, torsamide    Gastrointestinal/Nutrition: soft/bite sized mod thickened, pepcid, miralax    Genitourinary/Renal: TOV, monitor sodium    Hematologic: SQL , SCD    Infectious Disease: Nystatin mouthwash    Endocrine: ISS, prednisone     Disposition: transfer to medicine tele as no neurosurgical intervention      FOR FOLLOW UP:  [ ] MR brain  [ ] EP consult for HARJINDER recs   [ ] CTH   [ ] possible ASA81   [ ] monitor sodium  [ ] long term plans for watchman procedure NSICU Transfer Note    Transfer from: NSICU    Transfer to: (X ) Medicine   (X ) Telemetry    Accepting Physician:    Signout given to:     HPI / CCU COURSE:  79 yo M with PMH of afib with RVR, was off AC (pradaxa) x2 days for watchmen procedure when p/w new onset R sided weakness while in holding room awaiting the procedure. Now acute CVA due to L M1 occlusion, s/p IV thrombolysis and TICI 2a MT.  : mechanical thrombectomy  : Extubated, steroid taper  11/15: Lasix  : TOV, SQL DVT proph. IVF bolus for hypernatremia      Vital Signs Last 24 Hrs  T(C): 37.9 (2023 12:00), Max: 37.9 (15 Nov 2023 15:00)  T(F): 100.2 (2023 12:00), Max: 100.2 (15 Nov 2023 15:00)  HR: 88 (:00) (56 - 110)  BP: 112/81 (2023 12:00) (103/61 - 153/99)  BP(mean): 91 (:00) (70 - 117)  RR: 24 (:00) (17 - 25)  SpO2: 99% (2023 12:00) (93% - 100%)    Parameters below as of 2023 12:00  Patient On (Oxygen Delivery Method): room air        I&O's Summary    15 Nov 2023 07:  -  2023 07:00  --------------------------------------------------------  IN: 1124.8 mL / OUT: 1805 mL / NET: -680.2 mL    2023 07:  -  2023 14:30  --------------------------------------------------------  IN: 446.6 mL / OUT: 200 mL / NET: 246.6 mL        Physical Exam:     Neuro: cooperative, NAD, sitting in a chair.  EO spont, crosses midline, PERRLA,  component of R neglect noted, follows some simple commands but seems with limited comprehension, motor - moves L side spont, RUE wdrl, RLE at least 2/5.  Pulm:CTAB  CV: S1S2 present  Abd: soft, NT, ND  No peripheral swelling      LABS:                             9.1    7.77  )-----------( 144      ( 2023 02:45 )             28.3           150<H>  |  113<H>  |  17.5  ----------------------------<  146<H>  4.0   |  27.0  |  0.81    Ca    8.3<L>      2023 02:45  Phos  2.6       Mg     2.4               Imagin/13 CTH negative    CTA: Thrombus distal left MCA M1 segment with diminished flow in the distal MCA segments. CTP: 167 mL area of penumbra in the left MCA territory.    CTH: evolving left MCA infarct w/ hem conversion now seen w/ ME left lateral vent w/ left to rt shift.    CTH 6hr: Stable     BLE Dopplers: negative     TTE: EF 55-60, Mild Pulm HTN     ASSESSMENT & PLAN:     79 yo M with PMH of afib with RVR, was off AC (pradaxa) x2 days for watchmen procedure when p/w new onset R sided weakness while in holding room awaiting the procedure. Now acute CVA due to L M1 occlusion, s/p IV thrombolysis and TICI 2a MT.     PLAN:   Neurologic: Q4 neuro checks, gabapentin, cymbalta    Respiratory: IS, O2 prn, nebs    Cardiovascular: tele monitoring SBP goal 100-160, metoprolol. atorvastatin, torsamide    Gastrointestinal/Nutrition: soft/bite sized mod thickened, pepcid, miralax    Genitourinary/Renal: TOV, monitor sodium    Hematologic: SQL , SCD    Infectious Disease: Nystatin mouthwash    Endocrine: ISS, prednisone     Disposition: transfer to medicine tele as no neurosurgical intervention      FOR FOLLOW UP:  [ ] MR brain  [ ] EP consult for HARJINDER recs   [ ] CTH   [ ] possible ASA81   [ ] monitor sodium  [ ] long term plans for watchman procedure  ------------------------------    79 yo M with acute CVA due to L M1 occlusion, s/p IV thrombolysis and TICI 2a MT. On admission, NIHSS 26, MRS 1.   Small area of hemorrhagic transformation with some mass effect. Clinically and radiographically stable.  PMH of HTN, HLD, AFib on Pradaxa, CKD, gout, prostate cancer, remote DVT/PE on AC, OA s/p multiple orthopedic procedures, spinal stenosis    Plan:  neurochecks, protected sleep time  not a candidate for a surgical intervention in case of cerebral swelling/deterioration (age, co-morbidities, dominant hemisphere)  stability CTh in am per Neurology - if stable, plan to start on ASA 81 PO  daily  stroke core measures to be completed  maintain -160; on Metoprolol at home - restarted  Cardiology for possible HARJINDER (r/o intracard. thrombus)  maintain Osats>92%, chest PT  S/S therapy, diet as tolerated, monitor e-lytes, UOP, resume home Torsemide  TOV today  on Prednisone at home - reportedly, prescribed by Ortho for arthritis; completed Hydrocort taper over 3 days, switch to home dose Prednisone today  maintain -180, ISS  SCDs, chemoppx with SQL  stable to be downgraded to Telemetry    Time spent - 35 min, non-crit, included review of relevant history, clinical examination, review of data and images, discussion of treatment with the multidisciplinary team and any consultants involved in this patient’s care as well as family discussion.

## 2023-11-16 NOTE — PROGRESS NOTE ADULT - NS ATTEST RISK PROBLEM GEN_ALL_CORE FT
Acute stroke, hemorrhagic transformation risk for recurrent stroke off anticoagualtion
large stroke, needs for antithrombotics vs rosk of ICH

## 2023-11-16 NOTE — PROGRESS NOTE ADULT - ASSESSMENT
ASSESSMENT:   79 yo M PMH of HTN, HLD, AFib, CKD, obesity, gout, prostate cancer, DVT/PE, OA s/p multiple orthopedic procedures, spinal stenosis, presented on 11/13/23 for elective left atrial appendage occlusion procedure, and 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  Patient presented on 11/13 for elective left atrial appendage occlusion with Watchman device/HARJINDER w/ Dr Bobo, and Pradaxa was held for 2 days prior to procedure. 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 On admission, NIHSS 26, MRS 1. Patient is now s/p TNK administration at 11:16 11/13, and TICI 2A thrombectomy with Dr. Soliman. 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 Suspected etiology of stroke is cardioembolic from atrial fibrillation when off anticoagulation for recent procedure.    NEURO:   -Neurologically ---   -Continue close monitoring for neurologic deterioration    -Stroke neuro checks q2hrs  -Permissive HTN or  SBP goal up to 180mmHg, avoid hypotension or rapid fluctuations in blood pressure   -ANTITHROMBOTIC THERAPY:  On hold in setting of large ischemic stroke with hemorrhagic transformation, recommend obtaining repeat CT today (11/16) to assess for stability, if no acute changes then may start 81 mg ASA; further determination of when to resume AC will depend on clinical course and pending work up   -titrate statin to LDL goal less than 70, LDL=54, may resume home regimen of Rosuvastatin 10 mg PO QHS when patient able to tolerate PO   -MRI Brain w/o ordered, pending   -Dysphagia screen: pass  -Physical therapy/OT/Speech eval/treatment.   -Neurosurgery input appreciated, pt not a hemicrani candidate     CARDIOVASCULAR:  -TTE findings as above, no acute findings   -EP consult appreciated, no plans to reconsider cardiac intervention at this time, recommending resuming AC once safe to do so from neurologic standpoint   -cardiac monitoring w/ telemetry for now, further evaluation pending findings of noted workup                              HEMATOLOGY:   -H/H 89.1/28.3 Platelets 144, monitor anemia/thrombocytopenia per primary team, patient should have all age and risk appropriate malignancy screenings with PCP or sooner if clinically suspected   -Bilateral lower extremity venous duplex negative for DVT   -DVT ppx: Heparin s.c [] LMWH [] SCDs [x]    PULMONARY:   -Patient extubated 11/14/23  -protecting airway, saturating well     RENAL:   -BUN/Cr 17.5/0.81, monitor urine output, maintain adequate hydration    -Na Goal:  145-150 secondary to cerebral edema     ID:   -afebrile, leukocytosis resolved, monitor for si/sx of infection     OTHER:    -condition and plan of care d/w patient, questions and concerns addressed.     DISPOSITION: Rehab or home depending on PT eval once stable and workup is complete    CORE MEASURES:        Admission NIHSS: 26     Tenecteplase : [x] YES [] NO      LDL/HDL/A1C: 54/67/6.2     Depression Screen- if depression hx and/or present      Statin Therapy: Atorvastatin 40 mg PO Daily      Dysphagia Screen: [x] PASS [] FAIL      Smoking [] YES [x] NO      Afib [x] YES [] NO     Stroke Education [x] YES [] NO    Obtain screening lower extremity venous ultrasound in patients who meet 1 or more of the following criteria as patient is high risk for DVT/PE on admission:   [] History of DVT/PE  []Hypercoagulable states (Factor V Leiden, Cancer, OCP, etc. )  []Prolonged immobility (hemiplegia/hemiparesis/post operative or any other extended immobilization)  [] Transferred from outside facility (Rehab or Long term care)  [] Age </= to 50   COMPLETE A/P PENDING   ASSESSMENT:   79 yo M PMH of HTN, HLD, AFib, CKD, obesity, gout, prostate cancer, DVT/PE, OA s/p multiple orthopedic procedures, spinal stenosis, presented on 11/13/23 for elective left atrial appendage occlusion procedure, and 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  Patient presented on 11/13 for elective left atrial appendage occlusion with Watchman device/HARJINDER w/ Dr Bobo, and Pradaxa was held for 2 days prior to procedure. 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 On admission, NIHSS 26, MRS 1. Patient is now s/p TNK administration at 11:16 11/13, and TICI 2A thrombectomy with Dr. Soliman. 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 Suspected etiology of stroke is cardioembolic from atrial fibrillation when off anticoagulation for recent procedure.    NEURO:   -Neurologically ---   -Continue close monitoring for neurologic deterioration    -Stroke neuro checks q2hrs  -Permissive HTN or  SBP goal up to 180mmHg, avoid hypotension or rapid fluctuations in blood pressure   -ANTITHROMBOTIC THERAPY:  On hold in setting of large ischemic stroke with hemorrhagic transformation, recommend obtaining repeat CT today (11/16) to assess for stability, if no acute changes then may start 81 mg ASA; further determination of when to resume AC will depend on clinical course and pending work up   -titrate statin to LDL goal less than 70, LDL=54, may resume home regimen of Rosuvastatin 10 mg PO QHS when patient able to tolerate PO   -MRI Brain w/o ordered, pending   -Dysphagia screen: pass  -Physical therapy/OT/Speech eval/treatment.   -Neurosurgery input appreciated, pt not a hemicrani candidate     CARDIOVASCULAR:  -TTE findings as above, no acute findings   -EP consult appreciated, no plans to reconsider cardiac intervention at this time, recommending resuming AC once safe to do so from neurologic standpoint   -cardiac monitoring w/ telemetry for now, further evaluation pending findings of noted workup                              HEMATOLOGY:   -H/H 89.1/28.3 Platelets 144, monitor anemia/thrombocytopenia per primary team, patient should have all age and risk appropriate malignancy screenings with PCP or sooner if clinically suspected   -Bilateral lower extremity venous duplex negative for DVT   -DVT ppx: Heparin s.c [] LMWH [] SCDs [x]    PULMONARY:   -Patient extubated 11/14/23  -protecting airway, saturating well     RENAL:   -BUN/Cr 17.5/0.81, monitor urine output, maintain adequate hydration    -Na Goal:  145-150 secondary to cerebral edema     ID:   -afebrile, leukocytosis resolved, monitor for si/sx of infection     OTHER:    -condition and plan of care d/w patient, questions and concerns addressed.     DISPOSITION: Rehab or home depending on PT eval once stable and workup is complete    CORE MEASURES:        Admission NIHSS: 26     Tenecteplase : [x] YES [] NO      LDL/HDL/A1C: 54/67/6.2     Depression Screen- if depression hx and/or present      Statin Therapy: Atorvastatin 40 mg PO Daily      Dysphagia Screen: [x] PASS [] FAIL      Smoking [] YES [x] NO      Afib [x] YES [] NO     Stroke Education [x] YES [] NO    Obtain screening lower extremity venous ultrasound in patients who meet 1 or more of the following criteria as patient is high risk for DVT/PE on admission:   [] History of DVT/PE  []Hypercoagulable states (Factor V Leiden, Cancer, OCP, etc. )  []Prolonged immobility (hemiplegia/hemiparesis/post operative or any other extended immobilization)  [] Transferred from outside facility (Rehab or Long term care)  [] Age </= to 50   ASSESSMENT:   79 yo M PMH of HTN, HLD, AFib, CKD, obesity, gout, prostate cancer, DVT/PE, OA s/p multiple orthopedic procedures, spinal stenosis, presented on 11/13/23 for elective left atrial appendage occlusion procedure, and 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  Patient presented on 11/13 for elective left atrial appendage occlusion with Watchman device/HAJRINDER w/ Dr Bobo, and Pradaxa was held for 2 days prior to procedure. 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 On admission, NIHSS 26, MRS 1. Patient is now s/p TNK administration at 11:16 11/13, and TICI 2A thrombectomy with Dr. Soliman. 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 Suspected etiology of stroke is cardioembolic from atrial fibrillation when off anticoagulation for recent procedure.    NEURO:   -Neurologically no acute neurologic changes   -Continue close monitoring for neurologic deterioration    -Stroke neuro checks q2hrs  -Permissive HTN or  SBP goal up to 180mmHg, avoid hypotension or rapid fluctuations in blood pressure   -ANTITHROMBOTIC THERAPY:  On hold in setting of large ischemic stroke with hemorrhagic transformation, if unable to obtain MRI today (11/16) recommend obtaining repeat CT to assess for stability, if no acute changes then may start 81 mg ASA; further determination of when to resume AC will depend on clinical course and pending work up   -titrate statin to LDL goal less than 70, LDL=54, may resume home regimen of Rosuvastatin 10 mg PO QHS when patient able to tolerate PO   -MRI Brain w/o ordered, pending   -Dysphagia screen: pass  -Physical therapy/OT/Speech eval/treatment.   -Neurosurgery input appreciated, pt not a hemicrani candidate     CARDIOVASCULAR:  -TTE findings as above, no acute findings   -EP consult appreciated, no plans to reconsider cardiac intervention at this time, recommending resuming AC once safe to do so from neurologic standpoint   -cardiac monitoring w/ telemetry for now, further evaluation pending findings of noted workup                              HEMATOLOGY:   -H/H 9.1/28.3 Platelets 144, monitor anemia/thrombocytopenia per primary team, patient should have all age and risk appropriate malignancy screenings with PCP or sooner if clinically suspected   -Bilateral lower extremity venous duplex negative for DVT   -DVT ppx: Heparin s.c [] LMWH [] SCDs [x]    PULMONARY:   -Patient extubated 11/14/23  -protecting airway, saturating well     RENAL:   -BUN/Cr 17.5/0.81, monitor urine output, maintain adequate hydration    -Na Goal:  145-150 secondary to cerebral edema     ID:   -afebrile, leukocytosis resolved, monitor for si/sx of infection     OTHER:    -condition and plan of care d/w patient, questions and concerns addressed.     DISPOSITION: Rehab or home depending on PT eval once stable and workup is complete    CORE MEASURES:        Admission NIHSS: 26     Tenecteplase : [x] YES [] NO      LDL/HDL/A1C: 54/67/6.2     Depression Screen- if depression hx and/or present      Statin Therapy: Atorvastatin 40 mg PO Daily      Dysphagia Screen: [x] PASS [] FAIL      Smoking [] YES [x] NO      Afib [x] YES [] NO     Stroke Education [x] YES [] NO    Obtain screening lower extremity venous ultrasound in patients who meet 1 or more of the following criteria as patient is high risk for DVT/PE on admission:   [] History of DVT/PE  []Hypercoagulable states (Factor V Leiden, Cancer, OCP, etc. )  []Prolonged immobility (hemiplegia/hemiparesis/post operative or any other extended immobilization)  [] Transferred from outside facility (Rehab or Long term care)  [] Age </= to 50

## 2023-11-17 LAB
ANION GAP SERPL CALC-SCNC: 8 MMOL/L — SIGNIFICANT CHANGE UP (ref 5–17)
ANION GAP SERPL CALC-SCNC: 8 MMOL/L — SIGNIFICANT CHANGE UP (ref 5–17)
BASOPHILS # BLD AUTO: 0.02 K/UL — SIGNIFICANT CHANGE UP (ref 0–0.2)
BASOPHILS # BLD AUTO: 0.02 K/UL — SIGNIFICANT CHANGE UP (ref 0–0.2)
BASOPHILS NFR BLD AUTO: 0.3 % — SIGNIFICANT CHANGE UP (ref 0–2)
BASOPHILS NFR BLD AUTO: 0.3 % — SIGNIFICANT CHANGE UP (ref 0–2)
BUN SERPL-MCNC: 17.6 MG/DL — SIGNIFICANT CHANGE UP (ref 8–20)
BUN SERPL-MCNC: 17.6 MG/DL — SIGNIFICANT CHANGE UP (ref 8–20)
CALCIUM SERPL-MCNC: 8.8 MG/DL — SIGNIFICANT CHANGE UP (ref 8.4–10.5)
CALCIUM SERPL-MCNC: 8.8 MG/DL — SIGNIFICANT CHANGE UP (ref 8.4–10.5)
CHLORIDE SERPL-SCNC: 111 MMOL/L — HIGH (ref 96–108)
CHLORIDE SERPL-SCNC: 111 MMOL/L — HIGH (ref 96–108)
CO2 SERPL-SCNC: 29 MMOL/L — SIGNIFICANT CHANGE UP (ref 22–29)
CO2 SERPL-SCNC: 29 MMOL/L — SIGNIFICANT CHANGE UP (ref 22–29)
CREAT SERPL-MCNC: 0.75 MG/DL — SIGNIFICANT CHANGE UP (ref 0.5–1.3)
CREAT SERPL-MCNC: 0.75 MG/DL — SIGNIFICANT CHANGE UP (ref 0.5–1.3)
EGFR: 92 ML/MIN/1.73M2 — SIGNIFICANT CHANGE UP
EGFR: 92 ML/MIN/1.73M2 — SIGNIFICANT CHANGE UP
EOSINOPHIL # BLD AUTO: 0.16 K/UL — SIGNIFICANT CHANGE UP (ref 0–0.5)
EOSINOPHIL # BLD AUTO: 0.16 K/UL — SIGNIFICANT CHANGE UP (ref 0–0.5)
EOSINOPHIL NFR BLD AUTO: 2.3 % — SIGNIFICANT CHANGE UP (ref 0–6)
EOSINOPHIL NFR BLD AUTO: 2.3 % — SIGNIFICANT CHANGE UP (ref 0–6)
FERRITIN SERPL-MCNC: 107 NG/ML — SIGNIFICANT CHANGE UP (ref 30–400)
FERRITIN SERPL-MCNC: 107 NG/ML — SIGNIFICANT CHANGE UP (ref 30–400)
GLUCOSE BLDC GLUCOMTR-MCNC: 134 MG/DL — HIGH (ref 70–99)
GLUCOSE BLDC GLUCOMTR-MCNC: 134 MG/DL — HIGH (ref 70–99)
GLUCOSE BLDC GLUCOMTR-MCNC: 142 MG/DL — HIGH (ref 70–99)
GLUCOSE BLDC GLUCOMTR-MCNC: 142 MG/DL — HIGH (ref 70–99)
GLUCOSE BLDC GLUCOMTR-MCNC: 159 MG/DL — HIGH (ref 70–99)
GLUCOSE BLDC GLUCOMTR-MCNC: 159 MG/DL — HIGH (ref 70–99)
GLUCOSE BLDC GLUCOMTR-MCNC: 160 MG/DL — HIGH (ref 70–99)
GLUCOSE BLDC GLUCOMTR-MCNC: 160 MG/DL — HIGH (ref 70–99)
GLUCOSE BLDC GLUCOMTR-MCNC: 240 MG/DL — HIGH (ref 70–99)
GLUCOSE BLDC GLUCOMTR-MCNC: 240 MG/DL — HIGH (ref 70–99)
GLUCOSE SERPL-MCNC: 174 MG/DL — HIGH (ref 70–99)
GLUCOSE SERPL-MCNC: 174 MG/DL — HIGH (ref 70–99)
HCT VFR BLD CALC: 27.3 % — LOW (ref 39–50)
HCT VFR BLD CALC: 27.3 % — LOW (ref 39–50)
HGB BLD-MCNC: 8.8 G/DL — LOW (ref 13–17)
HGB BLD-MCNC: 8.8 G/DL — LOW (ref 13–17)
IMM GRANULOCYTES NFR BLD AUTO: 0.6 % — SIGNIFICANT CHANGE UP (ref 0–0.9)
IMM GRANULOCYTES NFR BLD AUTO: 0.6 % — SIGNIFICANT CHANGE UP (ref 0–0.9)
IRON SATN MFR SERPL: 15 % — LOW (ref 16–55)
IRON SATN MFR SERPL: 15 % — LOW (ref 16–55)
IRON SATN MFR SERPL: 36 UG/DL — LOW (ref 59–158)
IRON SATN MFR SERPL: 36 UG/DL — LOW (ref 59–158)
LYMPHOCYTES # BLD AUTO: 0.9 K/UL — LOW (ref 1–3.3)
LYMPHOCYTES # BLD AUTO: 0.9 K/UL — LOW (ref 1–3.3)
LYMPHOCYTES # BLD AUTO: 13.2 % — SIGNIFICANT CHANGE UP (ref 13–44)
LYMPHOCYTES # BLD AUTO: 13.2 % — SIGNIFICANT CHANGE UP (ref 13–44)
MAGNESIUM SERPL-MCNC: 2.2 MG/DL — SIGNIFICANT CHANGE UP (ref 1.6–2.6)
MAGNESIUM SERPL-MCNC: 2.2 MG/DL — SIGNIFICANT CHANGE UP (ref 1.6–2.6)
MCHC RBC-ENTMCNC: 31.2 PG — SIGNIFICANT CHANGE UP (ref 27–34)
MCHC RBC-ENTMCNC: 31.2 PG — SIGNIFICANT CHANGE UP (ref 27–34)
MCHC RBC-ENTMCNC: 32.2 GM/DL — SIGNIFICANT CHANGE UP (ref 32–36)
MCHC RBC-ENTMCNC: 32.2 GM/DL — SIGNIFICANT CHANGE UP (ref 32–36)
MCV RBC AUTO: 96.8 FL — SIGNIFICANT CHANGE UP (ref 80–100)
MCV RBC AUTO: 96.8 FL — SIGNIFICANT CHANGE UP (ref 80–100)
MONOCYTES # BLD AUTO: 0.55 K/UL — SIGNIFICANT CHANGE UP (ref 0–0.9)
MONOCYTES # BLD AUTO: 0.55 K/UL — SIGNIFICANT CHANGE UP (ref 0–0.9)
MONOCYTES NFR BLD AUTO: 8 % — SIGNIFICANT CHANGE UP (ref 2–14)
MONOCYTES NFR BLD AUTO: 8 % — SIGNIFICANT CHANGE UP (ref 2–14)
NEUTROPHILS # BLD AUTO: 5.17 K/UL — SIGNIFICANT CHANGE UP (ref 1.8–7.4)
NEUTROPHILS # BLD AUTO: 5.17 K/UL — SIGNIFICANT CHANGE UP (ref 1.8–7.4)
NEUTROPHILS NFR BLD AUTO: 75.6 % — SIGNIFICANT CHANGE UP (ref 43–77)
NEUTROPHILS NFR BLD AUTO: 75.6 % — SIGNIFICANT CHANGE UP (ref 43–77)
PHOSPHATE SERPL-MCNC: 3 MG/DL — SIGNIFICANT CHANGE UP (ref 2.4–4.7)
PHOSPHATE SERPL-MCNC: 3 MG/DL — SIGNIFICANT CHANGE UP (ref 2.4–4.7)
PLATELET # BLD AUTO: 147 K/UL — LOW (ref 150–400)
PLATELET # BLD AUTO: 147 K/UL — LOW (ref 150–400)
POTASSIUM SERPL-MCNC: 3.7 MMOL/L — SIGNIFICANT CHANGE UP (ref 3.5–5.3)
POTASSIUM SERPL-MCNC: 3.7 MMOL/L — SIGNIFICANT CHANGE UP (ref 3.5–5.3)
POTASSIUM SERPL-SCNC: 3.7 MMOL/L — SIGNIFICANT CHANGE UP (ref 3.5–5.3)
POTASSIUM SERPL-SCNC: 3.7 MMOL/L — SIGNIFICANT CHANGE UP (ref 3.5–5.3)
RBC # BLD: 2.82 M/UL — LOW (ref 4.2–5.8)
RBC # BLD: 2.82 M/UL — LOW (ref 4.2–5.8)
RBC # FLD: 13.9 % — SIGNIFICANT CHANGE UP (ref 10.3–14.5)
RBC # FLD: 13.9 % — SIGNIFICANT CHANGE UP (ref 10.3–14.5)
SODIUM SERPL-SCNC: 148 MMOL/L — HIGH (ref 135–145)
SODIUM SERPL-SCNC: 148 MMOL/L — HIGH (ref 135–145)
TIBC SERPL-MCNC: 240 UG/DL — SIGNIFICANT CHANGE UP (ref 220–430)
TIBC SERPL-MCNC: 240 UG/DL — SIGNIFICANT CHANGE UP (ref 220–430)
TRANSFERRIN SERPL-MCNC: 168 MG/DL — LOW (ref 180–329)
TRANSFERRIN SERPL-MCNC: 168 MG/DL — LOW (ref 180–329)
WBC # BLD: 6.84 K/UL — SIGNIFICANT CHANGE UP (ref 3.8–10.5)
WBC # BLD: 6.84 K/UL — SIGNIFICANT CHANGE UP (ref 3.8–10.5)
WBC # FLD AUTO: 6.84 K/UL — SIGNIFICANT CHANGE UP (ref 3.8–10.5)
WBC # FLD AUTO: 6.84 K/UL — SIGNIFICANT CHANGE UP (ref 3.8–10.5)

## 2023-11-17 PROCEDURE — 99233 SBSQ HOSP IP/OBS HIGH 50: CPT

## 2023-11-17 RX ORDER — ASPIRIN/CALCIUM CARB/MAGNESIUM 324 MG
81 TABLET ORAL DAILY
Refills: 0 | Status: DISCONTINUED | OUTPATIENT
Start: 2023-11-17 | End: 2023-11-17

## 2023-11-17 RX ORDER — SENNA PLUS 8.6 MG/1
2 TABLET ORAL AT BEDTIME
Refills: 0 | Status: DISCONTINUED | OUTPATIENT
Start: 2023-11-17 | End: 2023-12-04

## 2023-11-17 RX ORDER — ASPIRIN/CALCIUM CARB/MAGNESIUM 324 MG
81 TABLET ORAL DAILY
Refills: 0 | Status: DISCONTINUED | OUTPATIENT
Start: 2023-11-17 | End: 2023-12-04

## 2023-11-17 RX ADMIN — SENNA PLUS 2 TABLET(S): 8.6 TABLET ORAL at 21:37

## 2023-11-17 RX ADMIN — Medication 25 MILLIGRAM(S): at 05:18

## 2023-11-17 RX ADMIN — Medication 81 MILLIGRAM(S): at 17:59

## 2023-11-17 RX ADMIN — FAMOTIDINE 20 MILLIGRAM(S): 10 INJECTION INTRAVENOUS at 05:18

## 2023-11-17 RX ADMIN — Medication 25 MILLIGRAM(S): at 18:01

## 2023-11-17 RX ADMIN — GABAPENTIN 300 MILLIGRAM(S): 400 CAPSULE ORAL at 05:18

## 2023-11-17 RX ADMIN — Medication 5 MILLIGRAM(S): at 05:18

## 2023-11-17 RX ADMIN — ENOXAPARIN SODIUM 40 MILLIGRAM(S): 100 INJECTION SUBCUTANEOUS at 18:02

## 2023-11-17 RX ADMIN — NYSTATIN CREAM 1 APPLICATION(S): 100000 CREAM TOPICAL at 05:18

## 2023-11-17 RX ADMIN — GABAPENTIN 300 MILLIGRAM(S): 400 CAPSULE ORAL at 17:59

## 2023-11-17 RX ADMIN — NYSTATIN CREAM 1 APPLICATION(S): 100000 CREAM TOPICAL at 18:02

## 2023-11-17 RX ADMIN — FAMOTIDINE 20 MILLIGRAM(S): 10 INJECTION INTRAVENOUS at 17:59

## 2023-11-17 RX ADMIN — ATORVASTATIN CALCIUM 40 MILLIGRAM(S): 80 TABLET, FILM COATED ORAL at 21:37

## 2023-11-17 RX ADMIN — Medication 300 MILLIGRAM(S): at 12:31

## 2023-11-17 RX ADMIN — Medication 1: at 12:31

## 2023-11-17 RX ADMIN — Medication 2: at 21:42

## 2023-11-17 RX ADMIN — POLYETHYLENE GLYCOL 3350 17 GRAM(S): 17 POWDER, FOR SOLUTION ORAL at 12:31

## 2023-11-17 RX ADMIN — CHLORHEXIDINE GLUCONATE 1 APPLICATION(S): 213 SOLUTION TOPICAL at 05:17

## 2023-11-17 NOTE — PROGRESS NOTE ADULT - SUBJECTIVE AND OBJECTIVE BOX
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  Patient has a long h/o AFib, which is now considered permanent. He is on rate control with metoprolol 25 mg bid, and anticoagulation with Pradaxa.  He has a long Hx of arthritis and had multiple hip replacements, bilateral knee replacements and also has neck and back pain, but is unable to take NSAIDs due to bleeding risks. He has an unsteady gait and uses a cane for assistance.  Patient presented for elective left atrial appendage occlusion with Watchman device/HARJINDER w/ Dr Bobo, and Pradaxa was held for 2 days prior to procedure. 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   Reported per neurology that on admission, NIHSS 26, MRS 1.  Patient is now s/p TNK administration at 11:16, and TICI 2A thrombectomy with Dr. Soliman, admitted to the NeuroICU intubated and on phenylephrine.     NIH SS: 11/13 at 10:53 per neurology  1A: Level of consciousness (0-3): 1  1B: Questions (0-2): 2  1C: Commands (0-2): 2  2: Gaze (0-2): 2  3: Visual fields (0-3): 2  4: Facial palsy (0-3): 1  MOTOR:  5A: Left arm motor drift (0-4): 0  5B: Right arm motor drift (0-4): 4  6A: Left leg motor drift (0-4): 0  6B: Right leg motor drift (0-4): 4  7: Limb ataxia (0-2): 0  SENSORY:  8: Sensation (0-2): 2  SPEECH:  9: Language (0-3): 3  10: Dysarthria (0-2): 2  EXTINCTION:  11: Extinction/inattention (0-2): 2    TOTAL SCORE: 26 (13 Nov 2023 16:32)      Admission NIHSS  Pre-MRS  ICH Score (if applicable)    SUBJECTIVE: No events overnight.  No new neurologic complaints.  ROS reported negative unless otherwise noted.    acetaminophen     Tablet .. 650 milliGRAM(s) Oral every 6 hours PRN  albuterol/ipratropium (CFC free) Inhaler. 1 Puff(s) Inhalation four times a day PRN  allopurinol 300 milliGRAM(s) Oral daily  atorvastatin 40 milliGRAM(s) Oral at bedtime  chlorhexidine 2% Cloths 1 Application(s) Topical daily  dextrose 5%. 1000 milliLiter(s) IV Continuous <Continuous>  dextrose 5%. 1000 milliLiter(s) IV Continuous <Continuous>  dextrose 50% Injectable 25 Gram(s) IV Push once  dextrose 50% Injectable 12.5 Gram(s) IV Push once  dextrose 50% Injectable 25 Gram(s) IV Push once  dextrose Oral Gel 15 Gram(s) Oral once PRN  DULoxetine 30 milliGRAM(s) Oral daily  enoxaparin Injectable 40 milliGRAM(s) SubCutaneous <User Schedule>  famotidine    Tablet 20 milliGRAM(s) Oral two times a day  gabapentin 300 milliGRAM(s) Oral two times a day  glucagon  Injectable 1 milliGRAM(s) IntraMuscular once  insulin lispro (ADMELOG) corrective regimen sliding scale   SubCutaneous Before meals and at bedtime  metoprolol tartrate 25 milliGRAM(s) Oral two times a day  nystatin Powder 1 Application(s) Topical two times a day  polyethylene glycol 3350 17 Gram(s) Oral daily  predniSONE   Tablet 5 milliGRAM(s) Oral daily  senna 1 Tablet(s) Oral at bedtime      PHYSICAL EXAM:   Vital Signs Last 24 Hrs  T(C): 37.1 (17 Nov 2023 04:02), Max: 37.9 (16 Nov 2023 12:00)  T(F): 98.7 (17 Nov 2023 04:02), Max: 100.2 (16 Nov 2023 12:00)  HR: 59 (17 Nov 2023 08:00) (57 - 88)  BP: 132/64 (17 Nov 2023 08:00) (110/89 - 153/99)  BP(mean): 84 (17 Nov 2023 08:00) (67 - 117)  RR: 27 (17 Nov 2023 08:00) (20 - 27)  SpO2: 98% (17 Nov 2023 08:00) (85% - 100%)    Parameters below as of 17 Nov 2023 08:00  Patient On (Oxygen Delivery Method): nasal cannula  O2 Flow (L/min): 2      General: No acute distress    NEUROLOGICAL EXAM:  Mental status: Awake, alert, oriented x3, speech fluent, follows commands, no neglect, normal memory   Cranial Nerves: No facial asymmetry, no nystagmus, no dysarthria,  tongue midline  Motor exam: Normal tone, no drift, 5/5 RUE, 5/5 RLE, 5/5 LUE, 5/5 LLE, normal fine finger movements.  Sensation: Intact to light touch   Coordination/ Gait: No dysmetria, gait not tested    LABS:                        8.8    6.84  )-----------( 147      ( 17 Nov 2023 06:44 )             27.3    11-17    148<H>  |  111<H>  |  17.6  ----------------------------<  174<H>  3.7   |  29.0  |  0.75    Ca    8.8      17 Nov 2023 06:44  Phos  3.0     11-17  Mg     2.2     11-17          IMAGING: Reviewed by me.      Preliminary note, offical recommendations pending attending review/signature   [facility] Stroke Team  Progress Note     HPI:  77 yo M PMH of HTN, HLD, AFib, CKD, obesity, gout, prostate cancer, DVT/PE, OA s/p multiple orthopedic procedures, spinal stenosis, presented on 11/13/23 for elective left atrial appendage occlusion procedure, and developed a MCA syndrome due to acute L M1 occlusion as demonstrated on CTA Head and Neck.Patient has a long h/o AFib, which is now considered permanent. He is on rate control with metoprolol 25 mg bid, and anticoagulation with Pradaxa.  He has a long Hx of arthritis and had multiple hip replacements, bilateral knee replacements and also has neck and back pain, but is unable to take NSAIDs due to bleeding risks. He has an unsteady gait and uses a cane for assistance.  Patient presented on 11/13 for elective left atrial appendage occlusion with Watchman device/HARJINDER w/ Dr Bobo, and Pradaxa was held for 2 days prior to procedure. 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   Initial NIHSS 26, MRS 1. Patient s/p tenecteplase administration at 11:16 11/13/23 and TICI 2A thrombectomy with Dr. Soliman.    SUBJECTIVE: No events overnight.  No new neurologic complaints.  ROS reported negative unless otherwise noted.    acetaminophen     Tablet .. 650 milliGRAM(s) Oral every 6 hours PRN  albuterol/ipratropium (CFC free) Inhaler. 1 Puff(s) Inhalation four times a day PRN  allopurinol 300 milliGRAM(s) Oral daily  atorvastatin 40 milliGRAM(s) Oral at bedtime  chlorhexidine 2% Cloths 1 Application(s) Topical daily  dextrose 5%. 1000 milliLiter(s) IV Continuous <Continuous>  dextrose 5%. 1000 milliLiter(s) IV Continuous <Continuous>  dextrose 50% Injectable 25 Gram(s) IV Push once  dextrose 50% Injectable 12.5 Gram(s) IV Push once  dextrose 50% Injectable 25 Gram(s) IV Push once  dextrose Oral Gel 15 Gram(s) Oral once PRN  DULoxetine 30 milliGRAM(s) Oral daily  enoxaparin Injectable 40 milliGRAM(s) SubCutaneous <User Schedule>  famotidine    Tablet 20 milliGRAM(s) Oral two times a day  gabapentin 300 milliGRAM(s) Oral two times a day  glucagon  Injectable 1 milliGRAM(s) IntraMuscular once  insulin lispro (ADMELOG) corrective regimen sliding scale   SubCutaneous Before meals and at bedtime  metoprolol tartrate 25 milliGRAM(s) Oral two times a day  nystatin Powder 1 Application(s) Topical two times a day  polyethylene glycol 3350 17 Gram(s) Oral daily  predniSONE   Tablet 5 milliGRAM(s) Oral daily  senna 1 Tablet(s) Oral at bedtime      PHYSICAL EXAM:   Vital Signs Last 24 Hrs  T(C): 37.1 (17 Nov 2023 04:02), Max: 37.9 (16 Nov 2023 12:00)  T(F): 98.7 (17 Nov 2023 04:02), Max: 100.2 (16 Nov 2023 12:00)  HR: 59 (17 Nov 2023 08:00) (57 - 88)  BP: 132/64 (17 Nov 2023 08:00) (110/89 - 153/99)  BP(mean): 84 (17 Nov 2023 08:00) (67 - 117)  RR: 27 (17 Nov 2023 08:00) (20 - 27)  SpO2: 98% (17 Nov 2023 08:00) (85% - 100%)    Parameters below as of 17 Nov 2023 08:00  Patient On (Oxygen Delivery Method): nasal cannula  O2 Flow (L/min): 2  PENDING  General: NAD    Detailed Neurologic Exam:    Mental status: The patient is awake, alert, responds to voice. Right sided visual willow-neglect present but improved compared to prior exam. Global aphasia noted.     Cranial nerves: Pupils equal and react symmetrically to light, sluggish 5 to 3. Blink reflex is present bilaterally.  Extraocular movement intact, gaze crosses midline bilaterally. Right facial droop noted.     Motor: There is normal bulk and tone.  There is no tremor.  Right lower extremity with trace movement in the toes, 0/5 strength in RUE    Strength is at least 4+/5 in the left arm and leg.    Sensation: Intact to pinch left in 4 extremities    Cerebellar: unable to assess dysmetria on finger to nose testing.    Gait : deferredd    LABS:                        8.8    6.84  )-----------( 147      ( 17 Nov 2023 06:44 )             27.3    11-17    148<H>  |  111<H>  |  17.6  ----------------------------<  174<H>  3.7   |  29.0  |  0.75    Ca    8.8      17 Nov 2023 06:44  Phos  3.0     11-17  Mg     2.2     11-17    IMAGING: Reviewed by me.    CT Head No Cont (11.16.23 @ 18:05)   IMPRESSION: Stable moderate to large left MCA territory infarction   involving the left frontal lobe, left basal ganglia, left insular ribbon,   left parietal occipital lobe, and anterior left temporal lobe with stable   mild hemorrhage in the left basal ganglia. Left-to-right midline shift   measures 5.5 mm.    CT Head No Cont (11.14.23 @ 18:57)   IMPRESSION:  Stable moderate to large acute left MCA infarct with mild hemorrhage    TTE Echo Complete w/o Contrast w/ Doppler (11.14.23 @ 15:57)   Summary:   1. Left ventricular ejection fraction, by visual estimation, is 55 to   60%.   2. Normal global left ventricular systolic function.   3. The mitral in-flow pattern reveals no discernable A-wave, therefore   no comment on diastolic function can be made.   4. Normal right ventricular size and function.   5. Mild to moderately enlarged right atrium.   6. Moderately enlarged left atrium.   7. Trace mitral valve regurgitation.   8. Mild-moderate tricuspid regurgitation.   9. Sclerotic aortic valve with decreased opening.  10. Estimated pulmonary artery systolic pressure is 41.6 mmHg assuming a   right atrial pressure of 5 mmHg, which is consistent with mild pulmonary   hypertension.    CT Head No Cont (11.14.23 @ 11:36)   IMPRESSION: Evolving left MCA infarct is identified with hemorrhagic   transformation now seen with mass effect on left lateral ventricles and   left-to-right shift.    US Duplex Venous Lower Ext Complete, Bilateral (11.14.23 @ 10:38)   IMPRESSION:  No evidence of deep venous thrombosis in either lower extremity.  Left calf veins not visualized.  Small right popliteal fossa cyst.    CT Angio Brain, Neck and perfusion Stroke Protocol  w/ IV Cont (11.13.23 @ 11:18)   CTA BRAIN:  Acute thrombus in the distal left MCA M1 segment. Diminished flow in the   distal branches.  Severe bilateral cavernous carotid artery calcifications. Fetal origins   the bilateral posterior cerebral arteries. The [place] and   vertebrobasilar system are otherwise unremarkable without evidence of   stenosis, additional occlusion or saccular aneurysm dilation. No evidence   for arterial venous malformation. The vertebral arteries are codominant.    CTA NECK:  Mild bilateral carotid bulb calcifications. Mild stenosis in the left   carotid bulb. The right internal carotid arteries tortuous.  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 There is normal relationship to   the great vessels. The common carotid arteries, internal carotid arteries   and vertebral arteries shows no other evidence of significant stenosis,   occlusion or saccular aneurysm dilation. The vertebral arteries are   codominant.    CT PERFUSION:  Patient has only had less than 2 hours of symptoms may influence the CT   perfusion.  CBF<30% volume: 26 ml left MCA territory.  Tmax>6.0 s volume: 193 ml left MCA territory  Mismatch volume: 167 ml  Mismatch ratio: 7.4  IMPRESSION:  Thrombus in the distal left MCA M1 segment with diminished flow in the   distal MCA segments. 167 mL area of penumbra in the left MCA territory.     CT Brain Stroke Protocol (11.13.23 @ 11:15)   IMPRESSION:  Dense left MCA M1 segment suggesting intraluminal thrombus.   Mild chronic microvascular changes without evidence of an acute   transcortical infarction or hemorrhage.    TTE Echo Complete w/o Contrast w/ Doppler (11.14.23 @ 15:57)   Summary:   1. Left ventricular ejection fraction, by visual estimation, is 55 to   60%.   2. Normal global left ventricular systolic function.   3. The mitral in-flow pattern reveals no discernable A-wave, therefore   no comment on diastolic function can be made.   4. Normal right ventricular size and function.   5. Mild to moderately enlarged right atrium.   6. Moderatelyenlarged left atrium.   7. Trace mitral valve regurgitation.   8. Mild-moderate tricuspid regurgitation.   9. Sclerotic aortic valve with decreased opening.  10. Estimated pulmonary artery systolic pressure is 41.6 mmHg assuming a   right atrial pressure of 5 mmHg, which is consistent with mild pulmonary   hypertension.           Preliminary note, offical recommendations pending attending review/signature   [facility] Stroke Team  Progress Note     HPI:  79 yo M PMH of HTN, HLD, AFib, CKD, obesity, gout, prostate cancer, DVT/PE, OA s/p multiple orthopedic procedures, spinal stenosis, presented on 11/13/23 for elective left atrial appendage occlusion procedure, and developed a MCA syndrome due to acute L M1 occlusion as demonstrated on CTA Head and Neck.Patient has a long h/o AFib, which is now considered permanent. He is on rate control with metoprolol 25 mg bid, and anticoagulation with Pradaxa.  He has a long Hx of arthritis and had multiple hip replacements, bilateral knee replacements and also has neck and back pain, but is unable to take NSAIDs due to bleeding risks. He has an unsteady gait and uses a cane for assistance.  Patient presented on 11/13 for elective left atrial appendage occlusion with Watchman device/HARJINDER w/ Dr Bobo, and Pradaxa was held for 2 days prior to procedure. 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   Initial NIHSS 26, MRS 1. Patient s/p tenecteplase administration at 11:16 11/13/23 and TICI 2A thrombectomy with Dr. Soliman.    SUBJECTIVE: No events overnight.  No new neurologic complaints.  ROS reported negative unless otherwise noted.    acetaminophen     Tablet .. 650 milliGRAM(s) Oral every 6 hours PRN  albuterol/ipratropium (CFC free) Inhaler. 1 Puff(s) Inhalation four times a day PRN  allopurinol 300 milliGRAM(s) Oral daily  atorvastatin 40 milliGRAM(s) Oral at bedtime  chlorhexidine 2% Cloths 1 Application(s) Topical daily  dextrose 5%. 1000 milliLiter(s) IV Continuous <Continuous>  dextrose 5%. 1000 milliLiter(s) IV Continuous <Continuous>  dextrose 50% Injectable 25 Gram(s) IV Push once  dextrose 50% Injectable 12.5 Gram(s) IV Push once  dextrose 50% Injectable 25 Gram(s) IV Push once  dextrose Oral Gel 15 Gram(s) Oral once PRN  DULoxetine 30 milliGRAM(s) Oral daily  enoxaparin Injectable 40 milliGRAM(s) SubCutaneous <User Schedule>  famotidine    Tablet 20 milliGRAM(s) Oral two times a day  gabapentin 300 milliGRAM(s) Oral two times a day  glucagon  Injectable 1 milliGRAM(s) IntraMuscular once  insulin lispro (ADMELOG) corrective regimen sliding scale   SubCutaneous Before meals and at bedtime  metoprolol tartrate 25 milliGRAM(s) Oral two times a day  nystatin Powder 1 Application(s) Topical two times a day  polyethylene glycol 3350 17 Gram(s) Oral daily  predniSONE   Tablet 5 milliGRAM(s) Oral daily  senna 1 Tablet(s) Oral at bedtime      PHYSICAL EXAM:   Vital Signs Last 24 Hrs  T(C): 37.1 (17 Nov 2023 04:02), Max: 37.9 (16 Nov 2023 12:00)  T(F): 98.7 (17 Nov 2023 04:02), Max: 100.2 (16 Nov 2023 12:00)  HR: 59 (17 Nov 2023 08:00) (57 - 88)  BP: 132/64 (17 Nov 2023 08:00) (110/89 - 153/99)  BP(mean): 84 (17 Nov 2023 08:00) (67 - 117)  RR: 27 (17 Nov 2023 08:00) (20 - 27)  SpO2: 98% (17 Nov 2023 08:00) (85% - 100%)    Parameters below as of 17 Nov 2023 08:00  Patient On (Oxygen Delivery Method): nasal cannula  O2 Flow (L/min): 2    General: NAD. Sitting in chair resting.    Detailed Neurologic Exam:    Mental status: The patient is sleepy due to just waking up, however is alert and responds to voice. Right sided visual willow-neglect present but improved compared to prior exam. Global aphasia noted.     Cranial nerves: Pupils equal and react symmetrically to light. No blink to threat on right side. Blink to threat present out of left eye. Extraocular movement intact, gaze crosses midline bilaterally. Right facial weakness noted.     Motor: There is normal bulk and tone.  There is no tremor.  RLE: slight withdrawal due to noxious stimuli  RUE: 0/5 strength with no movement   Strength is 5/5 in the left arm and leg with no drift.    Sensation: Intact to pinch left in 4 extremities    Cerebellar: unable to assess dysmetria on finger to nose testing.    Gait : deferred    LABS:                        8.8    6.84  )-----------( 147      ( 17 Nov 2023 06:44 )             27.3    11-17    148<H>  |  111<H>  |  17.6  ----------------------------<  174<H>  3.7   |  29.0  |  0.75    Ca    8.8      17 Nov 2023 06:44  Phos  3.0     11-17  Mg     2.2     11-17    IMAGING: Reviewed by me.    CT Head No Cont (11.16.23 @ 18:05)   IMPRESSION: Stable moderate to large left MCA territory infarction   involving the left frontal lobe, left basal ganglia, left insular ribbon,   left parietal occipital lobe, and anterior left temporal lobe with stable   mild hemorrhage in the left basal ganglia. Left-to-right midline shift   measures 5.5 mm.    CT Head No Cont (11.14.23 @ 18:57)   IMPRESSION:  Stable moderate to large acute left MCA infarct with mild hemorrhage    TTE Echo Complete w/o Contrast w/ Doppler (11.14.23 @ 15:57)   Summary:   1. Left ventricular ejection fraction, by visual estimation, is 55 to   60%.   2. Normal global left ventricular systolic function.   3. The mitral in-flow pattern reveals no discernable A-wave, therefore   no comment on diastolic function can be made.   4. Normal right ventricular size and function.   5. Mild to moderately enlarged right atrium.   6. Moderately enlarged left atrium.   7. Trace mitral valve regurgitation.   8. Mild-moderate tricuspid regurgitation.   9. Sclerotic aortic valve with decreased opening.  10. Estimated pulmonary artery systolic pressure is 41.6 mmHg assuming a   right atrial pressure of 5 mmHg, which is consistent with mild pulmonary   hypertension.    CT Head No Cont (11.14.23 @ 11:36)   IMPRESSION: Evolving left MCA infarct is identified with hemorrhagic   transformation now seen with mass effect on left lateral ventricles and   left-to-right shift.    US Duplex Venous Lower Ext Complete, Bilateral (11.14.23 @ 10:38)   IMPRESSION:  No evidence of deep venous thrombosis in either lower extremity.  Left calf veins not visualized.  Small right popliteal fossa cyst.    CT Angio Brain, Neck and perfusion Stroke Protocol  w/ IV Cont (11.13.23 @ 11:18)   CTA BRAIN:  Acute thrombus in the distal left MCA M1 segment. Diminished flow in the   distal branches.  Severe bilateral cavernous carotid artery calcifications. Fetal origins   the bilateral posterior cerebral arteries. The [place] and   vertebrobasilar system are otherwise unremarkable without evidence of   stenosis, additional occlusion or saccular aneurysm dilation. No evidence   for arterial venous malformation. The vertebral arteries are codominant.    CTA NECK:  Mild bilateral carotid bulb calcifications. Mild stenosis in the left   carotid bulb. The right internal carotid arteries tortuous.  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 There is normal relationship to   the great vessels. The common carotid arteries, internal carotid arteries   and vertebral arteries shows no other evidence of significant stenosis,   occlusion or saccular aneurysm dilation. The vertebral arteries are   codominant.    CT PERFUSION:  Patient has only had less than 2 hours of symptoms may influence the CT   perfusion.  CBF<30% volume: 26 ml left MCA territory.  Tmax>6.0 s volume: 193 ml left MCA territory  Mismatch volume: 167 ml  Mismatch ratio: 7.4  IMPRESSION:  Thrombus in the distal left MCA M1 segment with diminished flow in the   distal MCA segments. 167 mL area of penumbra in the left MCA territory.     CT Brain Stroke Protocol (11.13.23 @ 11:15)   IMPRESSION:  Dense left MCA M1 segment suggesting intraluminal thrombus.   Mild chronic microvascular changes without evidence of an acute   transcortical infarction or hemorrhage.    TTE Echo Complete w/o Contrast w/ Doppler (11.14.23 @ 15:57)   Summary:   1. Left ventricular ejection fraction, by visual estimation, is 55 to   60%.   2. Normal global left ventricular systolic function.   3. The mitral in-flow pattern reveals no discernable A-wave, therefore   no comment on diastolic function can be made.   4. Normal right ventricular size and function.   5. Mild to moderately enlarged right atrium.   6. Moderatelyenlarged left atrium.   7. Trace mitral valve regurgitation.   8. Mild-moderate tricuspid regurgitation.   9. Sclerotic aortic valve with decreased opening.  10. Estimated pulmonary artery systolic pressure is 41.6 mmHg assuming a   right atrial pressure of 5 mmHg, which is consistent with mild pulmonary   hypertension.           Preliminary note, offical recommendations pending attending review/signature   [facility] Stroke Team  Progress Note     HPI:  77 yo M PMH of HTN, HLD, AFib, CKD, obesity, gout, prostate cancer, DVT/PE, OA s/p multiple orthopedic procedures, spinal stenosis, presented on 11/13/23 for elective left atrial appendage occlusion procedure, and developed a MCA syndrome due to acute L M1 occlusion as demonstrated on CTA Head and Neck.Patient has a long h/o AFib, which is now considered permanent. He is on rate control with metoprolol 25 mg bid, and anticoagulation with Pradaxa.  He has a long Hx of arthritis and had multiple hip replacements, bilateral knee replacements and also has neck and back pain, but is unable to take NSAIDs due to bleeding risks. He has an unsteady gait and uses a cane for assistance.  Patient presented on 11/13 for elective left atrial appendage occlusion with Watchman device/HARJINDER w/ Dr Bobo, and Pradaxa was held for 2 days prior to procedure. 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   Initial NIHSS 26, MRS 1. Patient s/p tenecteplase administration at 11:16 11/13/23 and TICI 2A thrombectomy with Dr. Soliman.    SUBJECTIVE: No events overnight.  No new neurologic complaints.  ROS reported negative unless otherwise noted.    acetaminophen     Tablet .. 650 milliGRAM(s) Oral every 6 hours PRN  albuterol/ipratropium (CFC free) Inhaler. 1 Puff(s) Inhalation four times a day PRN  allopurinol 300 milliGRAM(s) Oral daily  atorvastatin 40 milliGRAM(s) Oral at bedtime  chlorhexidine 2% Cloths 1 Application(s) Topical daily  dextrose 5%. 1000 milliLiter(s) IV Continuous <Continuous>  dextrose 5%. 1000 milliLiter(s) IV Continuous <Continuous>  dextrose 50% Injectable 25 Gram(s) IV Push once  dextrose 50% Injectable 12.5 Gram(s) IV Push once  dextrose 50% Injectable 25 Gram(s) IV Push once  dextrose Oral Gel 15 Gram(s) Oral once PRN  DULoxetine 30 milliGRAM(s) Oral daily  enoxaparin Injectable 40 milliGRAM(s) SubCutaneous <User Schedule>  famotidine    Tablet 20 milliGRAM(s) Oral two times a day  gabapentin 300 milliGRAM(s) Oral two times a day  glucagon  Injectable 1 milliGRAM(s) IntraMuscular once  insulin lispro (ADMELOG) corrective regimen sliding scale   SubCutaneous Before meals and at bedtime  metoprolol tartrate 25 milliGRAM(s) Oral two times a day  nystatin Powder 1 Application(s) Topical two times a day  polyethylene glycol 3350 17 Gram(s) Oral daily  predniSONE   Tablet 5 milliGRAM(s) Oral daily  senna 1 Tablet(s) Oral at bedtime      PHYSICAL EXAM:   Vital Signs Last 24 Hrs  T(C): 37.1 (17 Nov 2023 04:02), Max: 37.9 (16 Nov 2023 12:00)  T(F): 98.7 (17 Nov 2023 04:02), Max: 100.2 (16 Nov 2023 12:00)  HR: 59 (17 Nov 2023 08:00) (57 - 88)  BP: 132/64 (17 Nov 2023 08:00) (110/89 - 153/99)  BP(mean): 84 (17 Nov 2023 08:00) (67 - 117)  RR: 27 (17 Nov 2023 08:00) (20 - 27)  SpO2: 98% (17 Nov 2023 08:00) (85% - 100%)    Parameters below as of 17 Nov 2023 08:00  Patient On (Oxygen Delivery Method): nasal cannula  O2 Flow (L/min): 2    General: NAD. Sitting in chair resting.    Detailed Neurologic Exam:    Mental status: The patient is sleepy due to just waking up, however is alert and responds to voice. Right sided visual willow-neglect present but improved compared to prior exam. Global aphasia noted.     Cranial nerves: Pupils equal and react symmetrically to light. Blink to threat intermittently on right eye. Blink to threat present out of left eye. Extraocular movement intact, gaze crosses midline bilaterally. Right facial weakness noted.     Motor: There is normal bulk and tone.  There is no tremor.  RLE: slight withdrawal due to noxious stimuli  RUE: 0/5 strength with no movement   Strength is 5/5 in the left arm and leg with no drift.    Sensation: Intact to pinch left in 4 extremities    Cerebellar: unable to assess dysmetria on finger to nose testing.    Gait : deferred    LABS:                        8.8    6.84  )-----------( 147      ( 17 Nov 2023 06:44 )             27.3    11-17    148<H>  |  111<H>  |  17.6  ----------------------------<  174<H>  3.7   |  29.0  |  0.75    Ca    8.8      17 Nov 2023 06:44  Phos  3.0     11-17  Mg     2.2     11-17    IMAGING: Reviewed by me.    CT Head No Cont (11.16.23 @ 18:05)   IMPRESSION: Stable moderate to large left MCA territory infarction   involving the left frontal lobe, left basal ganglia, left insular ribbon,   left parietal occipital lobe, and anterior left temporal lobe with stable   mild hemorrhage in the left basal ganglia. Left-to-right midline shift   measures 5.5 mm.    CT Head No Cont (11.14.23 @ 18:57)   IMPRESSION:  Stable moderate to large acute left MCA infarct with mild hemorrhage    TTE Echo Complete w/o Contrast w/ Doppler (11.14.23 @ 15:57)   Summary:   1. Left ventricular ejection fraction, by visual estimation, is 55 to   60%.   2. Normal global left ventricular systolic function.   3. The mitral in-flow pattern reveals no discernable A-wave, therefore   no comment on diastolic function can be made.   4. Normal right ventricular size and function.   5. Mild to moderately enlarged right atrium.   6. Moderately enlarged left atrium.   7. Trace mitral valve regurgitation.   8. Mild-moderate tricuspid regurgitation.   9. Sclerotic aortic valve with decreased opening.  10. Estimated pulmonary artery systolic pressure is 41.6 mmHg assuming a   right atrial pressure of 5 mmHg, which is consistent with mild pulmonary   hypertension.    CT Head No Cont (11.14.23 @ 11:36)   IMPRESSION: Evolving left MCA infarct is identified with hemorrhagic   transformation now seen with mass effect on left lateral ventricles and   left-to-right shift.    US Duplex Venous Lower Ext Complete, Bilateral (11.14.23 @ 10:38)   IMPRESSION:  No evidence of deep venous thrombosis in either lower extremity.  Left calf veins not visualized.  Small right popliteal fossa cyst.    CT Angio Brain, Neck and perfusion Stroke Protocol  w/ IV Cont (11.13.23 @ 11:18)   CTA BRAIN:  Acute thrombus in the distal left MCA M1 segment. Diminished flow in the   distal branches.  Severe bilateral cavernous carotid artery calcifications. Fetal origins   the bilateral posterior cerebral arteries. The [place] of [place] and   vertebrobasilar system are otherwise unremarkable without evidence of   stenosis, additional occlusion or saccular aneurysm dilation. No evidence   for arterial venous malformation. The vertebral arteries are codominant.    CTA NECK:  Mild bilateral carotid bulb calcifications. Mild stenosis in the left   carotid bulb. The right internal carotid arteries tortuous.  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 There is normal relationship to   the great vessels. The common carotid arteries, internal carotid arteries   and vertebral arteries shows no other evidence of significant stenosis,   occlusion or saccular aneurysm dilation. The vertebral arteries are   codominant.    CT PERFUSION:  Patient has only had less than 2 hours of symptoms may influence the CT   perfusion.  CBF<30% volume: 26 ml left MCA territory.  Tmax>6.0 s volume: 193 ml left MCA territory  Mismatch volume: 167 ml  Mismatch ratio: 7.4  IMPRESSION:  Thrombus in the distal left MCA M1 segment with diminished flow in the   distal MCA segments. 167 mL area of penumbra in the left MCA territory.     CT Brain Stroke Protocol (11.13.23 @ 11:15)   IMPRESSION:  Dense left MCA M1 segment suggesting intraluminal thrombus.   Mild chronic microvascular changes without evidence of an acute   transcortical infarction or hemorrhage.    TTE Echo Complete w/o Contrast w/ Doppler (11.14.23 @ 15:57)   Summary:   1. Left ventricular ejection fraction, by visual estimation, is 55 to   60%.   2. Normal global left ventricular systolic function.   3. The mitral in-flow pattern reveals no discernable A-wave, therefore   no comment on diastolic function can be made.   4. Normal right ventricular size and function.   5. Mild to moderately enlarged right atrium.   6. Moderatelyenlarged left atrium.   7. Trace mitral valve regurgitation.   8. Mild-moderate tricuspid regurgitation.   9. Sclerotic aortic valve with decreased opening.  10. Estimated pulmonary artery systolic pressure is 41.6 mmHg assuming a   right atrial pressure of 5 mmHg, which is consistent with mild pulmonary   hypertension.           [facility] Stroke Team  Progress Note     HPI:  79 yo M PMH of HTN, HLD, AFib, CKD, obesity, gout, prostate cancer, DVT/PE, OA s/p multiple orthopedic procedures, spinal stenosis, presented on 11/13/23 for elective left atrial appendage occlusion procedure, and developed a MCA syndrome due to acute L M1 occlusion as demonstrated on CTA Head and Neck. Patient has a long h/o AFib, which is now considered permanent. He is on rate control with metoprolol 25 mg bid, and anticoagulation with Pradaxa.  He has a long Hx of arthritis and had multiple hip replacements, bilateral knee replacements and also has neck and back pain, but is unable to take NSAIDs due to bleeding risks. He has an unsteady gait and uses a cane for assistance.  Patient presented on 11/13 for elective left atrial appendage occlusion with Watchman device/HARJINDER w/ Dr Bobo, and Pradaxa was held for 2 days prior to procedure. 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   Initial NIHSS 26, MRS 1. Patient s/p tenecteplase administration at 11:16 11/13/23 and TICI 2A thrombectomy with Dr. Soliman.    SUBJECTIVE: No events overnight.  No new neurologic complaints.  ROS reported negative unless otherwise noted.    acetaminophen     Tablet .. 650 milliGRAM(s) Oral every 6 hours PRN  albuterol/ipratropium (CFC free) Inhaler. 1 Puff(s) Inhalation four times a day PRN  allopurinol 300 milliGRAM(s) Oral daily  atorvastatin 40 milliGRAM(s) Oral at bedtime  chlorhexidine 2% Cloths 1 Application(s) Topical daily  dextrose 5%. 1000 milliLiter(s) IV Continuous <Continuous>  dextrose 5%. 1000 milliLiter(s) IV Continuous <Continuous>  dextrose 50% Injectable 25 Gram(s) IV Push once  dextrose 50% Injectable 12.5 Gram(s) IV Push once  dextrose 50% Injectable 25 Gram(s) IV Push once  dextrose Oral Gel 15 Gram(s) Oral once PRN  DULoxetine 30 milliGRAM(s) Oral daily  enoxaparin Injectable 40 milliGRAM(s) SubCutaneous <User Schedule>  famotidine    Tablet 20 milliGRAM(s) Oral two times a day  gabapentin 300 milliGRAM(s) Oral two times a day  glucagon  Injectable 1 milliGRAM(s) IntraMuscular once  insulin lispro (ADMELOG) corrective regimen sliding scale   SubCutaneous Before meals and at bedtime  metoprolol tartrate 25 milliGRAM(s) Oral two times a day  nystatin Powder 1 Application(s) Topical two times a day  polyethylene glycol 3350 17 Gram(s) Oral daily  predniSONE   Tablet 5 milliGRAM(s) Oral daily  senna 1 Tablet(s) Oral at bedtime      PHYSICAL EXAM:   Vital Signs Last 24 Hrs  T(C): 37.1 (17 Nov 2023 04:02), Max: 37.9 (16 Nov 2023 12:00)  T(F): 98.7 (17 Nov 2023 04:02), Max: 100.2 (16 Nov 2023 12:00)  HR: 59 (17 Nov 2023 08:00) (57 - 88)  BP: 132/64 (17 Nov 2023 08:00) (110/89 - 153/99)  BP(mean): 84 (17 Nov 2023 08:00) (67 - 117)  RR: 27 (17 Nov 2023 08:00) (20 - 27)  SpO2: 98% (17 Nov 2023 08:00) (85% - 100%)    Parameters below as of 17 Nov 2023 08:00  Patient On (Oxygen Delivery Method): nasal cannula  O2 Flow (L/min): 2    General: NAD. Sitting in chair resting.    Detailed Neurologic Exam:    Mental status: The patient is sleepy due to just waking up, however is alert and responds to voice. Right sided visual willow-neglect present but improved compared to prior exam. Global aphasia noted.     Cranial nerves: Pupils equal and react symmetrically to light. Blink to threat intermittently on right eye. Blink to threat present out of left eye. Extraocular movement intact, gaze crosses midline bilaterally. Right facial weakness noted.     Motor: There is normal bulk and tone.  There is no tremor.  RLE: slight withdrawal due to noxious stimuli  RUE: 0/5 strength with no movement   Strength is 5/5 in the left arm and leg with no drift.    Sensation: Intact to pinch left in 4 extremities    Cerebellar: unable to assess dysmetria on finger to nose testing.    Gait : deferred    LABS:                        8.8    6.84  )-----------( 147      ( 17 Nov 2023 06:44 )             27.3    11-17    148<H>  |  111<H>  |  17.6  ----------------------------<  174<H>  3.7   |  29.0  |  0.75    Ca    8.8      17 Nov 2023 06:44  Phos  3.0     11-17  Mg     2.2     11-17    IMAGING: Reviewed by me.    CT Head No Cont (11.16.23 @ 18:05)   IMPRESSION: Stable moderate to large left MCA territory infarction   involving the left frontal lobe, left basal ganglia, left insular ribbon,   left parietal occipital lobe, and anterior left temporal lobe with stable   mild hemorrhage in the left basal ganglia. Left-to-right midline shift   measures 5.5 mm.    CT Head No Cont (11.14.23 @ 18:57)   IMPRESSION:  Stable moderate to large acute left MCA infarct with mild hemorrhage    TTE Echo Complete w/o Contrast w/ Doppler (11.14.23 @ 15:57)   Summary:   1. Left ventricular ejection fraction, by visual estimation, is 55 to   60%.   2. Normal global left ventricular systolic function.   3. The mitral in-flow pattern reveals no discernable A-wave, therefore   no comment on diastolic function can be made.   4. Normal right ventricular size and function.   5. Mild to moderately enlarged right atrium.   6. Moderately enlarged left atrium.   7. Trace mitral valve regurgitation.   8. Mild-moderate tricuspid regurgitation.   9. Sclerotic aortic valve with decreased opening.  10. Estimated pulmonary artery systolic pressure is 41.6 mmHg assuming a   right atrial pressure of 5 mmHg, which is consistent with mild pulmonary   hypertension.    CT Head No Cont (11.14.23 @ 11:36)   IMPRESSION: Evolving left MCA infarct is identified with hemorrhagic   transformation now seen with mass effect on left lateral ventricles and   left-to-right shift.    US Duplex Venous Lower Ext Complete, Bilateral (11.14.23 @ 10:38)   IMPRESSION:  No evidence of deep venous thrombosis in either lower extremity.  Left calf veins not visualized.  Small right popliteal fossa cyst.    CT Angio Brain, Neck and perfusion Stroke Protocol  w/ IV Cont (11.13.23 @ 11:18)   CTA BRAIN:  Acute thrombus in the distal left MCA M1 segment. Diminished flow in the   distal branches.  Severe bilateral cavernous carotid artery calcifications. Fetal origins   the bilateral posterior cerebral arteries. The [place] of [place] and   vertebrobasilar system are otherwise unremarkable without evidence of   stenosis, additional occlusion or saccular aneurysm dilation. No evidence   for arterial venous malformation. The vertebral arteries are codominant.    CTA NECK:  Mild bilateral carotid bulb calcifications. Mild stenosis in the left   carotid bulb. The right internal carotid arteries tortuous.  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 There is normal relationship to   the great vessels. The common carotid arteries, internal carotid arteries   and vertebral arteries shows no other evidence of significant stenosis,   occlusion or saccular aneurysm dilation. The vertebral arteries are   codominant.    CT PERFUSION:  Patient has only had less than 2 hours of symptoms may influence the CT   perfusion.  CBF<30% volume: 26 ml left MCA territory.  Tmax>6.0 s volume: 193 ml left MCA territory  Mismatch volume: 167 ml  Mismatch ratio: 7.4  IMPRESSION:  Thrombus in the distal left MCA M1 segment with diminished flow in the   distal MCA segments. 167 mL area of penumbra in the left MCA territory.     CT Brain Stroke Protocol (11.13.23 @ 11:15)   IMPRESSION:  Dense left MCA M1 segment suggesting intraluminal thrombus.   Mild chronic microvascular changes without evidence of an acute   transcortical infarction or hemorrhage.    TTE Echo Complete w/o Contrast w/ Doppler (11.14.23 @ 15:57)   Summary:   1. Left ventricular ejection fraction, by visual estimation, is 55 to   60%.   2. Normal global left ventricular systolic function.   3. The mitral in-flow pattern reveals no discernable A-wave, therefore   no comment on diastolic function can be made.   4. Normal right ventricular size and function.   5. Mild to moderately enlarged right atrium.   6. Moderatelyenlarged left atrium.   7. Trace mitral valve regurgitation.   8. Mild-moderate tricuspid regurgitation.   9. Sclerotic aortic valve with decreased opening.  10. Estimated pulmonary artery systolic pressure is 41.6 mmHg assuming a   right atrial pressure of 5 mmHg, which is consistent with mild pulmonary   hypertension.

## 2023-11-17 NOTE — PROGRESS NOTE ADULT - NS ATTEND AMEND GEN_ALL_CORE FT
Seen and examined with the ACP team. Plan discussed with ACPs.    I agree with assessment and plan  as written with modifications made above.    will follow with you    Link Casas MD PhD   [id number]

## 2023-11-17 NOTE — PROGRESS NOTE ADULT - SUBJECTIVE AND OBJECTIVE BOX
YOLIS CARROLL    [id number]    78y      Male    CC: L MCA M1 thrombus     INTERVAL HPI/OVERNIGHT EVENTS: Pt seen and examined. downgraded from nsicu yesterday pm     REVIEW OF SYSTEMS:  unable to obtain     Vital Signs Last 24 Hrs  T(C): 37 (17 Nov 2023 13:16), Max: 37.5 (16 Nov 2023 18:35)  T(F): 98.6 (17 Nov 2023 13:16), Max: 99.5 (16 Nov 2023 18:35)  HR: 66 (17 Nov 2023 14:00) (55 - 88)  BP: 132/56 (17 Nov 2023 14:00) (115/53 - 142/64)  BP(mean): 78 (17 Nov 2023 14:00) (67 - 98)  RR: 24 (17 Nov 2023 14:00) (20 - 27)  SpO2: 97% (17 Nov 2023 14:00) (85% - 100%)    Parameters below as of 17 Nov 2023 14:00  Patient On (Oxygen Delivery Method): nasal cannula  O2 Flow (L/min): 2      PHYSICAL EXAM:    GENERAL: NAD  CHEST/LUNG: Clear to auscultation bilaterally  HEART: S1S2+, Regular rate and rhythm  ABDOMEN: Soft, Nontender, Nondistended; Bowel sounds present  SKIN: warm, dry  NEURO: Awake, alert; RUE 0/5 strength; RLE withdraws to pain; strength 5/5 RUE, RLE; +aphasic     LABS:                        8.8    6.84  )-----------( 147      ( 17 Nov 2023 06:44 )             27.3     11-17    148<H>  |  111<H>  |  17.6  ----------------------------<  174<H>  3.7   |  29.0  |  0.75    Ca    8.8      17 Nov 2023 06:44  Phos  3.0     11-17  Mg     2.2     11-17        Urinalysis Basic - ( 17 Nov 2023 06:44 )    Color: x / Appearance: x / SG: x / pH: x  Gluc: 174 mg/dL / Ketone: x  / Bili: x / Urobili: x   Blood: x / Protein: x / Nitrite: x   Leuk Esterase: x / RBC: x / WBC x   Sq Epi: x / Non Sq Epi: x / Bacteria: x          MEDICATIONS  (STANDING):  allopurinol 300 milliGRAM(s) Oral daily  aspirin  chewable 81 milliGRAM(s) Oral daily  atorvastatin 40 milliGRAM(s) Oral at bedtime  chlorhexidine 2% Cloths 1 Application(s) Topical daily  dextrose 5%. 1000 milliLiter(s) (100 mL/Hr) IV Continuous <Continuous>  dextrose 5%. 1000 milliLiter(s) (50 mL/Hr) IV Continuous <Continuous>  dextrose 50% Injectable 25 Gram(s) IV Push once  dextrose 50% Injectable 12.5 Gram(s) IV Push once  dextrose 50% Injectable 25 Gram(s) IV Push once  DULoxetine 30 milliGRAM(s) Oral daily  enoxaparin Injectable 40 milliGRAM(s) SubCutaneous <User Schedule>  famotidine    Tablet 20 milliGRAM(s) Oral two times a day  gabapentin 300 milliGRAM(s) Oral two times a day  glucagon  Injectable 1 milliGRAM(s) IntraMuscular once  insulin lispro (ADMELOG) corrective regimen sliding scale   SubCutaneous Before meals and at bedtime  metoprolol tartrate 25 milliGRAM(s) Oral two times a day  nystatin Powder 1 Application(s) Topical two times a day  polyethylene glycol 3350 17 Gram(s) Oral daily  predniSONE   Tablet 5 milliGRAM(s) Oral daily  senna 2 Tablet(s) Oral at bedtime    MEDICATIONS  (PRN):  acetaminophen     Tablet .. 650 milliGRAM(s) Oral every 6 hours PRN Temp greater or equal to 38C (100.4F)  albuterol/ipratropium (CFC free) Inhaler. 1 Puff(s) Inhalation four times a day PRN Wheezing  dextrose Oral Gel 15 Gram(s) Oral once PRN Blood Glucose LESS THAN 70 milliGRAM(s)/deciliter      RADIOLOGY & ADDITIONAL TESTS:

## 2023-11-17 NOTE — PROGRESS NOTE ADULT - ASSESSMENT
78y/oM PMH of HTN, HLD, AFib on Pradaxa, CKD, Obesity, Gout, Prostate cancer, DVT/PE, OA s/p multiple orthopedic procedures, Spinal Stenosis, presented on 11/13/23 for elective left atrial appendage occlusion procedure, and developed right sided weakness. Imaging revealed an acute L M1 occlusion s/p TNK administration and TICI 2A thrombectomy. Patient was intubated for airway protection. Repeat CT head s/p tenecteplase administration showed evolving left MCA stroke with hemorrhagic transformation, and mass effects with left to right shift. Suspected etiology of stroke is cardioembolic from atrial fibrillation when off anticoagulation for recent procedure. Patient was subsequently extubated and is now stable for transfer to step down unit (11/17)    Left MCA CVA with M1 occlusion  -likely cardioembolic while AC had been on hold  -right sided weakness (RUE>RLE) and global aphasia  -s/p TNK and thrombectomy  -repeat CT head s/p TNK with evolving left MCA stroke with mild hemorrhage and mass effect  -No antiplatelets until cleared by stroke neuro team--> cleared to start aspirin 81mg qd 11/17  -continue statin  -hold AC   -repeat CTH 11/16 stable   -MRI brain pending  -TTE reviewed; no PFO  -neurochecks q 2 hours  -PT/OT- JHOAN  -SLP eval appreciated; continue dysphagia diet  -aspiration/fall precautions  -allow for permissive HTN  -stroke neuro recs appreciated    Atrial Fibrillation  -AC on hold  -continue metoprolol for rate control  -TTE reviewed  -EP recalled by neuro ICU to evaluate for RAVI; d/w EP team 11/17, ravi would not  and pt should resume AC when cleared by neuro   -telemonitoring    Hypernatremia  -goal sodium 145-150 per stroke neuro recs due to cerebral edema  -hold torsemide   -hold parenteral free water  -monitor I/os  -repeat BMP in AM    Anemia likely due to chronic disease  -Hb relatively stable  -check iron studies  -monitor CBC closely    Acute Hypoxic Respiratory Failure - resolved  -intubated for airway protection on admission   -history of ILD; will clarify if it is the etiology of chronic prednisone use   -extubated on 11/14  -CXR no acute infiltrates  -bronchodilators as needed  -incentive spirometry     DM  -HbA1c noted  -continue ISS  -ADA diet    History of PE/DVT  -off AC due to large infarct with mild hemorrhage  -LE duplex negative for DVT    HTN  -SBP 130s (goal 130-180 per neuro recs)  -continue metoprolol  -low sodium diet    GERD  -continue famotidine    Gout  -continue allopurinol    Neuropathy  -continue gabapentin     DVT ppx- Lovenox SC

## 2023-11-17 NOTE — PROGRESS NOTE ADULT - ASSESSMENT
INCOMPLETE  ASSESSMENT:   79 yo M PMH of HTN, HLD, AFib, CKD, obesity, gout, prostate cancer, DVT/PE, OA s/p multiple orthopedic procedures, spinal stenosis, presented on 11/13/23 for elective left atrial appendage occlusion procedure, and developed a left MCA syndrome due to acute left M1 occlusion as demonstrated on CT Head and Neck.  Patient presented on 11/13/23 for elective left atrial appendage occlusion with Watchman device/HARJINDER w/ Dr Bobo, and Pradaxa was held for 2 days prior to procedure. Whilst in holding patient developed a left MCA syndrome due to acute left M1 occlusion as demonstrated on CTH & Neck. On admission, NIHSS 26, MRS 1. Patient is now s/p tenecteplase administration at 11:16 11/13/23, and TICI 2A thrombectomy with Dr. Soliman. 24 hour repeat CT head s/p tenecteplase administration showed evolving left MCA stroke with hemorrhagic transformation, and mass effect with left to right shift. Suspected etiology of stroke is cardioembolic from atrial fibrillation when off anticoagulation for recent procedure.    NEURO:   -Neurologically no acute neurologic changes   -Repeat head CT 11/16/23 revealed stable moderate to large left MCA territory infarction involving the left frontal lobe, left basal ganglia, left insular ribbon, left parietal occipital lobe, and anterior left temporal lobe with stable   mild hemorrhage in the left basal ganglia. Also showed left-to-right midline shift measuring 5.5mm.  -Continue close monitoring for neurologic deterioration    -Stroke neuro checks q2hrs  -Permissive HTN or  SBP goal up to 180mmHg, avoid hypotension or rapid fluctuations in blood pressure   -ANTITHROMBOTIC THERAPY: Based on repeat head CT on 11/16/23  further determination of when to resume AC will depend on clinical course and pending work up   -titrate statin to LDL goal less than 70, LDL=54, may resume home regimen of Rosuvastatin 10 mg PO QHS when patient able to tolerate PO   -MRI Brain w/o ordered, pending   -Dysphagia screen: pass  -Physical therapy/OT/Speech eval/treatment.   -Neurosurgery input appreciated, pt not a hemicrani candidate     CARDIOVASCULAR:  -TTE findings as above, no acute findings   -EP consult appreciated, no plans to reconsider cardiac intervention at this time, recommending resuming anticoagulation once safe to do so from neurologic standpoint   -cardiac monitoring w/ telemetry for now, further evaluation pending findings of noted workup                              HEMATOLOGY:   -H/H 8.8/27.3.  Platelets 147. monitor anemia/thrombocytopenia per primary team, patient should have all age and risk appropriate malignancy screenings with PCP or sooner if clinically suspected   -Bilateral lower extremity venous duplex negative for DVT   -DVT ppx: Heparin s.c [] LMWH [x]     PULMONARY:   -Patient extubated 11/14/23  -protecting airway, saturating well     RENAL:   -BUN/Cr 17.6/0.75. monitor urine output, maintain adequate hydration    -Na Goal:  145-150 secondary to cerebral edema     ID:   -afebrile, leukocytosis resolved, monitor for si/sx of infection     OTHER:    -condition and plan of care d/w patient and wife previously at bedside, questions and concerns addressed.     DISPOSITION: Rehab or home depending on PT eval once stable and workup is complete    CORE MEASURES:        Admission NIHSS: 26     Tenecteplase : [x] YES [] NO      LDL/HDL/A1C: 54/67/6.2     Depression Screen- if depression hx and/or present      Statin Therapy: Atorvastatin 40 mg PO Daily      Dysphagia Screen: [x] PASS [] FAIL      Smoking [] YES [x] NO      Afib [x] YES [] NO     Stroke Education [x] YES [] NO    Obtain screening lower extremity venous ultrasound in patients who meet 1 or more of the following criteria as patient is high risk for DVT/PE on admission:   [] History of DVT/PE  []Hypercoagulable states (Factor V Leiden, Cancer, OCP, etc. 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 Patient presented on 11/13/23 for elective left atrial appendage occlusion with Watchman device/HARJINDER w/ Dr Bobo, and Pradaxa was held for 2 days prior to procedure. Whilst in holding patient developed a left MCA syndrome due to acute left M1 occlusion as demonstrated on CTH & Neck. On admission, NIHSS 26, MRS 1. Patient is now s/p tenecteplase administration at 11:16 11/13/23, and TICI 2A thrombectomy with Dr. Soliman. 24 hour repeat CT head s/p tenecteplase administration showed evolving left MCA stroke with hemorrhagic transformation, and mass effect with left to right shift. Suspected etiology of stroke is cardioembolic from atrial fibrillation when off anticoagulation for recent procedure.    NEURO:   -Neurologically no acute neurologic changes   -Repeat head CT 11/16/23 revealed stable moderate to large left MCA territory infarction involving the left frontal lobe, left basal ganglia, left insular ribbon, left parietal occipital lobe, and anterior left temporal lobe with stable   mild hemorrhage in the left basal ganglia. Also showed left-to-right midline shift measuring 5.5mm.  -Continue close monitoring for neurologic deterioration    -Stroke neuro checks q2hrs  -Permissive HTN or  SBP goal up to 180mmHg, avoid hypotension or rapid fluctuations in blood pressure   -ANTITHROMBOTIC THERAPY: Based on repeat head CT on 11/16/23 and no acute change in neurological exam, can start ASA 81mg daily. Further determination of when to resume anticoagualtion will depend on clinical course and pending work up   -titrate statin to LDL goal less than 70, LDL=54, may resume home regimen of Rosuvastatin 10 mg PO QHS when patient able to tolerate PO   -MRI Brain w/o ordered, pending   -Dysphagia screen: pass  -Physical therapy/OT/Speech eval/treatment.   -Neurosurgery input appreciated, pt not a hemicrani candidate     CARDIOVASCULAR:  -TTE findings as above, no acute findings   -EP consult appreciated, no plans to reconsider cardiac intervention at this time, recommending resuming anticoagulation once safe to do so from neurologic standpoint   -cardiac monitoring w/ telemetry for now, further evaluation pending findings of noted workup                              HEMATOLOGY:   -H/H 8.8/27.3.  Platelets 147. monitor anemia/thrombocytopenia per primary team, patient should have all age and risk appropriate malignancy screenings with PCP or sooner if clinically suspected   -Bilateral lower extremity venous duplex negative for DVT   -DVT ppx: Heparin s.c [] LMWH [x]     PULMONARY:   -Patient extubated 11/14/23  -protecting airway, saturating well     RENAL:   -BUN/Cr 17.6/0.75. monitor urine output, maintain adequate hydration    -Na Goal:  145-150 secondary to cerebral edema     ID:   -afebrile, leukocytosis resolved, monitor for si/sx of infection     OTHER:    -condition and plan of care d/w patient and wife previously at bedside, questions and concerns addressed.     DISPOSITION: Rehab or home depending on PT eval once stable and workup is complete    CORE MEASURES:        Admission NIHSS: 26     Tenecteplase : [x] YES [] NO      LDL/HDL/A1C: 54/67/6.2     Depression Screen- if depression hx and/or present      Statin Therapy: Atorvastatin 40 mg PO Daily      Dysphagia Screen: [x] PASS [] FAIL      Smoking [] YES [x] NO      Afib [x] YES [] NO     Stroke Education [x] YES [] NO    Obtain screening lower extremity venous ultrasound in patients who meet 1 or more of the following criteria as patient is high risk for DVT/PE on admission:   [] History of DVT/PE  []Hypercoagulable states (Factor V Leiden, Cancer, OCP, etc. )  []Prolonged immobility (hemiplegia/hemiparesis/post operative or any other extended immobilization)  [] Transferred from outside facility (Rehab or Long term care)  [] Age </= to 50   ASSESSMENT:   79 yo M PMH of HTN, HLD, AFib, CKD, obesity, gout, prostate cancer, DVT/PE, OA s/p multiple orthopedic procedures, spinal stenosis, presented on 11/13/23 for elective left atrial appendage occlusion procedure, and developed a left MCA syndrome due to acute left M1 occlusion as demonstrated on CT Head and Neck. Patient presented on 11/13/23 for elective left atrial appendage occlusion with Watchman device/HARJINDER w/ Dr Bobo, and Pradaxa was held for 2 days prior to procedure. Whilst in holding patient developed a left MCA syndrome due to acute left M1 occlusion as demonstrated on CTH & Neck. On admission, NIHSS 26, MRS 1. Patient is now s/p tenecteplase administration at 11:16 11/13/23, and TICI 2A thrombectomy with Dr. Soliman. 24 hour repeat CT head s/p tenecteplase administration showed evolving left MCA stroke with hemorrhagic transformation, and mass effect with left to right shift. Suspected etiology of stroke is cardioembolic from atrial fibrillation when off anticoagulation for recent procedure.    NEURO:   -Neurologically no acute neurologic changes   -Repeat head CT 11/16/23 revealed stable moderate to large left MCA territory infarction involving the left frontal lobe, left basal ganglia, left insular ribbon, left parietal occipital lobe, and anterior left temporal lobe with stable   mild hemorrhage in the left basal ganglia. Also showed left-to-right midline shift measuring 5.5mm.  -Continue close monitoring for neurologic deterioration    -Stroke neuro checks q2hrs  -Permissive HTN or  SBP goal up to 180mmHg, avoid hypotension or rapid fluctuations in blood pressure   -ANTITHROMBOTIC THERAPY: Based on repeat head CT on 11/16/23 and no acute change in neurological exam, can start ASA 81mg daily. Further determination of when to resume anticoagualtion will depend on clinical course and pending work up   -titrate statin to LDL goal less than 70, LDL=54, may resume home regimen of Rosuvastatin 10 mg PO QHS when patient able to tolerate PO   -MRI Brain w/o ordered, pending   -Dysphagia screen: pass  -Physical therapy/OT/Speech eval/treatment.   -Neurosurgery input appreciated, pt not a hemicrani candidate     CARDIOVASCULAR:  -TTE findings as above, no acute findings   -EP consult appreciated, no plans to reconsider cardiac intervention at this time, recommending resuming anticoagulation once safe to do so from neurologic standpoint   -cardiac monitoring w/ telemetry for now, further evaluation pending findings of noted workup                              HEMATOLOGY:   -H/H 8.8/27.3.  Platelets 147. monitor anemia/thrombocytopenia per primary team, patient should have all age and risk appropriate malignancy screenings with PCP or sooner if clinically suspected   -Bilateral lower extremity venous duplex negative for DVT   -DVT ppx: Heparin s.c [] LMWH [x]     PULMONARY:   -Patient extubated 11/14/23  -protecting airway, saturating well     RENAL:   -BUN/Cr 17.6/0.75. monitor urine output, maintain adequate hydration    -Na Goal:  145-150 secondary to cerebral edema.    ID:   -afebrile, leukocytosis resolved, monitor for si/sx of infection     OTHER:    -condition and plan of care d/w patient and wife previously at bedside, questions and concerns addressed.     DISPOSITION: Rehab or home depending on PT eval once stable and workup is complete    CORE MEASURES:        Admission NIHSS: 26     Tenecteplase : [x] YES [] NO      LDL/HDL/A1C: 54/67/6.2     Depression Screen- if depression hx and/or present      Statin Therapy: Atorvastatin 40 mg PO Daily      Dysphagia Screen: [x] PASS [] FAIL      Smoking [] YES [x] NO      Afib [x] YES [] NO     Stroke Education [x] YES [] NO    Obtain screening lower extremity venous ultrasound in patients who meet 1 or more of the following criteria as patient is high risk for DVT/PE on admission:   [] History of DVT/PE  []Hypercoagulable states (Factor V Leiden, Cancer, OCP, etc. )  []Prolonged immobility (hemiplegia/hemiparesis/post operative or any other extended immobilization)  [] Transferred from outside facility (Rehab or Long term care)  [] Age </= to 50

## 2023-11-17 NOTE — CHART NOTE - NSCHARTNOTEFT_GEN_A_CORE
Medicine PA-  F/U CTH ordered:    CT BRAIN   ORDERED BY: VIVI BULLARD     PROCEDURE DATE:  11/16/2023       INTERPRETATION:  No significant interval change compared to 11/14 exam.    GEOVANNY DINERO DO; Attending Radiologist  This document is a PRELIMINARY interpretation and is pending final   attending approval. Nov 17 2023  1:17AM

## 2023-11-18 LAB
ANION GAP SERPL CALC-SCNC: 8 MMOL/L — SIGNIFICANT CHANGE UP (ref 5–17)
ANION GAP SERPL CALC-SCNC: 8 MMOL/L — SIGNIFICANT CHANGE UP (ref 5–17)
BUN SERPL-MCNC: 15.8 MG/DL — SIGNIFICANT CHANGE UP (ref 8–20)
BUN SERPL-MCNC: 15.8 MG/DL — SIGNIFICANT CHANGE UP (ref 8–20)
CALCIUM SERPL-MCNC: 8.7 MG/DL — SIGNIFICANT CHANGE UP (ref 8.4–10.5)
CALCIUM SERPL-MCNC: 8.7 MG/DL — SIGNIFICANT CHANGE UP (ref 8.4–10.5)
CHLORIDE SERPL-SCNC: 110 MMOL/L — HIGH (ref 96–108)
CHLORIDE SERPL-SCNC: 110 MMOL/L — HIGH (ref 96–108)
CO2 SERPL-SCNC: 29 MMOL/L — SIGNIFICANT CHANGE UP (ref 22–29)
CO2 SERPL-SCNC: 29 MMOL/L — SIGNIFICANT CHANGE UP (ref 22–29)
CREAT SERPL-MCNC: 0.76 MG/DL — SIGNIFICANT CHANGE UP (ref 0.5–1.3)
CREAT SERPL-MCNC: 0.76 MG/DL — SIGNIFICANT CHANGE UP (ref 0.5–1.3)
EGFR: 91 ML/MIN/1.73M2 — SIGNIFICANT CHANGE UP
EGFR: 91 ML/MIN/1.73M2 — SIGNIFICANT CHANGE UP
GLUCOSE BLDC GLUCOMTR-MCNC: 139 MG/DL — HIGH (ref 70–99)
GLUCOSE BLDC GLUCOMTR-MCNC: 139 MG/DL — HIGH (ref 70–99)
GLUCOSE BLDC GLUCOMTR-MCNC: 148 MG/DL — HIGH (ref 70–99)
GLUCOSE BLDC GLUCOMTR-MCNC: 148 MG/DL — HIGH (ref 70–99)
GLUCOSE BLDC GLUCOMTR-MCNC: 158 MG/DL — HIGH (ref 70–99)
GLUCOSE BLDC GLUCOMTR-MCNC: 158 MG/DL — HIGH (ref 70–99)
GLUCOSE BLDC GLUCOMTR-MCNC: 169 MG/DL — HIGH (ref 70–99)
GLUCOSE BLDC GLUCOMTR-MCNC: 169 MG/DL — HIGH (ref 70–99)
GLUCOSE SERPL-MCNC: 132 MG/DL — HIGH (ref 70–99)
GLUCOSE SERPL-MCNC: 132 MG/DL — HIGH (ref 70–99)
HCT VFR BLD CALC: 28.3 % — LOW (ref 39–50)
HCT VFR BLD CALC: 28.3 % — LOW (ref 39–50)
HGB BLD-MCNC: 8.8 G/DL — LOW (ref 13–17)
HGB BLD-MCNC: 8.8 G/DL — LOW (ref 13–17)
MAGNESIUM SERPL-MCNC: 2.2 MG/DL — SIGNIFICANT CHANGE UP (ref 1.6–2.6)
MAGNESIUM SERPL-MCNC: 2.2 MG/DL — SIGNIFICANT CHANGE UP (ref 1.6–2.6)
MCHC RBC-ENTMCNC: 30.4 PG — SIGNIFICANT CHANGE UP (ref 27–34)
MCHC RBC-ENTMCNC: 30.4 PG — SIGNIFICANT CHANGE UP (ref 27–34)
MCHC RBC-ENTMCNC: 31.1 GM/DL — LOW (ref 32–36)
MCHC RBC-ENTMCNC: 31.1 GM/DL — LOW (ref 32–36)
MCV RBC AUTO: 97.9 FL — SIGNIFICANT CHANGE UP (ref 80–100)
MCV RBC AUTO: 97.9 FL — SIGNIFICANT CHANGE UP (ref 80–100)
PHOSPHATE SERPL-MCNC: 2.9 MG/DL — SIGNIFICANT CHANGE UP (ref 2.4–4.7)
PHOSPHATE SERPL-MCNC: 2.9 MG/DL — SIGNIFICANT CHANGE UP (ref 2.4–4.7)
PLATELET # BLD AUTO: 145 K/UL — LOW (ref 150–400)
PLATELET # BLD AUTO: 145 K/UL — LOW (ref 150–400)
POTASSIUM SERPL-MCNC: 4.1 MMOL/L — SIGNIFICANT CHANGE UP (ref 3.5–5.3)
POTASSIUM SERPL-MCNC: 4.1 MMOL/L — SIGNIFICANT CHANGE UP (ref 3.5–5.3)
POTASSIUM SERPL-SCNC: 4.1 MMOL/L — SIGNIFICANT CHANGE UP (ref 3.5–5.3)
POTASSIUM SERPL-SCNC: 4.1 MMOL/L — SIGNIFICANT CHANGE UP (ref 3.5–5.3)
RBC # BLD: 2.89 M/UL — LOW (ref 4.2–5.8)
RBC # BLD: 2.89 M/UL — LOW (ref 4.2–5.8)
RBC # FLD: 13.7 % — SIGNIFICANT CHANGE UP (ref 10.3–14.5)
RBC # FLD: 13.7 % — SIGNIFICANT CHANGE UP (ref 10.3–14.5)
SODIUM SERPL-SCNC: 147 MMOL/L — HIGH (ref 135–145)
SODIUM SERPL-SCNC: 147 MMOL/L — HIGH (ref 135–145)
WBC # BLD: 6.44 K/UL — SIGNIFICANT CHANGE UP (ref 3.8–10.5)
WBC # BLD: 6.44 K/UL — SIGNIFICANT CHANGE UP (ref 3.8–10.5)
WBC # FLD AUTO: 6.44 K/UL — SIGNIFICANT CHANGE UP (ref 3.8–10.5)
WBC # FLD AUTO: 6.44 K/UL — SIGNIFICANT CHANGE UP (ref 3.8–10.5)

## 2023-11-18 PROCEDURE — 99233 SBSQ HOSP IP/OBS HIGH 50: CPT

## 2023-11-18 RX ADMIN — POLYETHYLENE GLYCOL 3350 17 GRAM(S): 17 POWDER, FOR SOLUTION ORAL at 12:54

## 2023-11-18 RX ADMIN — CHLORHEXIDINE GLUCONATE 1 APPLICATION(S): 213 SOLUTION TOPICAL at 05:15

## 2023-11-18 RX ADMIN — Medication 1: at 21:57

## 2023-11-18 RX ADMIN — Medication 1: at 17:45

## 2023-11-18 RX ADMIN — FAMOTIDINE 20 MILLIGRAM(S): 10 INJECTION INTRAVENOUS at 05:16

## 2023-11-18 RX ADMIN — SENNA PLUS 2 TABLET(S): 8.6 TABLET ORAL at 21:57

## 2023-11-18 RX ADMIN — GABAPENTIN 300 MILLIGRAM(S): 400 CAPSULE ORAL at 17:42

## 2023-11-18 RX ADMIN — NYSTATIN CREAM 1 APPLICATION(S): 100000 CREAM TOPICAL at 17:42

## 2023-11-18 RX ADMIN — Medication 81 MILLIGRAM(S): at 12:54

## 2023-11-18 RX ADMIN — Medication 300 MILLIGRAM(S): at 12:54

## 2023-11-18 RX ADMIN — NYSTATIN CREAM 1 APPLICATION(S): 100000 CREAM TOPICAL at 05:19

## 2023-11-18 RX ADMIN — FAMOTIDINE 20 MILLIGRAM(S): 10 INJECTION INTRAVENOUS at 17:42

## 2023-11-18 RX ADMIN — GABAPENTIN 300 MILLIGRAM(S): 400 CAPSULE ORAL at 05:16

## 2023-11-18 RX ADMIN — ATORVASTATIN CALCIUM 40 MILLIGRAM(S): 80 TABLET, FILM COATED ORAL at 21:57

## 2023-11-18 RX ADMIN — ENOXAPARIN SODIUM 40 MILLIGRAM(S): 100 INJECTION SUBCUTANEOUS at 17:42

## 2023-11-18 RX ADMIN — Medication 25 MILLIGRAM(S): at 05:18

## 2023-11-18 RX ADMIN — Medication 5 MILLIGRAM(S): at 05:16

## 2023-11-18 RX ADMIN — Medication 25 MILLIGRAM(S): at 17:51

## 2023-11-18 NOTE — PROGRESS NOTE ADULT - ASSESSMENT
ASSESSMENT:   79 yo M PMH of HTN, HLD, AFib, CKD, obesity, gout, prostate cancer, DVT/PE, OA s/p multiple orthopedic procedures, spinal stenosis, presented on 11/13/23 for elective left atrial appendage occlusion procedure, and developed a left MCA syndrome due to acute left M1 occlusion as demonstrated on CT Head and Neck. Patient presented on 11/13/23 for elective left atrial appendage occlusion with Watchman device/HARJINDER w/ Dr Bobo, and Pradaxa was held for 2 days prior to procedure. Whilst in holding patient developed a left MCA syndrome due to acute left M1 occlusion as demonstrated on CTH & Neck. On admission, NIHSS 26, MRS 1. Patient is now s/p tenecteplase administration at 11:16 11/13/23, and TICI 2A thrombectomy with Dr. Soliman. 24 hour repeat CT head s/p tenecteplase administration showed evolving left MCA stroke with hemorrhagic transformation, and mass effect with left to right shift. Suspected etiology of stroke is cardioembolic from atrial fibrillation when off anticoagulation for recent procedure, 2 cardiac thrombi were appreciated on CTA chest 10 days prior    NEURO:   -Neurologically no acute neurologic changes   -Repeat head CT 11/16/23 revealed stable moderate to large left MCA territory infarction involving the left frontal lobe, left basal ganglia, left insular ribbon, left parietal occipital lobe, and anterior left temporal lobe with stable   mild hemorrhage in the left basal ganglia. Also showed left-to-right midline shift measuring 5.5mm.  -Continue close monitoring for neurologic deterioration    -Stroke neuro checks q2hrs  -Permissive HTN or  SBP goal up to 180mmHg, avoid hypotension or rapid fluctuations in blood pressure   -ANTITHROMBOTIC THERAPY: Based on repeat head CT on 11/16/23 and no acute change in neurological exam, can start ASA 81mg daily. Further determination of when to resume anticoagualtion will depend on clinical course and pending work up   -titrate statin to LDL goal less than 70, LDL=54, may resume home regimen of Rosuvastatin 10 mg PO QHS when patient able to tolerate PO   -MRI Brain w/o ordered, pending   -Dysphagia screen: pass  -Physical therapy/OT/Speech eval/treatment.   -Neurosurgery input appreciated, pt not a hemicrani candidate     CARDIOVASCULAR:  -TTE findings as above, no acute findings   -EP consult appreciated, no plans to reconsider cardiac intervention at this time, recommending resuming anticoagulation once safe to do so from neurologic standpoint   -cardiac monitoring w/ telemetry for now, further evaluation pending findings of noted workup                              HEMATOLOGY:   -H/H 8.8/27.3.  Platelets 147. monitor anemia/thrombocytopenia per primary team, patient should have all age and risk appropriate malignancy screenings with PCP or sooner if clinically suspected   -Bilateral lower extremity venous duplex negative for DVT   -DVT ppx: Heparin s.c [] LMWH [x]     PULMONARY:   -Patient extubated 11/14/23  -protecting airway, saturating well     RENAL:   -BUN/Cr 17.6/0.75. monitor urine output, maintain adequate hydration    -Na Goal:  145-150 secondary to cerebral edema.    ID:   -afebrile, leukocytosis resolved, monitor for si/sx of infection     OTHER:    -condition and plan of care d/w patient and wife previously at bedside, questions and concerns addressed.     DISPOSITION: Rehab or home depending on PT eval once stable and workup is complete    CORE MEASURES:        Admission NIHSS: 26     Tenecteplase : [x] YES [] NO      LDL/HDL/A1C: 54/67/6.2     Depression Screen- if depression hx and/or present      Statin Therapy: Atorvastatin 40 mg PO Daily      Dysphagia Screen: [x] PASS [] FAIL      Smoking [] YES [x] NO      Afib [x] YES [] NO     Stroke Education [x] YES [] NO    Obtain screening lower extremity venous ultrasound in patients who meet 1 or more of the following criteria as patient is high risk for DVT/PE on admission:   [] History of DVT/PE  []Hypercoagulable states (Factor V Leiden, Cancer, OCP, etc. )  []Prolonged immobility (hemiplegia/hemiparesis/post operative or any other extended immobilization)  [] Transferred from outside facility (Rehab or Long term care)  [] Age </= to 50

## 2023-11-18 NOTE — PROGRESS NOTE ADULT - ASSESSMENT
78y/oM PMH of HTN, HLD, AFib on Pradaxa, CKD, Obesity, Gout, Prostate cancer, DVT/PE, OA s/p multiple orthopedic procedures, Spinal Stenosis, presented on 11/13/23 for elective left atrial appendage occlusion procedure, and developed right sided weakness. Imaging revealed an acute L M1 occlusion s/p TNK administration and TICI 2A thrombectomy. Patient was intubated for airway protection. Repeat CT head s/p tenecteplase administration showed evolving left MCA stroke with hemorrhagic transformation, and mass effects with left to right shift. Suspected etiology of stroke is cardioembolic from atrial fibrillation when off anticoagulation for recent procedure. Patient was subsequently extubated and is now stable for transfer to step down unit (11/17)    Left MCA CVA with M1 occlusion  Hemorrhagic transformation  -likely cardioembolic while AC had been on hold  -right sided weakness (RUE>RLE) and global aphasia  -s/p TNK and thrombectomy  -repeat CT head s/p TNK with evolving left MCA stroke with mild hemorrhage and mass effect  -No antiplatelets until cleared by stroke neuro team--> cleared to start aspirin 81mg qd 11/17  -continue statin  -hold AC   -repeat CTH 11/16 stable   -MRI brain pending - Still pending   -TTE reviewed; no PFO  -neurochecks q 2 hours  -PT - JHOAN  -OT -AR  -Will consult PMNR  -SLP eval appreciated; continue dysphagia diet  -aspiration/fall precautions  -allow for permissive HTN  -stroke neuro recs appreciated    Atrial Fibrillation  -AC on hold  -continue metoprolol for rate control  -TTE reviewed  -EP recalled by neuro ICU to evaluate for RAVI; Prior Hospitalist d/w EP team 11/17, ravi would not  and pt should resume AC when cleared by neuro   -telemonitoring    Hypernatremia  -goal sodium 145-150 per stroke neuro recs due to cerebral edema  -hold torsemide   -hold parenteral free water  -monitor I/os  -repeat BMP in AM    Anemia likely due to chronic disease  -Hb relatively stable  -check iron studies  -monitor CBC closely    Acute Hypoxic Respiratory Failure - resolved  -intubated for airway protection on admission   -history of ILD; will clarify if it is the etiology of chronic prednisone use   -extubated on 11/14  -CXR no acute infiltrates  -bronchodilators as needed  -incentive spirometry     DM  -HbA1c noted  -continue ISS  -ADA diet    History of PE/DVT  -off AC due to large infarct with mild hemorrhage  -LE duplex negative for DVT    HTN  -SBP 130s (goal 130-180 per neuro recs)  -continue metoprolol  -low sodium diet    GERD  -continue famotidine    Gout  -continue allopurinol    Neuropathy  -continue gabapentin     DVT ppx- Lovenox SC      Spoke with family at bedside, All questions answered  Pending MRI brain

## 2023-11-18 NOTE — PROGRESS NOTE ADULT - SUBJECTIVE AND OBJECTIVE BOX
Hospitalist Daily Progress Note    Chief Complaint:  Patient is a 78y old  Male who presents with a chief complaint of Lt MCA M1 thrombus (17 Nov 2023 15:25)      SUBJECTIVE / OVERNIGHT EVENTS:  Patient was seen and examined at bedside.   Seen for CVA  Nods yes/no  Denies any complaints. Unable to do furhter ros.    MEDICATIONS  (STANDING):  allopurinol 300 milliGRAM(s) Oral daily  aspirin  chewable 81 milliGRAM(s) Oral daily  atorvastatin 40 milliGRAM(s) Oral at bedtime  chlorhexidine 2% Cloths 1 Application(s) Topical daily  dextrose 5%. 1000 milliLiter(s) (100 mL/Hr) IV Continuous <Continuous>  dextrose 5%. 1000 milliLiter(s) (50 mL/Hr) IV Continuous <Continuous>  dextrose 50% Injectable 25 Gram(s) IV Push once  dextrose 50% Injectable 12.5 Gram(s) IV Push once  dextrose 50% Injectable 25 Gram(s) IV Push once  DULoxetine 30 milliGRAM(s) Oral daily  enoxaparin Injectable 40 milliGRAM(s) SubCutaneous <User Schedule>  famotidine    Tablet 20 milliGRAM(s) Oral two times a day  gabapentin 300 milliGRAM(s) Oral two times a day  glucagon  Injectable 1 milliGRAM(s) IntraMuscular once  insulin lispro (ADMELOG) corrective regimen sliding scale   SubCutaneous Before meals and at bedtime  metoprolol tartrate 25 milliGRAM(s) Oral two times a day  nystatin Powder 1 Application(s) Topical two times a day  polyethylene glycol 3350 17 Gram(s) Oral daily  predniSONE   Tablet 5 milliGRAM(s) Oral daily  senna 2 Tablet(s) Oral at bedtime    MEDICATIONS  (PRN):  acetaminophen     Tablet .. 650 milliGRAM(s) Oral every 6 hours PRN Temp greater or equal to 38C (100.4F)  albuterol/ipratropium (CFC free) Inhaler. 1 Puff(s) Inhalation four times a day PRN Wheezing  dextrose Oral Gel 15 Gram(s) Oral once PRN Blood Glucose LESS THAN 70 milliGRAM(s)/deciliter        I&O's Summary    17 Nov 2023 07:01  -  18 Nov 2023 07:00  --------------------------------------------------------  IN: 0 mL / OUT: 1400 mL / NET: -1400 mL        PHYSICAL EXAM:  Vital Signs Last 24 Hrs  T(C): 37.6 (18 Nov 2023 15:00), Max: 37.6 (18 Nov 2023 15:00)  T(F): 99.7 (18 Nov 2023 15:00), Max: 99.7 (18 Nov 2023 15:00)  HR: 64 (18 Nov 2023 14:00) (53 - 87)  BP: 134/63 (18 Nov 2023 14:00) (114/63 - 135/83)  BP(mean): 85 (18 Nov 2023 14:00) (69 - 99)  RR: 21 (18 Nov 2023 14:00) (18 - 22)  SpO2: 97% (18 Nov 2023 14:00) (96% - 100%)    Parameters below as of 18 Nov 2023 14:00  Patient On (Oxygen Delivery Method): room air          Constitutional: NAD, Resting  ENT: Supple, No JVD  Lungs: CTA B/L, Non-labored breathing  Cardio: Irregular S1/s2  Abdomen: Soft, Nontender, Nondistended; Bowel sounds present  Extremities: No calf tenderness, No pitting edema  Musculoskeletal:   No joint swelling  Psych: Calm, cooperative affect appropriate  Neuro: Awake and alert, Aphasic, RUE is 0/5, RLE IS SLIGHTLY better, able to move foot, LUE/LLE is 5/5   Skin: No rashes; no palpable lesions    LABS:                        8.8    6.44  )-----------( 145      ( 18 Nov 2023 06:40 )             28.3     11-18    147<H>  |  110<H>  |  15.8  ----------------------------<  132<H>  4.1   |  29.0  |  0.76    Ca    8.7      18 Nov 2023 06:40  Phos  2.9     11-18  Mg     2.2     11-18            Urinalysis Basic - ( 18 Nov 2023 06:40 )    Color: x / Appearance: x / SG: x / pH: x  Gluc: 132 mg/dL / Ketone: x  / Bili: x / Urobili: x   Blood: x / Protein: x / Nitrite: x   Leuk Esterase: x / RBC: x / WBC x   Sq Epi: x / Non Sq Epi: x / Bacteria: x        CAPILLARY BLOOD GLUCOSE      POCT Blood Glucose.: 148 mg/dL (18 Nov 2023 12:51)  POCT Blood Glucose.: 139 mg/dL (18 Nov 2023 10:00)  POCT Blood Glucose.: 240 mg/dL (17 Nov 2023 21:40)  POCT Blood Glucose.: 134 mg/dL (17 Nov 2023 17:57)        RADIOLOGY REVIEWED

## 2023-11-18 NOTE — PROGRESS NOTE ADULT - SUBJECTIVE AND OBJECTIVE BOX
[facility] Stroke Team  Progress Note     HPI:  79 yo M PMH of HTN, HLD, AFib, CKD, obesity, gout, prostate cancer, DVT/PE, OA s/p multiple orthopedic procedures, spinal stenosis, presented on 11/13/23 for elective left atrial appendage occlusion procedure, and developed a MCA syndrome due to acute L M1 occlusion as demonstrated on CTA Head and Neck. Patient has a long h/o AFib, which is now considered permanent. He is on rate control with metoprolol 25 mg bid, and anticoagulation with Pradaxa.  He has a long Hx of arthritis and had multiple hip replacements, bilateral knee replacements and also has neck and back pain, but is unable to take NSAIDs due to bleeding risks. He has an unsteady gait and uses a cane for assistance.  Patient presented on 11/13 for elective left atrial appendage occlusion with Watchman device/HARJINDER w/ Dr Bobo, and Pradaxa was held for 2 days prior to procedure. 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   Initial NIHSS 26, MRS 1. Patient s/p tenecteplase administration at 11:16 11/13/23 and TICI 2A thrombectomy with Dr. Soliman.    SUBJECTIVE: No events overnight.  No new neurologic complaints.  ROS reported negative unless otherwise noted.    acetaminophen     Tablet .. 650 milliGRAM(s) Oral every 6 hours PRN  albuterol/ipratropium (CFC free) Inhaler. 1 Puff(s) Inhalation four times a day PRN  allopurinol 300 milliGRAM(s) Oral daily  atorvastatin 40 milliGRAM(s) Oral at bedtime  chlorhexidine 2% Cloths 1 Application(s) Topical daily  dextrose 5%. 1000 milliLiter(s) IV Continuous <Continuous>  dextrose 5%. 1000 milliLiter(s) IV Continuous <Continuous>  dextrose 50% Injectable 25 Gram(s) IV Push once  dextrose 50% Injectable 12.5 Gram(s) IV Push once  dextrose 50% Injectable 25 Gram(s) IV Push once  dextrose Oral Gel 15 Gram(s) Oral once PRN  DULoxetine 30 milliGRAM(s) Oral daily  enoxaparin Injectable 40 milliGRAM(s) SubCutaneous <User Schedule>  famotidine    Tablet 20 milliGRAM(s) Oral two times a day  gabapentin 300 milliGRAM(s) Oral two times a day  glucagon  Injectable 1 milliGRAM(s) IntraMuscular once  insulin lispro (ADMELOG) corrective regimen sliding scale   SubCutaneous Before meals and at bedtime  metoprolol tartrate 25 milliGRAM(s) Oral two times a day  nystatin Powder 1 Application(s) Topical two times a day  polyethylene glycol 3350 17 Gram(s) Oral daily  predniSONE   Tablet 5 milliGRAM(s) Oral daily  senna 1 Tablet(s) Oral at bedtime      PHYSICAL EXAM:   Vital Signs Last 24 Hrs  ICU Vital Signs Last 24 Hrs  T(C): 37.6 (18 Nov 2023 20:00), Max: 37.7 (18 Nov 2023 16:00)  T(F): 99.7 (18 Nov 2023 20:00), Max: 99.9 (18 Nov 2023 16:00)  HR: 78 (18 Nov 2023 20:00) (53 - 87)  BP: 133/66 (18 Nov 2023 20:00) (114/63 - 142/88)  BP(mean): 82 (18 Nov 2023 20:00) (69 - 101)  ABP: --  ABP(mean): --  RR: 23 (18 Nov 2023 20:00) (18 - 24)  SpO2: 96% (18 Nov 2023 20:00) (96% - 100%)    O2 Parameters below as of 18 Nov 2023 20:00  Patient On (Oxygen Delivery Method): room air    General: NAD. Sitting in chair resting.    Detailed Neurologic Exam:    Mental status: The patient is awake, alert, non verbal. Followed most commands. with his left side. Right sided visual willow-neglect present but improved compared to prior exam. Expressive > reseptive aphasia   Cranial nerves: Pupils equal and react symmetrically to light. Blink to threat intermittently on right eye. Blink to threat present out of left eye. Extraocular movement intact, gaze crosses midline bilaterally. Right facial asymmetry  Motor: There is normal bulk and tone.  There is no tremor.  RLE: withdrawal due to noxious stimuli  RUE: 0/5 strength with no movement   Strength is 5/5 in the left arm and leg with no drift.  Sensation: Intact to pinch left in 4 extremities  Cerebellar: unable to assess dysmetria on finger to nose testing.  Gait : deferred    LABS:   CBC:            8.8    6.44  )-----------( 145      ( 11-18-23 @ 06:40 )             28.3         Chem:         ( 11-18-23 @ 06:40 )    147<H>  |  110<H>  |  15.8  ----------------------------<  132<H>  4.1   |  29.0  |  0.76        Liver Functions:     Type & Screen:         IMAGING: Reviewed by me.    CT Head No Cont (11.16.23 @ 18:05)   IMPRESSION: Stable moderate to large left MCA territory infarction   involving the left frontal lobe, left basal ganglia, left insular ribbon,   left parietal occipital lobe, and anterior left temporal lobe with stable   mild hemorrhage in the left basal ganglia. Left-to-right midline shift   measures 5.5 mm.    CT Head No Cont (11.14.23 @ 18:57)   IMPRESSION:  Stable moderate to large acute left MCA infarct with mild hemorrhage    TTE Echo Complete w/o Contrast w/ Doppler (11.14.23 @ 15:57)   Summary:   1. Left ventricular ejection fraction, by visual estimation, is 55 to   60%.   2. Normal global left ventricular systolic function.   3. The mitral in-flow pattern reveals no discernable A-wave, therefore   no comment on diastolic function can be made.   4. Normal right ventricular size and function.   5. Mild to moderately enlarged right atrium.   6. Moderately enlarged left atrium.   7. Trace mitral valve regurgitation.   8. Mild-moderate tricuspid regurgitation.   9. Sclerotic aortic valve with decreased opening.  10. Estimated pulmonary artery systolic pressure is 41.6 mmHg assuming a   right atrial pressure of 5 mmHg, which is consistent with mild pulmonary   hypertension.    CT Head No Cont (11.14.23 @ 11:36)   IMPRESSION: Evolving left MCA infarct is identified with hemorrhagic   transformation now seen with mass effect on left lateral ventricles and   left-to-right shift.    US Duplex Venous Lower Ext Complete, Bilateral (11.14.23 @ 10:38)   IMPRESSION:  No evidence of deep venous thrombosis in either lower extremity.  Left calf veins not visualized.  Small right popliteal fossa cyst.    CT Angio Brain, Neck and perfusion Stroke Protocol  w/ IV Cont (11.13.23 @ 11:18)   CTA BRAIN:  Acute thrombus in the distal left MCA M1 segment. Diminished flow in the   distal branches.  Severe bilateral cavernous carotid artery calcifications. Fetal origins   the bilateral posterior cerebral arteries. The [place] and   vertebrobasilar system are otherwise unremarkable without evidence of   stenosis, additional occlusion or saccular aneurysm dilation. No evidence   for arterial venous malformation. The vertebral arteries are codominant.    CTA NECK:  Mild bilateral carotid bulb calcifications. Mild stenosis in the left   carotid bulb. The right internal carotid arteries tortuous.  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 There is normal relationship to   the great vessels. The common carotid arteries, internal carotid arteries   and vertebral arteries shows no other evidence of significant stenosis,   occlusion or saccular aneurysm dilation. The vertebral arteries are   codominant.    CT PERFUSION:  Patient has only had less than 2 hours of symptoms may influence the CT   perfusion.  CBF<30% volume: 26 ml left MCA territory.  Tmax>6.0 s volume: 193 ml left MCA territory  Mismatch volume: 167 ml  Mismatch ratio: 7.4  IMPRESSION:  Thrombus in the distal left MCA M1 segment with diminished flow in the   distal MCA segments. 167 mL area of penumbra in the left MCA territory.     CT Brain Stroke Protocol (11.13.23 @ 11:15)   IMPRESSION:  Dense left MCA M1 segment suggesting intraluminal thrombus.   Mild chronic microvascular changes without evidence of an acute   transcortical infarction or hemorrhage.    TTE Echo Complete w/o Contrast w/ Doppler (11.14.23 @ 15:57)   Summary:   1. Left ventricular ejection fraction, by visual estimation, is 55 to   60%.   2. Normal global left ventricular systolic function.   3. The mitral in-flow pattern reveals no discernable A-wave, therefore   no comment on diastolic function can be made.   4. Normal right ventricular size and function.   5. Mild to moderately enlarged right atrium.   6. Moderatelyenlarged left atrium.   7. Trace mitral valve regurgitation.   8. Mild-moderate tricuspid regurgitation.   9. Sclerotic aortic valve with decreased opening.  10. Estimated pulmonary artery systolic pressure is 41.6 mmHg assuming a   right atrial pressure of 5 mmHg, which is consistent with mild pulmonary   hypertension.

## 2023-11-19 LAB
ANION GAP SERPL CALC-SCNC: 9 MMOL/L — SIGNIFICANT CHANGE UP (ref 5–17)
ANION GAP SERPL CALC-SCNC: 9 MMOL/L — SIGNIFICANT CHANGE UP (ref 5–17)
BASOPHILS # BLD AUTO: 0.03 K/UL — SIGNIFICANT CHANGE UP (ref 0–0.2)
BASOPHILS # BLD AUTO: 0.03 K/UL — SIGNIFICANT CHANGE UP (ref 0–0.2)
BASOPHILS NFR BLD AUTO: 0.4 % — SIGNIFICANT CHANGE UP (ref 0–2)
BASOPHILS NFR BLD AUTO: 0.4 % — SIGNIFICANT CHANGE UP (ref 0–2)
BUN SERPL-MCNC: 16.8 MG/DL — SIGNIFICANT CHANGE UP (ref 8–20)
BUN SERPL-MCNC: 16.8 MG/DL — SIGNIFICANT CHANGE UP (ref 8–20)
CALCIUM SERPL-MCNC: 8.7 MG/DL — SIGNIFICANT CHANGE UP (ref 8.4–10.5)
CALCIUM SERPL-MCNC: 8.7 MG/DL — SIGNIFICANT CHANGE UP (ref 8.4–10.5)
CHLORIDE SERPL-SCNC: 108 MMOL/L — SIGNIFICANT CHANGE UP (ref 96–108)
CHLORIDE SERPL-SCNC: 108 MMOL/L — SIGNIFICANT CHANGE UP (ref 96–108)
CO2 SERPL-SCNC: 29 MMOL/L — SIGNIFICANT CHANGE UP (ref 22–29)
CO2 SERPL-SCNC: 29 MMOL/L — SIGNIFICANT CHANGE UP (ref 22–29)
CREAT SERPL-MCNC: 0.71 MG/DL — SIGNIFICANT CHANGE UP (ref 0.5–1.3)
CREAT SERPL-MCNC: 0.71 MG/DL — SIGNIFICANT CHANGE UP (ref 0.5–1.3)
EGFR: 93 ML/MIN/1.73M2 — SIGNIFICANT CHANGE UP
EGFR: 93 ML/MIN/1.73M2 — SIGNIFICANT CHANGE UP
EOSINOPHIL # BLD AUTO: 0.25 K/UL — SIGNIFICANT CHANGE UP (ref 0–0.5)
EOSINOPHIL # BLD AUTO: 0.25 K/UL — SIGNIFICANT CHANGE UP (ref 0–0.5)
EOSINOPHIL NFR BLD AUTO: 3.7 % — SIGNIFICANT CHANGE UP (ref 0–6)
EOSINOPHIL NFR BLD AUTO: 3.7 % — SIGNIFICANT CHANGE UP (ref 0–6)
GLUCOSE BLDC GLUCOMTR-MCNC: 113 MG/DL — HIGH (ref 70–99)
GLUCOSE BLDC GLUCOMTR-MCNC: 113 MG/DL — HIGH (ref 70–99)
GLUCOSE BLDC GLUCOMTR-MCNC: 157 MG/DL — HIGH (ref 70–99)
GLUCOSE BLDC GLUCOMTR-MCNC: 157 MG/DL — HIGH (ref 70–99)
GLUCOSE BLDC GLUCOMTR-MCNC: 225 MG/DL — HIGH (ref 70–99)
GLUCOSE BLDC GLUCOMTR-MCNC: 225 MG/DL — HIGH (ref 70–99)
GLUCOSE BLDC GLUCOMTR-MCNC: 241 MG/DL — HIGH (ref 70–99)
GLUCOSE BLDC GLUCOMTR-MCNC: 241 MG/DL — HIGH (ref 70–99)
GLUCOSE SERPL-MCNC: 137 MG/DL — HIGH (ref 70–99)
GLUCOSE SERPL-MCNC: 137 MG/DL — HIGH (ref 70–99)
HCT VFR BLD CALC: 28.7 % — LOW (ref 39–50)
HCT VFR BLD CALC: 28.7 % — LOW (ref 39–50)
HGB BLD-MCNC: 9.2 G/DL — LOW (ref 13–17)
HGB BLD-MCNC: 9.2 G/DL — LOW (ref 13–17)
IMM GRANULOCYTES NFR BLD AUTO: 0.9 % — SIGNIFICANT CHANGE UP (ref 0–0.9)
IMM GRANULOCYTES NFR BLD AUTO: 0.9 % — SIGNIFICANT CHANGE UP (ref 0–0.9)
LYMPHOCYTES # BLD AUTO: 1.07 K/UL — SIGNIFICANT CHANGE UP (ref 1–3.3)
LYMPHOCYTES # BLD AUTO: 1.07 K/UL — SIGNIFICANT CHANGE UP (ref 1–3.3)
LYMPHOCYTES # BLD AUTO: 15.9 % — SIGNIFICANT CHANGE UP (ref 13–44)
LYMPHOCYTES # BLD AUTO: 15.9 % — SIGNIFICANT CHANGE UP (ref 13–44)
MCHC RBC-ENTMCNC: 30.9 PG — SIGNIFICANT CHANGE UP (ref 27–34)
MCHC RBC-ENTMCNC: 30.9 PG — SIGNIFICANT CHANGE UP (ref 27–34)
MCHC RBC-ENTMCNC: 32.1 GM/DL — SIGNIFICANT CHANGE UP (ref 32–36)
MCHC RBC-ENTMCNC: 32.1 GM/DL — SIGNIFICANT CHANGE UP (ref 32–36)
MCV RBC AUTO: 96.3 FL — SIGNIFICANT CHANGE UP (ref 80–100)
MCV RBC AUTO: 96.3 FL — SIGNIFICANT CHANGE UP (ref 80–100)
MONOCYTES # BLD AUTO: 0.69 K/UL — SIGNIFICANT CHANGE UP (ref 0–0.9)
MONOCYTES # BLD AUTO: 0.69 K/UL — SIGNIFICANT CHANGE UP (ref 0–0.9)
MONOCYTES NFR BLD AUTO: 10.3 % — SIGNIFICANT CHANGE UP (ref 2–14)
MONOCYTES NFR BLD AUTO: 10.3 % — SIGNIFICANT CHANGE UP (ref 2–14)
NEUTROPHILS # BLD AUTO: 4.61 K/UL — SIGNIFICANT CHANGE UP (ref 1.8–7.4)
NEUTROPHILS # BLD AUTO: 4.61 K/UL — SIGNIFICANT CHANGE UP (ref 1.8–7.4)
NEUTROPHILS NFR BLD AUTO: 68.8 % — SIGNIFICANT CHANGE UP (ref 43–77)
NEUTROPHILS NFR BLD AUTO: 68.8 % — SIGNIFICANT CHANGE UP (ref 43–77)
PLATELET # BLD AUTO: 151 K/UL — SIGNIFICANT CHANGE UP (ref 150–400)
PLATELET # BLD AUTO: 151 K/UL — SIGNIFICANT CHANGE UP (ref 150–400)
POTASSIUM SERPL-MCNC: 3.9 MMOL/L — SIGNIFICANT CHANGE UP (ref 3.5–5.3)
POTASSIUM SERPL-MCNC: 3.9 MMOL/L — SIGNIFICANT CHANGE UP (ref 3.5–5.3)
POTASSIUM SERPL-SCNC: 3.9 MMOL/L — SIGNIFICANT CHANGE UP (ref 3.5–5.3)
POTASSIUM SERPL-SCNC: 3.9 MMOL/L — SIGNIFICANT CHANGE UP (ref 3.5–5.3)
RBC # BLD: 2.98 M/UL — LOW (ref 4.2–5.8)
RBC # BLD: 2.98 M/UL — LOW (ref 4.2–5.8)
RBC # FLD: 13.5 % — SIGNIFICANT CHANGE UP (ref 10.3–14.5)
RBC # FLD: 13.5 % — SIGNIFICANT CHANGE UP (ref 10.3–14.5)
SODIUM SERPL-SCNC: 146 MMOL/L — HIGH (ref 135–145)
SODIUM SERPL-SCNC: 146 MMOL/L — HIGH (ref 135–145)
WBC # BLD: 6.71 K/UL — SIGNIFICANT CHANGE UP (ref 3.8–10.5)
WBC # BLD: 6.71 K/UL — SIGNIFICANT CHANGE UP (ref 3.8–10.5)
WBC # FLD AUTO: 6.71 K/UL — SIGNIFICANT CHANGE UP (ref 3.8–10.5)
WBC # FLD AUTO: 6.71 K/UL — SIGNIFICANT CHANGE UP (ref 3.8–10.5)

## 2023-11-19 PROCEDURE — 99233 SBSQ HOSP IP/OBS HIGH 50: CPT

## 2023-11-19 PROCEDURE — 70551 MRI BRAIN STEM W/O DYE: CPT | Mod: 26

## 2023-11-19 RX ADMIN — Medication 300 MILLIGRAM(S): at 11:31

## 2023-11-19 RX ADMIN — GABAPENTIN 300 MILLIGRAM(S): 400 CAPSULE ORAL at 06:20

## 2023-11-19 RX ADMIN — GABAPENTIN 300 MILLIGRAM(S): 400 CAPSULE ORAL at 18:23

## 2023-11-19 RX ADMIN — FAMOTIDINE 20 MILLIGRAM(S): 10 INJECTION INTRAVENOUS at 06:19

## 2023-11-19 RX ADMIN — Medication 5 MILLIGRAM(S): at 06:20

## 2023-11-19 RX ADMIN — FAMOTIDINE 20 MILLIGRAM(S): 10 INJECTION INTRAVENOUS at 18:23

## 2023-11-19 RX ADMIN — NYSTATIN CREAM 1 APPLICATION(S): 100000 CREAM TOPICAL at 06:20

## 2023-11-19 RX ADMIN — Medication 2: at 21:42

## 2023-11-19 RX ADMIN — Medication 25 MILLIGRAM(S): at 18:24

## 2023-11-19 RX ADMIN — Medication 1: at 08:22

## 2023-11-19 RX ADMIN — SENNA PLUS 2 TABLET(S): 8.6 TABLET ORAL at 21:36

## 2023-11-19 RX ADMIN — Medication 650 MILLIGRAM(S): at 11:30

## 2023-11-19 RX ADMIN — POLYETHYLENE GLYCOL 3350 17 GRAM(S): 17 POWDER, FOR SOLUTION ORAL at 11:31

## 2023-11-19 RX ADMIN — Medication 81 MILLIGRAM(S): at 11:31

## 2023-11-19 RX ADMIN — Medication 2: at 11:29

## 2023-11-19 RX ADMIN — ENOXAPARIN SODIUM 40 MILLIGRAM(S): 100 INJECTION SUBCUTANEOUS at 18:23

## 2023-11-19 RX ADMIN — NYSTATIN CREAM 1 APPLICATION(S): 100000 CREAM TOPICAL at 18:24

## 2023-11-19 RX ADMIN — Medication 25 MILLIGRAM(S): at 06:19

## 2023-11-19 RX ADMIN — CHLORHEXIDINE GLUCONATE 1 APPLICATION(S): 213 SOLUTION TOPICAL at 06:20

## 2023-11-19 RX ADMIN — ATORVASTATIN CALCIUM 40 MILLIGRAM(S): 80 TABLET, FILM COATED ORAL at 21:37

## 2023-11-19 RX ADMIN — DULOXETINE HYDROCHLORIDE 30 MILLIGRAM(S): 30 CAPSULE, DELAYED RELEASE ORAL at 11:31

## 2023-11-19 NOTE — PROGRESS NOTE ADULT - SUBJECTIVE AND OBJECTIVE BOX
Hospitalist Daily Progress Note    Chief Complaint:  Patient is a 78y old  Male who presents with a chief complaint of Lt MCA M1 thrombus (18 Nov 2023 15:45)      SUBJECTIVE / OVERNIGHT EVENTS:  Patient was seen and examined at bedside.   No overnight events reported.  Unable to do further ROS.      MEDICATIONS  (STANDING):  allopurinol 300 milliGRAM(s) Oral daily  aspirin  chewable 81 milliGRAM(s) Oral daily  atorvastatin 40 milliGRAM(s) Oral at bedtime  chlorhexidine 2% Cloths 1 Application(s) Topical daily  dextrose 5%. 1000 milliLiter(s) (100 mL/Hr) IV Continuous <Continuous>  dextrose 5%. 1000 milliLiter(s) (50 mL/Hr) IV Continuous <Continuous>  dextrose 50% Injectable 25 Gram(s) IV Push once  dextrose 50% Injectable 12.5 Gram(s) IV Push once  dextrose 50% Injectable 25 Gram(s) IV Push once  DULoxetine 30 milliGRAM(s) Oral daily  enoxaparin Injectable 40 milliGRAM(s) SubCutaneous <User Schedule>  famotidine    Tablet 20 milliGRAM(s) Oral two times a day  gabapentin 300 milliGRAM(s) Oral two times a day  glucagon  Injectable 1 milliGRAM(s) IntraMuscular once  insulin lispro (ADMELOG) corrective regimen sliding scale   SubCutaneous Before meals and at bedtime  metoprolol tartrate 25 milliGRAM(s) Oral two times a day  nystatin Powder 1 Application(s) Topical two times a day  polyethylene glycol 3350 17 Gram(s) Oral daily  predniSONE   Tablet 5 milliGRAM(s) Oral daily  senna 2 Tablet(s) Oral at bedtime    MEDICATIONS  (PRN):  acetaminophen     Tablet .. 650 milliGRAM(s) Oral every 6 hours PRN Temp greater or equal to 38C (100.4F)  albuterol/ipratropium (CFC free) Inhaler. 1 Puff(s) Inhalation four times a day PRN Wheezing  dextrose Oral Gel 15 Gram(s) Oral once PRN Blood Glucose LESS THAN 70 milliGRAM(s)/deciliter        I&O's Summary    18 Nov 2023 07:01  -  19 Nov 2023 07:00  --------------------------------------------------------  IN: 0 mL / OUT: 850 mL / NET: -850 mL        PHYSICAL EXAM:  Vital Signs Last 24 Hrs  T(C): 37.4 (19 Nov 2023 07:45), Max: 37.7 (18 Nov 2023 16:00)  T(F): 99.3 (19 Nov 2023 07:45), Max: 99.9 (18 Nov 2023 16:00)  HR: 57 (19 Nov 2023 08:00) (57 - 85)  BP: 129/69 (19 Nov 2023 08:00) (108/63 - 142/88)  BP(mean): 86 (19 Nov 2023 08:00) (69 - 101)  RR: 23 (19 Nov 2023 08:00) (19 - 24)  SpO2: 100% (19 Nov 2023 08:00) (95% - 100%)    Parameters below as of 19 Nov 2023 08:00  Patient On (Oxygen Delivery Method): nasal cannula  O2 Flow (L/min): 2      Constitutional: NAD, Resting, tattoos   ENT: Supple, No JVD  Lungs: CTA B/L, Non-labored breathing  Cardio: Irregular S1/s2  Abdomen: Soft, Nontender, Nondistended; Bowel sounds present  Extremities: No calf tenderness, No pitting edema  Musculoskeletal:   No joint swelling  Psych: Calm, cooperative affect appropriate  Neuro: Awake and alert, Can nod yes/no Aphasic, RUE is 0/5, RLE IS SLIGHTLY better, able to move foot, LUE/LLE is 5/5   Skin: No rashes; no palpable lesions  LABS:                        9.2    6.71  )-----------( 151      ( 19 Nov 2023 05:59 )             28.7     11-19    146<H>  |  108  |  16.8  ----------------------------<  137<H>  3.9   |  29.0  |  0.71    Ca    8.7      19 Nov 2023 05:59  Phos  2.9     11-18  Mg     2.2     11-18            Urinalysis Basic - ( 19 Nov 2023 05:59 )    Color: x / Appearance: x / SG: x / pH: x  Gluc: 137 mg/dL / Ketone: x  / Bili: x / Urobili: x   Blood: x / Protein: x / Nitrite: x   Leuk Esterase: x / RBC: x / WBC x   Sq Epi: x / Non Sq Epi: x / Bacteria: x        CAPILLARY BLOOD GLUCOSE      POCT Blood Glucose.: 157 mg/dL (19 Nov 2023 08:15)  POCT Blood Glucose.: 169 mg/dL (18 Nov 2023 21:47)  POCT Blood Glucose.: 158 mg/dL (18 Nov 2023 17:33)  POCT Blood Glucose.: 148 mg/dL (18 Nov 2023 12:51)  POCT Blood Glucose.: 139 mg/dL (18 Nov 2023 10:00)        RADIOLOGY REVIEWED

## 2023-11-19 NOTE — PROGRESS NOTE ADULT - ASSESSMENT
78y/oM PMH of HTN, HLD, AFib on Pradaxa, CKD, Obesity, Gout, Prostate cancer, DVT/PE, OA s/p multiple orthopedic procedures, Spinal Stenosis, presented on 11/13/23 for elective left atrial appendage occlusion procedure, and developed right sided weakness. Imaging revealed an acute L M1 occlusion s/p TNK administration and TICI 2A thrombectomy. Patient was intubated for airway protection. Repeat CT head s/p tenecteplase administration showed evolving left MCA stroke with hemorrhagic transformation, and mass effects with left to right shift. Suspected etiology of stroke is cardioembolic from atrial fibrillation when off anticoagulation for recent procedure. Patient was subsequently extubated and is now stable for transfer to step down unit (11/17)    Left MCA CVA with M1 occlusion  Hemorrhagic transformation  -likely cardioembolic while AC had been on hold  -right sided weakness (RUE>RLE) and global aphasia  -s/p TNK and thrombectomy  -repeat CT head s/p TNK with evolving left MCA stroke with mild hemorrhage and mass effect  -No antiplatelets until cleared by stroke neuro team--> cleared to start aspirin 81mg qd 11/17  -continue statin  -hold AC   -repeat CTH 11/16 stable   -MRI brain pending - Still pending - Asked Medicine PA to do MRI screening as tech states that it was partially done? Unsure why no one was informed.   -TTE reviewed; no PFO  -neurochecks q 2 hours  -PT - JHOAN  -OT -AR  -Will consult PMNR  -SLP eval appreciated; continue dysphagia diet  -aspiration/fall precautions  -allow for permissive HTN  -stroke neuro recs appreciated    Atrial Fibrillation  -AC on hold  -continue metoprolol for rate control  -TTE reviewed  -EP recalled by neuro ICU to evaluate for RAVI; Prior Hospitalist d/w EP team 11/17, ravi would not  and pt should resume AC when cleared by neuro   -telemonitoring    Hypernatremia  -goal sodium 145-150 per stroke neuro recs due to cerebral edema  -hold torsemide   -hold parenteral free water  -monitor I/os  -repeat BMP in AM    Anemia likely due to chronic disease  -Hb relatively stable  -check iron studies  -monitor CBC closely    Acute Hypoxic Respiratory Failure - resolved  -intubated for airway protection on admission   -history of ILD; will clarify if it is the etiology of chronic prednisone use   -extubated on 11/14  -CXR no acute infiltrates  -bronchodilators as needed  -incentive spirometry     DM  -HbA1c noted  -continue ISS  -ADA diet    History of PE/DVT  -off AC due to large infarct with mild hemorrhage  -LE duplex negative for DVT    HTN  -SBP 130s (goal 130-180 per neuro recs)  -continue metoprolol  -low sodium diet    GERD  -continue famotidine    Gout  -continue allopurinol    Neuropathy  -continue gabapentin     DVT ppx- Lovenox SC       Pending MRI brain

## 2023-11-20 ENCOUNTER — TRANSCRIPTION ENCOUNTER (OUTPATIENT)
Age: 78
End: 2023-11-20

## 2023-11-20 LAB
ANION GAP SERPL CALC-SCNC: 9 MMOL/L — SIGNIFICANT CHANGE UP (ref 5–17)
ANION GAP SERPL CALC-SCNC: 9 MMOL/L — SIGNIFICANT CHANGE UP (ref 5–17)
BASOPHILS # BLD AUTO: 0.03 K/UL — SIGNIFICANT CHANGE UP (ref 0–0.2)
BASOPHILS # BLD AUTO: 0.03 K/UL — SIGNIFICANT CHANGE UP (ref 0–0.2)
BASOPHILS NFR BLD AUTO: 0.4 % — SIGNIFICANT CHANGE UP (ref 0–2)
BASOPHILS NFR BLD AUTO: 0.4 % — SIGNIFICANT CHANGE UP (ref 0–2)
BUN SERPL-MCNC: 17.1 MG/DL — SIGNIFICANT CHANGE UP (ref 8–20)
BUN SERPL-MCNC: 17.1 MG/DL — SIGNIFICANT CHANGE UP (ref 8–20)
CALCIUM SERPL-MCNC: 8.4 MG/DL — SIGNIFICANT CHANGE UP (ref 8.4–10.5)
CALCIUM SERPL-MCNC: 8.4 MG/DL — SIGNIFICANT CHANGE UP (ref 8.4–10.5)
CHLORIDE SERPL-SCNC: 106 MMOL/L — SIGNIFICANT CHANGE UP (ref 96–108)
CHLORIDE SERPL-SCNC: 106 MMOL/L — SIGNIFICANT CHANGE UP (ref 96–108)
CO2 SERPL-SCNC: 30 MMOL/L — HIGH (ref 22–29)
CO2 SERPL-SCNC: 30 MMOL/L — HIGH (ref 22–29)
CREAT SERPL-MCNC: 0.77 MG/DL — SIGNIFICANT CHANGE UP (ref 0.5–1.3)
CREAT SERPL-MCNC: 0.77 MG/DL — SIGNIFICANT CHANGE UP (ref 0.5–1.3)
EGFR: 91 ML/MIN/1.73M2 — SIGNIFICANT CHANGE UP
EGFR: 91 ML/MIN/1.73M2 — SIGNIFICANT CHANGE UP
EOSINOPHIL # BLD AUTO: 0.23 K/UL — SIGNIFICANT CHANGE UP (ref 0–0.5)
EOSINOPHIL # BLD AUTO: 0.23 K/UL — SIGNIFICANT CHANGE UP (ref 0–0.5)
EOSINOPHIL NFR BLD AUTO: 2.8 % — SIGNIFICANT CHANGE UP (ref 0–6)
EOSINOPHIL NFR BLD AUTO: 2.8 % — SIGNIFICANT CHANGE UP (ref 0–6)
GLUCOSE BLDC GLUCOMTR-MCNC: 122 MG/DL — HIGH (ref 70–99)
GLUCOSE BLDC GLUCOMTR-MCNC: 122 MG/DL — HIGH (ref 70–99)
GLUCOSE BLDC GLUCOMTR-MCNC: 166 MG/DL — HIGH (ref 70–99)
GLUCOSE BLDC GLUCOMTR-MCNC: 166 MG/DL — HIGH (ref 70–99)
GLUCOSE BLDC GLUCOMTR-MCNC: 215 MG/DL — HIGH (ref 70–99)
GLUCOSE BLDC GLUCOMTR-MCNC: 215 MG/DL — HIGH (ref 70–99)
GLUCOSE BLDC GLUCOMTR-MCNC: 239 MG/DL — HIGH (ref 70–99)
GLUCOSE BLDC GLUCOMTR-MCNC: 239 MG/DL — HIGH (ref 70–99)
GLUCOSE SERPL-MCNC: 140 MG/DL — HIGH (ref 70–99)
GLUCOSE SERPL-MCNC: 140 MG/DL — HIGH (ref 70–99)
HCT VFR BLD CALC: 30.1 % — LOW (ref 39–50)
HCT VFR BLD CALC: 30.1 % — LOW (ref 39–50)
HGB BLD-MCNC: 9.5 G/DL — LOW (ref 13–17)
HGB BLD-MCNC: 9.5 G/DL — LOW (ref 13–17)
IMM GRANULOCYTES NFR BLD AUTO: 0.9 % — SIGNIFICANT CHANGE UP (ref 0–0.9)
IMM GRANULOCYTES NFR BLD AUTO: 0.9 % — SIGNIFICANT CHANGE UP (ref 0–0.9)
LYMPHOCYTES # BLD AUTO: 0.96 K/UL — LOW (ref 1–3.3)
LYMPHOCYTES # BLD AUTO: 0.96 K/UL — LOW (ref 1–3.3)
LYMPHOCYTES # BLD AUTO: 11.8 % — LOW (ref 13–44)
LYMPHOCYTES # BLD AUTO: 11.8 % — LOW (ref 13–44)
MCHC RBC-ENTMCNC: 30.2 PG — SIGNIFICANT CHANGE UP (ref 27–34)
MCHC RBC-ENTMCNC: 30.2 PG — SIGNIFICANT CHANGE UP (ref 27–34)
MCHC RBC-ENTMCNC: 31.6 GM/DL — LOW (ref 32–36)
MCHC RBC-ENTMCNC: 31.6 GM/DL — LOW (ref 32–36)
MCV RBC AUTO: 95.6 FL — SIGNIFICANT CHANGE UP (ref 80–100)
MCV RBC AUTO: 95.6 FL — SIGNIFICANT CHANGE UP (ref 80–100)
MONOCYTES # BLD AUTO: 0.71 K/UL — SIGNIFICANT CHANGE UP (ref 0–0.9)
MONOCYTES # BLD AUTO: 0.71 K/UL — SIGNIFICANT CHANGE UP (ref 0–0.9)
MONOCYTES NFR BLD AUTO: 8.7 % — SIGNIFICANT CHANGE UP (ref 2–14)
MONOCYTES NFR BLD AUTO: 8.7 % — SIGNIFICANT CHANGE UP (ref 2–14)
NEUTROPHILS # BLD AUTO: 6.15 K/UL — SIGNIFICANT CHANGE UP (ref 1.8–7.4)
NEUTROPHILS # BLD AUTO: 6.15 K/UL — SIGNIFICANT CHANGE UP (ref 1.8–7.4)
NEUTROPHILS NFR BLD AUTO: 75.4 % — SIGNIFICANT CHANGE UP (ref 43–77)
NEUTROPHILS NFR BLD AUTO: 75.4 % — SIGNIFICANT CHANGE UP (ref 43–77)
PLATELET # BLD AUTO: 182 K/UL — SIGNIFICANT CHANGE UP (ref 150–400)
PLATELET # BLD AUTO: 182 K/UL — SIGNIFICANT CHANGE UP (ref 150–400)
POTASSIUM SERPL-MCNC: 4 MMOL/L — SIGNIFICANT CHANGE UP (ref 3.5–5.3)
POTASSIUM SERPL-MCNC: 4 MMOL/L — SIGNIFICANT CHANGE UP (ref 3.5–5.3)
POTASSIUM SERPL-SCNC: 4 MMOL/L — SIGNIFICANT CHANGE UP (ref 3.5–5.3)
POTASSIUM SERPL-SCNC: 4 MMOL/L — SIGNIFICANT CHANGE UP (ref 3.5–5.3)
RBC # BLD: 3.15 M/UL — LOW (ref 4.2–5.8)
RBC # BLD: 3.15 M/UL — LOW (ref 4.2–5.8)
RBC # FLD: 13.4 % — SIGNIFICANT CHANGE UP (ref 10.3–14.5)
RBC # FLD: 13.4 % — SIGNIFICANT CHANGE UP (ref 10.3–14.5)
SODIUM SERPL-SCNC: 145 MMOL/L — SIGNIFICANT CHANGE UP (ref 135–145)
SODIUM SERPL-SCNC: 145 MMOL/L — SIGNIFICANT CHANGE UP (ref 135–145)
WBC # BLD: 8.15 K/UL — SIGNIFICANT CHANGE UP (ref 3.8–10.5)
WBC # BLD: 8.15 K/UL — SIGNIFICANT CHANGE UP (ref 3.8–10.5)
WBC # FLD AUTO: 8.15 K/UL — SIGNIFICANT CHANGE UP (ref 3.8–10.5)
WBC # FLD AUTO: 8.15 K/UL — SIGNIFICANT CHANGE UP (ref 3.8–10.5)

## 2023-11-20 PROCEDURE — 99232 SBSQ HOSP IP/OBS MODERATE 35: CPT

## 2023-11-20 PROCEDURE — 99222 1ST HOSP IP/OBS MODERATE 55: CPT

## 2023-11-20 RX ADMIN — Medication 2: at 22:42

## 2023-11-20 RX ADMIN — GABAPENTIN 300 MILLIGRAM(S): 400 CAPSULE ORAL at 05:43

## 2023-11-20 RX ADMIN — Medication 5 MILLIGRAM(S): at 05:43

## 2023-11-20 RX ADMIN — ENOXAPARIN SODIUM 40 MILLIGRAM(S): 100 INJECTION SUBCUTANEOUS at 18:36

## 2023-11-20 RX ADMIN — FAMOTIDINE 20 MILLIGRAM(S): 10 INJECTION INTRAVENOUS at 18:32

## 2023-11-20 RX ADMIN — POLYETHYLENE GLYCOL 3350 17 GRAM(S): 17 POWDER, FOR SOLUTION ORAL at 12:11

## 2023-11-20 RX ADMIN — Medication 300 MILLIGRAM(S): at 12:13

## 2023-11-20 RX ADMIN — Medication 1: at 17:05

## 2023-11-20 RX ADMIN — Medication 2: at 12:14

## 2023-11-20 RX ADMIN — Medication 25 MILLIGRAM(S): at 18:31

## 2023-11-20 RX ADMIN — CHLORHEXIDINE GLUCONATE 1 APPLICATION(S): 213 SOLUTION TOPICAL at 05:41

## 2023-11-20 RX ADMIN — FAMOTIDINE 20 MILLIGRAM(S): 10 INJECTION INTRAVENOUS at 05:43

## 2023-11-20 RX ADMIN — DULOXETINE HYDROCHLORIDE 30 MILLIGRAM(S): 30 CAPSULE, DELAYED RELEASE ORAL at 12:12

## 2023-11-20 RX ADMIN — GABAPENTIN 300 MILLIGRAM(S): 400 CAPSULE ORAL at 18:31

## 2023-11-20 RX ADMIN — Medication 81 MILLIGRAM(S): at 12:12

## 2023-11-20 RX ADMIN — Medication 25 MILLIGRAM(S): at 05:43

## 2023-11-20 RX ADMIN — SENNA PLUS 2 TABLET(S): 8.6 TABLET ORAL at 22:42

## 2023-11-20 RX ADMIN — NYSTATIN CREAM 1 APPLICATION(S): 100000 CREAM TOPICAL at 05:44

## 2023-11-20 RX ADMIN — NYSTATIN CREAM 1 APPLICATION(S): 100000 CREAM TOPICAL at 18:31

## 2023-11-20 RX ADMIN — ATORVASTATIN CALCIUM 40 MILLIGRAM(S): 80 TABLET, FILM COATED ORAL at 22:42

## 2023-11-20 NOTE — PROGRESS NOTE ADULT - SUBJECTIVE AND OBJECTIVE BOX
Subjective:  pt making progress, responding appropriately and eating with assistance.       MEDICATIONS  (STANDING):  allopurinol 300 milliGRAM(s) Oral daily  aspirin  chewable 81 milliGRAM(s) Oral daily  atorvastatin 40 milliGRAM(s) Oral at bedtime  chlorhexidine 2% Cloths 1 Application(s) Topical daily  dextrose 5%. 1000 milliLiter(s) (50 mL/Hr) IV Continuous <Continuous>  dextrose 5%. 1000 milliLiter(s) (100 mL/Hr) IV Continuous <Continuous>  dextrose 50% Injectable 12.5 Gram(s) IV Push once  dextrose 50% Injectable 25 Gram(s) IV Push once  dextrose 50% Injectable 25 Gram(s) IV Push once  DULoxetine 30 milliGRAM(s) Oral daily  enoxaparin Injectable 40 milliGRAM(s) SubCutaneous <User Schedule>  famotidine    Tablet 20 milliGRAM(s) Oral two times a day  gabapentin 300 milliGRAM(s) Oral two times a day  glucagon  Injectable 1 milliGRAM(s) IntraMuscular once  insulin lispro (ADMELOG) corrective regimen sliding scale   SubCutaneous Before meals and at bedtime  metoprolol tartrate 25 milliGRAM(s) Oral two times a day  nystatin Powder 1 Application(s) Topical two times a day  polyethylene glycol 3350 17 Gram(s) Oral daily  predniSONE   Tablet 5 milliGRAM(s) Oral daily  senna 2 Tablet(s) Oral at bedtime    MEDICATIONS  (PRN):  acetaminophen     Tablet .. 650 milliGRAM(s) Oral every 6 hours PRN Temp greater or equal to 38C (100.4F)  albuterol/ipratropium (CFC free) Inhaler. 1 Puff(s) Inhalation four times a day PRN Wheezing  dextrose Oral Gel 15 Gram(s) Oral once PRN Blood Glucose LESS THAN 70 milliGRAM(s)/deciliter      Allergies    adhesives (Rash)  No Known Drug Allergies  grass / pollen (Rhinorrhea; Rhinitis; Sneezing; Eye Irritation)  ADHESIVE TAPE - WOULD PREFER PAPER TAPE (Other)    Intolerances        Vital Signs Last 24 Hrs  T(C): 37.3 (20 Nov 2023 15:27), Max: 37.6 (20 Nov 2023 08:00)  T(F): 99.2 (20 Nov 2023 15:27), Max: 99.7 (20 Nov 2023 08:00)  HR: 73 (20 Nov 2023 12:00) (60 - 95)  BP: 134/83 (20 Nov 2023 12:00) (110/69 - 154/78)  BP(mean): 91 (20 Nov 2023 12:00) (70 - 100)  RR: 20 (20 Nov 2023 12:00) (20 - 24)  SpO2: 99% (20 Nov 2023 12:00) (94% - 100%)    Parameters below as of 20 Nov 2023 12:00  Patient On (Oxygen Delivery Method): room air        Physical Exam:  Constitutional: NAD, aphasic  Cardiovascular: +S1S2 irregularly irregular  Pulmonary: CTA b/l, unlabored  GI: soft NTND +BS  Extremities: no pedal edema, +distal pulses b/l  Neuro: right side remains weak    LABS:                        9.5    8.15  )-----------( 182      ( 20 Nov 2023 06:58 )             30.1     11-20    145  |  106  |  17.1  ----------------------------<  140<H>  4.0   |  30.0<H>  |  0.77    Ca    8.4      20 Nov 2023 06:58        Urinalysis Basic - ( 20 Nov 2023 06:58 )    Color: x / Appearance: x / SG: x / pH: x  Gluc: 140 mg/dL / Ketone: x  / Bili: x / Urobili: x   Blood: x / Protein: x / Nitrite: x   Leuk Esterase: x / RBC: x / WBC x   Sq Epi: x / Non Sq Epi: x / Bacteria: x        RADIOLOGY & ADDITIONAL TESTS:  < from: TTE Echo Complete w/o Contrast w/ Doppler (11.14.23 @ 15:57) >   1. Left ventricular ejection fraction, by visual estimation, is 55 to   60%.   2. Normal global left ventricular systolic function.   3. The mitral in-flow pattern reveals no discernable A-wave, therefore   no comment on diastolic function can be made.   4. Normal right ventricular size and function.   5. Mild to moderately enlarged right atrium.   6. Moderatelyenlarged left atrium.   7. Trace mitral valve regurgitation.   8. Mild-moderate tricuspid regurgitation.   9. Sclerotic aortic valve with decreased opening.  10. Estimated pulmonary artery systolic pressure is 41.6 mmHg assuming a   right atrial pressure of 5 mmHg, which is consistent with mild pulmonary   hypertension.    < end of copied text >

## 2023-11-20 NOTE — PROGRESS NOTE ADULT - ASSESSMENT
78 year old male with HTN, hyperlipidemia, gout, obesity, CKD, prostate cancer, remote DVT/PE, OA s/p multiple orthopedic procedures, spinal stenosis, CKD, and permanent atrial fibrillation with recent stroke occurring prior to planned left atrial appendage occlusion).  Pt now with some neurologic recovery, and planning discharge to rehab in the near future.   Given some hemorrhagic conversion noted on followup HCT, anticoagulation has been held. Per neurology, plan to likely hold anticoagulation for one more week and repeat HCT at that time to decide when OAC can be restarted. However, if there is thrombus present in FROY, this may warrant earlier reinitiation of anticoagulation. Therefore, will plan HARJINDER to reassess presence of FROY thrombus, likely tomorrow.     -NPO p MD for HARJINDER tomorrow  -to decide on timing of OAC after this (with neurology)  -ok to continue amiodarone for now  -family evaluating options for JHOAN after discharge- requesting rehab facility closer to pt's home  -I will follow and assist with care as needed

## 2023-11-20 NOTE — CONSULT NOTE ADULT - SUBJECTIVE AND OBJECTIVE BOX
78yM was admitted on 11-13      Patient is a 78y old  Male who presents with a chief complaint of Lt MCA M1 thrombus (19 Nov 2023 09:40)    HPI:  Mr. 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  Imaging revealed an acute L M1 occlusion s/p TNK administration and TICI 2A thrombectomy. Patient was intubated for airway protection.      Imaging reviewed showed:    ACC: [id number] EXAM:  MR BRAIN   ORDERED BY: RUSTY LIU     PROCEDURE DATE:  11/19/2023      INTERPRETATION:      CLINICAL INFORMATION: Stroke.    TECHNIQUE: Multiplanar multisequential MRI of the brain was acquired   without the administration of IV gadolinium.    COMPARISON: Prior head CT study from 11/15/2023.    FINDINGS: Diffusion restriction is seen within the entire left MCA   territory including the left basal ganglia, compatible with   acute/subacute infarction with associated cytotoxic edema. Areas of   hemorrhagic transformation are seen within the left putamen. There is   also evidence of petechial hemorrhagic transformation involving the left   frontotemporal and parietal cortex. Sulcal effacement and mass effect is   again seen with unchanged shift of midline structures from left to right   measuring approximately 5.5 mm. There is no herniation or hydrocephalus.   No abnormal extra-axial fluid collections are notable.    Flow-voids are noted throughout the major intracranial vessels, on the T2   weighted images, consistent with their patency. The sella turcica and   posterior fossa are unremarkable.    The paranasal sinuses and right tympanomastoid cavities are clear. Trace   fluid signal is noted within the left mastoid cavity. Calvarial signal   appears unremarkable. There is evidence of bilateral cataract removal.    IMPRESSION: Redemonstration of an evolving acute/subacute large   left-sided MCA distribution infarction with gross hemorrhagic   transformation in the left basal ganglia as well as evidence of petechial   hemorrhagic transformation within the left frontotemporal and parietal   cortices.    Associated cytotoxic edema, sulcal effacement, and mass effect as well as   shift of the midline structures from left to right measuring 5.5 mm   appears unchanged. There is no herniation.    REVIEW OF SYSTEMS  Difficult to obtain due to aphasia     VITALS  T(C): 37.6 (11-20-23 @ 08:00), Max: 38 (11-19-23 @ 11:30)  HR: 68 (11-20-23 @ 08:00) (60 - 87)  BP: 115/92 (11-20-23 @ 08:00) (90/72 - 154/78)  RR: 24 (11-20-23 @ 08:00) (20 - 24)  SpO2: 99% (11-20-23 @ 08:00) (94% - 100%)  Wt(kg): --    PAST MEDICAL & SURGICAL HISTORY  PC (prostate cancer)    Osteoarthrosis    Afib    GERD (gastroesophageal reflux disease)    Hypertension    Bilateral leg edema    Seasonal allergies    Hyperlipidemia    Pulmonary embolism    Phlebitis    Spinal stenosis    Herniated lumbar intervertebral disc    Unilateral primary osteoarthritis, right knee    Unilateral primary osteoarthritis, left knee    Unspecified atrial fibrillation    OA (osteoarthritis)    HTN (hypertension)    HLD (hyperlipidemia)    Chronic kidney disease (CKD)    History of pulmonary embolism    H/O total knee replacement, bilateral    H/O bilateral hip replacements    S/p bilateral shoulder joint replacement    inguinal hernia repair    hip replacement    bunionectomy    arthroscopy    arthroscopy    History of shoulder replacement    H/O colonoscopy    H/O endoscopy    H/O spinal fusion    H/O knee surgery    S/p bilateral shoulder joint replacement    H/O bilateral hip replacements    H/O total knee replacement, bilateral        SOCIAL HISTORY - as per documentation/history  Smoking - None  EtOH - None  Drugs - None      CURRENT FUNCTIONAL STATUS  11/16/23:    Sit-Stand Transfer Training  Transfer Training Sit-to-Stand Transfer: moderate assist (50% patient effort);  2 person assist;  weight-bearing as tolerated   bilateral hand held assist   Transfer Training Stand-to-Sit Transfer: maximum assist (25% patient effort);  2 person assist;  weight-bearing as tolerated   bilateral hand held assist   Sit-to-Stand Transfer Training Transfer Safety Analysis: decreased balance;  decreased weight-shifting ability;  decreased cognition;  decreased strength;  impaired balance;  impaired postural control;  cognitive, decreased safety awareness;  bilateral hand held assist     Gait Training  Gait Training: unable to perform;  Pre gait weight shifting performed, max assist needed for lateral weight shifting.     Therapeutic Exercise  Therapeutic Exercise Detail: AAROM Hip flexion, AROM bilateral LAQ, ankle pumps, quad sets (manual cues needed to perform properly)         FAMILY HISTORY   Family history of basal cell carcinoma (Mother)    Hypertension (Father)      RECENT LABS - Reviewed  CBC Full  -  ( 20 Nov 2023 06:58 )  WBC Count : 8.15 K/uL  RBC Count : 3.15 M/uL  Hemoglobin : 9.5 g/dL  Hematocrit : 30.1 %  Platelet Count - Automated : 182 K/uL  Mean Cell Volume : 95.6 fl  Mean Cell Hemoglobin : 30.2 pg  Mean Cell Hemoglobin Concentration : 31.6 gm/dL  Auto Neutrophil # : 6.15 K/uL  Auto Lymphocyte # : 0.96 K/uL  Auto Monocyte # : 0.71 K/uL  Auto Eosinophil # : 0.23 K/uL  Auto Basophil # : 0.03 K/uL  Auto Neutrophil % : 75.4 %  Auto Lymphocyte % : 11.8 %  Auto Monocyte % : 8.7 %  Auto Eosinophil % : 2.8 %  Auto Basophil % : 0.4 %    11-20    145  |  106  |  17.1  ----------------------------<  140<H>  4.0   |  30.0<H>  |  0.77    Ca    8.4      20 Nov 2023 06:58      Urinalysis Basic - ( 20 Nov 2023 06:58 )    Color: x / Appearance: x / SG: x / pH: x  Gluc: 140 mg/dL / Ketone: x  / Bili: x / Urobili: x   Blood: x / Protein: x / Nitrite: x   Leuk Esterase: x / RBC: x / WBC x   Sq Epi: x / Non Sq Epi: x / Bacteria: x        ALLERGIES  adhesives (Rash)  No Known Drug Allergies  grass / pollen (Rhinorrhea; Rhinitis; Sneezing; Eye Irritation)  ADHESIVE TAPE - WOULD PREFER PAPER TAPE (Other)      MEDICATIONS   acetaminophen     Tablet .. 650 milliGRAM(s) Oral every 6 hours PRN  albuterol/ipratropium (CFC free) Inhaler. 1 Puff(s) Inhalation four times a day PRN  allopurinol 300 milliGRAM(s) Oral daily  aspirin  chewable 81 milliGRAM(s) Oral daily  atorvastatin 40 milliGRAM(s) Oral at bedtime  chlorhexidine 2% Cloths 1 Application(s) Topical daily  dextrose 5%. 1000 milliLiter(s) IV Continuous <Continuous>  dextrose 5%. 1000 milliLiter(s) IV Continuous <Continuous>  dextrose 50% Injectable 25 Gram(s) IV Push once  dextrose 50% Injectable 12.5 Gram(s) IV Push once  dextrose 50% Injectable 25 Gram(s) IV Push once  dextrose Oral Gel 15 Gram(s) Oral once PRN  DULoxetine 30 milliGRAM(s) Oral daily  enoxaparin Injectable 40 milliGRAM(s) SubCutaneous <User Schedule>  famotidine    Tablet 20 milliGRAM(s) Oral two times a day  gabapentin 300 milliGRAM(s) Oral two times a day  glucagon  Injectable 1 milliGRAM(s) IntraMuscular once  insulin lispro (ADMELOG) corrective regimen sliding scale   SubCutaneous Before meals and at bedtime  metoprolol tartrate 25 milliGRAM(s) Oral two times a day  nystatin Powder 1 Application(s) Topical two times a day  polyethylene glycol 3350 17 Gram(s) Oral daily  predniSONE   Tablet 5 milliGRAM(s) Oral daily  senna 2 Tablet(s) Oral at bedtime      ----------------------------------------------------------------------------------------  PHYSICAL EXAM  Constitutional - NAD, Comfortable  HEENT - NCAT  Neck - Supple  Chest - Breathing comfortably, No wheezing  Cardiovascular - No cyanosis   Abdomen - Soft   Extremities - No C/C/E, No calf tenderness   Neurologic Exam -                    Cognitive - Awake, Alert, Intermittantly follow commands, does not appear oriented with yes/no questions      Communication - aphasic      Motor - anti-gravity LUE and LLE, RLE trace movements, RUE flaccid      Reflexes - No clonus   Psychiatric - Mood stable, Affect WNL  ----------------------------------------------------------------------------------------  ASSESSMENT/PLAN  78yMale with functional deficits after CVA  CVA - aspirin, atorvastatin, neurology following   Pain - Tylenol  DVT PPX - SCDs, Lovenox  Aphasia -  Continue SLP  Rehab/Impaired mobility - Continue PT/OT.  Recommend [facility] for continued rehabilitation with PT/OT and SLP.    Rehabilitation team will continue to follow as patient's condition progresses.

## 2023-11-20 NOTE — PROGRESS NOTE ADULT - SUBJECTIVE AND OBJECTIVE BOX
Preliminary note, offical recommendations pending attending review/signature   [facility] Stroke Team  Progress Note     HPI:  79 yo M PMH of HTN, HLD, AFib, CKD, obesity, gout, prostate cancer, DVT/PE, OA s/p multiple orthopedic procedures, spinal stenosis, presented on 11/13/23 for elective left atrial appendage occlusion procedure, and developed a MCA syndrome due to acute L M1 occlusion as demonstrated on CTA Head and Neck. Patient has a long h/o AFib, which is now considered permanent. He is on rate control with metoprolol 25 mg bid, and anticoagulation with Pradaxa.  He has a long Hx of arthritis and had multiple hip replacements, bilateral knee replacements and also has neck and back pain, but is unable to take NSAIDs due to bleeding risks. He has an unsteady gait and uses a cane for assistance.  Patient presented on 11/13 for elective left atrial appendage occlusion with Watchman device/HARJINDER w/ Dr Bobo, and Pradaxa was held for 2 days prior to procedure. 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   Initial NIHSS 26, MRS 1. Patient s/p tenecteplase administration at 11:16 11/13/23 and TICI 2A thrombectomy with Dr. Soliman.    SUBJECTIVE: No events overnight.  No new neurologic complaints.  ROS reported negative unless otherwise noted.    acetaminophen     Tablet .. 650 milliGRAM(s) Oral every 6 hours PRN  albuterol/ipratropium (CFC free) Inhaler. 1 Puff(s) Inhalation four times a day PRN  allopurinol 300 milliGRAM(s) Oral daily  aspirin  chewable 81 milliGRAM(s) Oral daily  atorvastatin 40 milliGRAM(s) Oral at bedtime  chlorhexidine 2% Cloths 1 Application(s) Topical daily  dextrose 5%. 1000 milliLiter(s) IV Continuous <Continuous>  dextrose 5%. 1000 milliLiter(s) IV Continuous <Continuous>  dextrose 50% Injectable 12.5 Gram(s) IV Push once  dextrose 50% Injectable 25 Gram(s) IV Push once  dextrose 50% Injectable 25 Gram(s) IV Push once  dextrose Oral Gel 15 Gram(s) Oral once PRN  DULoxetine 30 milliGRAM(s) Oral daily  enoxaparin Injectable 40 milliGRAM(s) SubCutaneous <User Schedule>  famotidine    Tablet 20 milliGRAM(s) Oral two times a day  gabapentin 300 milliGRAM(s) Oral two times a day  glucagon  Injectable 1 milliGRAM(s) IntraMuscular once  insulin lispro (ADMELOG) corrective regimen sliding scale   SubCutaneous Before meals and at bedtime  metoprolol tartrate 25 milliGRAM(s) Oral two times a day  nystatin Powder 1 Application(s) Topical two times a day  polyethylene glycol 3350 17 Gram(s) Oral daily  predniSONE   Tablet 5 milliGRAM(s) Oral daily  senna 2 Tablet(s) Oral at bedtime      PHYSICAL EXAM:   Vital Signs Last 24 Hrs  T(C): 37.3 (20 Nov 2023 15:27), Max: 37.6 (20 Nov 2023 08:00)  T(F): 99.2 (20 Nov 2023 15:27), Max: 99.7 (20 Nov 2023 08:00)  HR: 73 (20 Nov 2023 12:00) (60 - 95)  BP: 134/83 (20 Nov 2023 12:00) (112/599 - 154/78)  BP(mean): 91 (20 Nov 2023 12:00) (70 - 100)  RR: 20 (20 Nov 2023 12:00) (20 - 24)  SpO2: 99% (20 Nov 2023 12:00) (94% - 100%)    Parameters below as of 20 Nov 2023 12:00  Patient On (Oxygen Delivery Method): room air    General: NAD.    Detailed Neurologic Exam:    Mental status: Attends to examiner and is awake.  Right sided visual willow-neglect present. Global aphasia noted with no verbal output. Unable to follow commands, ID objects, or repeat. Not oriented to self, place, or month. Does mimic examiner.    Cranial nerves: Pupils equal and react symmetrically to light. Blink to threat intermittently on right eye. Blink to threat present out of left eye. Left gaze preference with right gaze palsy that gets to midline but does not cross midline. Right facial weakness noted.     Motor: There is normal bulk and tone.  There is no tremor.  RLE: slight withdrawal due to noxious stimuli. Was externally rotated on presentation  RUE: 0/5 strength with no movement. Grimaces to noxious stimuli with no movement.  Strength is 5/5 in the left arm and leg with no drift.    Sensation: Grimaces to noxious stimuli in all extremities.    Cerebellar: unable to assess dysmetria on finger to nose testing.    Gait : deferred      LABS:                        9.5    8.15  )-----------( 182      ( 20 Nov 2023 06:58 )             30.1    11-20    145  |  106  |  17.1  ----------------------------<  140<H>  4.0   |  30.0<H>  |  0.77    Ca    8.4      20 Nov 2023 06:58    IMAGING: Reviewed by me.    MR Head No Cont (11.19.23 @ 15:52)   IMPRESSION: Redemonstration of an evolving acute/subacute large   left-sided MCA distribution infarction with gross hemorrhagic   transformation in the left basal ganglia as well as evidence of petechial   hemorrhagic transformation within the left frontotemporal and parietal   cortices.    Associated cytotoxic edema, sulcal effacement, and mass effect as well as   shift of the midline structures from left to right measuring 5.5 mm   appears unchanged. There is no herniation.    CT Head No Cont (11.16.23 @ 18:05)   IMPRESSION: Stable moderate to large left MCA territory infarction   involving the left frontal lobe, left basal ganglia, left insular ribbon,   left parietal occipital lobe, and anterior left temporal lobe with stable   mild hemorrhage in the left basal ganglia. Left-to-right midline shift   measures 5.5 mm.    CT Head No Cont (11.14.23 @ 18:57)   IMPRESSION:  Stable moderate to large acute left MCA infarct with mild hemorrhage    TTE Echo Complete w/o Contrast w/ Doppler (11.14.23 @ 15:57)   Summary:   1. Left ventricular ejection fraction, by visual estimation, is 55 to   60%.   2. Normal global left ventricular systolic function.   3. The mitral in-flow pattern reveals no discernable A-wave, therefore   no comment on diastolic function can be made.   4. Normal right ventricular size and function.   5. Mild to moderately enlarged right atrium.   6. Moderately enlarged left atrium.   7. Trace mitral valve regurgitation.   8. Mild-moderate tricuspid regurgitation.   9. Sclerotic aortic valve with decreased opening.  10. Estimated pulmonary artery systolic pressure is 41.6 mmHg assuming a   right atrial pressure of 5 mmHg, which is consistent with mild pulmonary   hypertension.    CT Head No Cont (11.14.23 @ 11:36)   IMPRESSION: Evolving left MCA infarct is identified with hemorrhagic   transformation now seen with mass effect on left lateral ventricles and   left-to-right shift.    US Duplex Venous Lower Ext Complete, Bilateral (11.14.23 @ 10:38)   IMPRESSION:  No evidence of deep venous thrombosis in either lower extremity.  Left calf veins not visualized.  Small right popliteal fossa cyst.    CT Angio Brain, Neck and perfusion Stroke Protocol  w/ IV Cont (11.13.23 @ 11:18)   CTA BRAIN:  Acute thrombus in the distal left MCA M1 segment. Diminished flow in the   distal branches.  Severe bilateral cavernous carotid artery calcifications. Fetal origins   the bilateral posterior cerebral arteries. The [place] and   vertebrobasilar system are otherwise unremarkable without evidence of   stenosis, additional occlusion or saccular aneurysm dilation. No evidence   for arterial venous malformation. The vertebral arteries are codominant.    CTA NECK:  Mild bilateral carotid bulb calcifications. Mild stenosis in the left   carotid bulb. The right internal carotid arteries tortuous.  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 There is normal relationship to   the great vessels. The common carotid arteries, internal carotid arteries   and vertebral arteries shows no other evidence of significant stenosis,   occlusion or saccular aneurysm dilation. The vertebral arteries are   codominant.    CT PERFUSION:  Patient has only had less than 2 hours of symptoms may influence the CT   perfusion.  CBF<30% volume: 26 ml left MCA territory.  Tmax>6.0 s volume: 193 ml left MCA territory  Mismatch volume: 167 ml  Mismatch ratio: 7.4  IMPRESSION:  Thrombus in the distal left MCA M1 segment with diminished flow in the   distal MCA segments. 167 mL area of penumbra in the left MCA territory.     CT Brain Stroke Protocol (11.13.23 @ 11:15)   IMPRESSION:  Dense left MCA M1 segment suggesting intraluminal thrombus.   Mild chronic microvascular changes without evidence of an acute   transcortical infarction or hemorrhage.    TTE Echo Complete w/o Contrast w/ Doppler (11.14.23 @ 15:57)   Summary:   1. Left ventricular ejection fraction, by visual estimation, is 55 to   60%.   2. Normal global left ventricular systolic function.   3. The mitral in-flow pattern reveals no discernable A-wave, therefore   no comment on diastolic function can be made.   4. Normal right ventricular size and function.   5. Mild to moderately enlarged right atrium.   6. Moderatelyenlarged left atrium.   7. Trace mitral valve regurgitation.   8. Mild-moderate tricuspid regurgitation.   9. Sclerotic aortic valve with decreased opening.  10. Estimated pulmonary artery systolic pressure is 41.6 mmHg assuming a   right atrial pressure of 5 mmHg, which is consistent with mild pulmonary   hypertension.           [facility] Stroke Team  Progress Note     HPI:  79 yo M PMH of HTN, HLD, AFib, CKD, obesity, gout, prostate cancer, DVT/PE, OA s/p multiple orthopedic procedures, spinal stenosis, presented on 11/13/23 for elective left atrial appendage occlusion procedure, and developed a MCA syndrome due to acute L M1 occlusion as demonstrated on CTA Head and Neck. Patient has a long h/o AFib, which is now considered permanent. He is on rate control with metoprolol 25 mg bid, and anticoagulation with Pradaxa.  He has a long Hx of arthritis and had multiple hip replacements, bilateral knee replacements and also has neck and back pain, but is unable to take NSAIDs due to bleeding risks. He has an unsteady gait and uses a cane for assistance.  Patient presented on 11/13 for elective left atrial appendage occlusion with Watchman device/HARJINDER w/ Dr Bobo, and Pradaxa was held for 2 days prior to procedure. 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   Initial NIHSS 26, MRS 1. Patient s/p tenecteplase administration at 11:16 11/13/23 and TICI 2A thrombectomy with Dr. Soliman.    SUBJECTIVE: No events overnight.  No new neurologic complaints.  ROS reported negative unless otherwise noted.    acetaminophen     Tablet .. 650 milliGRAM(s) Oral every 6 hours PRN  albuterol/ipratropium (CFC free) Inhaler. 1 Puff(s) Inhalation four times a day PRN  allopurinol 300 milliGRAM(s) Oral daily  aspirin  chewable 81 milliGRAM(s) Oral daily  atorvastatin 40 milliGRAM(s) Oral at bedtime  chlorhexidine 2% Cloths 1 Application(s) Topical daily  dextrose 5%. 1000 milliLiter(s) IV Continuous <Continuous>  dextrose 5%. 1000 milliLiter(s) IV Continuous <Continuous>  dextrose 50% Injectable 12.5 Gram(s) IV Push once  dextrose 50% Injectable 25 Gram(s) IV Push once  dextrose 50% Injectable 25 Gram(s) IV Push once  dextrose Oral Gel 15 Gram(s) Oral once PRN  DULoxetine 30 milliGRAM(s) Oral daily  enoxaparin Injectable 40 milliGRAM(s) SubCutaneous <User Schedule>  famotidine    Tablet 20 milliGRAM(s) Oral two times a day  gabapentin 300 milliGRAM(s) Oral two times a day  glucagon  Injectable 1 milliGRAM(s) IntraMuscular once  insulin lispro (ADMELOG) corrective regimen sliding scale   SubCutaneous Before meals and at bedtime  metoprolol tartrate 25 milliGRAM(s) Oral two times a day  nystatin Powder 1 Application(s) Topical two times a day  polyethylene glycol 3350 17 Gram(s) Oral daily  predniSONE   Tablet 5 milliGRAM(s) Oral daily  senna 2 Tablet(s) Oral at bedtime      PHYSICAL EXAM:   Vital Signs Last 24 Hrs  T(C): 37.3 (20 Nov 2023 15:27), Max: 37.6 (20 Nov 2023 08:00)  T(F): 99.2 (20 Nov 2023 15:27), Max: 99.7 (20 Nov 2023 08:00)  HR: 73 (20 Nov 2023 12:00) (60 - 95)  BP: 134/83 (20 Nov 2023 12:00) (112/599 - 154/78)  BP(mean): 91 (20 Nov 2023 12:00) (70 - 100)  RR: 20 (20 Nov 2023 12:00) (20 - 24)  SpO2: 99% (20 Nov 2023 12:00) (94% - 100%)    Parameters below as of 20 Nov 2023 12:00  Patient On (Oxygen Delivery Method): room air    General: NAD.    Detailed Neurologic Exam:    Mental status: Attends to examiner and is awake.  Right sided visual willow-neglect present. Global aphasia noted with no verbal output. Unable to follow commands, ID objects, or repeat. Not oriented to self, place, or month. Does mimic examiner.    Cranial nerves: Pupils equal and react symmetrically to light. Blink to threat intermittently on right eye. Blink to threat present out of left eye. Left gaze preference with right gaze palsy that gets to midline but does not cross midline. Right facial weakness noted.     Motor: There is normal bulk and tone.  There is no tremor.  RLE: slight withdrawal due to noxious stimuli. Was externally rotated on presentation  RUE: 0/5 strength with no movement. Grimaces to noxious stimuli with no movement.  Strength is 5/5 in the left arm and leg with no drift.    Sensation: Grimaces to noxious stimuli in all extremities.    Cerebellar: unable to assess dysmetria on finger to nose testing.    Gait : deferred      LABS:                        9.5    8.15  )-----------( 182      ( 20 Nov 2023 06:58 )             30.1    11-20    145  |  106  |  17.1  ----------------------------<  140<H>  4.0   |  30.0<H>  |  0.77    Ca    8.4      20 Nov 2023 06:58    IMAGING: Reviewed by me.    MR Head No Cont (11.19.23 @ 15:52)   IMPRESSION: Redemonstration of an evolving acute/subacute large   left-sided MCA distribution infarction with gross hemorrhagic   transformation in the left basal ganglia as well as evidence of petechial   hemorrhagic transformation within the left frontotemporal and parietal   cortices.    Associated cytotoxic edema, sulcal effacement, and mass effect as well as   shift of the midline structures from left to right measuring 5.5 mm   appears unchanged. There is no herniation.    CT Head No Cont (11.16.23 @ 18:05)   IMPRESSION: Stable moderate to large left MCA territory infarction   involving the left frontal lobe, left basal ganglia, left insular ribbon,   left parietal occipital lobe, and anterior left temporal lobe with stable   mild hemorrhage in the left basal ganglia. Left-to-right midline shift   measures 5.5 mm.    CT Head No Cont (11.14.23 @ 18:57)   IMPRESSION:  Stable moderate to large acute left MCA infarct with mild hemorrhage    TTE Echo Complete w/o Contrast w/ Doppler (11.14.23 @ 15:57)   Summary:   1. Left ventricular ejection fraction, by visual estimation, is 55 to   60%.   2. Normal global left ventricular systolic function.   3. The mitral in-flow pattern reveals no discernable A-wave, therefore   no comment on diastolic function can be made.   4. Normal right ventricular size and function.   5. Mild to moderately enlarged right atrium.   6. Moderately enlarged left atrium.   7. Trace mitral valve regurgitation.   8. Mild-moderate tricuspid regurgitation.   9. Sclerotic aortic valve with decreased opening.  10. Estimated pulmonary artery systolic pressure is 41.6 mmHg assuming a   right atrial pressure of 5 mmHg, which is consistent with mild pulmonary   hypertension.    CT Head No Cont (11.14.23 @ 11:36)   IMPRESSION: Evolving left MCA infarct is identified with hemorrhagic   transformation now seen with mass effect on left lateral ventricles and   left-to-right shift.    US Duplex Venous Lower Ext Complete, Bilateral (11.14.23 @ 10:38)   IMPRESSION:  No evidence of deep venous thrombosis in either lower extremity.  Left calf veins not visualized.  Small right popliteal fossa cyst.    CT Angio Brain, Neck and perfusion Stroke Protocol  w/ IV Cont (11.13.23 @ 11:18)   CTA BRAIN:  Acute thrombus in the distal left MCA M1 segment. Diminished flow in the   distal branches.  Severe bilateral cavernous carotid artery calcifications. Fetal origins   the bilateral posterior cerebral arteries. The [place] and   vertebrobasilar system are otherwise unremarkable without evidence of   stenosis, additional occlusion or saccular aneurysm dilation. No evidence   for arterial venous malformation. The vertebral arteries are codominant.    CTA NECK:  Mild bilateral carotid bulb calcifications. Mild stenosis in the left   carotid bulb. The right internal carotid arteries tortuous.  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 There is normal relationship to   the great vessels. The common carotid arteries, internal carotid arteries   and vertebral arteries shows no other evidence of significant stenosis,   occlusion or saccular aneurysm dilation. The vertebral arteries are   codominant.    CT PERFUSION:  Patient has only had less than 2 hours of symptoms may influence the CT   perfusion.  CBF<30% volume: 26 ml left MCA territory.  Tmax>6.0 s volume: 193 ml left MCA territory  Mismatch volume: 167 ml  Mismatch ratio: 7.4  IMPRESSION:  Thrombus in the distal left MCA M1 segment with diminished flow in the   distal MCA segments. 167 mL area of penumbra in the left MCA territory.     CT Brain Stroke Protocol (11.13.23 @ 11:15)   IMPRESSION:  Dense left MCA M1 segment suggesting intraluminal thrombus.   Mild chronic microvascular changes without evidence of an acute   transcortical infarction or hemorrhage.    TTE Echo Complete w/o Contrast w/ Doppler (11.14.23 @ 15:57)   Summary:   1. Left ventricular ejection fraction, by visual estimation, is 55 to   60%.   2. Normal global left ventricular systolic function.   3. The mitral in-flow pattern reveals no discernable A-wave, therefore   no comment on diastolic function can be made.   4. Normal right ventricular size and function.   5. Mild to moderately enlarged right atrium.   6. Moderatelyenlarged left atrium.   7. Trace mitral valve regurgitation.   8. Mild-moderate tricuspid regurgitation.   9. Sclerotic aortic valve with decreased opening.  10. Estimated pulmonary artery systolic pressure is 41.6 mmHg assuming a   right atrial pressure of 5 mmHg, which is consistent with mild pulmonary   hypertension.

## 2023-11-20 NOTE — DISCHARGE NOTE PROVIDER - NSDCCPCAREPLAN_GEN_ALL_CORE_FT
PRINCIPAL DISCHARGE DIAGNOSIS  Diagnosis: CVA (cerebrovascular accident)  Assessment and Plan of Treatment: Stable  Rehab on discharge  Outpatient neuro follow up on discharge      SECONDARY DISCHARGE DIAGNOSES  Diagnosis: Chronic atrial fibrillation  Assessment and Plan of Treatment:     Diagnosis: Hypernatremia  Assessment and Plan of Treatment:     Diagnosis: HTN (hypertension)  Assessment and Plan of Treatment:     Diagnosis: Diabetes mellitus  Assessment and Plan of Treatment:      PRINCIPAL DISCHARGE DIAGNOSIS  Diagnosis: CVA (cerebrovascular accident)  Assessment and Plan of Treatment: - Stable  - Left MCA CVA with M1 occlusion s/p TNK and thrombectomy with hemorrhagic transformation  - Likely cardioembolic while AC had been on hold  - Rehab on discharge  - Outpatient neuro follow up on discharge      SECONDARY DISCHARGE DIAGNOSES  Diagnosis: Hypernatremia  Assessment and Plan of Treatment: - Resolved

## 2023-11-20 NOTE — PROGRESS NOTE ADULT - ASSESSMENT
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 Patient presented on 11/13/23 for elective left atrial appendage occlusion with Watchman device/HARJINDER w/ Dr Bobo, and Pradaxa was held for 2 days prior to procedure. Whilst in holding patient developed a left MCA syndrome due to acute left M1 occlusion as demonstrated on CTH & Neck. On admission, NIHSS 26, MRS 1. Patient is now s/p tenecteplase administration at 11:16 11/13/23, and TICI 2A thrombectomy with Dr. Soliman. 24 hour repeat CT head s/p tenecteplase administration showed evolving left MCA stroke with hemorrhagic transformation, and mass effect with left to right shift. MRI brain revealed redemonstration of an evolving acute/subacute large left-sided MCA distribution infarction with gross hemorrhagic transformation in the left basal ganglia as well as evidence of petechial hemorrhagic transformation within the left frontotemporal and parietal cortices. Suspected etiology of stroke is cardioembolic from atrial fibrillation when off anticoagulation for recent procedure, 2 cardiac thrombi were appreciated on CTA chest 10 days prior.    NEURO:   -Neurologically no acute neurologic changes   -Continue close monitoring for neurologic deterioration    -Stroke neuro checks q 4 hours  -Permissive HTN or SBP goal up to 180mmHg, avoid hypotension or rapid fluctuations in blood pressure   -ANTITHROMBOTIC THERAPY: ASA 81mg daily for now. Patient scheduled for HARJINDER on 11/21/23 and head CT on 11/27/23. Dependent on findings and clinical exam will further clarify timeline for initiation of anticoagulation.   -titrate statin to LDL goal less than 70, LDL=54, may resume home regimen of Rosuvastatin 10 mg PO QHS when patient able to tolerate PO   -MRI Brain w/o as noted  -Dysphagia screen: pass  -Physical therapy/OT/Speech eval/treatment.   -Neurosurgery input appreciated, patient not a hemicrani candidate     CARDIOVASCULAR:  -TTE findings as above, no acute findings   -No cardiac intervention at this time as the benefits EP following.   -cardiac monitoring w/ telemetry for now, further evaluation pending findings of noted workup                              HEMATOLOGY:   -H/H 9.5/30.1.  Platelets 182. monitor anemia/thrombocytopenia per primary team, patient should have all age and risk appropriate malignancy screenings with PCP or sooner if clinically suspected   -Bilateral lower extremity venous duplex negative for DVT   -DVT ppx: Heparin s.c [] LMWH [x]     PULMONARY:   -Patient extubated 11/14/23  -protecting airway, saturating well     RENAL:   -BUN/Cr 17.1/0.77. monitor urine output, maintain adequate hydration    -Na Goal:  145-150 secondary to cerebral edema.    ID:   -afebrile, leukocytosis resolved, monitor for si/sx of infection     OTHER:    -condition and plan of care d/w patient and family at bedside, questions and concerns addressed.     DISPOSITION: Rehab or home depending on PT eval once stable and workup is complete    CORE MEASURES:        Admission NIHSS: 26     Tenecteplase : [x] YES [] NO      LDL/HDL/A1C: 54/67/6.2     Depression Screen- if depression hx and/or present      Statin Therapy: Atorvastatin 40 mg PO Daily      Dysphagia Screen: [x] PASS [] FAIL      Smoking [] YES [x] NO      Afib [x] YES [] NO     Stroke Education [x] YES [] NO    Obtain screening lower extremity venous ultrasound in patients who meet 1 or more of the following criteria as patient is high risk for DVT/PE on admission:   [] History of DVT/PE  []Hypercoagulable states (Factor V Leiden, Cancer, OCP, etc. )  []Prolonged immobility (hemiplegia/hemiparesis/post operative or any other extended immobilization)  [] Transferred from outside facility (Rehab or Long term care)  [] Age </= to 50

## 2023-11-20 NOTE — DISCHARGE NOTE PROVIDER - NSDCMRMEDTOKEN_GEN_ALL_CORE_FT
allopurinol 300 mg oral tablet: 1 tab(s) orally once a day  Centrum Silver oral tablet: 1 tab(s) orally once a day  Cymbalta 30 mg oral delayed release capsule: 1 cap(s) orally once a day  famotidine 20 mg oral tablet: 1 tab(s) orally 2 times a day  gabapentin 300 mg oral capsule: 1 cap(s) orally 2 times a day  Klor-Con 10 mEq oral tablet, extended release: 1 tab(s) orally once a day  Metoprolol Tartrate 25 mg oral tablet: 1 tab(s) orally 2 times a day  Pradaxa 150 mg oral capsule: 1 cap(s) orally 2 times a day  predniSONE 5 mg oral tablet: 1 tab(s) orally once a day (at bedtime)  Probiotic Formula oral capsule: 1 cap(s) orally once a day  rosuvastatin 10 mg oral tablet: 1 tab(s) orally once a day (at bedtime)  torsemide 10 mg oral tablet: 1 tab(s) orally once a day  traMADol 50 mg oral tablet: 1 tab(s) orally prn  Vitamin C 1000 mg:   Vitamin D 1000 mg:    allopurinol 300 mg oral tablet: 1 tab(s) orally once a day  apixaban 5 mg oral tablet: 1 tab(s) orally every 12 hours  aspirin 81 mg oral tablet, chewable: 1 tab(s) orally once a day  atorvastatin 40 mg oral tablet: 1 tab(s) orally once a day (at bedtime)  Centrum Silver oral tablet: 1 tab(s) orally once a day  Cymbalta 30 mg oral delayed release capsule: 1 cap(s) orally once a day  famotidine 20 mg oral tablet: 1 tab(s) orally 2 times a day  gabapentin 300 mg oral capsule: 1 cap(s) orally 2 times a day  Metoprolol Tartrate 25 mg oral tablet: 1 tab(s) orally 2 times a day  predniSONE 5 mg oral tablet: 1 tab(s) orally once a day (at bedtime)  Vitamin C 1000 mg:   Vitamin D 1000 mg:

## 2023-11-20 NOTE — DISCHARGE NOTE PROVIDER - PROVIDER TOKENS
PROVIDER:[TOKEN:[[id number]],FOLLOWUP:[2 weeks]] PROVIDER:[TOKEN:[[id number]],FOLLOWUP:[2 weeks]] PROVIDER:[TOKEN:[[id number]],FOLLOWUP:[2 weeks]]

## 2023-11-20 NOTE — PROGRESS NOTE ADULT - ASSESSMENT
78y/oM PMH of HTN, HLD, AFib on Pradaxa, CKD, Obesity, Gout, Prostate cancer, DVT/PE, OA s/p multiple orthopedic procedures, Spinal Stenosis, presented on 11/13/23 for elective left atrial appendage occlusion procedure, and developed right sided weakness. Imaging revealed an acute L M1 occlusion s/p TNK administration and TICI 2A thrombectomy. Patient was intubated for airway protection. Repeat CT head s/p tenecteplase administration showed evolving left MCA stroke with hemorrhagic transformation, and mass effects with left to right shift. Suspected etiology of stroke is cardioembolic from atrial fibrillation when off anticoagulation for recent procedure. Patient was subsequently extubated and is now stable for transfer to step down unit (11/17)    Left MCA CVA with M1 occlusion  Hemorrhagic transformation  -likely cardioembolic while AC had been on hold  -right sided weakness (RUE>RLE) and global aphasia  -s/p TNK and thrombectomy  -repeat CT head s/p TNK with evolving left MCA stroke with mild hemorrhage and mass effect  -No antiplatelets until cleared by stroke neuro team--> cleared to start aspirin 81mg qd 11/17  -continue statin  -hold AC   -repeat CTH 11/16 stable   -MRI brain completed with acute CVA and hemorrhagic transformation with   -TTE reviewed; no PFO  -neurochecks q 2 hours  -PT - JHOAN  -OT -AR  -SLP eval appreciated; continue dysphagia diet  -aspiration/fall precautions  -allow for permissive HTN  -stroke neuro recs appreciated  -Plan for HARJINDER tomorrow     Atrial Fibrillation  -AC on hold  -continue metoprolol for rate control  -TTE reviewed  -HARJINDER in AM     Hypernatremia  -goal sodium 145-150 per stroke neuro recs due to cerebral edema  -hold torsemide   -hold parenteral free water  -monitor I/os  -repeat BMP in AM    Anemia likely due to chronic disease  -Hb relatively stable  -check iron studies  -monitor CBC closely    Acute Hypoxic Respiratory Failure - resolved  -intubated for airway protection on admission   -history of ILD; will clarify if it is the etiology of chronic prednisone use   -extubated on 11/14  -CXR no acute infiltrates  -bronchodilators as needed  -incentive spirometry     DM  -HbA1c noted  -continue ISS  -ADA diet    History of PE/DVT  -off AC due to large infarct with mild hemorrhage  -LE duplex negative for DVT    HTN  -SBP 130s (goal 130-180 per neuro recs)  -continue metoprolol  -low sodium diet    GERD  -continue famotidine    Gout  -continue allopurinol    Neuropathy  -continue gabapentin     DVT ppx- Lovenox SC    Per Neurology - Needs HARJINDER - Plan for HARJINDER in AM as per EP.  Acute rehab afterwards.

## 2023-11-20 NOTE — DISCHARGE NOTE PROVIDER - DETAILS OF MALNUTRITION DIAGNOSIS/DIAGNOSES
This patient has been assessed with a concern for Malnutrition and was treated during this hospitalization for the following Nutrition diagnosis/diagnoses:     -  11/22/2023: Moderate protein-calorie malnutrition

## 2023-11-20 NOTE — DISCHARGE NOTE PROVIDER - CARE PROVIDER_API CALL
Salvatore Hill  Neurology  [address] [place] [id number]  Phone: [phone]  Fax: [phone]  Follow Up Time: 2 weeks   Salvatore Hill  Neurology  [address] [place] [id number]  Phone: [phone]  Fax: [phone]  Follow Up Time: 2 weeks   Salvatore Hill  Neurology  [address] [place] [id number]  Phone: [phone]  Fax: [phone]  Follow Up Time: 2 weeks

## 2023-11-20 NOTE — PROGRESS NOTE ADULT - SUBJECTIVE AND OBJECTIVE BOX
Hospitalist Daily Progress Note    Chief Complaint:  Patient is a 78y old  Male who presents with a chief complaint of Lt MCA M1 thrombus (20 Nov 2023 13:41)      SUBJECTIVE / OVERNIGHT EVENTS:  Patient was seen and examined at bedside.   No overnight events  Nods head yes/no  Not able to do ROS.    MEDICATIONS  (STANDING):  allopurinol 300 milliGRAM(s) Oral daily  aspirin  chewable 81 milliGRAM(s) Oral daily  atorvastatin 40 milliGRAM(s) Oral at bedtime  chlorhexidine 2% Cloths 1 Application(s) Topical daily  dextrose 5%. 1000 milliLiter(s) (50 mL/Hr) IV Continuous <Continuous>  dextrose 5%. 1000 milliLiter(s) (100 mL/Hr) IV Continuous <Continuous>  dextrose 50% Injectable 12.5 Gram(s) IV Push once  dextrose 50% Injectable 25 Gram(s) IV Push once  dextrose 50% Injectable 25 Gram(s) IV Push once  DULoxetine 30 milliGRAM(s) Oral daily  enoxaparin Injectable 40 milliGRAM(s) SubCutaneous <User Schedule>  famotidine    Tablet 20 milliGRAM(s) Oral two times a day  gabapentin 300 milliGRAM(s) Oral two times a day  glucagon  Injectable 1 milliGRAM(s) IntraMuscular once  insulin lispro (ADMELOG) corrective regimen sliding scale   SubCutaneous Before meals and at bedtime  metoprolol tartrate 25 milliGRAM(s) Oral two times a day  nystatin Powder 1 Application(s) Topical two times a day  polyethylene glycol 3350 17 Gram(s) Oral daily  predniSONE   Tablet 5 milliGRAM(s) Oral daily  senna 2 Tablet(s) Oral at bedtime    MEDICATIONS  (PRN):  acetaminophen     Tablet .. 650 milliGRAM(s) Oral every 6 hours PRN Temp greater or equal to 38C (100.4F)  albuterol/ipratropium (CFC free) Inhaler. 1 Puff(s) Inhalation four times a day PRN Wheezing  dextrose Oral Gel 15 Gram(s) Oral once PRN Blood Glucose LESS THAN 70 milliGRAM(s)/deciliter        I&O's Summary    19 Nov 2023 07:01  -  20 Nov 2023 07:00  --------------------------------------------------------  IN: 0 mL / OUT: 650 mL / NET: -650 mL        PHYSICAL EXAM:  Vital Signs Last 24 Hrs  T(C): 37.6 (20 Nov 2023 08:00), Max: 37.6 (20 Nov 2023 08:00)  T(F): 99.7 (20 Nov 2023 08:00), Max: 99.7 (20 Nov 2023 08:00)  HR: 68 (20 Nov 2023 08:00) (60 - 82)  BP: 115/92 (20 Nov 2023 08:00) (110/69 - 154/78)  BP(mean): 100 (20 Nov 2023 08:00) (70 - 100)  RR: 24 (20 Nov 2023 08:00) (20 - 24)  SpO2: 99% (20 Nov 2023 08:00) (94% - 100%)    Parameters below as of 20 Nov 2023 08:00  Patient On (Oxygen Delivery Method): room air      Constitutional: NAD, Resting, tattoos   ENT: Supple, No JVD  Lungs: CTA B/L, Non-labored breathing  Cardio: Irregular S1/s2  Abdomen: Soft, Nontender, Nondistended; Bowel sounds present  Extremities: No calf tenderness, No pitting edema  Musculoskeletal:   No joint swelling  Psych: Calm, cooperative affect appropriate  Neuro: Awake and alert, Can nod yes/no Aphasic, RUE is 0/5, RLE IS SLIGHTLY better, able to move foot, LUE/LLE is 5/5   Skin: No rashes; no palpable lesions    LABS:                        9.5    8.15  )-----------( 182      ( 20 Nov 2023 06:58 )             30.1     11-20    145  |  106  |  17.1  ----------------------------<  140<H>  4.0   |  30.0<H>  |  0.77    Ca    8.4      20 Nov 2023 06:58            Urinalysis Basic - ( 20 Nov 2023 06:58 )    Color: x / Appearance: x / SG: x / pH: x  Gluc: 140 mg/dL / Ketone: x  / Bili: x / Urobili: x   Blood: x / Protein: x / Nitrite: x   Leuk Esterase: x / RBC: x / WBC x   Sq Epi: x / Non Sq Epi: x / Bacteria: x        CAPILLARY BLOOD GLUCOSE      POCT Blood Glucose.: 215 mg/dL (20 Nov 2023 12:11)  POCT Blood Glucose.: 122 mg/dL (20 Nov 2023 08:26)  POCT Blood Glucose.: 225 mg/dL (19 Nov 2023 21:36)  POCT Blood Glucose.: 113 mg/dL (19 Nov 2023 18:20)        RADIOLOGY REVIEWED

## 2023-11-20 NOTE — DISCHARGE NOTE PROVIDER - HOSPITAL COURSE
78y/oM PMH of HTN, HLD, AFib on Pradaxa, CKD, Obesity, Gout, Prostate cancer, DVT/PE, OA s/p multiple orthopedic procedures, Spinal Stenosis, presented on 11/13/23 for elective left atrial appendage occlusion procedure, and developed right sided weakness. Imaging revealed an acute L M1 occlusion s/p TNK administration and TICI 2A thrombectomy. Patient was intubated for airway protection. Repeat CT head s/p tenecteplase administration showed evolving left MCA stroke with hemorrhagic transformation, and mass effects with left to right shift. Suspected etiology of stroke is cardioembolic from atrial fibrillation when off anticoagulation for recent procedure. Patient was subsequently extubated on 11/14 and transferred to SDU. Repeat CTH head stable. TTE no PFO. MRI performed which showed.  Redemonstration of an evolving acute/subacute large left-sided MCA distribution infarction with gross hemorrhagic transformation in the left basal ganglia as well as evidence of petechial hemorrhagic transformation within the left frontotemporal and parietal cortices. Associated cytotoxic edema, sulcal effacement, and mass effect as well as shift of the midline structures from left to right measuring 5.5 mm appears unchanged. There is no herniation. AC was held during admission. Pt was cleared to start ASA by neuro on 11/17. Seen by PT, recommends JHOAN. Course complicated by hypernatremia, likely due to cerebral edema. Torsemide was held.

## 2023-11-21 LAB
ANION GAP SERPL CALC-SCNC: 9 MMOL/L — SIGNIFICANT CHANGE UP (ref 5–17)
ANION GAP SERPL CALC-SCNC: 9 MMOL/L — SIGNIFICANT CHANGE UP (ref 5–17)
BASOPHILS # BLD AUTO: 0.03 K/UL — SIGNIFICANT CHANGE UP (ref 0–0.2)
BASOPHILS # BLD AUTO: 0.03 K/UL — SIGNIFICANT CHANGE UP (ref 0–0.2)
BASOPHILS NFR BLD AUTO: 0.4 % — SIGNIFICANT CHANGE UP (ref 0–2)
BASOPHILS NFR BLD AUTO: 0.4 % — SIGNIFICANT CHANGE UP (ref 0–2)
BUN SERPL-MCNC: 17.3 MG/DL — SIGNIFICANT CHANGE UP (ref 8–20)
BUN SERPL-MCNC: 17.3 MG/DL — SIGNIFICANT CHANGE UP (ref 8–20)
CALCIUM SERPL-MCNC: 8.9 MG/DL — SIGNIFICANT CHANGE UP (ref 8.4–10.5)
CALCIUM SERPL-MCNC: 8.9 MG/DL — SIGNIFICANT CHANGE UP (ref 8.4–10.5)
CHLORIDE SERPL-SCNC: 108 MMOL/L — SIGNIFICANT CHANGE UP (ref 96–108)
CHLORIDE SERPL-SCNC: 108 MMOL/L — SIGNIFICANT CHANGE UP (ref 96–108)
CO2 SERPL-SCNC: 29 MMOL/L — SIGNIFICANT CHANGE UP (ref 22–29)
CO2 SERPL-SCNC: 29 MMOL/L — SIGNIFICANT CHANGE UP (ref 22–29)
CREAT SERPL-MCNC: 0.83 MG/DL — SIGNIFICANT CHANGE UP (ref 0.5–1.3)
CREAT SERPL-MCNC: 0.83 MG/DL — SIGNIFICANT CHANGE UP (ref 0.5–1.3)
EGFR: 89 ML/MIN/1.73M2 — SIGNIFICANT CHANGE UP
EGFR: 89 ML/MIN/1.73M2 — SIGNIFICANT CHANGE UP
EOSINOPHIL # BLD AUTO: 0.16 K/UL — SIGNIFICANT CHANGE UP (ref 0–0.5)
EOSINOPHIL # BLD AUTO: 0.16 K/UL — SIGNIFICANT CHANGE UP (ref 0–0.5)
EOSINOPHIL NFR BLD AUTO: 1.9 % — SIGNIFICANT CHANGE UP (ref 0–6)
EOSINOPHIL NFR BLD AUTO: 1.9 % — SIGNIFICANT CHANGE UP (ref 0–6)
GLUCOSE BLDC GLUCOMTR-MCNC: 145 MG/DL — HIGH (ref 70–99)
GLUCOSE BLDC GLUCOMTR-MCNC: 145 MG/DL — HIGH (ref 70–99)
GLUCOSE BLDC GLUCOMTR-MCNC: 154 MG/DL — HIGH (ref 70–99)
GLUCOSE BLDC GLUCOMTR-MCNC: 154 MG/DL — HIGH (ref 70–99)
GLUCOSE BLDC GLUCOMTR-MCNC: 163 MG/DL — HIGH (ref 70–99)
GLUCOSE BLDC GLUCOMTR-MCNC: 163 MG/DL — HIGH (ref 70–99)
GLUCOSE BLDC GLUCOMTR-MCNC: 173 MG/DL — HIGH (ref 70–99)
GLUCOSE BLDC GLUCOMTR-MCNC: 173 MG/DL — HIGH (ref 70–99)
GLUCOSE SERPL-MCNC: 141 MG/DL — HIGH (ref 70–99)
GLUCOSE SERPL-MCNC: 141 MG/DL — HIGH (ref 70–99)
HCT VFR BLD CALC: 30.8 % — LOW (ref 39–50)
HCT VFR BLD CALC: 30.8 % — LOW (ref 39–50)
HGB BLD-MCNC: 9.8 G/DL — LOW (ref 13–17)
HGB BLD-MCNC: 9.8 G/DL — LOW (ref 13–17)
IMM GRANULOCYTES NFR BLD AUTO: 0.9 % — SIGNIFICANT CHANGE UP (ref 0–0.9)
IMM GRANULOCYTES NFR BLD AUTO: 0.9 % — SIGNIFICANT CHANGE UP (ref 0–0.9)
LYMPHOCYTES # BLD AUTO: 1.28 K/UL — SIGNIFICANT CHANGE UP (ref 1–3.3)
LYMPHOCYTES # BLD AUTO: 1.28 K/UL — SIGNIFICANT CHANGE UP (ref 1–3.3)
LYMPHOCYTES # BLD AUTO: 15.1 % — SIGNIFICANT CHANGE UP (ref 13–44)
LYMPHOCYTES # BLD AUTO: 15.1 % — SIGNIFICANT CHANGE UP (ref 13–44)
MCHC RBC-ENTMCNC: 30.4 PG — SIGNIFICANT CHANGE UP (ref 27–34)
MCHC RBC-ENTMCNC: 30.4 PG — SIGNIFICANT CHANGE UP (ref 27–34)
MCHC RBC-ENTMCNC: 31.8 GM/DL — LOW (ref 32–36)
MCHC RBC-ENTMCNC: 31.8 GM/DL — LOW (ref 32–36)
MCV RBC AUTO: 95.7 FL — SIGNIFICANT CHANGE UP (ref 80–100)
MCV RBC AUTO: 95.7 FL — SIGNIFICANT CHANGE UP (ref 80–100)
MONOCYTES # BLD AUTO: 0.97 K/UL — HIGH (ref 0–0.9)
MONOCYTES # BLD AUTO: 0.97 K/UL — HIGH (ref 0–0.9)
MONOCYTES NFR BLD AUTO: 11.5 % — SIGNIFICANT CHANGE UP (ref 2–14)
MONOCYTES NFR BLD AUTO: 11.5 % — SIGNIFICANT CHANGE UP (ref 2–14)
NEUTROPHILS # BLD AUTO: 5.93 K/UL — SIGNIFICANT CHANGE UP (ref 1.8–7.4)
NEUTROPHILS # BLD AUTO: 5.93 K/UL — SIGNIFICANT CHANGE UP (ref 1.8–7.4)
NEUTROPHILS NFR BLD AUTO: 70.2 % — SIGNIFICANT CHANGE UP (ref 43–77)
NEUTROPHILS NFR BLD AUTO: 70.2 % — SIGNIFICANT CHANGE UP (ref 43–77)
PLATELET # BLD AUTO: 197 K/UL — SIGNIFICANT CHANGE UP (ref 150–400)
PLATELET # BLD AUTO: 197 K/UL — SIGNIFICANT CHANGE UP (ref 150–400)
POTASSIUM SERPL-MCNC: 3.9 MMOL/L — SIGNIFICANT CHANGE UP (ref 3.5–5.3)
POTASSIUM SERPL-MCNC: 3.9 MMOL/L — SIGNIFICANT CHANGE UP (ref 3.5–5.3)
POTASSIUM SERPL-SCNC: 3.9 MMOL/L — SIGNIFICANT CHANGE UP (ref 3.5–5.3)
POTASSIUM SERPL-SCNC: 3.9 MMOL/L — SIGNIFICANT CHANGE UP (ref 3.5–5.3)
RBC # BLD: 3.22 M/UL — LOW (ref 4.2–5.8)
RBC # BLD: 3.22 M/UL — LOW (ref 4.2–5.8)
RBC # FLD: 13.3 % — SIGNIFICANT CHANGE UP (ref 10.3–14.5)
RBC # FLD: 13.3 % — SIGNIFICANT CHANGE UP (ref 10.3–14.5)
SODIUM SERPL-SCNC: 146 MMOL/L — HIGH (ref 135–145)
SODIUM SERPL-SCNC: 146 MMOL/L — HIGH (ref 135–145)
WBC # BLD: 8.45 K/UL — SIGNIFICANT CHANGE UP (ref 3.8–10.5)
WBC # BLD: 8.45 K/UL — SIGNIFICANT CHANGE UP (ref 3.8–10.5)
WBC # FLD AUTO: 8.45 K/UL — SIGNIFICANT CHANGE UP (ref 3.8–10.5)
WBC # FLD AUTO: 8.45 K/UL — SIGNIFICANT CHANGE UP (ref 3.8–10.5)

## 2023-11-21 PROCEDURE — 99232 SBSQ HOSP IP/OBS MODERATE 35: CPT

## 2023-11-21 RX ADMIN — ATORVASTATIN CALCIUM 40 MILLIGRAM(S): 80 TABLET, FILM COATED ORAL at 21:08

## 2023-11-21 RX ADMIN — ENOXAPARIN SODIUM 40 MILLIGRAM(S): 100 INJECTION SUBCUTANEOUS at 17:48

## 2023-11-21 RX ADMIN — Medication 300 MILLIGRAM(S): at 12:57

## 2023-11-21 RX ADMIN — SENNA PLUS 2 TABLET(S): 8.6 TABLET ORAL at 21:08

## 2023-11-21 RX ADMIN — FAMOTIDINE 20 MILLIGRAM(S): 10 INJECTION INTRAVENOUS at 17:47

## 2023-11-21 RX ADMIN — Medication 81 MILLIGRAM(S): at 12:57

## 2023-11-21 RX ADMIN — GABAPENTIN 300 MILLIGRAM(S): 400 CAPSULE ORAL at 17:47

## 2023-11-21 RX ADMIN — Medication 25 MILLIGRAM(S): at 07:53

## 2023-11-21 RX ADMIN — CHLORHEXIDINE GLUCONATE 1 APPLICATION(S): 213 SOLUTION TOPICAL at 06:20

## 2023-11-21 RX ADMIN — NYSTATIN CREAM 1 APPLICATION(S): 100000 CREAM TOPICAL at 06:20

## 2023-11-21 RX ADMIN — NYSTATIN CREAM 1 APPLICATION(S): 100000 CREAM TOPICAL at 17:48

## 2023-11-21 RX ADMIN — Medication 1: at 18:02

## 2023-11-21 RX ADMIN — Medication 5 MILLIGRAM(S): at 07:53

## 2023-11-21 RX ADMIN — Medication 1: at 21:18

## 2023-11-21 RX ADMIN — Medication 25 MILLIGRAM(S): at 17:47

## 2023-11-21 RX ADMIN — POLYETHYLENE GLYCOL 3350 17 GRAM(S): 17 POWDER, FOR SOLUTION ORAL at 12:57

## 2023-11-21 RX ADMIN — DULOXETINE HYDROCHLORIDE 30 MILLIGRAM(S): 30 CAPSULE, DELAYED RELEASE ORAL at 12:57

## 2023-11-21 NOTE — PROGRESS NOTE ADULT - ASSESSMENT
ASSESSMENT:   79 yo M PMH of HTN, HLD, AFib, CKD, obesity, gout, prostate cancer, DVT/PE, OA s/p multiple orthopedic procedures, spinal stenosis, presented on 11/13/23 for elective left atrial appendage occlusion procedure, and developed a left MCA syndrome due to acute left M1 occlusion as demonstrated on CT Head and Neck. Patient presented on 11/13/23 for elective left atrial appendage occlusion with Watchman device/HARJINDER w/ Dr Bobo, and Pradaxa was held for 2 days prior to procedure. Whilst in holding patient developed a left MCA syndrome due to acute left M1 occlusion as demonstrated on CTH & Neck. On admission, NIHSS 26, MRS 1. Patient is now s/p tenecteplase administration at 11:16 11/13/23, and TICI 2A thrombectomy with Dr. Soliman. 24 hour repeat CT head s/p tenecteplase administration showed evolving left MCA stroke with hemorrhagic transformation, and mass effect with left to right shift. MRI brain revealed redemonstration of an evolving acute/subacute large left-sided MCA distribution infarction with gross hemorrhagic transformation in the left basal ganglia as well as evidence of petechial hemorrhagic transformation within the left frontotemporal and parietal cortices. Suspected etiology of stroke is cardioembolic from atrial fibrillation when off anticoagulation for recent procedure, 2 cardiac thrombi were appreciated on CTA chest 10 days prior.    NEURO:   -Neurologically no acute neurologic changes   -Continue close monitoring for neurologic deterioration    -Stroke neuro checks q 4 hours  -Permissive HTN or SBP goal up to 180mmHg, avoid hypotension or rapid fluctuations in blood pressure   -ANTITHROMBOTIC THERAPY: ASA 81mg daily for now. Patient scheduled for HARJINDER on 11/21/23 and head CT on 11/27/23. Dependent on findings and clinical exam will further clarify timeline for initiation of anticoagulation.   -titrate statin to LDL goal less than 70, LDL=54, may resume home regimen of Rosuvastatin 10 mg PO QHS when patient able to tolerate PO   -MRI Brain w/o as noted  -Dysphagia screen: pass  -Physical therapy/OT/Speech eval/treatment.   -Neurosurgery input appreciated, patient not a hemicrani candidate     CARDIOVASCULAR:  -TTE findings as above, no acute findings   -No cardiac intervention at this time as the risks>benefits as per ED  -cardiac monitoring w/ telemetry for now, further evaluation pending findings of noted workup                              HEMATOLOGY:   -H/H 9.8/30.8. Platelets 197. Monitor anemia/thrombocytopenia per primary team, patient should have all age and risk appropriate malignancy screenings with PCP or sooner if clinically suspected   -Bilateral lower extremity venous duplex negative for DVT   -DVT ppx: Heparin s.c [] LMWH [x]     PULMONARY:   -Patient extubated 11/14/23  -protecting airway, saturating well     RENAL:   -BUN/Cr 17.3/0.83. monitor urine output, maintain adequate hydration    -Na Goal:  145-150 secondary to cerebral edema.    ID:   -afebrile, leukocytosis resolved, monitor for si/sx of infection     OTHER:    -condition and plan of care d/w patient and family at bedside, questions and concerns addressed.     DISPOSITION: Rehab or home depending on PT eval once stable and workup is complete    CORE MEASURES:        Admission NIHSS: 26     Tenecteplase : [x] YES [] NO      LDL/HDL/A1C: 54/67/6.2     Depression Screen- if depression hx and/or present      Statin Therapy: Atorvastatin 40 mg PO Daily      Dysphagia Screen: [x] PASS [] FAIL      Smoking [] YES [x] NO      Afib [x] YES [] NO     Stroke Education [x] YES [] NO    Obtain screening lower extremity venous ultrasound in patients who meet 1 or more of the following criteria as patient is high risk for DVT/PE on admission:   [] History of DVT/PE  []Hypercoagulable states (Factor V Leiden, Cancer, OCP, etc. )  []Prolonged immobility (hemiplegia/hemiparesis/post operative or any other extended immobilization)  [] Transferred from outside facility (Rehab or Long term care)  [] Age </= to 50   ASSESSMENT:   79 yo M PMH of HTN, HLD, AFib, CKD, obesity, gout, prostate cancer, DVT/PE, OA s/p multiple orthopedic procedures, spinal stenosis, presented on 11/13/23 for elective left atrial appendage occlusion procedure, and developed a left MCA syndrome due to acute left M1 occlusion as demonstrated on CT Head and Neck. Patient presented on 11/13/23 for elective left atrial appendage occlusion with Watchman device/HARJINDER w/ Dr Bobo, and Pradaxa was held for 2 days prior to procedure. Whilst in holding patient developed a left MCA syndrome due to acute left M1 occlusion as demonstrated on CTH & Neck. On admission, NIHSS 26, MRS 1. Patient is now s/p tenecteplase administration at 11:16 11/13/23, and TICI 2A thrombectomy with Dr. Soliman. 24 hour repeat CT head s/p tenecteplase administration showed evolving left MCA stroke with hemorrhagic transformation, and mass effect with left to right shift. MRI brain revealed redemonstration of an evolving acute/subacute large left-sided MCA distribution infarction with gross hemorrhagic transformation in the left basal ganglia as well as evidence of petechial hemorrhagic transformation within the left frontotemporal and parietal cortices. Suspected etiology of stroke is cardioembolic from atrial fibrillation when off anticoagulation for recent procedure, 2 cardiac thrombi were appreciated on CTA chest 10 days prior.    NEURO:   -Neurologically no acute neurologic changes   -Continue close monitoring for neurologic deterioration    -Stroke neuro checks q 4 hours  -Permissive HTN or SBP goal up to 180mmHg, avoid hypotension or rapid fluctuations in blood pressure   -ANTITHROMBOTIC THERAPY: ASA 81mg daily for now. Initial plan was to obtain HARJINDER to evaluate for cardiac thrombus as this would further guide risk stratification in regards to initiation of anticoagulation However, HARJINDER deferred by anesthesia and not able to be obtained. Suggest optimal cardiac testing to differentiate whether thrombus is present as this would again provide risk stratification of anticoagulation therapy in the setting of a large cerebral infarction. Anticoagulation is currently on hold due to risk of hemorrhagic transformation. Will re-consider time frame if cardiac thrombus is present and patient demonstrated to be higher risk for thromboembolism based on suggested testing.    -titrate statin to LDL goal less than 70, LDL=54, may resume home regimen of Rosuvastatin 10 mg PO QHS when patient able to tolerate PO   -MRI Brain w/o as noted  -Dysphagia screen: pass  -Physical therapy/OT/Speech eval/treatment.   -Neurosurgery input appreciated, patient not a hemicrani candidate     CARDIOVASCULAR:  -TTE findings as above, no acute findings   -HARJINDER deferred by anesthesia. Call by team x 3 to Dr. bobo to discuss optimal cardiac testing regarding the above noted plan. Awaiting call back.   -No cardiac intervention at this time as the risks>benefits as per ED  -cardiac monitoring w/ telemetry for now, further evaluation pending findings of noted workup                              HEMATOLOGY:   -H/H 9.8/30.8. Platelets 197. Monitor anemia/thrombocytopenia per primary team, patient should have all age and risk appropriate malignancy screenings with PCP or sooner if clinically suspected   -Bilateral lower extremity venous duplex negative for DVT   -DVT ppx: Heparin s.c [] LMWH [x]     PULMONARY:   -Patient extubated 11/14/23  -protecting airway, saturating well     RENAL:   -BUN/Cr 17.3/0.83. monitor urine output, maintain adequate hydration    -Na Goal:  145-150 secondary to cerebral edema.    ID:   -afebrile, leukocytosis resolved, monitor for si/sx of infection     OTHER:    -condition and plan of care d/w patient and family at bedside, questions and concerns addressed.     DISPOSITION: Rehab or home depending on PT eval once stable and workup is complete    CORE MEASURES:        Admission NIHSS: 26     Tenecteplase : [x] YES [] NO      LDL/HDL/A1C: 54/67/6.2     Depression Screen- if depression hx and/or present      Statin Therapy: Atorvastatin 40 mg PO Daily      Dysphagia Screen: [x] PASS [] FAIL      Smoking [] YES [x] NO      Afib [x] YES [] NO     Stroke Education [x] YES [] NO    Obtain screening lower extremity venous ultrasound in patients who meet 1 or more of the following criteria as patient is high risk for DVT/PE on admission:   [] History of DVT/PE  []Hypercoagulable states (Factor V Leiden, Cancer, OCP, etc. 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 Patient presented on 11/13/23 for elective left atrial appendage occlusion with Watchman device/HARJINDER w/ Dr Bobo, and Pradaxa was held for 2 days prior to procedure. Whilst in holding patient developed a left MCA syndrome due to acute left M1 occlusion as demonstrated on CTH & Neck. On admission, NIHSS 26, MRS 1. Patient is now s/p tenecteplase administration at 11:16 11/13/23, and TICI 2A thrombectomy with Dr. Soliman. 24 hour repeat CT head s/p tenecteplase administration showed evolving left MCA stroke with hemorrhagic transformation, and mass effect with left to right shift. MRI brain revealed redemonstration of an evolving acute/subacute large left-sided MCA distribution infarction with gross hemorrhagic transformation in the left basal ganglia as well as evidence of petechial hemorrhagic transformation within the left frontotemporal and parietal cortices. Suspected etiology of stroke is cardioembolic from atrial fibrillation when off anticoagulation for recent procedure, 2 cardiac thrombi were appreciated on CTA chest 10 days prior.    NEURO:   -Neurologically no acute neurologic changes   -Continue close monitoring for neurologic deterioration    -Stroke neuro checks q 4 hours  -Neurologically appears to be toleraing SBP of 130s-170s. Can keep this SBP goal and avoid hypotension or rapid fluctuations in blood pressure   -ANTITHROMBOTIC THERAPY: ASA 81mg daily for now. Initial plan was to obtain HARJINDER to evaluate for cardiac thrombus as this would further guide risk stratification in regards to initiation of anticoagulation However, HARJINDER deferred by anesthesia and not able to be obtained. Suggest optimal cardiac testing to differentiate whether thrombus is present as this would again provide risk stratification of anticoagulation therapy in the setting of a large cerebral infarction. Anticoagulation is currently on hold due to risk of hemorrhagic transformation. Will re-consider time frame if cardiac thrombus is present and patient demonstrated to be higher risk for thromboembolism based on suggested testing.    -titrate statin to LDL goal less than 70, LDL=54, may resume home regimen of Rosuvastatin 10 mg PO QHS when patient able to tolerate PO   -MRI Brain w/o as noted  -Dysphagia screen: pass  -Physical therapy/OT/Speech eval/treatment.   -Neurosurgery input appreciated, patient not a hemicrani candidate     CARDIOVASCULAR:  -TTE findings as above, no acute findings   -HARJINDER deferred by anesthesia. Call by team x 3 to Dr. bobo to discuss optimal cardiac testing regarding the above noted plan. Awaiting call back.   -No cardiac intervention at this time as the risks>benefits as per ED  -cardiac monitoring w/ telemetry for now, further evaluation pending findings of noted workup                              HEMATOLOGY:   -H/H 9.8/30.8. Platelets 197. Monitor anemia/thrombocytopenia per primary team, patient should have all age and risk appropriate malignancy screenings with PCP or sooner if clinically suspected   -Bilateral lower extremity venous duplex negative for DVT   -DVT ppx: Heparin s.c [] LMWH [x]     PULMONARY:   -Patient extubated 11/14/23  -protecting airway, saturating well     RENAL:   -BUN/Cr 17.3/0.83. monitor urine output, maintain adequate hydration    -Na Goal:  145-150 secondary to cerebral edema.    ID:   -afebrile, leukocytosis resolved, monitor for si/sx of infection     OTHER:    -condition and plan of care d/w patient and family at bedside, questions and concerns addressed.     DISPOSITION: Rehab or home depending on PT eval once stable and workup is complete    CORE MEASURES:        Admission NIHSS: 26     Tenecteplase : [x] YES [] NO      LDL/HDL/A1C: 54/67/6.2     Depression Screen- if depression hx and/or present      Statin Therapy: Atorvastatin 40 mg PO Daily      Dysphagia Screen: [x] PASS [] FAIL      Smoking [] YES [x] NO      Afib [x] YES [] NO     Stroke Education [x] YES [] NO    Obtain screening lower extremity venous ultrasound in patients who meet 1 or more of the following criteria as patient is high risk for DVT/PE on admission:   [] History of DVT/PE  []Hypercoagulable states (Factor V Leiden, Cancer, OCP, etc. )  []Prolonged immobility (hemiplegia/hemiparesis/post operative or any other extended immobilization)  [] Transferred from outside facility (Rehab or Long term care)  [] Age </= to 50   ASSESSMENT:   79 yo M PMH of HTN, HLD, AFib, CKD, obesity, gout, prostate cancer, DVT/PE, OA s/p multiple orthopedic procedures, spinal stenosis, presented on 11/13/23 for elective left atrial appendage occlusion procedure, and developed a left MCA syndrome due to acute left M1 occlusion as demonstrated on CT Head and Neck. Patient presented on 11/13/23 for elective left atrial appendage occlusion with Watchman device/HARJINDER w/ Dr Bobo, and Pradaxa was held for 2 days prior to procedure. Whilst in holding patient developed a left MCA syndrome due to acute left M1 occlusion as demonstrated on CTH & Neck. On admission, NIHSS 26, MRS 1. Patient is now s/p tenecteplase administration at 11:16 11/13/23, and TICI 2A thrombectomy with Dr. Soliman. 24 hour repeat CT head s/p tenecteplase administration showed evolving left MCA stroke with hemorrhagic transformation, and mass effect with left to right shift. MRI brain revealed redemonstration of an evolving acute/subacute large left-sided MCA distribution infarction with gross hemorrhagic transformation in the left basal ganglia as well as evidence of petechial hemorrhagic transformation within the left frontotemporal and parietal cortices. Suspected etiology of stroke is cardioembolic from atrial fibrillation when off anticoagulation for recent procedure, 2 cardiac thrombi were appreciated on CTA chest 10 days prior.    NEURO:   -Neurologically no acute neurologic changes   -Continue close monitoring for neurologic deterioration    -Stroke neuro checks q 4 hours  -Neurologically appears to be toleraing SBP of 130s-170s. Can keep this SBP goal and avoid hypotension or rapid fluctuations in blood pressure   -ANTITHROMBOTIC THERAPY: ASA 81mg daily for now. Initial plan was to obtain HARJINDER to evaluate for cardiac thrombus as this would further guide risk stratification in regards to initiation of anticoagulation However, HARJINDER deferred by anesthesia and not able to be obtained. Suggest optimal cardiac testing to differentiate whether thrombus is present as this would again provide risk stratification of anticoagulation therapy in the setting of a large cerebral infarction. Anticoagulation is currently on hold due to risk of hemorrhagic transformation. Will re-consider time frame if cardiac thrombus is present and patient demonstrated to be higher risk for thromboembolism based on suggested testing.    -titrate statin to LDL goal less than 70, LDL=54, may resume home regimen of Rosuvastatin 10 mg PO QHS when patient able to tolerate PO   -MRI Brain w/o as noted  -Dysphagia screen: pass  -Physical therapy/OT/Speech eval/treatment.   -Neurosurgery input appreciated, patient not a hemicrani candidate     CARDIOVASCULAR:  -TTE findings as above, no acute findings   -HARJINDER deferred by anesthesia. Called Dr. Bobo to discuss optimal cardiac testing regarding the above noted plan. Awaiting call back.   -No cardiac intervention at this time as the risks>benefits as per ED  -cardiac monitoring w/ telemetry for now, further evaluation pending findings of noted workup                              HEMATOLOGY:   -H/H 9.8/30.8. Platelets 197. Monitor anemia/thrombocytopenia per primary team, patient should have all age and risk appropriate malignancy screenings with PCP or sooner if clinically suspected   -Bilateral lower extremity venous duplex negative for DVT   -DVT ppx: Heparin s.c [] LMWH [x]     PULMONARY:   -Patient extubated 11/14/23  -protecting airway, saturating well     RENAL:   -BUN/Cr 17.3/0.83. monitor urine output, maintain adequate hydration    -Na Goal:  145-150 secondary to cerebral edema.    ID:   -afebrile, leukocytosis resolved, monitor for si/sx of infection     OTHER:    -condition and plan of care d/w patient and family at bedside, questions and concerns addressed.     DISPOSITION: Rehab or home depending on PT eval once stable and workup is complete    CORE MEASURES:        Admission NIHSS: 26     Tenecteplase : [x] YES [] NO      LDL/HDL/A1C: 54/67/6.2     Depression Screen- if depression hx and/or present      Statin Therapy: Atorvastatin 40 mg PO Daily      Dysphagia Screen: [x] PASS [] FAIL      Smoking [] YES [x] NO      Afib [x] YES [] NO     Stroke Education [x] YES [] NO    Obtain screening lower extremity venous ultrasound in patients who meet 1 or more of the following criteria as patient is high risk for DVT/PE on admission:   [] History of DVT/PE  []Hypercoagulable states (Factor V Leiden, Cancer, OCP, etc. )  []Prolonged immobility (hemiplegia/hemiparesis/post operative or any other extended immobilization)  [] Transferred from outside facility (Rehab or Long term care)  [] Age </= to 50

## 2023-11-21 NOTE — PROGRESS NOTE ADULT - CRITICAL CARE ATTENDING COMMENT
Pt is critically ill and at high risk of rapid deterioration/death due to abovementioned conditions.   Still requires critical care interventions - ongoing frequent evaluations, interventions and management adjustment by the Attending and ICU team, - and included review of relevant history, clinical examination, review of data and images, discussion of treatment with the multidisciplinary team and any consultants involved in this patient’s care as well as family discussion.
Pt is critically ill and at high risk of rapid deterioration/death due to abovementioned conditions.   Still requires critical care interventions - ongoing frequent evaluations, interventions and management adjustment by the Attending and ICU team, - and included review of relevant history, clinical examination, review of data and images, discussion of treatment with the multidisciplinary team and any consultants involved in this patient’s care as well as family discussion.
Will need to further eval for cardiac thrombus and if evidence of cardiac thrombus will need to start on heparin gtt

## 2023-11-21 NOTE — CHART NOTE - NSCHARTNOTEFT_GEN_A_CORE
Patient received in cardiac cath lab holding room for HARJINDER. HARJINDER cancelled by Anesthesia Dr. Jose Luis Eric and by Cardiologist Dr. Myles for concerns of anesthesia induction/ large MCA stroke with hemorrhagic transformation/ cytotoxic edema and midline shift. EP team Dr. Bobo notified, Hospitalist Dr. SARAH thompson notified.   Patient sent back to room. Patient received in cardiac cath lab holding room for HARJINDER. HARJINDER cancelled by Anesthesia Dr. Jose Luis Eric and by Cardiologist Dr. Myles for concerns of anesthesia induction given large MCA stroke with hemorrhagic transformation/ cytotoxic edema and midline shift. EP team D/W Dr. Bobo; Hospitalist Dr. SARAH Solano and Neuro PA Liana Ortiz.   Patient sent back to room.

## 2023-11-21 NOTE — PROGRESS NOTE ADULT - SUBJECTIVE AND OBJECTIVE BOX
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 Patient has a long h/o AFib, which is now considered permanent. He is on rate control with metoprolol 25 mg bid, and anticoagulation with Pradaxa.  He has a long Hx of arthritis and had multiple hip replacements, bilateral knee replacements and also has neck and back pain, but is unable to take NSAIDs due to bleeding risks. He has an unsteady gait and uses a cane for assistance.  Patient presented on 11/13 for elective left atrial appendage occlusion with Watchman device/HARJINDER w/ Dr Bobo, and Pradaxa was held for 2 days prior to procedure. 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   Initial NIHSS 26, MRS 1. Patient s/p tenecteplase administration at 11:16 11/13/23 and TICI 2A thrombectomy with Dr. Soliman.    SUBJECTIVE: No events overnight.  No new neurologic complaints.  ROS reported negative unless otherwise noted.    acetaminophen     Tablet .. 650 milliGRAM(s) Oral every 6 hours PRN  albuterol/ipratropium (CFC free) Inhaler. 1 Puff(s) Inhalation four times a day PRN  allopurinol 300 milliGRAM(s) Oral daily  aspirin  chewable 81 milliGRAM(s) Oral daily  atorvastatin 40 milliGRAM(s) Oral at bedtime  chlorhexidine 2% Cloths 1 Application(s) Topical daily  dextrose 5%. 1000 milliLiter(s) IV Continuous <Continuous>  dextrose 5%. 1000 milliLiter(s) IV Continuous <Continuous>  dextrose 50% Injectable 25 Gram(s) IV Push once  dextrose 50% Injectable 12.5 Gram(s) IV Push once  dextrose 50% Injectable 25 Gram(s) IV Push once  dextrose Oral Gel 15 Gram(s) Oral once PRN  DULoxetine 30 milliGRAM(s) Oral daily  enoxaparin Injectable 40 milliGRAM(s) SubCutaneous <User Schedule>  famotidine    Tablet 20 milliGRAM(s) Oral two times a day  gabapentin 300 milliGRAM(s) Oral two times a day  glucagon  Injectable 1 milliGRAM(s) IntraMuscular once  insulin lispro (ADMELOG) corrective regimen sliding scale   SubCutaneous Before meals and at bedtime  metoprolol tartrate 25 milliGRAM(s) Oral two times a day  nystatin Powder 1 Application(s) Topical two times a day  polyethylene glycol 3350 17 Gram(s) Oral daily  predniSONE   Tablet 5 milliGRAM(s) Oral daily  senna 2 Tablet(s) Oral at bedtime      PHYSICAL EXAM:   Vital Signs Last 24 Hrs  T(C): 36.9 (21 Nov 2023 09:09), Max: 37.5 (21 Nov 2023 04:00)  T(F): 98.5 (21 Nov 2023 09:09), Max: 99.5 (21 Nov 2023 04:00)  HR: 62 (21 Nov 2023 09:09) (62 - 96)  BP: 128/83 (21 Nov 2023 09:09) (121/73 - 153/82)  BP(mean): 103 (21 Nov 2023 08:00) (83 - 103)  RR: 18 (21 Nov 2023 09:09) (18 - 20)  SpO2: 96% (21 Nov 2023 09:09) (96% - 99%)    Parameters below as of 21 Nov 2023 09:09  Patient On (Oxygen Delivery Method): room air      EXAM PENDING     General: NAD.    Detailed Neurologic Exam:    Mental status: Attends to examiner and is awake.  Right sided visual willow-neglect present. Global aphasia noted with no verbal output. Unable to follow commands, ID objects, or repeat. Not oriented to self, place, or month. Does mimic examiner.    Cranial nerves: Pupils equal and react symmetrically to light. Blink to threat intermittently on right eye. Blink to threat present out of left eye. Left gaze preference with right gaze palsy that gets to midline but does not cross midline. Right facial weakness noted.     Motor: There is normal bulk and tone.  There is no tremor.  RLE: slight withdrawal due to noxious stimuli. Was externally rotated on presentation  RUE: 0/5 strength with no movement. Grimaces to noxious stimuli with no movement.  Strength is 5/5 in the left arm and leg with no drift.    Sensation: Grimaces to noxious stimuli in all extremities.    Cerebellar: unable to assess dysmetria on finger to nose testing.    Gait : deferred    LABS:                        9.8    8.45  )-----------( 197      ( 21 Nov 2023 05:28 )             30.8    11-21    146<H>  |  108  |  17.3  ----------------------------<  141<H>  3.9   |  29.0  |  0.83    Ca    8.9      21 Nov 2023 05:28          IMAGING: Reviewed by me.    MR Head No Cont (11.19.23 @ 15:52)   IMPRESSION: Redemonstration of an evolving acute/subacute large   left-sided MCA distribution infarction with gross hemorrhagic   transformation in the left basal ganglia as well as evidence of petechial   hemorrhagic transformation within the left frontotemporal and parietal   cortices.    Associated cytotoxic edema, sulcal effacement, and mass effect as well as   shift of the midline structures from left to right measuring 5.5 mm   appears unchanged. There is no herniation.    CT Head No Cont (11.16.23 @ 18:05)   IMPRESSION: Stable moderate to large left MCA territory infarction   involving the left frontal lobe, left basal ganglia, left insular ribbon,   left parietal occipital lobe, and anterior left temporal lobe with stable   mild hemorrhage in the left basal ganglia. Left-to-right midline shift   measures 5.5 mm.    CT Head No Cont (11.14.23 @ 18:57)   IMPRESSION:  Stable moderate to large acute left MCA infarct with mild hemorrhage    TTE Echo Complete w/o Contrast w/ Doppler (11.14.23 @ 15:57)   Summary:   1. Left ventricular ejection fraction, by visual estimation, is 55 to   60%.   2. Normal global left ventricular systolic function.   3. The mitral in-flow pattern reveals no discernable A-wave, therefore   no comment on diastolic function can be made.   4. Normal right ventricular size and function.   5. Mild to moderately enlarged right atrium.   6. Moderately enlarged left atrium.   7. Trace mitral valve regurgitation.   8. Mild-moderate tricuspid regurgitation.   9. Sclerotic aortic valve with decreased opening.  10. Estimated pulmonary artery systolic pressure is 41.6 mmHg assuming a   right atrial pressure of 5 mmHg, which is consistent with mild pulmonary   hypertension.    CT Head No Cont (11.14.23 @ 11:36)   IMPRESSION: Evolving left MCA infarct is identified with hemorrhagic   transformation now seen with mass effect on left lateral ventricles and   left-to-right shift.    US Duplex Venous Lower Ext Complete, Bilateral (11.14.23 @ 10:38)   IMPRESSION:  No evidence of deep venous thrombosis in either lower extremity.  Left calf veins not visualized.  Small right popliteal fossa cyst.    CT Angio Brain, Neck and perfusion Stroke Protocol  w/ IV Cont (11.13.23 @ 11:18)   CTA BRAIN:  Acute thrombus in the distal left MCA M1 segment. Diminished flow in the   distal branches.  Severe bilateral cavernous carotid artery calcifications. Fetal origins   the bilateral posterior cerebral arteries. The [place] of [place] and   vertebrobasilar system are otherwise unremarkable without evidence of   stenosis, additional occlusion or saccular aneurysm dilation. No evidence   for arterial venous malformation. The vertebral arteries are codominant.    CTA NECK:  Mild bilateral carotid bulb calcifications. Mild stenosis in the left   carotid bulb. The right internal carotid arteries tortuous.  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 There is normal relationship to   the great vessels. The common carotid arteries, internal carotid arteries   and vertebral arteries shows no other evidence of significant stenosis,   occlusion or saccular aneurysm dilation. The vertebral arteries are   codominant.    CT PERFUSION:  Patient has only had less than 2 hours of symptoms may influence the CT   perfusion.  CBF<30% volume: 26 ml left MCA territory.  Tmax>6.0 s volume: 193 ml left MCA territory  Mismatch volume: 167 ml  Mismatch ratio: 7.4  IMPRESSION:  Thrombus in the distal left MCA M1 segment with diminished flow in the   distal MCA segments. 167 mL area of penumbra in the left MCA territory.     CT Brain Stroke Protocol (11.13.23 @ 11:15)   IMPRESSION:  Dense left MCA M1 segment suggesting intraluminal thrombus.   Mild chronic microvascular changes without evidence of an acute   transcortical infarction or hemorrhage.    TTE Echo Complete w/o Contrast w/ Doppler (11.14.23 @ 15:57)   Summary:   1. Left ventricular ejection fraction, by visual estimation, is 55 to   60%.   2. Normal global left ventricular systolic function.   3. The mitral in-flow pattern reveals no discernable A-wave, therefore   no comment on diastolic function can be made.   4. Normal right ventricular size and function.   5. Mild to moderately enlarged right atrium.   6. Moderatelyenlarged left atrium.   7. Trace mitral valve regurgitation.   8. Mild-moderate tricuspid regurgitation.   9. Sclerotic aortic valve with decreased opening.  10. Estimated pulmonary artery systolic pressure is 41.6 mmHg assuming a   right atrial pressure of 5 mmHg, which is consistent with mild pulmonary   hypertension.     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 Patient has a long h/o AFib, which is now considered permanent. He is on rate control with metoprolol 25 mg bid, and anticoagulation with Pradaxa.  He has a long Hx of arthritis and had multiple hip replacements, bilateral knee replacements and also has neck and back pain, but is unable to take NSAIDs due to bleeding risks. He has an unsteady gait and uses a cane for assistance.  Patient presented on 11/13 for elective left atrial appendage occlusion with Watchman device/HARJINDER w/ Dr Bobo, and Pradaxa was held for 2 days prior to procedure. 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   Initial NIHSS 26, MRS 1. Patient s/p tenecteplase administration at 11:16 11/13/23 and TICI 2A thrombectomy with Dr. Soliman.    SUBJECTIVE: No events overnight.  No new neurologic complaints.  ROS reported negative unless otherwise noted.    acetaminophen     Tablet .. 650 milliGRAM(s) Oral every 6 hours PRN  albuterol/ipratropium (CFC free) Inhaler. 1 Puff(s) Inhalation four times a day PRN  allopurinol 300 milliGRAM(s) Oral daily  aspirin  chewable 81 milliGRAM(s) Oral daily  atorvastatin 40 milliGRAM(s) Oral at bedtime  chlorhexidine 2% Cloths 1 Application(s) Topical daily  dextrose 5%. 1000 milliLiter(s) IV Continuous <Continuous>  dextrose 5%. 1000 milliLiter(s) IV Continuous <Continuous>  dextrose 50% Injectable 25 Gram(s) IV Push once  dextrose 50% Injectable 12.5 Gram(s) IV Push once  dextrose 50% Injectable 25 Gram(s) IV Push once  dextrose Oral Gel 15 Gram(s) Oral once PRN  DULoxetine 30 milliGRAM(s) Oral daily  enoxaparin Injectable 40 milliGRAM(s) SubCutaneous <User Schedule>  famotidine    Tablet 20 milliGRAM(s) Oral two times a day  gabapentin 300 milliGRAM(s) Oral two times a day  glucagon  Injectable 1 milliGRAM(s) IntraMuscular once  insulin lispro (ADMELOG) corrective regimen sliding scale   SubCutaneous Before meals and at bedtime  metoprolol tartrate 25 milliGRAM(s) Oral two times a day  nystatin Powder 1 Application(s) Topical two times a day  polyethylene glycol 3350 17 Gram(s) Oral daily  predniSONE   Tablet 5 milliGRAM(s) Oral daily  senna 2 Tablet(s) Oral at bedtime      PHYSICAL EXAM:   Vital Signs Last 24 Hrs  T(C): 36.9 (21 Nov 2023 09:09), Max: 37.5 (21 Nov 2023 04:00)  T(F): 98.5 (21 Nov 2023 09:09), Max: 99.5 (21 Nov 2023 04:00)  HR: 62 (21 Nov 2023 09:09) (62 - 96)  BP: 128/83 (21 Nov 2023 09:09) (121/73 - 153/82)  BP(mean): 103 (21 Nov 2023 08:00) (83 - 103)  RR: 18 (21 Nov 2023 09:09) (18 - 20)  SpO2: 96% (21 Nov 2023 09:09) (96% - 99%)    Parameters below as of 21 Nov 2023 09:09  Patient On (Oxygen Delivery Method): room air      General: Patient is found sleeping in bed.  Family at bedside.    Detailed Neurologic Exam:    Mental status: P  Right sided visual iwllow-neglect present. Global aphasia noted with no verbal output. Unable to follow commands, ID objects, or repeat. Not oriented to self, place, or month. Does mimic examiner.    Cranial nerves: Pupils equal and react symmetrically to light. Blink to threat intermittently on right eye. Blink to threat present out of left eye. Left gaze preference with right gaze palsy that gets to midline but does not cross midline. Right facial weakness noted.     Motor: There is normal bulk and tone.  There is no tremor.  RLE: slight withdrawal due to noxious stimuli. Was externally rotated on presentation  RUE: 0/5 strength with no movement. Grimaces to noxious stimuli with no movement.  Strength is 5/5 in the left arm and leg with no drift.    Sensation: Grimaces to noxious stimuli in all extremities.    Cerebellar: unable to assess dysmetria on finger to nose testing.    Gait : deferred    LABS:                        9.8    8.45  )-----------( 197      ( 21 Nov 2023 05:28 )             30.8    11-21    146<H>  |  108  |  17.3  ----------------------------<  141<H>  3.9   |  29.0  |  0.83    Ca    8.9      21 Nov 2023 05:28          IMAGING: Reviewed by me.    MR Head No Cont (11.19.23 @ 15:52)   IMPRESSION: Redemonstration of an evolving acute/subacute large   left-sided MCA distribution infarction with gross hemorrhagic   transformation in the left basal ganglia as well as evidence of petechial   hemorrhagic transformation within the left frontotemporal and parietal   cortices.    Associated cytotoxic edema, sulcal effacement, and mass effect as well as   shift of the midline structures from left to right measuring 5.5 mm   appears unchanged. There is no herniation.    CT Head No Cont (11.16.23 @ 18:05)   IMPRESSION: Stable moderate to large left MCA territory infarction   involving the left frontal lobe, left basal ganglia, left insular ribbon,   left parietal occipital lobe, and anterior left temporal lobe with stable   mild hemorrhage in the left basal ganglia. Left-to-right midline shift   measures 5.5 mm.    CT Head No Cont (11.14.23 @ 18:57)   IMPRESSION:  Stable moderate to large acute left MCA infarct with mild hemorrhage    TTE Echo Complete w/o Contrast w/ Doppler (11.14.23 @ 15:57)   Summary:   1. Left ventricular ejection fraction, by visual estimation, is 55 to   60%.   2. Normal global left ventricular systolic function.   3. The mitral in-flow pattern reveals no discernable A-wave, therefore   no comment on diastolic function can be made.   4. Normal right ventricular size and function.   5. Mild to moderately enlarged right atrium.   6. Moderately enlarged left atrium.   7. Trace mitral valve regurgitation.   8. Mild-moderate tricuspid regurgitation.   9. Sclerotic aortic valve with decreased opening.  10. Estimated pulmonary artery systolic pressure is 41.6 mmHg assuming a   right atrial pressure of 5 mmHg, which is consistent with mild pulmonary   hypertension.    CT Head No Cont (11.14.23 @ 11:36)   IMPRESSION: Evolving left MCA infarct is identified with hemorrhagic   transformation now seen with mass effect on left lateral ventricles and   left-to-right shift.    US Duplex Venous Lower Ext Complete, Bilateral (11.14.23 @ 10:38)   IMPRESSION:  No evidence of deep venous thrombosis in either lower extremity.  Left calf veins not visualized.  Small right popliteal fossa cyst.    CT Angio Brain, Neck and perfusion Stroke Protocol  w/ IV Cont (11.13.23 @ 11:18)   CTA BRAIN:  Acute thrombus in the distal left MCA M1 segment. Diminished flow in the   distal branches.  Severe bilateral cavernous carotid artery calcifications. Fetal origins   the bilateral posterior cerebral arteries. The [place] of [place] and   vertebrobasilar system are otherwise unremarkable without evidence of   stenosis, additional occlusion or saccular aneurysm dilation. No evidence   for arterial venous malformation. The vertebral arteries are codominant.    CTA NECK:  Mild bilateral carotid bulb calcifications. Mild stenosis in the left   carotid bulb. The right internal carotid arteries tortuous.  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 There is normal relationship to   the great vessels. The common carotid arteries, internal carotid arteries   and vertebral arteries shows no other evidence of significant stenosis,   occlusion or saccular aneurysm dilation. The vertebral arteries are   codominant.    CT PERFUSION:  Patient has only had less than 2 hours of symptoms may influence the CT   perfusion.  CBF<30% volume: 26 ml left MCA territory.  Tmax>6.0 s volume: 193 ml left MCA territory  Mismatch volume: 167 ml  Mismatch ratio: 7.4  IMPRESSION:  Thrombus in the distal left MCA M1 segment with diminished flow in the   distal MCA segments. 167 mL area of penumbra in the left MCA territory.     CT Brain Stroke Protocol (11.13.23 @ 11:15)   IMPRESSION:  Dense left MCA M1 segment suggesting intraluminal thrombus.   Mild chronic microvascular changes without evidence of an acute   transcortical infarction or hemorrhage.    TTE Echo Complete w/o Contrast w/ Doppler (11.14.23 @ 15:57)   Summary:   1. Left ventricular ejection fraction, by visual estimation, is 55 to   60%.   2. Normal global left ventricular systolic function.   3. The mitral in-flow pattern reveals no discernable A-wave, therefore   no comment on diastolic function can be made.   4. Normal right ventricular size and function.   5. Mild to moderately enlarged right atrium.   6. Moderatelyenlarged left atrium.   7. Trace mitral valve regurgitation.   8. Mild-moderate tricuspid regurgitation.   9. Sclerotic aortic valve with decreased opening.  10. Estimated pulmonary artery systolic pressure is 41.6 mmHg assuming a   right atrial pressure of 5 mmHg, which is consistent with mild pulmonary   hypertension.     Preliminary note, offical recommendations pending attending review/signature   [facility] Stroke Team  Progress Note     HPI:  77 yo M PMH of HTN, HLD, AFib, CKD, obesity, gout, prostate cancer, DVT/PE, OA s/p multiple orthopedic procedures, spinal stenosis, presented on 11/13/23 for elective left atrial appendage occlusion procedure, and developed a MCA syndrome due to acute L M1 occlusion as demonstrated on CTA Head and Neck. Patient has a long h/o AFib, which is now considered permanent. He is on rate control with metoprolol 25 mg bid, and anticoagulation with Pradaxa.  He has a long Hx of arthritis and had multiple hip replacements, bilateral knee replacements and also has neck and back pain, but is unable to take NSAIDs due to bleeding risks. He has an unsteady gait and uses a cane for assistance.  Patient presented on 11/13 for elective left atrial appendage occlusion with Watchman device/HARJINDER w/ Dr Bobo, and Pradaxa was held for 2 days prior to procedure. 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   Initial NIHSS 26, MRS 1. Patient s/p tenecteplase administration at 11:16 11/13/23 and TICI 2A thrombectomy with Dr. Soliman.    SUBJECTIVE: No events overnight.  No new neurologic complaints.  ROS reported negative unless otherwise noted.    acetaminophen     Tablet .. 650 milliGRAM(s) Oral every 6 hours PRN  albuterol/ipratropium (CFC free) Inhaler. 1 Puff(s) Inhalation four times a day PRN  allopurinol 300 milliGRAM(s) Oral daily  aspirin  chewable 81 milliGRAM(s) Oral daily  atorvastatin 40 milliGRAM(s) Oral at bedtime  chlorhexidine 2% Cloths 1 Application(s) Topical daily  dextrose 5%. 1000 milliLiter(s) IV Continuous <Continuous>  dextrose 5%. 1000 milliLiter(s) IV Continuous <Continuous>  dextrose 50% Injectable 25 Gram(s) IV Push once  dextrose 50% Injectable 12.5 Gram(s) IV Push once  dextrose 50% Injectable 25 Gram(s) IV Push once  dextrose Oral Gel 15 Gram(s) Oral once PRN  DULoxetine 30 milliGRAM(s) Oral daily  enoxaparin Injectable 40 milliGRAM(s) SubCutaneous <User Schedule>  famotidine    Tablet 20 milliGRAM(s) Oral two times a day  gabapentin 300 milliGRAM(s) Oral two times a day  glucagon  Injectable 1 milliGRAM(s) IntraMuscular once  insulin lispro (ADMELOG) corrective regimen sliding scale   SubCutaneous Before meals and at bedtime  metoprolol tartrate 25 milliGRAM(s) Oral two times a day  nystatin Powder 1 Application(s) Topical two times a day  polyethylene glycol 3350 17 Gram(s) Oral daily  predniSONE   Tablet 5 milliGRAM(s) Oral daily  senna 2 Tablet(s) Oral at bedtime      PHYSICAL EXAM:   Vital Signs Last 24 Hrs  T(C): 36.9 (21 Nov 2023 09:09), Max: 37.5 (21 Nov 2023 04:00)  T(F): 98.5 (21 Nov 2023 09:09), Max: 99.5 (21 Nov 2023 04:00)  HR: 62 (21 Nov 2023 09:09) (62 - 96)  BP: 128/83 (21 Nov 2023 09:09) (121/73 - 153/82)  BP(mean): 103 (21 Nov 2023 08:00) (83 - 103)  RR: 18 (21 Nov 2023 09:09) (18 - 20)  SpO2: 96% (21 Nov 2023 09:09) (96% - 99%)    Parameters below as of 21 Nov 2023 09:09  Patient On (Oxygen Delivery Method): room air      General: Patient is found sleeping in bed.  Family at bedside.    Detailed Neurologic Exam:    Mental status: Patient is seen sleeping and takes multiple verbal and noxious stimuli to awaken. Right sided visual willow-neglect present. Global aphasia noted with no verbal output. Unable to follow commands, ID objects, or repeat. Not oriented to self, place, or month. Does mimic examiner.    Cranial nerves: Pupils equal and react symmetrically to light. Blink to threat intermittently on right eye. Blink to threat present out of left eye. Left gaze preference with right gaze palsy that gets to midline but does not cross midline. Right facial weakness noted.     Motor: There is normal bulk and tone.  There is no tremor.  RLE: slight withdrawal due to noxious stimuli.  RUE: 0/5 strength with no movement. Grimaces to noxious stimuli with no movement.  Strength is 5/5 in the left arm and leg with no drift.    Sensation: Grimaces to noxious stimuli in all extremities.    Cerebellar: unable to assess dysmetria on finger to nose testing.    Gait : deferred    LABS:                        9.8    8.45  )-----------( 197      ( 21 Nov 2023 05:28 )             30.8    11-21    146<H>  |  108  |  17.3  ----------------------------<  141<H>  3.9   |  29.0  |  0.83    Ca    8.9      21 Nov 2023 05:28          IMAGING: Reviewed by me.    MR Head No Cont (11.19.23 @ 15:52)   IMPRESSION: Redemonstration of an evolving acute/subacute large   left-sided MCA distribution infarction with gross hemorrhagic   transformation in the left basal ganglia as well as evidence of petechial   hemorrhagic transformation within the left frontotemporal and parietal   cortices.    Associated cytotoxic edema, sulcal effacement, and mass effect as well as   shift of the midline structures from left to right measuring 5.5 mm   appears unchanged. There is no herniation.    CT Head No Cont (11.16.23 @ 18:05)   IMPRESSION: Stable moderate to large left MCA territory infarction   involving the left frontal lobe, left basal ganglia, left insular ribbon,   left parietal occipital lobe, and anterior left temporal lobe with stable   mild hemorrhage in the left basal ganglia. Left-to-right midline shift   measures 5.5 mm.    CT Head No Cont (11.14.23 @ 18:57)   IMPRESSION:  Stable moderate to large acute left MCA infarct with mild hemorrhage    TTE Echo Complete w/o Contrast w/ Doppler (11.14.23 @ 15:57)   Summary:   1. Left ventricular ejection fraction, by visual estimation, is 55 to   60%.   2. Normal global left ventricular systolic function.   3. The mitral in-flow pattern reveals no discernable A-wave, therefore   no comment on diastolic function can be made.   4. Normal right ventricular size and function.   5. Mild to moderately enlarged right atrium.   6. Moderately enlarged left atrium.   7. Trace mitral valve regurgitation.   8. Mild-moderate tricuspid regurgitation.   9. Sclerotic aortic valve with decreased opening.  10. Estimated pulmonary artery systolic pressure is 41.6 mmHg assuming a   right atrial pressure of 5 mmHg, which is consistent with mild pulmonary   hypertension.    CT Head No Cont (11.14.23 @ 11:36)   IMPRESSION: Evolving left MCA infarct is identified with hemorrhagic   transformation now seen with mass effect on left lateral ventricles and   left-to-right shift.    US Duplex Venous Lower Ext Complete, Bilateral (11.14.23 @ 10:38)   IMPRESSION:  No evidence of deep venous thrombosis in either lower extremity.  Left calf veins not visualized.  Small right popliteal fossa cyst.    CT Angio Brain, Neck and perfusion Stroke Protocol  w/ IV Cont (11.13.23 @ 11:18)   CTA BRAIN:  Acute thrombus in the distal left MCA M1 segment. Diminished flow in the   distal branches.  Severe bilateral cavernous carotid artery calcifications. Fetal origins   the bilateral posterior cerebral arteries. The [place] and   vertebrobasilar system are otherwise unremarkable without evidence of   stenosis, additional occlusion or saccular aneurysm dilation. No evidence   for arterial venous malformation. The vertebral arteries are codominant.    CTA NECK:  Mild bilateral carotid bulb calcifications. Mild stenosis in the left   carotid bulb. The right internal carotid arteries tortuous.  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 There is normal relationship to   the great vessels. The common carotid arteries, internal carotid arteries   and vertebral arteries shows no other evidence of significant stenosis,   occlusion or saccular aneurysm dilation. The vertebral arteries are   codominant.    CT PERFUSION:  Patient has only had less than 2 hours of symptoms may influence the CT   perfusion.  CBF<30% volume: 26 ml left MCA territory.  Tmax>6.0 s volume: 193 ml left MCA territory  Mismatch volume: 167 ml  Mismatch ratio: 7.4  IMPRESSION:  Thrombus in the distal left MCA M1 segment with diminished flow in the   distal MCA segments. 167 mL area of penumbra in the left MCA territory.     CT Brain Stroke Protocol (11.13.23 @ 11:15)   IMPRESSION:  Dense left MCA M1 segment suggesting intraluminal thrombus.   Mild chronic microvascular changes without evidence of an acute   transcortical infarction or hemorrhage.    TTE Echo Complete w/o Contrast w/ Doppler (11.14.23 @ 15:57)   Summary:   1. Left ventricular ejection fraction, by visual estimation, is 55 to   60%.   2. Normal global left ventricular systolic function.   3. The mitral in-flow pattern reveals no discernable A-wave, therefore   no comment on diastolic function can be made.   4. Normal right ventricular size and function.   5. Mild to moderately enlarged right atrium.   6. Moderatelyenlarged left atrium.   7. Trace mitral valve regurgitation.   8. Mild-moderate tricuspid regurgitation.   9. Sclerotic aortic valve with decreased opening.  10. Estimated pulmonary artery systolic pressure is 41.6 mmHg assuming a   right atrial pressure of 5 mmHg, which is consistent with mild pulmonary   hypertension.     [facility] Stroke Team  Progress Note     HPI:  77 yo M PMH of HTN, HLD, AFib, CKD, obesity, gout, prostate cancer, DVT/PE, OA s/p multiple orthopedic procedures, spinal stenosis, presented on 11/13/23 for elective left atrial appendage occlusion procedure, and developed a MCA syndrome due to acute L M1 occlusion as demonstrated on CTA Head and Neck. Patient has a long h/o AFib, which is now considered permanent. He is on rate control with metoprolol 25 mg bid, and anticoagulation with Pradaxa.  He has a long Hx of arthritis and had multiple hip replacements, bilateral knee replacements and also has neck and back pain, but is unable to take NSAIDs due to bleeding risks. He has an unsteady gait and uses a cane for assistance.  Patient presented on 11/13 for elective left atrial appendage occlusion with Watchman device/HARJINDER w/ Dr Bobo, and Pradaxa was held for 2 days prior to procedure. 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   Initial NIHSS 26, MRS 1. Patient s/p tenecteplase administration at 11:16 11/13/23 and TICI 2A thrombectomy with Dr. Soliman.    SUBJECTIVE: No events overnight.  No new neurologic complaints.  ROS reported negative unless otherwise noted.    acetaminophen     Tablet .. 650 milliGRAM(s) Oral every 6 hours PRN  albuterol/ipratropium (CFC free) Inhaler. 1 Puff(s) Inhalation four times a day PRN  allopurinol 300 milliGRAM(s) Oral daily  aspirin  chewable 81 milliGRAM(s) Oral daily  atorvastatin 40 milliGRAM(s) Oral at bedtime  chlorhexidine 2% Cloths 1 Application(s) Topical daily  dextrose 5%. 1000 milliLiter(s) IV Continuous <Continuous>  dextrose 5%. 1000 milliLiter(s) IV Continuous <Continuous>  dextrose 50% Injectable 25 Gram(s) IV Push once  dextrose 50% Injectable 12.5 Gram(s) IV Push once  dextrose 50% Injectable 25 Gram(s) IV Push once  dextrose Oral Gel 15 Gram(s) Oral once PRN  DULoxetine 30 milliGRAM(s) Oral daily  enoxaparin Injectable 40 milliGRAM(s) SubCutaneous <User Schedule>  famotidine    Tablet 20 milliGRAM(s) Oral two times a day  gabapentin 300 milliGRAM(s) Oral two times a day  glucagon  Injectable 1 milliGRAM(s) IntraMuscular once  insulin lispro (ADMELOG) corrective regimen sliding scale   SubCutaneous Before meals and at bedtime  metoprolol tartrate 25 milliGRAM(s) Oral two times a day  nystatin Powder 1 Application(s) Topical two times a day  polyethylene glycol 3350 17 Gram(s) Oral daily  predniSONE   Tablet 5 milliGRAM(s) Oral daily  senna 2 Tablet(s) Oral at bedtime      PHYSICAL EXAM:   Vital Signs Last 24 Hrs  T(C): 36.9 (21 Nov 2023 09:09), Max: 37.5 (21 Nov 2023 04:00)  T(F): 98.5 (21 Nov 2023 09:09), Max: 99.5 (21 Nov 2023 04:00)  HR: 62 (21 Nov 2023 09:09) (62 - 96)  BP: 128/83 (21 Nov 2023 09:09) (121/73 - 153/82)  BP(mean): 103 (21 Nov 2023 08:00) (83 - 103)  RR: 18 (21 Nov 2023 09:09) (18 - 20)  SpO2: 96% (21 Nov 2023 09:09) (96% - 99%)    Parameters below as of 21 Nov 2023 09:09  Patient On (Oxygen Delivery Method): room air      General: Patient is found sleeping in bed.  Family at bedside.    Detailed Neurologic Exam:    Mental status: Patient is seen sleeping and takes multiple verbal and noxious stimuli to awaken. Right sided visual willow-neglect present. Global aphasia noted with no verbal output. Unable to follow commands, ID objects, or repeat. Not oriented to self, place, or month. Does mimic examiner.    Cranial nerves: Pupils equal and react symmetrically to light. Blink to threat intermittently on right eye. Blink to threat present out of left eye. Left gaze preference with right gaze palsy that gets to midline but does not cross midline. Right facial weakness noted.     Motor: There is normal bulk and tone.  There is no tremor.  RLE: slight withdrawal due to noxious stimuli.  RUE: 0/5 strength with no movement. Grimaces to noxious stimuli with no movement.  Strength is 5/5 in the left arm and leg with no drift.    Sensation: Grimaces to noxious stimuli in all extremities.    Cerebellar: unable to assess dysmetria on finger to nose testing.    Gait : deferred    LABS:                        9.8    8.45  )-----------( 197      ( 21 Nov 2023 05:28 )             30.8    11-21    146<H>  |  108  |  17.3  ----------------------------<  141<H>  3.9   |  29.0  |  0.83    Ca    8.9      21 Nov 2023 05:28          IMAGING: Reviewed by me.    MR Head No Cont (11.19.23 @ 15:52)   IMPRESSION: Redemonstration of an evolving acute/subacute large   left-sided MCA distribution infarction with gross hemorrhagic   transformation in the left basal ganglia as well as evidence of petechial   hemorrhagic transformation within the left frontotemporal and parietal   cortices.    Associated cytotoxic edema, sulcal effacement, and mass effect as well as   shift of the midline structures from left to right measuring 5.5 mm   appears unchanged. There is no herniation.    CT Head No Cont (11.16.23 @ 18:05)   IMPRESSION: Stable moderate to large left MCA territory infarction   involving the left frontal lobe, left basal ganglia, left insular ribbon,   left parietal occipital lobe, and anterior left temporal lobe with stable   mild hemorrhage in the left basal ganglia. Left-to-right midline shift   measures 5.5 mm.    CT Head No Cont (11.14.23 @ 18:57)   IMPRESSION:  Stable moderate to large acute left MCA infarct with mild hemorrhage    TTE Echo Complete w/o Contrast w/ Doppler (11.14.23 @ 15:57)   Summary:   1. Left ventricular ejection fraction, by visual estimation, is 55 to   60%.   2. Normal global left ventricular systolic function.   3. The mitral in-flow pattern reveals no discernable A-wave, therefore   no comment on diastolic function can be made.   4. Normal right ventricular size and function.   5. Mild to moderately enlarged right atrium.   6. Moderately enlarged left atrium.   7. Trace mitral valve regurgitation.   8. Mild-moderate tricuspid regurgitation.   9. Sclerotic aortic valve with decreased opening.  10. Estimated pulmonary artery systolic pressure is 41.6 mmHg assuming a   right atrial pressure of 5 mmHg, which is consistent with mild pulmonary   hypertension.    CT Head No Cont (11.14.23 @ 11:36)   IMPRESSION: Evolving left MCA infarct is identified with hemorrhagic   transformation now seen with mass effect on left lateral ventricles and   left-to-right shift.    US Duplex Venous Lower Ext Complete, Bilateral (11.14.23 @ 10:38)   IMPRESSION:  No evidence of deep venous thrombosis in either lower extremity.  Left calf veins not visualized.  Small right popliteal fossa cyst.    CT Angio Brain, Neck and perfusion Stroke Protocol  w/ IV Cont (11.13.23 @ 11:18)   CTA BRAIN:  Acute thrombus in the distal left MCA M1 segment. Diminished flow in the   distal branches.  Severe bilateral cavernous carotid artery calcifications. Fetal origins   the bilateral posterior cerebral arteries. The [place] and   vertebrobasilar system are otherwise unremarkable without evidence of   stenosis, additional occlusion or saccular aneurysm dilation. No evidence   for arterial venous malformation. The vertebral arteries are codominant.    CTA NECK:  Mild bilateral carotid bulb calcifications. Mild stenosis in the left   carotid bulb. The right internal carotid arteries tortuous.  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 There is normal relationship to   the great vessels. The common carotid arteries, internal carotid arteries   and vertebral arteries shows no other evidence of significant stenosis,   occlusion or saccular aneurysm dilation. The vertebral arteries are   codominant.    CT PERFUSION:  Patient has only had less than 2 hours of symptoms may influence the CT   perfusion.  CBF<30% volume: 26 ml left MCA territory.  Tmax>6.0 s volume: 193 ml left MCA territory  Mismatch volume: 167 ml  Mismatch ratio: 7.4  IMPRESSION:  Thrombus in the distal left MCA M1 segment with diminished flow in the   distal MCA segments. 167 mL area of penumbra in the left MCA territory.     CT Brain Stroke Protocol (11.13.23 @ 11:15)   IMPRESSION:  Dense left MCA M1 segment suggesting intraluminal thrombus.   Mild chronic microvascular changes without evidence of an acute   transcortical infarction or hemorrhage.    TTE Echo Complete w/o Contrast w/ Doppler (11.14.23 @ 15:57)   Summary:   1. Left ventricular ejection fraction, by visual estimation, is 55 to   60%.   2. Normal global left ventricular systolic function.   3. The mitral in-flow pattern reveals no discernable A-wave, therefore   no comment on diastolic function can be made.   4. Normal right ventricular size and function.   5. Mild to moderately enlarged right atrium.   6. Moderatelyenlarged left atrium.   7. Trace mitral valve regurgitation.   8. Mild-moderate tricuspid regurgitation.   9. Sclerotic aortic valve with decreased opening.  10. Estimated pulmonary artery systolic pressure is 41.6 mmHg assuming a   right atrial pressure of 5 mmHg, which is consistent with mild pulmonary   hypertension.

## 2023-11-21 NOTE — PROGRESS NOTE ADULT - SUBJECTIVE AND OBJECTIVE BOX
Hospitalist Daily Progress Note    Chief Complaint:  Patient is a 78y old  Male who presents with a chief complaint of Lt MCA M1 thrombus (21 Nov 2023 10:19)      SUBJECTIVE / OVERNIGHT EVENTS:  Patient was seen and examined at bedside.   Not able to speak  Nods yes no  No overnight events  HARJINDER was cancelled by anesthesiology  Not able to do ROS     MEDICATIONS  (STANDING):  allopurinol 300 milliGRAM(s) Oral daily  aspirin  chewable 81 milliGRAM(s) Oral daily  atorvastatin 40 milliGRAM(s) Oral at bedtime  chlorhexidine 2% Cloths 1 Application(s) Topical daily  dextrose 5%. 1000 milliLiter(s) (50 mL/Hr) IV Continuous <Continuous>  dextrose 5%. 1000 milliLiter(s) (100 mL/Hr) IV Continuous <Continuous>  dextrose 50% Injectable 25 Gram(s) IV Push once  dextrose 50% Injectable 12.5 Gram(s) IV Push once  dextrose 50% Injectable 25 Gram(s) IV Push once  DULoxetine 30 milliGRAM(s) Oral daily  enoxaparin Injectable 40 milliGRAM(s) SubCutaneous <User Schedule>  famotidine    Tablet 20 milliGRAM(s) Oral two times a day  gabapentin 300 milliGRAM(s) Oral two times a day  glucagon  Injectable 1 milliGRAM(s) IntraMuscular once  insulin lispro (ADMELOG) corrective regimen sliding scale   SubCutaneous Before meals and at bedtime  metoprolol tartrate 25 milliGRAM(s) Oral two times a day  nystatin Powder 1 Application(s) Topical two times a day  polyethylene glycol 3350 17 Gram(s) Oral daily  predniSONE   Tablet 5 milliGRAM(s) Oral daily  senna 2 Tablet(s) Oral at bedtime    MEDICATIONS  (PRN):  acetaminophen     Tablet .. 650 milliGRAM(s) Oral every 6 hours PRN Temp greater or equal to 38C (100.4F)  albuterol/ipratropium (CFC free) Inhaler. 1 Puff(s) Inhalation four times a day PRN Wheezing  dextrose Oral Gel 15 Gram(s) Oral once PRN Blood Glucose LESS THAN 70 milliGRAM(s)/deciliter        I&O's Summary    20 Nov 2023 07:01  -  21 Nov 2023 07:00  --------------------------------------------------------  IN: 0 mL / OUT: 1850 mL / NET: -1850 mL        PHYSICAL EXAM:  Vital Signs Last 24 Hrs  T(C): 36.8 (21 Nov 2023 12:30), Max: 37.5 (21 Nov 2023 04:00)  T(F): 98.3 (21 Nov 2023 12:30), Max: 99.5 (21 Nov 2023 04:00)  HR: 80 (21 Nov 2023 12:30) (62 - 96)  BP: 157/69 (21 Nov 2023 12:30) (121/73 - 169/133)  BP(mean): 101 (21 Nov 2023 10:50) (83 - 103)  RR: 19 (21 Nov 2023 12:30) (18 - 22)  SpO2: 99% (21 Nov 2023 12:30) (96% - 99%)    Parameters below as of 21 Nov 2023 12:30  Patient On (Oxygen Delivery Method): room air     Constitutional: NAD, Resting, tattoos   ENT: Supple, No JVD  Lungs: CTA B/L, Non-labored breathing  Cardio: Irregular S1/s2  Abdomen: Soft, Nontender, Nondistended; Bowel sounds present  Extremities: No calf tenderness, No pitting edema  Musculoskeletal:   No joint swelling  Psych: Calm, cooperative affect appropriate  Neuro: Awake and alert, Can nod yes/no Aphasic, RUE is 0/5, RLE 0-1/5, able to move foot, LUE/LLE is 5/5   Skin: No rashes; no palpable lesions    LABS:                        9.8    8.45  )-----------( 197      ( 21 Nov 2023 05:28 )             30.8     11-21    146<H>  |  108  |  17.3  ----------------------------<  141<H>  3.9   |  29.0  |  0.83    Ca    8.9      21 Nov 2023 05:28            Urinalysis Basic - ( 21 Nov 2023 05:28 )    Color: x / Appearance: x / SG: x / pH: x  Gluc: 141 mg/dL / Ketone: x  / Bili: x / Urobili: x   Blood: x / Protein: x / Nitrite: x   Leuk Esterase: x / RBC: x / WBC x   Sq Epi: x / Non Sq Epi: x / Bacteria: x        CAPILLARY BLOOD GLUCOSE      POCT Blood Glucose.: 145 mg/dL (21 Nov 2023 12:56)  POCT Blood Glucose.: 163 mg/dL (21 Nov 2023 08:31)  POCT Blood Glucose.: 239 mg/dL (20 Nov 2023 21:50)  POCT Blood Glucose.: 166 mg/dL (20 Nov 2023 17:05)        RADIOLOGY REVIEWED

## 2023-11-21 NOTE — PROGRESS NOTE ADULT - ASSESSMENT
78y/oM PMH of HTN, HLD, AFib on Pradaxa, CKD, Obesity, Gout, Prostate cancer, DVT/PE, OA s/p multiple orthopedic procedures, Spinal Stenosis, presented on 11/13/23 for elective left atrial appendage occlusion procedure, and developed right sided weakness. Imaging revealed an acute L M1 occlusion s/p TNK administration and TICI 2A thrombectomy. Patient was intubated for airway protection. Repeat CT head s/p tenecteplase administration showed evolving left MCA stroke with hemorrhagic transformation, and mass effects with left to right shift. Suspected etiology of stroke is cardioembolic from atrial fibrillation when off anticoagulation for recent procedure. Patient was subsequently extubated and is now stable for transfer to step down unit (11/17)    Left MCA CVA with M1 occlusion  Hemorrhagic transformation  -likely cardioembolic while AC had been on hold  -right sided weakness (RUE>RLE) and global aphasia  -s/p TNK and thrombectomy  -repeat CT head s/p TNK with evolving left MCA stroke with mild hemorrhage and mass effect  -No antiplatelets until cleared by stroke neuro team--> cleared to start aspirin 81mg qd 11/17  -continue statin  -hold AC   -repeat CTH 11/16 stable   -MRI brain completed with acute CVA and hemorrhagic transformation with   -TTE reviewed; no PFO  -neurochecks q 2 hours  -PT - JHOAN  -OT -AR  -SLP eval appreciated; continue dysphagia diet  -aspiration/fall precautions  -allow for permissive HTN  -stroke neuro recs appreciated  -Was planned to have HARJINDER today but cancelled by anesthesiology given high risk  -Will need EP and Stroke team to discuss in regards to further imaging for FROY thrombi vs restart of AC?    Atrial Fibrillation  FROY thrombi  -FROY Thrombi noted on CTA Heart  -AC on hold  -continue metoprolol for rate control  -TTE reviewed  -EP on board    Hypernatremia  -goal sodium 145-150 per stroke neuro recs due to cerebral edema  -hold torsemide   -hold parenteral free water  -monitor I/os  -repeat BMP in AM    Anemia likely due to chronic disease  -Hb relatively stable  -check iron studies  -monitor CBC closely    Acute Hypoxic Respiratory Failure - resolved  -intubated for airway protection on admission   -history of ILD; will clarify if it is the etiology of chronic prednisone use   -extubated on 11/14  -CXR no acute infiltrates  -bronchodilators as needed  -incentive spirometry     DM  -HbA1c noted  -continue ISS  -ADA diet    History of PE/DVT  -off AC due to large infarct with mild hemorrhage  -LE duplex negative for DVT    HTN  -SBP 130s (goal 130-180 per neuro recs)  -continue metoprolol  -low sodium diet    GERD  -continue famotidine    Gout  -continue allopurinol    Neuropathy  -continue gabapentin     DVT ppx- Lovenox SC    Per Neurology - Needs further imaging to assess for FROY thrombi. Per EP team - no further imaging can be obtained. EP and Stroke neuro to discuss with each other.    Acute rehab afterwards.

## 2023-11-22 LAB
ANION GAP SERPL CALC-SCNC: 10 MMOL/L — SIGNIFICANT CHANGE UP (ref 5–17)
ANION GAP SERPL CALC-SCNC: 10 MMOL/L — SIGNIFICANT CHANGE UP (ref 5–17)
APTT BLD: 27.1 SEC — SIGNIFICANT CHANGE UP (ref 24.5–35.6)
APTT BLD: 27.1 SEC — SIGNIFICANT CHANGE UP (ref 24.5–35.6)
BUN SERPL-MCNC: 19.7 MG/DL — SIGNIFICANT CHANGE UP (ref 8–20)
BUN SERPL-MCNC: 19.7 MG/DL — SIGNIFICANT CHANGE UP (ref 8–20)
CALCIUM SERPL-MCNC: 9.1 MG/DL — SIGNIFICANT CHANGE UP (ref 8.4–10.5)
CALCIUM SERPL-MCNC: 9.1 MG/DL — SIGNIFICANT CHANGE UP (ref 8.4–10.5)
CHLORIDE SERPL-SCNC: 106 MMOL/L — SIGNIFICANT CHANGE UP (ref 96–108)
CHLORIDE SERPL-SCNC: 106 MMOL/L — SIGNIFICANT CHANGE UP (ref 96–108)
CO2 SERPL-SCNC: 27 MMOL/L — SIGNIFICANT CHANGE UP (ref 22–29)
CO2 SERPL-SCNC: 27 MMOL/L — SIGNIFICANT CHANGE UP (ref 22–29)
CREAT SERPL-MCNC: 0.83 MG/DL — SIGNIFICANT CHANGE UP (ref 0.5–1.3)
CREAT SERPL-MCNC: 0.83 MG/DL — SIGNIFICANT CHANGE UP (ref 0.5–1.3)
EGFR: 89 ML/MIN/1.73M2 — SIGNIFICANT CHANGE UP
EGFR: 89 ML/MIN/1.73M2 — SIGNIFICANT CHANGE UP
GLUCOSE BLDC GLUCOMTR-MCNC: 120 MG/DL — HIGH (ref 70–99)
GLUCOSE BLDC GLUCOMTR-MCNC: 120 MG/DL — HIGH (ref 70–99)
GLUCOSE BLDC GLUCOMTR-MCNC: 160 MG/DL — HIGH (ref 70–99)
GLUCOSE BLDC GLUCOMTR-MCNC: 160 MG/DL — HIGH (ref 70–99)
GLUCOSE BLDC GLUCOMTR-MCNC: 167 MG/DL — HIGH (ref 70–99)
GLUCOSE BLDC GLUCOMTR-MCNC: 167 MG/DL — HIGH (ref 70–99)
GLUCOSE BLDC GLUCOMTR-MCNC: 237 MG/DL — HIGH (ref 70–99)
GLUCOSE BLDC GLUCOMTR-MCNC: 237 MG/DL — HIGH (ref 70–99)
GLUCOSE SERPL-MCNC: 216 MG/DL — HIGH (ref 70–99)
GLUCOSE SERPL-MCNC: 216 MG/DL — HIGH (ref 70–99)
HCT VFR BLD CALC: 33.6 % — LOW (ref 39–50)
HCT VFR BLD CALC: 33.6 % — LOW (ref 39–50)
HGB BLD-MCNC: 10.6 G/DL — LOW (ref 13–17)
HGB BLD-MCNC: 10.6 G/DL — LOW (ref 13–17)
INR BLD: 1.04 RATIO — SIGNIFICANT CHANGE UP (ref 0.85–1.18)
INR BLD: 1.04 RATIO — SIGNIFICANT CHANGE UP (ref 0.85–1.18)
MCHC RBC-ENTMCNC: 29.9 PG — SIGNIFICANT CHANGE UP (ref 27–34)
MCHC RBC-ENTMCNC: 29.9 PG — SIGNIFICANT CHANGE UP (ref 27–34)
MCHC RBC-ENTMCNC: 31.5 GM/DL — LOW (ref 32–36)
MCHC RBC-ENTMCNC: 31.5 GM/DL — LOW (ref 32–36)
MCV RBC AUTO: 94.9 FL — SIGNIFICANT CHANGE UP (ref 80–100)
MCV RBC AUTO: 94.9 FL — SIGNIFICANT CHANGE UP (ref 80–100)
PLATELET # BLD AUTO: 257 K/UL — SIGNIFICANT CHANGE UP (ref 150–400)
PLATELET # BLD AUTO: 257 K/UL — SIGNIFICANT CHANGE UP (ref 150–400)
POTASSIUM SERPL-MCNC: 4.2 MMOL/L — SIGNIFICANT CHANGE UP (ref 3.5–5.3)
POTASSIUM SERPL-MCNC: 4.2 MMOL/L — SIGNIFICANT CHANGE UP (ref 3.5–5.3)
POTASSIUM SERPL-SCNC: 4.2 MMOL/L — SIGNIFICANT CHANGE UP (ref 3.5–5.3)
POTASSIUM SERPL-SCNC: 4.2 MMOL/L — SIGNIFICANT CHANGE UP (ref 3.5–5.3)
PROTHROM AB SERPL-ACNC: 11.5 SEC — SIGNIFICANT CHANGE UP (ref 9.5–13)
PROTHROM AB SERPL-ACNC: 11.5 SEC — SIGNIFICANT CHANGE UP (ref 9.5–13)
RBC # BLD: 3.54 M/UL — LOW (ref 4.2–5.8)
RBC # BLD: 3.54 M/UL — LOW (ref 4.2–5.8)
RBC # FLD: 13.3 % — SIGNIFICANT CHANGE UP (ref 10.3–14.5)
RBC # FLD: 13.3 % — SIGNIFICANT CHANGE UP (ref 10.3–14.5)
SODIUM SERPL-SCNC: 143 MMOL/L — SIGNIFICANT CHANGE UP (ref 135–145)
SODIUM SERPL-SCNC: 143 MMOL/L — SIGNIFICANT CHANGE UP (ref 135–145)
WBC # BLD: 9.37 K/UL — SIGNIFICANT CHANGE UP (ref 3.8–10.5)
WBC # BLD: 9.37 K/UL — SIGNIFICANT CHANGE UP (ref 3.8–10.5)
WBC # FLD AUTO: 9.37 K/UL — SIGNIFICANT CHANGE UP (ref 3.8–10.5)
WBC # FLD AUTO: 9.37 K/UL — SIGNIFICANT CHANGE UP (ref 3.8–10.5)

## 2023-11-22 PROCEDURE — 75572 CT HRT W/3D IMAGE: CPT | Mod: 26

## 2023-11-22 PROCEDURE — 74230 X-RAY XM SWLNG FUNCJ C+: CPT | Mod: 26

## 2023-11-22 PROCEDURE — 93308 TTE F-UP OR LMTD: CPT | Mod: 26

## 2023-11-22 PROCEDURE — 99233 SBSQ HOSP IP/OBS HIGH 50: CPT

## 2023-11-22 PROCEDURE — 70450 CT HEAD/BRAIN W/O DYE: CPT | Mod: 26

## 2023-11-22 RX ORDER — ACETAMINOPHEN 500 MG
650 TABLET ORAL EVERY 6 HOURS
Refills: 0 | Status: DISCONTINUED | OUTPATIENT
Start: 2023-11-22 | End: 2023-12-04

## 2023-11-22 RX ORDER — HEPARIN SODIUM 5000 [USP'U]/ML
900 INJECTION INTRAVENOUS; SUBCUTANEOUS
Qty: 25000 | Refills: 0 | Status: DISCONTINUED | OUTPATIENT
Start: 2023-11-22 | End: 2023-11-23

## 2023-11-22 RX ADMIN — Medication 25 MILLIGRAM(S): at 05:11

## 2023-11-22 RX ADMIN — NYSTATIN CREAM 1 APPLICATION(S): 100000 CREAM TOPICAL at 17:14

## 2023-11-22 RX ADMIN — Medication 1: at 09:16

## 2023-11-22 RX ADMIN — CHLORHEXIDINE GLUCONATE 1 APPLICATION(S): 213 SOLUTION TOPICAL at 05:12

## 2023-11-22 RX ADMIN — GABAPENTIN 300 MILLIGRAM(S): 400 CAPSULE ORAL at 05:12

## 2023-11-22 RX ADMIN — ATORVASTATIN CALCIUM 40 MILLIGRAM(S): 80 TABLET, FILM COATED ORAL at 22:52

## 2023-11-22 RX ADMIN — Medication 2: at 14:11

## 2023-11-22 RX ADMIN — NYSTATIN CREAM 1 APPLICATION(S): 100000 CREAM TOPICAL at 05:12

## 2023-11-22 RX ADMIN — HEPARIN SODIUM 9 UNIT(S)/HR: 5000 INJECTION INTRAVENOUS; SUBCUTANEOUS at 17:15

## 2023-11-22 RX ADMIN — Medication 650 MILLIGRAM(S): at 12:26

## 2023-11-22 RX ADMIN — FAMOTIDINE 20 MILLIGRAM(S): 10 INJECTION INTRAVENOUS at 05:11

## 2023-11-22 RX ADMIN — Medication 25 MILLIGRAM(S): at 17:10

## 2023-11-22 RX ADMIN — HEPARIN SODIUM 9 UNIT(S)/HR: 5000 INJECTION INTRAVENOUS; SUBCUTANEOUS at 19:50

## 2023-11-22 RX ADMIN — Medication 1: at 17:09

## 2023-11-22 RX ADMIN — SENNA PLUS 2 TABLET(S): 8.6 TABLET ORAL at 22:51

## 2023-11-22 RX ADMIN — Medication 650 MILLIGRAM(S): at 13:10

## 2023-11-22 RX ADMIN — Medication 5 MILLIGRAM(S): at 05:12

## 2023-11-22 RX ADMIN — Medication 81 MILLIGRAM(S): at 11:58

## 2023-11-22 RX ADMIN — DULOXETINE HYDROCHLORIDE 30 MILLIGRAM(S): 30 CAPSULE, DELAYED RELEASE ORAL at 11:59

## 2023-11-22 RX ADMIN — Medication 300 MILLIGRAM(S): at 11:59

## 2023-11-22 RX ADMIN — GABAPENTIN 300 MILLIGRAM(S): 400 CAPSULE ORAL at 17:10

## 2023-11-22 RX ADMIN — FAMOTIDINE 20 MILLIGRAM(S): 10 INJECTION INTRAVENOUS at 17:09

## 2023-11-22 RX ADMIN — Medication 10 MILLIGRAM(S): at 12:41

## 2023-11-22 RX ADMIN — POLYETHYLENE GLYCOL 3350 17 GRAM(S): 17 POWDER, FOR SOLUTION ORAL at 11:59

## 2023-11-22 NOTE — DIETITIAN NUTRITION RISK NOTIFICATION - TREATMENT: THE FOLLOWING DIET HAS BEEN RECOMMENDED
Diet, DASH/TLC:   Sodium & Cholesterol Restricted  Easy to Chew (EASYTOCHEW)  Mildly Thick Liquids (MILDTHICKLIQS) (11-21-23 @ 14:11) [Active]

## 2023-11-22 NOTE — CHART NOTE - NSCHARTNOTEFT_GEN_A_CORE
Source: Patient [ ]  Family [ ]   other [ x]    Current Diet: Diet, DASH/TLC:   Sodium & Cholesterol Restricted  Easy to Chew (EASYTOCHEW)  Mildly Thick Liquids (MILDTHICKLIQS) (11-21-23 @ 14:11)      Patient reports [ ] nausea  [ ] vomiting [ ] diarrhea [ ] constipation  [ ]chewing problems [ ] swallowing issues  [ ] other:     PO intake:  < 50% [ x]   50-75%  [ ]   %  [ ]  other :    Current Weight:   (11/14) 275.7 lbs  (11/13) 276.2 lbs  (11/13) 264.5 lbs  ? accuracy of weights, noted with 2+ b/l leg and b/l ankle edema, continue to trend and maintain strict Is&Os     Pertinent Medications: MEDICATIONS  (STANDING):  atorvastatin 40 milliGRAM(s) Oral at bedtime  insulin lispro (ADMELOG) corrective regimen sliding scale   SubCutaneous Before meals and at bedtime  polyethylene glycol 3350 17 Gram(s) Oral daily  predniSONE   Tablet 5 milliGRAM(s) Oral daily  senna 2 Tablet(s) Oral at bedtime    MEDICATIONS  (PRN):  bisacodyl Suppository 10 milliGRAM(s) Rectal daily PRN Constipation    Pertinent Labs: CBC Full  -  ( 21 Nov 2023 05:28 )  WBC Count : 8.45 K/uL  RBC Count : 3.22 M/uL  Hemoglobin : 9.8 g/dL  Hematocrit : 30.8 %    Skin: Surgical incision to right groin and moisture associated dermatitis per documentation  Alok: 12    Nutrition focused physical exam conducted - found signs of malnutrition [ ]absent [ x]present    Subcutaneous fat loss: [ x] Orbital fat pads region, [ ]Buccal fat region, [ ]Triceps region,  [ ]Ribs region    Muscle wasting: [x ]Temples region, [ x]Clavicle region, [ ]Shoulder region, [ ]Scapula region, [ ]Interosseous region,  [ ]thigh region, [ ]Calf region    Estimated Needs:   [x ] no change since previous assessment  [ ] recalculated:     Current Nutrition Diagnosis: Pt remains at high nutrition risk secondary to malnutrition (moderate, acute) related to inability to meet sufficient protein-energy in setting of Left MCA CVA with M1 occlusion, dysphagia, poor appetite as evidenced by meeting <50% nutrient needs >5 days, mild muscle loss of temples and clavicles, mild fat loss of orbitals, +edema. Pt s/p MBS today with recommendations for an easy to chew diet with mildly thick liquids. Pt aphasic. Per documentation, pt has been with poor po intake. Also confirmed by visual observation of meal tray at bedside. RD to follow up.     Recommendations:   1) Continue diet as tolerated/per SLP.  2) Add Ensure Plus High Protein TID (mildly thick) to optimize PO intake and provide an additional 350 kcal, 20g protein per serving.  3) Rx: MVI daily.   4) Continue bowel regimen PRN.  5) Encourage po intake, monitor diet tolerance, and provide assistance at meals as needed.  6) Obtain daily weights to monitor trends.     Monitoring and Evaluation:   [ x] PO intake [ x] Tolerance to diet prescription [X] Weights  [X] Follow up per protocol [X] Labs: chem 8, mg, phos, H/H Source: Patient [ ]  Family [ ]   other [ x]    Current Diet: Diet, DASH/TLC:   Sodium & Cholesterol Restricted  Easy to Chew (EASYTOCHEW)  Mildly Thick Liquids (MILDTHICKLIQS) (11-21-23 @ 14:11)      Patient reports [ ] nausea  [ ] vomiting [ ] diarrhea [ ] constipation  [ ]chewing problems [ ] swallowing issues  [ ] other:     PO intake:  < 50% [ x]   50-75%  [ ]   %  [ ]  other :    Current Weight:   (11/14) 275.7 lbs  (11/13) 276.2 lbs  (11/13) 264.5 lbs  ? accuracy of weights, noted with 2+ b/l leg and b/l ankle edema, continue to trend and maintain strict Is&Os     Pertinent Medications: MEDICATIONS  (STANDING):  atorvastatin 40 milliGRAM(s) Oral at bedtime  insulin lispro (ADMELOG) corrective regimen sliding scale   SubCutaneous Before meals and at bedtime  polyethylene glycol 3350 17 Gram(s) Oral daily  predniSONE   Tablet 5 milliGRAM(s) Oral daily  senna 2 Tablet(s) Oral at bedtime    MEDICATIONS  (PRN):  bisacodyl Suppository 10 milliGRAM(s) Rectal daily PRN Constipation    Pertinent Labs: CBC Full  -  ( 21 Nov 2023 05:28 )  WBC Count : 8.45 K/uL  RBC Count : 3.22 M/uL  Hemoglobin : 9.8 g/dL  Hematocrit : 30.8 %    Skin: Surgical incision to right groin and moisture associated dermatitis per documentation  Alok: 12    Nutrition focused physical exam conducted - found signs of malnutrition [ ]absent [ x]present    Subcutaneous fat loss: [ x] Orbital fat pads region, [ ]Buccal fat region, [ ]Triceps region,  [ ]Ribs region    Muscle wasting: [x ]Temples region, [ x]Clavicle region, [ ]Shoulder region, [ ]Scapula region, [ ]Interosseous region,  [ ]thigh region, [ ]Calf region    Estimated Needs:   [x ] no change since previous assessment  [ ] recalculated:     Current Nutrition Diagnosis: Pt remains at high nutrition risk secondary to malnutrition (moderate, acute) related to inability to meet sufficient protein-energy in setting of Left MCA CVA with M1 occlusion, dysphagia, poor appetite as evidenced by meeting <50% nutrient needs >5 days, mild muscle loss of temples and clavicles, mild fat loss of orbitals, +edema. Pt s/p MBS today with recommendations for an easy to chew diet with mildly thick liquids. Pt aphasic. Per documentation, pt has been with poor po intake. Also confirmed by visual observation of meal tray at bedside. RD to follow up.     Recommendations:   1) Continue diet as tolerated/per SLP.  2) Add Glucerna TID (mildly thick) to optimize po intake and provide an additional 220 kcal, 10g protein per serving.   3) Rx: MVI daily.   4) Continue bowel regimen PRN.  5) Encourage po intake, monitor diet tolerance, and provide assistance at meals as needed.  6) Obtain daily weights to monitor trends.   7) Monitor blood sugars and correct PRN.     Monitoring and Evaluation:   [ x] PO intake [ x] Tolerance to diet prescription [X] Weights  [X] Follow up per protocol [X] Labs: chem 8, mg, phos, H/H

## 2023-11-22 NOTE — DIETITIAN NUTRITION RISK NOTIFICATION - ADDITIONAL COMMENTS/DIETITIAN RECOMMENDATIONS
Continue diet as tolerated/per SLP.  Add Glucerna TID (mildly thick) to optimize po intake and provide an additional 220 kcal, 10g protein per serving.   Rx: MVI daily.   Continue bowel regimen PRN.  Encourage po intake, monitor diet tolerance, and provide assistance at meals as needed.  Obtain daily weights to monitor trends.   Monitor blood sugars and correct PRN.

## 2023-11-22 NOTE — SWALLOW VFSS/MBS ASSESSMENT ADULT - UNSUCCESSFUL STRATEGIES TRIALED DURING PROCEDURE
Compensatory postures unable to be trialed 2* pt's language deficits and inability to execute postural modifications.

## 2023-11-22 NOTE — SWALLOW VFSS/MBS ASSESSMENT ADULT - DIAGNOSTIC IMPRESSIONS
Mild oral dysphagia marked by reduced lingual coordination, reduced buccal strength resulting in posterior loss of solids to the oropharynx and liquids variably to the marnie- and hypopharynx. Trace buccal stasis with solids. Increased mastication time with regular solids, improved with easy-to-chew.     Pharyngeal stage WNL for solids and mildly thick liquids. No pharyngeal stasis or penetration/aspiration visualized.     Severe pharyngeal dysphagia with thin liquids marked by reduced laryngeal elevation, reduced upper and lower airway closure resulting in silent aspiration of thin liquid via small cup sip and deep penetration of thins via teaspoon administration to the cords without retrieval.     Pt unable to complete compensatory maneuvers at this time 2* inability to consistently follow commands

## 2023-11-22 NOTE — SWALLOW VFSS/MBS ASSESSMENT ADULT - SLP GENERAL OBSERVATIONS
Pt received in Radiology Suite, A&A, +aphasic, nonverbal, responding via gesture to yes/no questions with fair-good reliability, following some simple commands with moderate cues, +pain in back reported via yes/no, unable to quantify 2* aphasia

## 2023-11-22 NOTE — PROGRESS NOTE ADULT - SUBJECTIVE AND OBJECTIVE BOX
Hospitalist Daily Progress Note    Chief Complaint:  Patient is a 78y old  Male who presents with a chief complaint of Lt MCA M1 thrombus (22 Nov 2023 07:56)      SUBJECTIVE / OVERNIGHT EVENTS:  Patient was seen and examined at bedside.   Not able to participate  No overnight events  Unable to do ROS.    MEDICATIONS  (STANDING):  allopurinol 300 milliGRAM(s) Oral daily  aspirin  chewable 81 milliGRAM(s) Oral daily  atorvastatin 40 milliGRAM(s) Oral at bedtime  chlorhexidine 2% Cloths 1 Application(s) Topical daily  dextrose 5%. 1000 milliLiter(s) (50 mL/Hr) IV Continuous <Continuous>  dextrose 5%. 1000 milliLiter(s) (100 mL/Hr) IV Continuous <Continuous>  dextrose 50% Injectable 12.5 Gram(s) IV Push once  dextrose 50% Injectable 25 Gram(s) IV Push once  dextrose 50% Injectable 25 Gram(s) IV Push once  DULoxetine 30 milliGRAM(s) Oral daily  famotidine    Tablet 20 milliGRAM(s) Oral two times a day  gabapentin 300 milliGRAM(s) Oral two times a day  glucagon  Injectable 1 milliGRAM(s) IntraMuscular once  heparin  Infusion 900 Unit(s)/Hr (9 mL/Hr) IV Continuous <Continuous>  insulin lispro (ADMELOG) corrective regimen sliding scale   SubCutaneous Before meals and at bedtime  metoprolol tartrate 25 milliGRAM(s) Oral two times a day  nystatin Powder 1 Application(s) Topical two times a day  polyethylene glycol 3350 17 Gram(s) Oral daily  predniSONE   Tablet 5 milliGRAM(s) Oral daily  senna 2 Tablet(s) Oral at bedtime    MEDICATIONS  (PRN):  acetaminophen     Tablet .. 650 milliGRAM(s) Oral every 6 hours PRN Temp greater or equal to 38C (100.4F), Mild Pain (1 - 3)  albuterol/ipratropium (CFC free) Inhaler. 1 Puff(s) Inhalation four times a day PRN Wheezing  bisacodyl Suppository 10 milliGRAM(s) Rectal daily PRN Constipation  dextrose Oral Gel 15 Gram(s) Oral once PRN Blood Glucose LESS THAN 70 milliGRAM(s)/deciliter        I&O's Summary    21 Nov 2023 07:01  -  22 Nov 2023 07:00  --------------------------------------------------------  IN: 600 mL / OUT: 550 mL / NET: 50 mL    22 Nov 2023 07:01  -  22 Nov 2023 14:51  --------------------------------------------------------  IN: 360 mL / OUT: 0 mL / NET: 360 mL        PHYSICAL EXAM:  Vital Signs Last 24 Hrs  T(C): 36.9 (22 Nov 2023 12:00), Max: 37.3 (21 Nov 2023 20:00)  T(F): 98.5 (22 Nov 2023 12:00), Max: 99.1 (21 Nov 2023 20:00)  HR: 70 (22 Nov 2023 12:00) (65 - 92)  BP: 130/70 (22 Nov 2023 12:00) (125/78 - 144/64)  BP(mean): --  RR: 18 (22 Nov 2023 12:00) (18 - 18)  SpO2: 96% (22 Nov 2023 12:00) (91% - 98%)    Parameters below as of 22 Nov 2023 12:00  Patient On (Oxygen Delivery Method): room air    Constitutional: NAD, Resting, tattoos   ENT: Supple, No JVD  Lungs: CTA B/L, Non-labored breathing  Cardio: Irregular S1/s2  Abdomen: Soft, Nontender, Nondistended; Bowel sounds present  Extremities: No calf tenderness, No pitting edema  Musculoskeletal:   No joint swelling  Psych: Calm, cooperative affect appropriate  Neuro: Awake and alert, Can nod yes/no Aphasic, RUE is 0/5, RLE 0-1/5, able to move foot, LUE/LLE is 5/5   Skin: No rashes; no palpable lesions         LABS:                        9.8    8.45  )-----------( 197      ( 21 Nov 2023 05:28 )             30.8     11-21    146<H>  |  108  |  17.3  ----------------------------<  141<H>  3.9   |  29.0  |  0.83    Ca    8.9      21 Nov 2023 05:28            Urinalysis Basic - ( 21 Nov 2023 05:28 )    Color: x / Appearance: x / SG: x / pH: x  Gluc: 141 mg/dL / Ketone: x  / Bili: x / Urobili: x   Blood: x / Protein: x / Nitrite: x   Leuk Esterase: x / RBC: x / WBC x   Sq Epi: x / Non Sq Epi: x / Bacteria: x        CAPILLARY BLOOD GLUCOSE      POCT Blood Glucose.: 237 mg/dL (22 Nov 2023 13:19)  POCT Blood Glucose.: 167 mg/dL (22 Nov 2023 08:40)  POCT Blood Glucose.: 173 mg/dL (21 Nov 2023 21:12)  POCT Blood Glucose.: 154 mg/dL (21 Nov 2023 17:45)        RADIOLOGY REVIEWED

## 2023-11-22 NOTE — CONSULT NOTE ADULT - SUBJECTIVE AND OBJECTIVE BOX
Chief complaint:   Patient is a 78y old  Male who presents with a chief complaint of Lt MCA M1 thrombus (22 Nov 2023 14:44)    HPI:   79 yo M PMH of HTN, HLD, AFib, CKD, obesity, gout, prostate cancer, DVT/PE, OA s/p multiple orthopedic procedures, spinal stenosis, presented on 11/13/23 for elective left atrial appendage occlusion procedure, and 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  Patient has a long h/o AFib, which is now considered permanent. He is on rate control with metoprolol 25 mg bid, and anticoagulation with Pradaxa.  He has a long Hx of arthritis and had multiple hip replacements, bilateral knee replacements and also has neck and back pain, but is unable to take NSAIDs due to bleeding risks. He has an unsteady gait and uses a cane for assistance.  Patient presented for elective left atrial appendage occlusion with Watchman device/HARJINDER w/ Dr Bobo, and Pradaxa was held for 2 days prior to procedure. 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   Reported per neurology that on admission, NIHSS 26, MRS 1.  Patient is now s/p TNK administration at 11:16, and TICI 2A thrombectomy with Dr. Soliman, admitted to the NeuroICU intubated and on phenylephrine.     NIH SS: 11/13 at 10:53 per neurology  1A: Level of consciousness (0-3): 1  1B: Questions (0-2): 2  1C: Commands (0-2): 2  2: Gaze (0-2): 2  3: Visual fields (0-3): 2  4: Facial palsy (0-3): 1  MOTOR:  5A: Left arm motor drift (0-4): 0  5B: Right arm motor drift (0-4): 4  6A: Left leg motor drift (0-4): 0  6B: Right leg motor drift (0-4): 4  7: Limb ataxia (0-2): 0  SENSORY:  8: Sensation (0-2): 2  SPEECH:  9: Language (0-3): 3  10: Dysarthria (0-2): 2  EXTINCTION:  11: Extinction/inattention (0-2): 2    TOTAL SCORE: 26 (13 Nov 2023 16:32)    24hr EVENTS: CTA showed coronary occlusion      ROS: [x ]  Unable to assess due to mental status   All other systems negative    -----------------------------------------------------------------------------------------------------------------------------------------------------------------------------------  ICU Vital Signs Last 24 Hrs  T(C): 37.1 (22 Nov 2023 15:58), Max: 37.3 (21 Nov 2023 20:00)  T(F): 98.7 (22 Nov 2023 15:58), Max: 99.1 (21 Nov 2023 20:00)  HR: 88 (22 Nov 2023 15:31) (65 - 92)  BP: 136/87 (22 Nov 2023 15:31) (125/78 - 142/85)  BP(mean): --  ABP: --  ABP(mean): --  RR: 20 (22 Nov 2023 15:31) (18 - 20)  SpO2: 95% (22 Nov 2023 15:31) (93% - 98%)    O2 Parameters below as of 22 Nov 2023 15:31  Patient On (Oxygen Delivery Method): room air        I&O's Summary    21 Nov 2023 07:01  -  22 Nov 2023 07:00  --------------------------------------------------------  IN: 600 mL / OUT: 550 mL / NET: 50 mL    22 Nov 2023 07:01  -  22 Nov 2023 16:31  --------------------------------------------------------  IN: 360 mL / OUT: 800 mL / NET: -440 mL        MEDICATIONS  (STANDING):  allopurinol 300 milliGRAM(s) Oral daily  aspirin  chewable 81 milliGRAM(s) Oral daily  atorvastatin 40 milliGRAM(s) Oral at bedtime  chlorhexidine 2% Cloths 1 Application(s) Topical daily  dextrose 5%. 1000 milliLiter(s) (50 mL/Hr) IV Continuous <Continuous>  dextrose 5%. 1000 milliLiter(s) (100 mL/Hr) IV Continuous <Continuous>  dextrose 50% Injectable 25 Gram(s) IV Push once  dextrose 50% Injectable 12.5 Gram(s) IV Push once  dextrose 50% Injectable 25 Gram(s) IV Push once  DULoxetine 30 milliGRAM(s) Oral daily  famotidine    Tablet 20 milliGRAM(s) Oral two times a day  gabapentin 300 milliGRAM(s) Oral two times a day  glucagon  Injectable 1 milliGRAM(s) IntraMuscular once  heparin  Infusion 900 Unit(s)/Hr (9 mL/Hr) IV Continuous <Continuous>  insulin lispro (ADMELOG) corrective regimen sliding scale   SubCutaneous Before meals and at bedtime  metoprolol tartrate 25 milliGRAM(s) Oral two times a day  nystatin Powder 1 Application(s) Topical two times a day  polyethylene glycol 3350 17 Gram(s) Oral daily  predniSONE   Tablet 5 milliGRAM(s) Oral daily  senna 2 Tablet(s) Oral at bedtime      RESPIRATORY:        IMAGING:   Recent imaging studies were reviewed.    LAB RESULTS:                          10.6   9.37  )-----------( 257      ( 22 Nov 2023 15:09 )             33.6       PT/INR - ( 22 Nov 2023 15:09 )   PT: 11.5 sec;   INR: 1.04 ratio         PTT - ( 22 Nov 2023 15:09 )  PTT:27.1 sec    11-22    143  |  106  |  19.7  ----------------------------<  216<H>  4.2   |  27.0  |  0.83    Ca    9.1      22 Nov 2023 15:09      -----------------------------------------------------------------------------------------------------------------------------------------------------------------------------------    PHYSICAL EXAM:  General: Calm, laying in bed  HEENT: MMM  Neuro:  -Mental status- No acute distress, EO, attends  global aphasia  -CN- PERRL 3mm, EOMI, tongue midline, R facial droop  BTT on R, L gaze pref   -Motor-   RUE 0/5  RLE WD  LUE and LLE 5/5    -Sensation- decreased on R  -Coordination- no dysmetria noted    CV: RRR  Pulm: Clear to auscultation  Abd: Soft, nontender, nondistended  Ext: No edema  Skin: warm, dry

## 2023-11-22 NOTE — SWALLOW VFSS/MBS ASSESSMENT ADULT - SLP PERTINENT HISTORY OF CURRENT PROBLEM
78y/oM PMH of HTN, HLD, AFib on Pradaxa, CKD, Obesity, Gout, Prostate cancer, DVT/PE, OA s/p multiple orthopedic procedures, Spinal Stenosis, presented on 11/13/23 for elective left atrial appendage occlusion procedure, and developed right sided weakness. Imaging revealed an acute L M1 occlusion s/p TNK administration and TICI 2A thrombectomy. Patient was intubated for airway protection. Repeat CT head s/p tenecteplase administration showed evolving left MCA stroke with hemorrhagic transformation, and mass effects with left to right shift. Suspected etiology of stroke is cardioembolic from atrial fibrillation when off anticoagulation for recent procedure. Patient was subsequently extubated and is now stable for transfer to step down unit

## 2023-11-22 NOTE — PROGRESS NOTE ADULT - ASSESSMENT
78y/oM PMH of HTN, HLD, AFib on Pradaxa, CKD, Obesity, Gout, Prostate cancer, DVT/PE, OA s/p multiple orthopedic procedures, Spinal Stenosis, presented on 11/13/23 for elective left atrial appendage occlusion procedure, and developed right sided weakness. Imaging revealed an acute L M1 occlusion s/p TNK administration and TICI 2A thrombectomy. Patient was intubated for airway protection. Repeat CT head s/p tenecteplase administration showed evolving left MCA stroke with hemorrhagic transformation, and mass effects with left to right shift. Suspected etiology of stroke is cardioembolic from atrial fibrillation when off anticoagulation for recent procedure. Patient was subsequently extubated and is now stable for transfer to step down unit (11/17). Had MRI brain that confirmed Ischemic and hemorrhagic CVA. CTA cardiac was done which showed FROY thrombi and PE. Decision was made to start AC per Neurology team. Will updated to SDU again.     Left MCA CVA with M1 occlusion  Hemorrhagic transformation  -likely cardioembolic while AC had been on hold  -right sided weakness (RUE>RLE) and global aphasia  -s/p TNK and thrombectomy  -repeat CT head s/p TNK with evolving left MCA stroke with mild hemorrhage and mass effect  -No antiplatelets until cleared by stroke neuro team--> cleared to start aspirin 81mg qd 11/17  -continue statin  -repeat CTH 11/16 stable   -MRI brain completed with acute CVA and hemorrhagic transformation with   -TTE reviewed; no PFO  -PT - JHOAN  -OT -AR  -SLP eval appreciated; continue dysphagia diet  -aspiration/fall precautions  -stroke neuro recs appreciated  -Was planned to have HARJINDER today but cancelled by anesthesiology given high risk  -Patient underwent cardiac CTA - Shows FROY thrombi and RUL PE  -NEURO ICU CONSULTED ON 11/22  -Plan is to start heparin drip with specific goal of 40-60  -Will obtain STAT CT HEAD  -Neuro q2h now    Atrial Fibrillation  FROY thrombi  PE  -FROY Thrombi noted on CTA Heart  -Start AC -Hep GTT - PTT goal of 40-60 - NO BOLUS.  -continue metoprolol for rate control  -TTE reviewed - but repeat TTE requested for new PE   -EP on board    Hypernatremia  -goal sodium 145-150 per stroke neuro recs due to cerebral edema  -hold torsemide   -hold parenteral free water  -monitor I/os     Anemia likely due to chronic disease  -Hb relatively stable  -check iron studies  -monitor CBC closely    Acute Hypoxic Respiratory Failure - resolved  -intubated for airway protection on admission   -history of ILD; will clarify if it is the etiology of chronic prednisone use   -extubated on 11/14  -CXR no acute infiltrates  -bronchodilators as needed  -incentive spirometry     DM  -HbA1c noted  -continue ISS  -ADA diet    History of PE/DVT  -LE duplex negative for DVT  -Confirmed [facility] PE ON CT   -AC as above    HTN  -SBP 130s (goal 130-180 per neuro recs)  -continue metoprolol  -low sodium diet    GERD  -continue famotidine    Gout  -continue allopurinol    Neuropathy  -continue gabapentin     DVT ppx- heparin drip    Repeat CT HEAD TOMORROW IN THE AFTERNOON    Dispo: Patient remains acute  Now on heparin drip - Monitor ptt  Case discussed extensively with STROKE Neuro  Neuro ICU consulted today - states to be stable for SDU  EP made aware of current plan of care    Spoke with all family members at bedside. Upgrade to SDU

## 2023-11-22 NOTE — PROGRESS NOTE ADULT - NS ATTEND AMEND GEN_ALL_CORE FT
CTA with LA thrombi as well as PE  will start heparin gtt as above and monitor with close neurochecks  Repeat TTE to eval for R heart strain and monitor respiratory status

## 2023-11-22 NOTE — CONSULT NOTE ADULT - ASSESSMENT
79 y/o male with A.fib w/ RVR off AC (pradaxa) for scheduled watchman, DVT/PE, remote prostate cancer, CKD, HTN, HLD adm to NSICU on 11/14 s/p TNK and TICI 2A thrombectomy of LICA/M1 occlusion. Extubated 11/14. Neurosurgery consulted for potential hemicraniectomy    Plan:  - Case discussed with Dr. Solano. No neurosurgical intervention offered at this time due to age, dominant hemisphere stroke and comorbidities.   - Na 140-150  - HOB 30-45  - Continue remainder of care per NSICU. please reconsult prn
The patient is a 78y Male who presented with acute stroke while waiting to go for watchman procedure.  he has chronic a fib and was here today for a watchman procedure, He stopped pradaxa on 11/10/23- confirmed by wife.  He was last seen normal at [id number], he came back from bathroom and when in stretcher became acutely aphasic with right hemiparesis. A code stroke was called at 1050 and the stroke team arrived at 1052.  He was found to have the right hemiparesis, global aphasia and we suspected left MCA embolic stroke.   He was taken for STAT CT head and CTA angio head/neck with perfusion.  As soon as head CT showed no hemorrhage tenecteplase was ordered with request to bring to cath lab (where he was at  time of code stroke).  He was brought back to cath lab after CT Angio/perfusion and tenecteplase arrived just before he got there.  I spoke with his wife to discuss that we suspected stroke as cause of his acute symptoms, explained risk/ benefit of giving tenecteplase (bleeding risk vs recovery of symtpoms) as well as alternative of not giving tenecteplase.  After cycling his BP (132/78) I administered tenecteplase at 1116.  After this was administered the CT angiogram showed left MCA occlusion with large penumbra and Dr Soliman consented his wifefor thrombectomy.  Right after tenecteplase he was taken to the suite for emergent thrombectomy.    Likely embolic stroke from atrial fibrillation when off anticoagulation for his procedure.    ASSESSMENT:     NEURO: s/p tenecteplase, went for thrombectomy.  For NSICU admission.  Vitals per protocol. Continue close monitoring for neurologic deterioration , titrate statin to LDL goal less than 70, MRI Brain w/o, MRA Head w/o and Neck w/contrast. Physical therapy/OT/Speech eval/treatment.     ANTITHROMBOTIC THERAPY: hold as he received tenecteplase, repeat head CT 24 hours to assess for bleed, if not blood will need restart pradaxa    PULMONARY:  protecting airway, saturating well     CARDIOVASCULAR: check TTE, cardiac monitoring                              SBP goal: per post thrombectomy orders per JAVIER    GASTROINTESTINAL:  dysphagia screen pending     Diet: NPO for now    RENAL: BUN/Cr stable,      Na Goal: Greater than 135       HEMATOLOGY: H/H stable, Platelets normal      DVT ppx: Heparin s.c [] LMWH []     ID: afebrile, no leukocytosis     OTHER:      DISPOSITION: Rehab or home depending on PT eval once stable and workup is complete      CORE MEASURES:        Admission NIHSS:      Tenecteplase : [x] YES [] NO      LDL/HDL/A1C: pending     Depression Screen:      Statin Therapy:     Dysphagia Screen: [] PASS [] FAIL     Smoking [] YES [x] NO      Afib [x] YES [] NO     Stroke Education [] YES [] NO    Obtain screening lower extremity venous ultrasound in patients who meet 1 or more of the following criteria as patient is high risk for DVT/PE on admission:   [] History of DVT/PE  []Hypercoagulable states (Factor V Leiden, Cancer, OCP, etc. )  []Prolonged immobility (hemiplegia/hemiparesis/post operative or any other extended immobilization)  [] Transferred from outside facility (Rehab or Long term care)  [] Age </= to 50    discussed with Angeles Bobo and Jourdan
Patient is a 78 year old male with PMH of HTN, HLD, AFib on Pradaxa, CKD, Obesity, Gout, Prostate cancer, DVT/PE, OA s/p multiple orthopedic procedures, Spinal Stenosis, presented on 11/13/23 for elective left atrial appendage occlusion procedure, and developed right sided weakness. Imaging revealed an acute L M1 occlusion s/p TNK administration and TICI 2A thrombectomy. Patient was intubated for airway protection. Repeat CT head s/p tenecteplase administration showed evolving left MCA stroke with hemorrhagic transformation, and mass effects with left to right shift. Suspected etiology of stroke is cardioembolic from atrial fibrillation when off anticoagulation for recent procedure. Patient was subsequently extubated and is now stable for transfer to step down unit.    1. Left MCA CVA with M1 occlusion  -likely cardioembolic while AC had been on hold  -right sided weakness (RUE>RLE) and global aphasia  -s/p TNK and thrombectomy  -repeat CT head s/p TNK with evolving left MCA stroke with mild hemorrhage and mass effect  -No antiplatelets until cleared by stroke neuro team  -continue statin  -hold AC   -Stat repeat CT head ordered  -MRI brain pending  -TTE reviewed; no PFO  -neurochecks q 2 hours  -PT/OT- JHOAN  -SLP eval appreciated; continue dysphagia diet  -aspiration/fall precautions  -allow for permissive HTN  -stroke neuro recs appreciated    Atrial Fibrillation  -AC on hold  -continue metoprolol for rate control  -TTE reviewed  -EP recalled by neuro ICU to evaluate for HARJINDER  -telemonitoring    Hypernatremia  -goal sodium 145-150 per stroke neuro recs due to cerebral edema  -hold torsemide   -hold parenteral free water  -monitor I/os  -repeat BMP in AM    Anemia likely due to chronic disease  -Hb relatively stable  -check iron studies  -monitor CBC closely    Acute Hypoxic Respiratory Failure - resolved  -intubated for airway protection on admission  -history of ILD; will clarify if it is the etiology of chronic prednisone use  -extubated on 11/14  -CXR no acute infiltrates  -bronchodilators as needed  -incentive spirometry     DM  -HbA1c noted  -continue ISS  -ADA diet    History of PE/DVT  -off AC due to large infarct with mild hemorrhage  -LE duplex negative for DVT    HTN  -SBP 130s (goal 130-180 per neuro recs)  -continue metoprolol  -low sodium diet    GERD  -continue famotidine    Gout  -continue allopurinol    Neuropathy  -continue gabapentin     DVT ppx- Lovenox SC    Dispo- Downgrade to SDU for q2 neurochecks. Repeat CT head stat. MRI brain pending.    Plan discussed with neuro ICU PADr. Casas, RN
 79 yo M PMH of HTN, HLD, AFib, CKD, obesity, gout, prostate cancer, DVT/PE, OA s/p multiple orthopedic procedures, spinal stenosis, presented on 11/13/23 for elective left atrial appendage occlusion procedure, and developed a L MCA syndrome due to acute L M1 occlusion. Pt has been stable on tele. Had MRI brain that confirmed Ischemic and hemorrhagic CVA. CTA cardiac was done which showed FROY thrombi and PE. Decision was made to start AC per Neurology team.    - CTH performed today- improved cerebral edema and hemorrhagic conversion  - stable exam  - neurochecks q2h  - start heparin gtt, no bolus, PTT goal 60-80  - repeat CTH after PTT therapeutic  - stat CTH if worsening neuro exam    Please call NSICU if any concern for neuro decompensation      case discussed with Stroke neurology

## 2023-11-22 NOTE — SWALLOW VFSS/MBS ASSESSMENT ADULT - RECOMMENDED FEEDING/EATING TECHNIQUES
crush medication (when feasible)/oral hygiene/position upright (90 degrees)/provide rest periods between swallows

## 2023-11-22 NOTE — PROGRESS NOTE ADULT - ASSESSMENT
INCOMPLETE  ASSESSMENT:   79 yo M PMH of HTN, HLD, AFib, CKD, obesity, gout, prostate cancer, DVT/PE, OA s/p multiple orthopedic procedures, spinal stenosis, presented on 11/13/23 for elective left atrial appendage occlusion procedure, and developed a left MCA syndrome due to acute left M1 occlusion as demonstrated on CT Head and Neck. Patient presented on 11/13/23 for elective left atrial appendage occlusion with Watchman device/HARJINDER w/ Dr Bobo, and Pradaxa was held for 2 days prior to procedure. Whilst in holding patient developed a left MCA syndrome due to acute left M1 occlusion as demonstrated on CTH & Neck. On admission, NIHSS 26, MRS 1. Patient is now s/p tenecteplase administration at 11:16 11/13/23, and TICI 2A thrombectomy with Dr. Soliman. 24 hour repeat CT head s/p tenecteplase administration showed evolving left MCA stroke with hemorrhagic transformation, and mass effect with left to right shift. MRI brain revealed redemonstration of an evolving acute/subacute large left-sided MCA distribution infarction with gross hemorrhagic transformation in the left basal ganglia as well as evidence of petechial hemorrhagic transformation within the left frontotemporal and parietal cortices. Suspected etiology of stroke is cardioembolic from atrial fibrillation when off anticoagulation for recent procedure, 2 cardiac thrombi were appreciated on CTA chest 10 days prior.    NEURO:   -Neurologically no acute neurologic changes   -Continue close monitoring for neurologic deterioration    -Stroke neuro checks q 4 hours  -Neurologically appears to be toleraing SBP of 130s-170s. Can keep this SBP goal and avoid hypotension or rapid fluctuations in blood pressure   -ANTITHROMBOTIC THERAPY: ASA 81mg daily for now. Initial plan was to obtain HARJINDER to evaluate for cardiac thrombus as this would further guide risk stratification in regards to initiation of anticoagulation However, HARJINDER deferred by anesthesia and not able to be obtained. Suggest optimal cardiac testing to differentiate whether thrombus is present as this would again provide risk stratification of anticoagulation therapy in the setting of a large cerebral infarction. Anticoagulation is currently on hold due to risk of hemorrhagic transformation. Will re-consider time frame if cardiac thrombus is present and patient demonstrated to be higher risk for thromboembolism based on suggested testing.    -titrate statin to LDL goal less than 70, LDL=54, may resume home regimen of Rosuvastatin 10 mg PO QHS when patient able to tolerate PO   -MRI Brain w/o as noted  -Dysphagia screen: pass  -Physical therapy/OT/Speech eval/treatment.   -Neurosurgery input appreciated, patient not a hemicrani candidate     CARDIOVASCULAR:  -TTE findings as above, no acute findings   -HARJINDER deferred by anesthesia.   -No cardiac intervention at this time as the risks>benefits as per ED  -cardiac monitoring w/ telemetry for now, further evaluation pending findings of noted workup                              HEMATOLOGY:   -H/H 9.8/30.8. Platelets 197. Monitor anemia/thrombocytopenia per primary team, patient should have all age and risk appropriate malignancy screenings with PCP or sooner if clinically suspected   -Bilateral lower extremity venous duplex negative for DVT   -DVT ppx: Heparin s.c [] LMWH [x]     PULMONARY:   -Patient extubated 11/14/23  -protecting airway, saturating well     RENAL:   -BUN/Cr 17.3/0.83. monitor urine output, maintain adequate hydration    -Na Goal:  145-150 secondary to cerebral edema.    ID:   -afebrile, leukocytosis resolved, monitor for si/sx of infection     OTHER:    -condition and plan of care d/w patient and family at bedside, questions and concerns addressed.     DISPOSITION: Rehab or home depending on PT eval once stable and workup is complete    CORE MEASURES:        Admission NIHSS: 26     Tenecteplase : [x] YES [] NO      LDL/HDL/A1C: 54/67/6.2     Depression Screen- if depression hx and/or present      Statin Therapy: Atorvastatin 40 mg PO Daily      Dysphagia Screen: [x] PASS [] FAIL      Smoking [] YES [x] NO      Afib [x] YES [] NO     Stroke Education [x] YES [] NO    Obtain screening lower extremity venous ultrasound in patients who meet 1 or more of the following criteria as patient is high risk for DVT/PE on admission:   [] History of DVT/PE  []Hypercoagulable states (Factor V Leiden, Cancer, OCP, etc. )  []Prolonged immobility (hemiplegia/hemiparesis/post operative or any other extended immobilization)  [] Transferred from outside facility (Rehab or Long term care)  [] Age </= to 50   ASSESSMENT:   79 yo M PMH of HTN, HLD, AFib, CKD, obesity, gout, prostate cancer, DVT/PE, OA s/p multiple orthopedic procedures, spinal stenosis, presented on 11/13/23 for elective left atrial appendage occlusion procedure, and developed a left MCA syndrome due to acute left M1 occlusion as demonstrated on CT Head and Neck. Patient presented on 11/13/23 for elective left atrial appendage occlusion with Watchman device/HARJINDER w/ Dr Bobo, and Pradaxa was held for 2 days prior to procedure. Whilst in holding patient developed a left MCA syndrome due to acute left M1 occlusion as demonstrated on CTH & Neck. On admission, NIHSS 26, MRS 1. Patient is now s/p tenecteplase administration at 11:16 11/13/23, and TICI 2A thrombectomy with Dr. Soliman. 24 hour repeat CT head s/p tenecteplase administration showed evolving left MCA stroke with hemorrhagic transformation, and mass effect with left to right shift. MRI brain revealed redemonstration of an evolving acute/subacute large left-sided MCA distribution infarction with gross hemorrhagic transformation in the left basal ganglia as well as evidence of petechial hemorrhagic transformation within the left frontotemporal and parietal cortices. Suspected etiology of stroke is cardioembolic from atrial fibrillation when off anticoagulation for recent procedure, 2 cardiac thrombi were appreciated on CTA chest 10 days prior.    NEURO:   -Neurologically no acute neurologic changes   -Continue close monitoring for neurologic deterioration    -Stroke neuro checks q 4 hours  -Neurologically appears to be tolerating SBP of 130s-160s. Can keep this SBP goal and avoid hypotension or rapid fluctuations in blood pressure   -ANTITHROMBOTIC THERAPY: ASA 81mg daily for now. Initial plan was to obtain HARJINDER to evaluate for cardiac thrombus as this would further guide risk stratification in regards to initiation of anticoagulation However, HARJINDER deferred by anesthesia and not able to be obtained. Will obtain CTA chest in order to differentiate whether thrombus is present as this would again provide risk stratification of anticoagulation therapy in the setting of a large cerebral infarction. Anticoagulation is currently on hold due to risk of hemorrhagic transformation. Will re-consider time frame if cardiac thrombus is present and patient demonstrated to be higher risk for thromboembolism based on suggested testing.    -titrate statin to LDL goal less than 70, LDL=54, may resume home regimen of Rosuvastatin 10 mg PO QHS when patient able to tolerate PO   -MRI Brain w/o as noted  -Dysphagia screen: pass  -Physical therapy/OT/Speech eval/treatment.   -Neurosurgery input appreciated, patient not a hemicrani candidate     CARDIOVASCULAR:  -TTE findings as above, no acute findings   -HARJINDER deferred by anesthesia. CTA chest pending in order to evaluate for cardiac thrombus.  -No cardiac intervention at this time as the risks>benefits as per EP  -cardiac monitoring w/ telemetry for now, further evaluation pending findings of noted workup                              HEMATOLOGY:   -H/H 9.8/30.8. Platelets 197. Monitor anemia/thrombocytopenia per primary team, patient should have all age and risk appropriate malignancy screenings with PCP or sooner if clinically suspected   -Bilateral lower extremity venous duplex negative for DVT   -DVT ppx: Heparin s.c [] LMWH [x]     PULMONARY:   -Patient extubated 11/14/23  -protecting airway, saturating well     RENAL:   -BUN/Cr 17.3/0.83. monitor urine output, maintain adequate hydration    -Na Goal:  145-150 secondary to cerebral edema.    ID:   -afebrile, leukocytosis resolved, monitor for si/sx of infection     OTHER:    -condition and plan of care d/w patient and family at bedside, questions and concerns addressed.     DISPOSITION: Rehab or home depending on PT eval once stable and workup is complete    CORE MEASURES:        Admission NIHSS: 26     Tenecteplase : [x] YES [] NO      LDL/HDL/A1C: 54/67/6.2     Depression Screen- if depression hx and/or present      Statin Therapy: Atorvastatin 40 mg PO Daily      Dysphagia Screen: [x] PASS [] FAIL      Smoking [] YES [x] NO      Afib [x] YES [] NO     Stroke Education [x] YES [] NO    Obtain screening lower extremity venous ultrasound in patients who meet 1 or more of the following criteria as patient is high risk for DVT/PE on admission:   [] History of DVT/PE  []Hypercoagulable states (Factor V Leiden, Cancer, OCP, etc. )  []Prolonged immobility (hemiplegia/hemiparesis/post operative or any other extended immobilization)  [] Transferred from outside facility (Rehab or Long term care)  [] Age </= to 50   ASSESSMENT:   79 yo M PMH of HTN, HLD, AFib, CKD, obesity, gout, prostate cancer, DVT/PE, OA s/p multiple orthopedic procedures, spinal stenosis, presented on 11/13/23 for elective left atrial appendage occlusion procedure, and developed a left MCA syndrome due to acute left M1 occlusion as demonstrated on CT Head and Neck. Patient presented on 11/13/23 for elective left atrial appendage occlusion with Watchman device/HARJINDER w/ Dr Bobo, and Pradaxa was held for 2 days prior to procedure. Whilst in holding patient developed a left MCA syndrome due to acute left M1 occlusion as demonstrated on CTH & Neck. On admission, NIHSS 26, MRS 1. Patient is now s/p tenecteplase administration at 11:16 11/13/23, and TICI 2A thrombectomy with Dr. Soliman. 24 hour repeat CT head s/p tenecteplase administration showed evolving left MCA stroke with hemorrhagic transformation, and mass effect with left to right shift. MRI brain revealed redemonstration of an evolving acute/subacute large left-sided MCA distribution infarction with gross hemorrhagic transformation in the left basal ganglia as well as evidence of petechial hemorrhagic transformation within the left frontotemporal and parietal cortices. Suspected etiology of stroke is cardioembolic from atrial fibrillation when off anticoagulation for recent procedure, 2 cardiac thrombi were appreciated on CTA chest 10 days prior.    NEURO:   -Neurologically with improvement as patient is more awake and does follow one command of "sticking out tongue"  -Continue close monitoring for neurologic deterioration    -Stroke neuro checks q 4 hours  -Neurologically appears to be tolerating SBP of 130s-160s. Can keep this SBP goal and avoid hypotension or rapid fluctuations in blood pressure   -ANTITHROMBOTIC THERAPY: ASA 81mg daily for now. Initial plan was to obtain HARJNIDER to evaluate for cardiac thrombus as this would further guide risk stratification in regards to initiation of anticoagulation However, HRAJINDER deferred by anesthesia and not able to be obtained. Will obtain CTA chest in order to differentiate whether thrombus is present as this would again provide risk stratification of anticoagulation therapy in the setting of a large cerebral infarction. Anticoagulation is currently on hold due to risk of hemorrhagic transformation. Will re-consider time frame if cardiac thrombus is present and patient demonstrated to be higher risk for thromboembolism based on suggested testing.    -titrate statin to LDL goal less than 70, LDL=54, may resume home regimen of Rosuvastatin 10 mg PO QHS when patient able to tolerate PO   -MRI Brain w/o as noted  -Dysphagia screen: pass  -Physical therapy/OT/Speech eval/treatment.   -Neurosurgery input appreciated, patient not a hemicrani candidate     CARDIOVASCULAR:  -TTE findings as above, no acute findings   -HARJINDER deferred by anesthesia. CTA chest pending in order to evaluate for cardiac thrombus as this would guide plan discussed above.  -No cardiac intervention at this time as the risks>benefits as per EP  -cardiac monitoring w/ telemetry for now, further evaluation pending findings of noted workup                              HEMATOLOGY:   -H/H 9.8/30.8. Platelets 197. Monitor anemia/thrombocytopenia per primary team, patient should have all age and risk appropriate malignancy screenings with PCP or sooner if clinically suspected   -Bilateral lower extremity venous duplex negative for DVT   -DVT ppx: Heparin s.c [] LMWH [x]     PULMONARY:   -Patient extubated 11/14/23  -protecting airway, saturating well     RENAL:   -BUN/Cr 17.3/0.83. monitor urine output, maintain adequate hydration    -Na Goal:  145-150 secondary to cerebral edema.    ID:   -afebrile, leukocytosis resolved, monitor for si/sx of infection     OTHER:    -condition and plan of care d/w patient and family at bedside, questions and concerns addressed.     DISPOSITION: Rehab or home depending on PT eval once stable and workup is complete    CORE MEASURES:        Admission NIHSS: 26     Tenecteplase : [x] YES [] NO      LDL/HDL/A1C: 54/67/6.2     Depression Screen- if depression hx and/or present      Statin Therapy: Atorvastatin 40 mg PO Daily      Dysphagia Screen: [x] PASS [] FAIL      Smoking [] YES [x] NO      Afib [x] YES [] NO     Stroke Education [x] YES [] NO    Obtain screening lower extremity venous ultrasound in patients who meet 1 or more of the following criteria as patient is high risk for DVT/PE on admission:   [] History of DVT/PE  []Hypercoagulable states (Factor V Leiden, Cancer, OCP, etc. )  []Prolonged immobility (hemiplegia/hemiparesis/post operative or any other extended immobilization)  [] Transferred from outside facility (Rehab or Long term care)  [] Age </= to 50   ASSESSMENT:   79 yo M PMH of HTN, HLD, AFib, CKD, obesity, gout, prostate cancer, DVT/PE, OA s/p multiple orthopedic procedures, spinal stenosis, presented on 11/13/23 for elective left atrial appendage occlusion procedure, and developed a left MCA syndrome due to acute left M1 occlusion as demonstrated on CT Head and Neck. Patient presented on 11/13/23 for elective left atrial appendage occlusion with Watchman device/HARJINDER w/ Dr Bobo, and Pradaxa was held for 2 days prior to procedure. Whilst in holding patient developed a left MCA syndrome due to acute left M1 occlusion as demonstrated on CTH & Neck. On admission, NIHSS 26, MRS 1. Patient is now s/p tenecteplase administration at 11:16 11/13/23, and TICI 2A thrombectomy with Dr. Soliman. 24 hour repeat CT head s/p tenecteplase administration showed evolving left MCA stroke with hemorrhagic transformation, and mass effect with left to right shift. MRI brain revealed redemonstration of an evolving acute/subacute large left-sided MCA distribution infarction with gross hemorrhagic transformation in the left basal ganglia as well as evidence of petechial hemorrhagic transformation within the left frontotemporal and parietal cortices. Suspected etiology of stroke is cardioembolic from atrial fibrillation when off anticoagulation for recent procedure, 2 cardiac thrombi were appreciated on CTA chest obtained 11/22/23.     NEURO:   -Neurologically with improvement as patient is more awake and does follow one command of "sticking out tongue"  -Continue close monitoring for neurologic deterioration    -Stroke neuro checks q2hrs  -Neurologically appears to be tolerating SBP of 130s-160s. Can keep this SBP goal and avoid hypotension or rapid fluctuations in blood pressure   -ANTITHROMBOTIC THERAPY: Heparin gtt w no bolus, goal ptt 40-60; although patient has a large left MCA stroke with hemorrhagic transformation, due to presence of cardiac thrombi on chest CTA and recent embolic event, there is a high risk of recurrent stroke without anticoagulation therapy; overall benefit of anticoagulation (low ptt goal for now) outweighs risk of hemorrhage, almost 10 days from stroke onset, as patient has evidence of active cardiac thrombi and recent cardioembolic event. This should be discussed with the family as well, regarding; risk/benefits and alternatives; ASA if indicated from the cardiac standpoint if patient resumes anticoagulation. Do not bolus, avoid supratherapeutic ptt levels, further titration pending clinical course. Repeat CT head for any acute change, otherwise please obtain once ptt range within goal to ensure stability. Would repeat CT head 11/23 afternoon as well.   -titrate statin to LDL goal less than 70, LDL=54, may resume home regimen of Rosuvastatin 10 mg PO QHS when patient able to tolerate PO   -MRI Brain w/o as noted  -Dysphagia screen: pass  -Physical therapy/OT/Speech eval/treatment.   -Neurosurgery input appreciated, patient not a hemicrani candidate     CARDIOVASCULAR:  -TTE findings as above, no acute findings   -HARJINDER deferred by anesthesia.   -CTA chest findings as above, see neuro portion for recommendations regarding anticoagulation.   -No cardiac intervention at this time as the risks>benefits as per EP  -cardiac monitoring w/ telemetry for now, further evaluation pending findings of noted workup                              HEMATOLOGY:   -H/H 9.8/30.8. Platelets 197. Monitor anemia/thrombocytopenia per primary team, patient should have all age and risk appropriate malignancy screenings with PCP or sooner if clinically suspected   -Bilateral lower extremity venous duplex negative for DVT   -DVT ppx: Heparin s.c [] LMWH [x]     PULMONARY:   -Patient extubated 11/14/23  -protecting airway, saturating well     RENAL:   -BUN/Cr 17.3/0.83. monitor urine output, maintain adequate hydration    -Na Goal:  145-150 secondary to cerebral edema.    ID:   -afebrile, leukocytosis resolved, monitor for si/sx of infection     OTHER:    -condition and plan of care d/w patient and family at bedside, questions and concerns addressed.     DISPOSITION: Rehab or home depending on PT eval once stable and workup is complete    CORE MEASURES:        Admission NIHSS: 26     Tenecteplase : [x] YES [] NO      LDL/HDL/A1C: 54/67/6.2     Depression Screen- if depression hx and/or present      Statin Therapy: Atorvastatin 40 mg PO Daily      Dysphagia Screen: [x] PASS [] FAIL      Smoking [] YES [x] NO      Afib [x] YES [] NO     Stroke Education [x] YES [] NO    Obtain screening lower extremity venous ultrasound in patients who meet 1 or more of the following criteria as patient is high risk for DVT/PE on admission:   [] History of DVT/PE  []Hypercoagulable states (Factor V Leiden, Cancer, OCP, etc. )  []Prolonged immobility (hemiplegia/hemiparesis/post operative or any other extended immobilization)  [] Transferred from outside facility (Rehab or Long term care)  [] Age </= to 50   ASSESSMENT:   79 yo M PMH of HTN, HLD, AFib, CKD, obesity, gout, prostate cancer, DVT/PE, OA s/p multiple orthopedic procedures, spinal stenosis, presented on 11/13/23 for elective left atrial appendage occlusion procedure, and developed a left MCA syndrome due to acute left M1 occlusion as demonstrated on CT Head and Neck. Patient presented on 11/13/23 for elective left atrial appendage occlusion with Watchman device/HARJINDER w/ Dr Bobo, and Pradaxa was held for 2 days prior to procedure. Whilst in holding patient developed a left MCA syndrome due to acute left M1 occlusion as demonstrated on CTH & Neck. On admission, NIHSS 26, MRS 1. Patient is now s/p tenecteplase administration at 11:16 11/13/23, and TICI 2A thrombectomy with Dr. Soliman. 24 hour repeat CT head s/p tenecteplase administration showed evolving left MCA stroke with hemorrhagic transformation, and mass effect with left to right shift. MRI brain revealed redemonstration of an evolving acute/subacute large left-sided MCA distribution infarction with gross hemorrhagic transformation in the left basal ganglia as well as evidence of petechial hemorrhagic transformation within the left frontotemporal and parietal cortices. Suspected etiology of stroke is cardioembolic from atrial fibrillation when off anticoagulation for recent procedure, 2 cardiac thrombi were appreciated on CTA chest obtained 11/22/23.     NEURO:   -Neurologically with improvement as patient is more awake and does follow one command of "sticking out tongue"  -Continue close monitoring for neurologic deterioration    -Stroke neuro checks q2hrs  -Neurologically appears to be tolerating SBP of 130s-160s. Can keep this SBP goal and avoid hypotension or rapid fluctuations in blood pressure   -ANTITHROMBOTIC THERAPY: Heparin gtt w no bolus, goal ptt 40-60; although patient has a large left MCA stroke with hemorrhagic transformation, due to presence of cardiac thrombi on chest CTA and recent embolic event, there is a high risk of recurrent stroke without anticoagulation therapy; overall benefit of anticoagulation (low ptt goal for now) outweighs risk of hemorrhage, almost 10 days from stroke onset, as patient has evidence of active cardiac thrombi and recent cardioembolic event. This should be discussed with the family as well, regarding; risk/benefits and alternatives; ASA if indicated from the cardiac standpoint if patient resumes anticoagulation. Do not bolus, avoid supratherapeutic ptt levels, further titration pending clinical course. Repeat CT head for any acute change, otherwise please obtain once ptt range within goal to ensure stability. Would repeat CT head 11/23 afternoon as well.   -titrate statin to LDL goal less than 70, LDL=54, may resume home regimen of Rosuvastatin 10 mg PO QHS when patient able to tolerate PO   -MRI Brain w/o as noted  -Dysphagia screen: pass  -Physical therapy/OT/Speech eval/treatment.   -Neurosurgery input appreciated, patient not a hemicrani candidate     CARDIOVASCULAR:  -TTE findings as above, no acute findings   -HARJINDER deferred by anesthesia.   -CTA chest findings as above, see neuro portion for recommendations regarding anticoagulation; recommend repeat TTE to evaluate for right heart strain in the setting of PE   -No cardiac intervention at this time as the risks>benefits as per EP  -cardiac monitoring w/ telemetry for now, further evaluation pending findings of noted workup                              HEMATOLOGY:   -H/H 9.8/30.8. Platelets 197. Monitor anemia/thrombocytopenia per primary team, patient should have all age and risk appropriate malignancy screenings with PCP or sooner if clinically suspected   -Bilateral lower extremity venous duplex negative for DVT   -DVT ppx: Heparin s.c [] LMWH [x]     PULMONARY:   -Patient extubated 11/14/23  -protecting airway, saturating well     RENAL:   -BUN/Cr 17.3/0.83. monitor urine output, maintain adequate hydration    -Na Goal:  145-150 secondary to cerebral edema.    ID:   -afebrile, leukocytosis resolved, monitor for si/sx of infection     OTHER:    -condition and plan of care d/w patient and family at bedside, questions and concerns addressed.     DISPOSITION: Rehab or home depending on PT eval once stable and workup is complete    CORE MEASURES:        Admission NIHSS: 26     Tenecteplase : [x] YES [] NO      LDL/HDL/A1C: 54/67/6.2     Depression Screen- if depression hx and/or present      Statin Therapy: Atorvastatin 40 mg PO Daily      Dysphagia Screen: [x] PASS [] FAIL      Smoking [] YES [x] NO      Afib [x] YES [] NO     Stroke Education [x] YES [] NO    Obtain screening lower extremity venous ultrasound in patients who meet 1 or more of the following criteria as patient is high risk for DVT/PE on admission:   [] History of DVT/PE  []Hypercoagulable states (Factor V Leiden, Cancer, OCP, etc. 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 Patient presented on 11/13/23 for elective left atrial appendage occlusion with Watchman device/HARJINDER w/ Dr Bobo, and Pradaxa was held for 2 days prior to procedure. Whilst in holding patient developed a left MCA syndrome due to acute left M1 occlusion as demonstrated on CTH & Neck. On admission, NIHSS 26, MRS 1. Patient is now s/p tenecteplase administration at 11:16 11/13/23, and TICI 2A thrombectomy with Dr. Soliman. 24 hour repeat CT head s/p tenecteplase administration showed evolving left MCA stroke with hemorrhagic transformation, and mass effect with left to right shift. MRI brain revealed redemonstration of an evolving acute/subacute large left-sided MCA distribution infarction with gross hemorrhagic transformation in the left basal ganglia as well as evidence of petechial hemorrhagic transformation within the left frontotemporal and parietal cortices. Suspected etiology of stroke is cardioembolic from atrial fibrillation when off anticoagulation for recent procedure, 2 cardiac thrombi were appreciated on CTA chest obtained 11/22/23 as well as new findings of PE.     NEURO:   -Neurologically with improvement as patient is more awake and does follow one command of "sticking out tongue"  -Continue close monitoring for neurologic deterioration    -Stroke neuro checks q2hrs  -Neurologically appears to be tolerating SBP of 130s-160s. Can keep this SBP goal and avoid hypotension or rapid fluctuations in blood pressure   -ANTITHROMBOTIC THERAPY: Heparin gtt w no bolus, goal ptt 40-60; although patient has a large left MCA stroke with hemorrhagic transformation, due to presence of cardiac thrombi on chest CTA and recent embolic event, there is a high risk of recurrent stroke without anticoagulation therapy; overall benefit of anticoagulation (low ptt goal for now) outweighs risk of hemorrhage, almost 10 days from stroke onset, as patient has evidence of active cardiac thrombi and recent cardioembolic event. This should be discussed with the family as well, regarding; risk/benefits and alternatives; ASA if indicated from the cardiac standpoint if patient resumes anticoagulation. Do not bolus, avoid supratherapeutic ptt levels, further titration pending clinical course. Repeat CT head for any acute change, otherwise please obtain once ptt range within goal to ensure stability. Would repeat CT head 11/23 afternoon as well.   -titrate statin to LDL goal less than 70, LDL=54, may resume home regimen of Rosuvastatin 10 mg PO QHS when patient able to tolerate PO   -MRI Brain w/o as noted  -Dysphagia screen: pass  -Physical therapy/OT/Speech eval/treatment.   -Neurosurgery input appreciated, patient not a hemicrani candidate     CARDIOVASCULAR:  -TTE findings as above, no acute findings   -HARJINDER deferred by anesthesia.   -CTA chest findings as above, see neuro portion for recommendations regarding anticoagulation; recommend repeat TTE to evaluate for right heart strain in the setting of PE   -No cardiac intervention at this time as the risks>benefits as per EP  -cardiac monitoring w/ telemetry for now, further evaluation pending findings of noted workup                              HEMATOLOGY:   -H/H 9.8/30.8. Platelets 197. Monitor anemia/thrombocytopenia per primary team, patient should have all age and risk appropriate malignancy screenings with PCP or sooner if clinically suspected   -Bilateral lower extremity venous duplex negative for DVT   -DVT ppx: Heparin s.c [] LMWH [x]     PULMONARY:   -Patient extubated 11/14/23  -protecting airway, saturating well     RENAL:   -BUN/Cr 17.3/0.83. monitor urine output, maintain adequate hydration    -Na Goal:  145-150 secondary to cerebral edema.    ID:   -afebrile, leukocytosis resolved, monitor for si/sx of infection     OTHER:    -condition and plan of care d/w patient and family at bedside, questions and concerns addressed.     DISPOSITION: Rehab or home depending on PT eval once stable and workup is complete    CORE MEASURES:        Admission NIHSS: 26     Tenecteplase : [x] YES [] NO      LDL/HDL/A1C: 54/67/6.2     Depression Screen- if depression hx and/or present      Statin Therapy: Atorvastatin 40 mg PO Daily      Dysphagia Screen: [x] PASS [] FAIL      Smoking [] YES [x] NO      Afib [x] YES [] NO     Stroke Education [x] YES [] NO    Obtain screening lower extremity venous ultrasound in patients who meet 1 or more of the following criteria as patient is high risk for DVT/PE on admission:   [] History of DVT/PE  []Hypercoagulable states (Factor V Leiden, Cancer, OCP, etc. )  []Prolonged immobility (hemiplegia/hemiparesis/post operative or any other extended immobilization)  [] Transferred from outside facility (Rehab or Long term care)  [] Age </= to 50   ASSESSMENT:   79 yo M PMH of HTN, HLD, AFib, CKD, obesity, gout, prostate cancer, DVT/PE, OA s/p multiple orthopedic procedures, spinal stenosis, presented on 11/13/23 for elective left atrial appendage occlusion procedure, and developed a left MCA syndrome due to acute left M1 occlusion as demonstrated on CT Head and Neck. Patient presented on 11/13/23 for elective left atrial appendage occlusion with Watchman device/HARJINDER w/ Dr Bobo, and Pradaxa was held for 2 days prior to procedure. Whilst in holding patient developed a left MCA syndrome due to acute left M1 occlusion as demonstrated on CTH & Neck. On admission, NIHSS 26, MRS 1. Patient is now s/p tenecteplase administration at 11:16 11/13/23, and TICI 2A thrombectomy with Dr. Soliman. 24 hour repeat CT head s/p tenecteplase administration showed evolving left MCA stroke with hemorrhagic transformation, and mass effect with left to right shift. MRI brain revealed redemonstration of an evolving acute/subacute large left-sided MCA distribution infarction with gross hemorrhagic transformation in the left basal ganglia as well as evidence of petechial hemorrhagic transformation within the left frontotemporal and parietal cortices. Suspected etiology of stroke is cardioembolic from atrial fibrillation when off anticoagulation for recent procedure, 2 cardiac thrombi were appreciated on CTA chest obtained 10 days prior to procedure, these were again seen on repeat CTA chest obtained 11/22/23 as well as new findings of right upper lobe PE.     NEURO:   -Neurologically with improvement as patient is more awake and does follow one command of "sticking out tongue"  -Continue close monitoring for neurologic deterioration    -Stroke neuro checks q2hrs  -Neurologically appears to be tolerating SBP of 130s-160s. Can keep this SBP goal and avoid hypotension or rapid fluctuations in blood pressure   -ANTITHROMBOTIC THERAPY: Heparin gtt w no bolus, goal ptt 40-60; although patient has a large left MCA stroke with hemorrhagic transformation, due to presence of cardiac thrombi on chest CTA and recent embolic event, there is a high risk of recurrent stroke without anticoagulation therapy; overall benefit of anticoagulation (low ptt goal for now) outweighs risk of hemorrhage, almost 10 days from stroke onset, as patient has evidence of active cardiac thrombi and recent cardioembolic event. This should be discussed with the family as well, regarding; risk/benefits and alternatives; ASA if indicated from the cardiac standpoint if patient resumes anticoagulation. Do not bolus, avoid supratherapeutic ptt levels, further titration pending clinical course. Repeat CT head for any acute change, otherwise please obtain once ptt range within goal to ensure stability. Would repeat CT head 11/23 afternoon as well.   -titrate statin to LDL goal less than 70, LDL=54, may resume home regimen of Rosuvastatin 10 mg PO QHS when patient able to tolerate PO   -MRI Brain w/o as noted  -Dysphagia screen: pass  -Physical therapy/OT/Speech eval/treatment.   -Neurosurgery input appreciated, patient not a hemicrani candidate     CARDIOVASCULAR:  -TTE findings as above, no acute findings   -HARJINDER deferred by anesthesia.   -CTA chest findings as above, see neuro portion for recommendations regarding anticoagulation; recommend repeat TTE to evaluate for right heart strain in the setting of PE   -No cardiac intervention at this time as the risks>benefits as per EP  -cardiac monitoring w/ telemetry for now, further evaluation pending findings of noted workup                              HEMATOLOGY:   -H/H 9.8/30.8. Platelets 197. Monitor anemia/thrombocytopenia per primary team, patient should have all age and risk appropriate malignancy screenings with PCP or sooner if clinically suspected   -Bilateral lower extremity venous duplex negative for DVT   -DVT ppx: Heparin s.c [] LMWH [x]     PULMONARY:   -Patient extubated 11/14/23  -protecting airway, saturating well     RENAL:   -BUN/Cr 17.3/0.83. monitor urine output, maintain adequate hydration    -Na Goal:  145-150 secondary to cerebral edema.    ID:   -afebrile, leukocytosis resolved, monitor for si/sx of infection     OTHER:    -condition and plan of care d/w patient and family at bedside, questions and concerns addressed.     DISPOSITION: Rehab or home depending on PT eval once stable and workup is complete    CORE MEASURES:        Admission NIHSS: 26     Tenecteplase : [x] YES [] NO      LDL/HDL/A1C: 54/67/6.2     Depression Screen- if depression hx and/or present      Statin Therapy: Atorvastatin 40 mg PO Daily      Dysphagia Screen: [x] PASS [] FAIL      Smoking [] YES [x] NO      Afib [x] YES [] NO     Stroke Education [x] YES [] NO    Obtain screening lower extremity venous ultrasound in patients who meet 1 or more of the following criteria as patient is high risk for DVT/PE on admission:   [] History of DVT/PE  []Hypercoagulable states (Factor V Leiden, Cancer, OCP, etc. 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 Patient presented on 11/13/23 for elective left atrial appendage occlusion with Watchman device/HARJINDER w/ Dr Bobo, and Pradaxa was held for 2 days prior to procedure. Whilst in holding patient developed a left MCA syndrome due to acute left M1 occlusion as demonstrated on CTH & Neck. On admission, NIHSS 26, MRS 1. Patient is now s/p tenecteplase administration at 11:16 11/13/23, and TICI 2A thrombectomy with Dr. Soliman. 24 hour repeat CT head s/p tenecteplase administration showed evolving left MCA stroke with hemorrhagic transformation, and mass effect with left to right shift. MRI brain revealed redemonstration of an evolving acute/subacute large left-sided MCA distribution infarction with gross hemorrhagic transformation in the left basal ganglia as well as evidence of petechial hemorrhagic transformation within the left frontotemporal and parietal cortices. Suspected etiology of stroke is cardioembolic from atrial fibrillation when off anticoagulation for recent procedure, 2 cardiac thrombi were appreciated on CTA chest obtained 10 days prior to procedure, these were again seen on repeat CTA chest obtained 11/22/23 as well as new findings of right upper lobe PE.     NEURO:   -Neurologically with improvement as patient is more awake and does follow one command of "sticking out tongue"  -Continue close monitoring for neurologic deterioration    -Stroke neuro checks q2hrs  -Neurologically appears to be tolerating SBP of 130s-160s. Can keep this SBP goal and avoid hypotension or rapid fluctuations in blood pressure   -ANTITHROMBOTIC THERAPY: Heparin gtt w no bolus, goal ptt 40-60; although patient has a large left MCA stroke with hemorrhagic transformation, due to presence of cardiac thrombi on chest CTA and recent embolic event, there is a high risk of recurrent stroke without anticoagulation therapy; overall benefit of anticoagulation (low ptt goal for now) outweighs risk of hemorrhage, now almost 10 days from stroke onset, as patient has evidence of active cardiac thrombi and recent cardioembolic event. This should be discussed with the family as well, regarding risk/benefits and alternatives. ASA if indicated from the cardiac standpoint if patient resumes anticoagulation. Do not bolus, avoid supra-therapeutic ptt levels, further titration pending clinical course. Repeat CT head for any acute change, otherwise please obtain once ptt range within goal to ensure stability. Would repeat CT head 11/23 afternoon as well.   -titrate statin to LDL goal less than 70, LDL=54, may resume home regimen of Rosuvastatin 10 mg PO QHS when patient able to tolerate PO   -MRI Brain w/o as noted  -Dysphagia screen: pass  -Physical therapy/OT/Speech eval/treatment.   -Neurosurgery input appreciated, patient not a hemicrani candidate     CARDIOVASCULAR:  -TTE findings as above, no acute findings   -HARJINDER deferred by anesthesia.   -CTA chest findings as above, see neuro portion for recommendations regarding anticoagulation; recommend repeat TTE to evaluate for right heart strain in the setting of PE   -No cardiac intervention at this time as the risks>benefits as per EP  -cardiac monitoring w/ telemetry for now, further evaluation pending findings of noted workup                              HEMATOLOGY:   -H/H 9.8/30.8. Platelets 197. Monitor anemia/thrombocytopenia per primary team, patient should have all age and risk appropriate malignancy screenings with PCP or sooner if clinically suspected   -Bilateral lower extremity venous duplex negative for DVT   -DVT ppx: Heparin s.c [] LMWH [x]     PULMONARY:   -Patient extubated 11/14/23  -protecting airway, saturating well     RENAL:   -BUN/Cr 17.3/0.83. monitor urine output, maintain adequate hydration    -Na Goal:  145-150 secondary to cerebral edema.    ID:   -afebrile, leukocytosis resolved, monitor for si/sx of infection     OTHER:    -Extensive discussion held with patient and family at bedside regarding clinical course, risk vs benefit discussion regarding need for anticoagulation given findings of cardiac thrombi and PE on CTA chest; family expresses understanding; all questions and concerns addressed      DISPOSITION: Rehab or home depending on PT eval once stable and workup is complete    CORE MEASURES:        Admission NIHSS: 26     Tenecteplase : [x] YES [] NO      LDL/HDL/A1C: 54/67/6.2     Depression Screen- if depression hx and/or present      Statin Therapy: Atorvastatin 40 mg PO Daily      Dysphagia Screen: [x] PASS [] FAIL      Smoking [] YES [x] NO      Afib [x] YES [] NO     Stroke Education [x] YES [] NO    Obtain screening lower extremity venous ultrasound in patients who meet 1 or more of the following criteria as patient is high risk for DVT/PE on admission:   [] History of DVT/PE  []Hypercoagulable states (Factor V Leiden, Cancer, OCP, etc. 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 Patient presented on 11/13/23 for elective left atrial appendage occlusion with Watchman device/HARJINDER w/ Dr Bobo, and Pradaxa was held for 2 days prior to procedure. Whilst in holding patient developed a left MCA syndrome due to acute left M1 occlusion as demonstrated on CTH & Neck. On admission, NIHSS 26, MRS 1. Patient is now s/p tenecteplase administration at 11:16 11/13/23, and TICI 2A thrombectomy with Dr. Soliman. 24 hour repeat CT head s/p tenecteplase administration showed evolving left MCA stroke with hemorrhagic transformation, and mass effect with left to right shift. MRI brain revealed redemonstration of an evolving acute/subacute large left-sided MCA distribution infarction with gross hemorrhagic transformation in the left basal ganglia as well as evidence of petechial hemorrhagic transformation within the left frontotemporal and parietal cortices. Suspected etiology of stroke is cardioembolic from atrial fibrillation when off anticoagulation for recent procedure, 2 cardiac thrombi were appreciated on CTA chest obtained 10 days prior to procedure, these were again seen on repeat CTA chest obtained 11/22/23 as well as new findings of right upper lobe PE.     NEURO:   -Neurologically with improvement as patient is more awake and does follow one command of "sticking out tongue"  -Continue close monitoring for neurologic deterioration    -Stroke neuro checks q2hrs  -Neurologically appears to be tolerating SBP of 130s-160s. Can keep this SBP goal and avoid hypotension or rapid fluctuations in blood pressure   -ANTITHROMBOTIC THERAPY: Heparin gtt w no bolus, goal ptt 40-60; although patient has a large left MCA stroke with hemorrhagic transformation, due to presence of cardiac thrombi on chest CTA, PE and recent embolic event, there is a high risk of recurrent stroke without anticoagulation therapy; overall benefit of anticoagulation (low ptt goal for now) outweighs risk of hemorrhage, now almost 10 days from stroke onset, as patient has evidence of active cardiac thrombi and recent cardioembolic event. This should be discussed with the family as well, regarding risk/benefits and alternatives. ASA if indicated from the cardiac standpoint if patient resumes anticoagulation. Do not bolus, avoid supra-therapeutic ptt levels, further titration pending clinical course. Repeat CT head for any acute change, otherwise please obtain once ptt range within goal to ensure stability. Would repeat CT head 11/23 afternoon as well.   -titrate statin to LDL goal less than 70, LDL=54, may resume home regimen of Rosuvastatin 10 mg PO QHS when patient able to tolerate PO   -MRI Brain w/o as noted  -Dysphagia screen: pass  -Physical therapy/OT/Speech eval/treatment.   -Neurosurgery input appreciated, patient not a hemicrani candidate     CARDIOVASCULAR:  -TTE findings as above, no acute findings   -HARJINDER deferred by anesthesia.   -CTA chest findings as above, see neuro portion for recommendations regarding anticoagulation; recommend repeat TTE to evaluate for right heart strain in the setting of PE   -No cardiac intervention at this time as the risks>benefits as per EP  -cardiac monitoring w/ telemetry for now, further evaluation pending findings of noted workup                              HEMATOLOGY:   -H/H 9.8/30.8. Platelets 197. Monitor anemia/thrombocytopenia per primary team, patient should have all age and risk appropriate malignancy screenings with PCP or sooner if clinically suspected   -Bilateral lower extremity venous duplex negative for DVT   -DVT ppx: Heparin s.c [] LMWH [x]     PULMONARY:   -Patient extubated 11/14/23  -protecting airway, saturating well     RENAL:   -BUN/Cr 17.3/0.83. monitor urine output, maintain adequate hydration    -Na Goal:  145-150 secondary to cerebral edema.    ID:   -afebrile, leukocytosis resolved, monitor for si/sx of infection     OTHER:    -Extensive discussion held with patient and family at bedside regarding clinical course, risk vs benefit discussion regarding need for anticoagulation given findings of cardiac thrombi and PE on CTA chest; family expresses understanding; all questions and concerns addressed      DISPOSITION: Rehab or home depending on PT eval once stable and workup is complete    CORE MEASURES:        Admission NIHSS: 26     Tenecteplase : [x] YES [] NO      LDL/HDL/A1C: 54/67/6.2     Depression Screen- if depression hx and/or present      Statin Therapy: Atorvastatin 40 mg PO Daily      Dysphagia Screen: [x] PASS [] FAIL      Smoking [] YES [x] NO      Afib [x] YES [] NO     Stroke Education [x] YES [] NO    Obtain screening lower extremity venous ultrasound in patients who meet 1 or more of the following criteria as patient is high risk for DVT/PE on admission:   [] History of DVT/PE  []Hypercoagulable states (Factor V Leiden, Cancer, OCP, etc. )  []Prolonged immobility (hemiplegia/hemiparesis/post operative or any other extended immobilization)  [] Transferred from outside facility (Rehab or Long term care)  [] Age </= to 50

## 2023-11-22 NOTE — PROGRESS NOTE ADULT - SUBJECTIVE AND OBJECTIVE BOX
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 Patient has a long h/o AFib, which is now considered permanent. He is on rate control with metoprolol 25 mg bid, and anticoagulation with Pradaxa.  He has a long Hx of arthritis and had multiple hip replacements, bilateral knee replacements and also has neck and back pain, but is unable to take NSAIDs due to bleeding risks. He has an unsteady gait and uses a cane for assistance.  Patient presented on 11/13 for elective left atrial appendage occlusion with Watchman device/HARJINDER w/ Dr Bobo, and Pradaxa was held for 2 days prior to procedure. 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   Initial NIHSS 26, MRS 1. Patient s/p tenecteplase administration at 11:16 11/13/23 and TICI 2A thrombectomy with Dr. Soliman.    SUBJECTIVE: No events overnight.  No new neurologic complaints.  ROS reported negative unless otherwise noted.    acetaminophen     Tablet .. 650 milliGRAM(s) Oral every 6 hours PRN  albuterol/ipratropium (CFC free) Inhaler. 1 Puff(s) Inhalation four times a day PRN  allopurinol 300 milliGRAM(s) Oral daily  aspirin  chewable 81 milliGRAM(s) Oral daily  atorvastatin 40 milliGRAM(s) Oral at bedtime  chlorhexidine 2% Cloths 1 Application(s) Topical daily  dextrose 5%. 1000 milliLiter(s) IV Continuous <Continuous>  dextrose 5%. 1000 milliLiter(s) IV Continuous <Continuous>  dextrose 50% Injectable 12.5 Gram(s) IV Push once  dextrose 50% Injectable 25 Gram(s) IV Push once  dextrose 50% Injectable 25 Gram(s) IV Push once  dextrose Oral Gel 15 Gram(s) Oral once PRN  DULoxetine 30 milliGRAM(s) Oral daily  enoxaparin Injectable 40 milliGRAM(s) SubCutaneous <User Schedule>  famotidine    Tablet 20 milliGRAM(s) Oral two times a day  gabapentin 300 milliGRAM(s) Oral two times a day  glucagon  Injectable 1 milliGRAM(s) IntraMuscular once  insulin lispro (ADMELOG) corrective regimen sliding scale   SubCutaneous Before meals and at bedtime  metoprolol tartrate 25 milliGRAM(s) Oral two times a day  nystatin Powder 1 Application(s) Topical two times a day  polyethylene glycol 3350 17 Gram(s) Oral daily  predniSONE   Tablet 5 milliGRAM(s) Oral daily  senna 2 Tablet(s) Oral at bedtime      PHYSICAL EXAM:   Vital Signs Last 24 Hrs  T(C): 37.1 (22 Nov 2023 08:00), Max: 37.3 (21 Nov 2023 20:00)  T(F): 98.7 (22 Nov 2023 08:00), Max: 99.1 (21 Nov 2023 20:00)  HR: 65 (22 Nov 2023 08:00) (62 - 92)  BP: 125/78 (22 Nov 2023 08:00) (125/78 - 169/133)  BP(mean): 101 (21 Nov 2023 10:50) (101 - 103)  RR: 18 (22 Nov 2023 08:00) (18 - 22)  SpO2: 98% (22 Nov 2023 08:00) (91% - 99%)    Parameters below as of 22 Nov 2023 08:00  Patient On (Oxygen Delivery Method): room air      General: Patient is found sleeping in bed.      Detailed Neurologic Exam:    Mental status: Patient is seen sleeping and takes multiple verbal and noxious stimuli to awaken. Right sided visual willow-neglect present. Global aphasia noted with no verbal output. Unable to follow commands, ID objects, or repeat. Not oriented to self, place, or month. Does mimic examiner.    Cranial nerves: Pupils equal and react symmetrically to light. Blink to threat intermittently on right eye. Blink to threat present out of left eye. Left gaze preference with right gaze palsy that gets to midline but does not cross midline. Right facial weakness noted.     Motor: There is normal bulk and tone.  There is no tremor.  RLE: slight withdrawal due to noxious stimuli.  RUE: 0/5 strength with no movement. Grimaces to noxious stimuli with no movement.  Strength is 5/5 in the left arm and leg with no drift.    Sensation: Grimaces to noxious stimuli in all extremities.    Cerebellar: unable to assess dysmetria on finger to nose testing.    Gait : deferred  LABS:                        9.8    8.45  )-----------( 197      ( 21 Nov 2023 05:28 )             30.8    11-21    146<H>  |  108  |  17.3  ----------------------------<  141<H>  3.9   |  29.0  |  0.83    Ca    8.9      21 Nov 2023 05:28    IMAGING: Reviewed by me.    MR Head No Cont (11.19.23 @ 15:52)   IMPRESSION: Redemonstration of an evolving acute/subacute large   left-sided MCA distribution infarction with gross hemorrhagic   transformation in the left basal ganglia as well as evidence of petechial   hemorrhagic transformation within the left frontotemporal and parietal   cortices.    Associated cytotoxic edema, sulcal effacement, and mass effect as well as   shift of the midline structures from left to right measuring 5.5 mm   appears unchanged. There is no herniation.    CT Head No Cont (11.16.23 @ 18:05)   IMPRESSION: Stable moderate to large left MCA territory infarction   involving the left frontal lobe, left basal ganglia, left insular ribbon,   left parietal occipital lobe, and anterior left temporal lobe with stable   mild hemorrhage in the left basal ganglia. Left-to-right midline shift   measures 5.5 mm.    CT Head No Cont (11.14.23 @ 18:57)   IMPRESSION:  Stable moderate to large acute left MCA infarct with mild hemorrhage    TTE Echo Complete w/o Contrast w/ Doppler (11.14.23 @ 15:57)   Summary:   1. Left ventricular ejection fraction, by visual estimation, is 55 to   60%.   2. Normal global left ventricular systolic function.   3. The mitral in-flow pattern reveals no discernable A-wave, therefore   no comment on diastolic function can be made.   4. Normal right ventricular size and function.   5. Mild to moderately enlarged right atrium.   6. Moderately enlarged left atrium.   7. Trace mitral valve regurgitation.   8. Mild-moderate tricuspid regurgitation.   9. Sclerotic aortic valve with decreased opening.  10. Estimated pulmonary artery systolic pressure is 41.6 mmHg assuming a   right atrial pressure of 5 mmHg, which is consistent with mild pulmonary   hypertension.    CT Head No Cont (11.14.23 @ 11:36)   IMPRESSION: Evolving left MCA infarct is identified with hemorrhagic   transformation now seen with mass effect on left lateral ventricles and   left-to-right shift.    US Duplex Venous Lower Ext Complete, Bilateral (11.14.23 @ 10:38)   IMPRESSION:  No evidence of deep venous thrombosis in either lower extremity.  Left calf veins not visualized.  Small right popliteal fossa cyst.    CT Angio Brain, Neck and perfusion Stroke Protocol  w/ IV Cont (11.13.23 @ 11:18)   CTA BRAIN:  Acute thrombus in the distal left MCA M1 segment. Diminished flow in the   distal branches.  Severe bilateral cavernous carotid artery calcifications. Fetal origins   the bilateral posterior cerebral arteries. The [place] and   vertebrobasilar system are otherwise unremarkable without evidence of   stenosis, additional occlusion or saccular aneurysm dilation. No evidence   for arterial venous malformation. The vertebral arteries are codominant.    CTA NECK:  Mild bilateral carotid bulb calcifications. Mild stenosis in the left   carotid bulb. The right internal carotid arteries tortuous.  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 There is normal relationship to   the great vessels. The common carotid arteries, internal carotid arteries   and vertebral arteries shows no other evidence of significant stenosis,   occlusion or saccular aneurysm dilation. The vertebral arteries are   codominant.    CT PERFUSION:  Patient has only had less than 2 hours of symptoms may influence the CT   perfusion.  CBF<30% volume: 26 ml left MCA territory.  Tmax>6.0 s volume: 193 ml left MCA territory  Mismatch volume: 167 ml  Mismatch ratio: 7.4  IMPRESSION:  Thrombus in the distal left MCA M1 segment with diminished flow in the   distal MCA segments. 167 mL area of penumbra in the left MCA territory.     CT Brain Stroke Protocol (11.13.23 @ 11:15)   IMPRESSION:  Dense left MCA M1 segment suggesting intraluminal thrombus.   Mild chronic microvascular changes without evidence of an acute   transcortical infarction or hemorrhage.    TTE Echo Complete w/o Contrast w/ Doppler (11.14.23 @ 15:57)   Summary:   1. Left ventricular ejection fraction, by visual estimation, is 55 to   60%.   2. Normal global left ventricular systolic function.   3. The mitral in-flow pattern reveals no discernable A-wave, therefore   no comment on diastolic function can be made.   4. Normal right ventricular size and function.   5. Mild to moderately enlarged right atrium.   6. Moderatelyenlarged left atrium.   7. Trace mitral valve regurgitation.   8. Mild-moderate tricuspid regurgitation.   9. Sclerotic aortic valve with decreased opening.  10. Estimated pulmonary artery systolic pressure is 41.6 mmHg assuming a   right atrial pressure of 5 mmHg, which is consistent with mild pulmonary   hypertension.             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 Patient has a long h/o AFib, which is now considered permanent. He is on rate control with metoprolol 25 mg bid, and anticoagulation with Pradaxa.  He has a long Hx of arthritis and had multiple hip replacements, bilateral knee replacements and also has neck and back pain, but is unable to take NSAIDs due to bleeding risks. He has an unsteady gait and uses a cane for assistance.  Patient presented on 11/13 for elective left atrial appendage occlusion with Watchman device/HARJINDER w/ Dr Bobo, and Pradaxa was held for 2 days prior to procedure. 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   Initial NIHSS 26, MRS 1. Patient s/p tenecteplase administration at 11:16 11/13/23 and TICI 2A thrombectomy with Dr. Soliman.    SUBJECTIVE: No events overnight.  No new neurologic complaints.  ROS reported negative unless otherwise noted.    acetaminophen     Tablet .. 650 milliGRAM(s) Oral every 6 hours PRN  albuterol/ipratropium (CFC free) Inhaler. 1 Puff(s) Inhalation four times a day PRN  allopurinol 300 milliGRAM(s) Oral daily  aspirin  chewable 81 milliGRAM(s) Oral daily  atorvastatin 40 milliGRAM(s) Oral at bedtime  chlorhexidine 2% Cloths 1 Application(s) Topical daily  dextrose 5%. 1000 milliLiter(s) IV Continuous <Continuous>  dextrose 5%. 1000 milliLiter(s) IV Continuous <Continuous>  dextrose 50% Injectable 12.5 Gram(s) IV Push once  dextrose 50% Injectable 25 Gram(s) IV Push once  dextrose 50% Injectable 25 Gram(s) IV Push once  dextrose Oral Gel 15 Gram(s) Oral once PRN  DULoxetine 30 milliGRAM(s) Oral daily  enoxaparin Injectable 40 milliGRAM(s) SubCutaneous <User Schedule>  famotidine    Tablet 20 milliGRAM(s) Oral two times a day  gabapentin 300 milliGRAM(s) Oral two times a day  glucagon  Injectable 1 milliGRAM(s) IntraMuscular once  insulin lispro (ADMELOG) corrective regimen sliding scale   SubCutaneous Before meals and at bedtime  metoprolol tartrate 25 milliGRAM(s) Oral two times a day  nystatin Powder 1 Application(s) Topical two times a day  polyethylene glycol 3350 17 Gram(s) Oral daily  predniSONE   Tablet 5 milliGRAM(s) Oral daily  senna 2 Tablet(s) Oral at bedtime      PHYSICAL EXAM:   Vital Signs Last 24 Hrs  T(C): 37.1 (22 Nov 2023 08:00), Max: 37.3 (21 Nov 2023 20:00)  T(F): 98.7 (22 Nov 2023 08:00), Max: 99.1 (21 Nov 2023 20:00)  HR: 65 (22 Nov 2023 08:00) (62 - 92)  BP: 125/78 (22 Nov 2023 08:00) (125/78 - 169/133)  BP(mean): 101 (21 Nov 2023 10:50) (101 - 103)  RR: 18 (22 Nov 2023 08:00) (18 - 22)  SpO2: 98% (22 Nov 2023 08:00) (91% - 99%)    Parameters below as of 22 Nov 2023 08:00  Patient On (Oxygen Delivery Method): room air      General: NAD.    Detailed Neurologic Exam:    Mental status: Patient is awake and alert and attends to examiner.  Right sided visual iwllow-neglect present. Global aphasia noted with no verbal output. Follows command to stick out tongue, however unable to follow any other commands. Unable to ID objects or repeat. Not oriented to self, place, or month. Does mimic examiner.    Cranial nerves: Pupils equal and react symmetrically to light. Blink to threat intermittently on right eye. Blink to threat present out of left eye. Left gaze preference with right gaze palsy that does cross midline. Right facial weakness noted.    Motor: There is normal bulk and tone.  There is no tremor.  RLE: slight withdrawal due to noxious stimuli.  RUE: 0/5 strength with no movement. Grimaces to noxious stimuli with no movement.  Strength is 5/5 in the left arm and leg with no drift.    Sensation: Grimaces to noxious stimuli in all extremities.    Cerebellar: unable to assess dysmetria on finger to nose testing.    Gait : deferred  LABS:                        9.8    8.45  )-----------( 197      ( 21 Nov 2023 05:28 )             30.8    11-21    146<H>  |  108  |  17.3  ----------------------------<  141<H>  3.9   |  29.0  |  0.83    Ca    8.9      21 Nov 2023 05:28    IMAGING: Reviewed by me.    MR Head No Cont (11.19.23 @ 15:52)   IMPRESSION: Redemonstration of an evolving acute/subacute large   left-sided MCA distribution infarction with gross hemorrhagic   transformation in the left basal ganglia as well as evidence of petechial   hemorrhagic transformation within the left frontotemporal and parietal   cortices.    Associated cytotoxic edema, sulcal effacement, and mass effect as well as   shift of the midline structures from left to right measuring 5.5 mm   appears unchanged. There is no herniation.    CT Head No Cont (11.16.23 @ 18:05)   IMPRESSION: Stable moderate to large left MCA territory infarction   involving the left frontal lobe, left basal ganglia, left insular ribbon,   left parietal occipital lobe, and anterior left temporal lobe with stable   mild hemorrhage in the left basal ganglia. Left-to-right midline shift   measures 5.5 mm.    CT Head No Cont (11.14.23 @ 18:57)   IMPRESSION:  Stable moderate to large acute left MCA infarct with mild hemorrhage    TTE Echo Complete w/o Contrast w/ Doppler (11.14.23 @ 15:57)   Summary:   1. Left ventricular ejection fraction, by visual estimation, is 55 to   60%.   2. Normal global left ventricular systolic function.   3. The mitral in-flow pattern reveals no discernable A-wave, therefore   no comment on diastolic function can be made.   4. Normal right ventricular size and function.   5. Mild to moderately enlarged right atrium.   6. Moderately enlarged left atrium.   7. Trace mitral valve regurgitation.   8. Mild-moderate tricuspid regurgitation.   9. Sclerotic aortic valve with decreased opening.  10. Estimated pulmonary artery systolic pressure is 41.6 mmHg assuming a   right atrial pressure of 5 mmHg, which is consistent with mild pulmonary   hypertension.    CT Head No Cont (11.14.23 @ 11:36)   IMPRESSION: Evolving left MCA infarct is identified with hemorrhagic   transformation now seen with mass effect on left lateral ventricles and   left-to-right shift.    US Duplex Venous Lower Ext Complete, Bilateral (11.14.23 @ 10:38)   IMPRESSION:  No evidence of deep venous thrombosis in either lower extremity.  Left calf veins not visualized.  Small right popliteal fossa cyst.    CT Angio Brain, Neck and perfusion Stroke Protocol  w/ IV Cont (11.13.23 @ 11:18)   CTA BRAIN:  Acute thrombus in the distal left MCA M1 segment. Diminished flow in the   distal branches.  Severe bilateral cavernous carotid artery calcifications. Fetal origins   the bilateral posterior cerebral arteries. The [place] and   vertebrobasilar system are otherwise unremarkable without evidence of   stenosis, additional occlusion or saccular aneurysm dilation. No evidence   for arterial venous malformation. The vertebral arteries are codominant.    CTA NECK:  Mild bilateral carotid bulb calcifications. Mild stenosis in the left   carotid bulb. The right internal carotid arteries tortuous.  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 There is normal relationship to   the great vessels. The common carotid arteries, internal carotid arteries   and vertebral arteries shows no other evidence of significant stenosis,   occlusion or saccular aneurysm dilation. The vertebral arteries are   codominant.    CT PERFUSION:  Patient has only had less than 2 hours of symptoms may influence the CT   perfusion.  CBF<30% volume: 26 ml left MCA territory.  Tmax>6.0 s volume: 193 ml left MCA territory  Mismatch volume: 167 ml  Mismatch ratio: 7.4  IMPRESSION:  Thrombus in the distal left MCA M1 segment with diminished flow in the   distal MCA segments. 167 mL area of penumbra in the left MCA territory.     CT Brain Stroke Protocol (11.13.23 @ 11:15)   IMPRESSION:  Dense left MCA M1 segment suggesting intraluminal thrombus.   Mild chronic microvascular changes without evidence of an acute   transcortical infarction or hemorrhage.    TTE Echo Complete w/o Contrast w/ Doppler (11.14.23 @ 15:57)   Summary:   1. Left ventricular ejection fraction, by visual estimation, is 55 to   60%.   2. Normal global left ventricular systolic function.   3. The mitral in-flow pattern reveals no discernable A-wave, therefore   no comment on diastolic function can be made.   4. Normal right ventricular size and function.   5. Mild to moderately enlarged right atrium.   6. Moderatelyenlarged left atrium.   7. Trace mitral valve regurgitation.   8. Mild-moderate tricuspid regurgitation.   9. Sclerotic aortic valve with decreased opening.  10. Estimated pulmonary artery systolic pressure is 41.6 mmHg assuming a   right atrial pressure of 5 mmHg, which is consistent with mild pulmonary   hypertension.             Preliminary note, offical recommendations pending attending review/signature   [facility] Stroke Team  Progress Note     HPI:  79 yo M PMH of HTN, HLD, AFib, CKD, obesity, gout, prostate cancer, DVT/PE, OA s/p multiple orthopedic procedures, spinal stenosis, presented on 11/13/23 for elective left atrial appendage occlusion procedure, and developed a MCA syndrome due to acute L M1 occlusion as demonstrated on CTA Head and Neck. Patient has a long h/o AFib, which is now considered permanent. He is on rate control with metoprolol 25 mg bid, and anticoagulation with Pradaxa.  He has a long Hx of arthritis and had multiple hip replacements, bilateral knee replacements and also has neck and back pain, but is unable to take NSAIDs due to bleeding risks. He has an unsteady gait and uses a cane for assistance.  Patient presented on 11/13 for elective left atrial appendage occlusion with Watchman device/HARJINDER w/ Dr Bobo, and Pradaxa was held for 2 days prior to procedure. 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   Initial NIHSS 26, MRS 1. Patient s/p tenecteplase administration at 11:16 11/13/23 and TICI 2A thrombectomy with Dr. Soliman.    SUBJECTIVE: No events overnight.  No new neurologic complaints.  ROS reported negative unless otherwise noted.    acetaminophen     Tablet .. 650 milliGRAM(s) Oral every 6 hours PRN  albuterol/ipratropium (CFC free) Inhaler. 1 Puff(s) Inhalation four times a day PRN  allopurinol 300 milliGRAM(s) Oral daily  aspirin  chewable 81 milliGRAM(s) Oral daily  atorvastatin 40 milliGRAM(s) Oral at bedtime  chlorhexidine 2% Cloths 1 Application(s) Topical daily  dextrose 5%. 1000 milliLiter(s) IV Continuous <Continuous>  dextrose 5%. 1000 milliLiter(s) IV Continuous <Continuous>  dextrose 50% Injectable 12.5 Gram(s) IV Push once  dextrose 50% Injectable 25 Gram(s) IV Push once  dextrose 50% Injectable 25 Gram(s) IV Push once  dextrose Oral Gel 15 Gram(s) Oral once PRN  DULoxetine 30 milliGRAM(s) Oral daily  enoxaparin Injectable 40 milliGRAM(s) SubCutaneous <User Schedule>  famotidine    Tablet 20 milliGRAM(s) Oral two times a day  gabapentin 300 milliGRAM(s) Oral two times a day  glucagon  Injectable 1 milliGRAM(s) IntraMuscular once  insulin lispro (ADMELOG) corrective regimen sliding scale   SubCutaneous Before meals and at bedtime  metoprolol tartrate 25 milliGRAM(s) Oral two times a day  nystatin Powder 1 Application(s) Topical two times a day  polyethylene glycol 3350 17 Gram(s) Oral daily  predniSONE   Tablet 5 milliGRAM(s) Oral daily  senna 2 Tablet(s) Oral at bedtime      PHYSICAL EXAM:   Vital Signs Last 24 Hrs  T(C): 37.1 (22 Nov 2023 08:00), Max: 37.3 (21 Nov 2023 20:00)  T(F): 98.7 (22 Nov 2023 08:00), Max: 99.1 (21 Nov 2023 20:00)  HR: 65 (22 Nov 2023 08:00) (62 - 92)  BP: 125/78 (22 Nov 2023 08:00) (125/78 - 169/133)  BP(mean): 101 (21 Nov 2023 10:50) (101 - 103)  RR: 18 (22 Nov 2023 08:00) (18 - 22)  SpO2: 98% (22 Nov 2023 08:00) (91% - 99%)    Parameters below as of 22 Nov 2023 08:00  Patient On (Oxygen Delivery Method): room air      General: NAD.    Detailed Neurologic Exam:    Mental status: Patient is awake and alert and attends to examiner.  Right sided visual willow-neglect present. Global aphasia noted with no verbal output. Follows command to stick out tongue, however unable to follow any other commands. Unable to ID objects or repeat. Not oriented to self, place, or month. Does mimic examiner.    Cranial nerves: Pupils equal and react symmetrically to light. Blink to threat intermittently on right eye. Blink to threat present out of left eye. Left gaze preference with right gaze palsy that does cross midline. Right facial weakness noted.    Motor: There is normal bulk and tone.  There is no tremor.  RLE: slight withdrawal due to noxious stimuli. Leg seen externally rotated.   RUE: 0/5 strength with no movement. Grimaces to noxious stimuli with no movement.  Strength is 5/5 in the left arm and leg with no drift.    Sensation: Grimaces to noxious stimuli in all extremities.    Cerebellar: unable to assess dysmetria on finger to nose testing.    Gait : deferred  LABS:                        9.8    8.45  )-----------( 197      ( 21 Nov 2023 05:28 )             30.8    11-21    146<H>  |  108  |  17.3  ----------------------------<  141<H>  3.9   |  29.0  |  0.83    Ca    8.9      21 Nov 2023 05:28    IMAGING: Reviewed by me.    MR Head No Cont (11.19.23 @ 15:52)   IMPRESSION: Redemonstration of an evolving acute/subacute large   left-sided MCA distribution infarction with gross hemorrhagic   transformation in the left basal ganglia as well as evidence of petechial   hemorrhagic transformation within the left frontotemporal and parietal   cortices.    Associated cytotoxic edema, sulcal effacement, and mass effect as well as   shift of the midline structures from left to right measuring 5.5 mm   appears unchanged. There is no herniation.    CT Head No Cont (11.16.23 @ 18:05)   IMPRESSION: Stable moderate to large left MCA territory infarction   involving the left frontal lobe, left basal ganglia, left insular ribbon,   left parietal occipital lobe, and anterior left temporal lobe with stable   mild hemorrhage in the left basal ganglia. Left-to-right midline shift   measures 5.5 mm.    CT Head No Cont (11.14.23 @ 18:57)   IMPRESSION:  Stable moderate to large acute left MCA infarct with mild hemorrhage    TTE Echo Complete w/o Contrast w/ Doppler (11.14.23 @ 15:57)   Summary:   1. Left ventricular ejection fraction, by visual estimation, is 55 to   60%.   2. Normal global left ventricular systolic function.   3. The mitral in-flow pattern reveals no discernable A-wave, therefore   no comment on diastolic function can be made.   4. Normal right ventricular size and function.   5. Mild to moderately enlarged right atrium.   6. Moderately enlarged left atrium.   7. Trace mitral valve regurgitation.   8. Mild-moderate tricuspid regurgitation.   9. Sclerotic aortic valve with decreased opening.  10. Estimated pulmonary artery systolic pressure is 41.6 mmHg assuming a   right atrial pressure of 5 mmHg, which is consistent with mild pulmonary   hypertension.    CT Head No Cont (11.14.23 @ 11:36)   IMPRESSION: Evolving left MCA infarct is identified with hemorrhagic   transformation now seen with mass effect on left lateral ventricles and   left-to-right shift.    US Duplex Venous Lower Ext Complete, Bilateral (11.14.23 @ 10:38)   IMPRESSION:  No evidence of deep venous thrombosis in either lower extremity.  Left calf veins not visualized.  Small right popliteal fossa cyst.    CT Angio Brain, Neck and perfusion Stroke Protocol  w/ IV Cont (11.13.23 @ 11:18)   CTA BRAIN:  Acute thrombus in the distal left MCA M1 segment. Diminished flow in the   distal branches.  Severe bilateral cavernous carotid artery calcifications. Fetal origins   the bilateral posterior cerebral arteries. The [place] and   vertebrobasilar system are otherwise unremarkable without evidence of   stenosis, additional occlusion or saccular aneurysm dilation. No evidence   for arterial venous malformation. The vertebral arteries are codominant.    CTA NECK:  Mild bilateral carotid bulb calcifications. Mild stenosis in the left   carotid bulb. The right internal carotid arteries tortuous.  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 There is normal relationship to   the great vessels. The common carotid arteries, internal carotid arteries   and vertebral arteries shows no other evidence of significant stenosis,   occlusion or saccular aneurysm dilation. The vertebral arteries are   codominant.    CT PERFUSION:  Patient has only had less than 2 hours of symptoms may influence the CT   perfusion.  CBF<30% volume: 26 ml left MCA territory.  Tmax>6.0 s volume: 193 ml left MCA territory  Mismatch volume: 167 ml  Mismatch ratio: 7.4  IMPRESSION:  Thrombus in the distal left MCA M1 segment with diminished flow in the   distal MCA segments. 167 mL area of penumbra in the left MCA territory.     CT Brain Stroke Protocol (11.13.23 @ 11:15)   IMPRESSION:  Dense left MCA M1 segment suggesting intraluminal thrombus.   Mild chronic microvascular changes without evidence of an acute   transcortical infarction or hemorrhage.    TTE Echo Complete w/o Contrast w/ Doppler (11.14.23 @ 15:57)   Summary:   1. Left ventricular ejection fraction, by visual estimation, is 55 to   60%.   2. Normal global left ventricular systolic function.   3. The mitral in-flow pattern reveals no discernable A-wave, therefore   no comment on diastolic function can be made.   4. Normal right ventricular size and function.   5. Mild to moderately enlarged right atrium.   6. Moderatelyenlarged left atrium.   7. Trace mitral valve regurgitation.   8. Mild-moderate tricuspid regurgitation.   9. Sclerotic aortic valve with decreased opening.  10. Estimated pulmonary artery systolic pressure is 41.6 mmHg assuming a   right atrial pressure of 5 mmHg, which is consistent with mild pulmonary   hypertension.             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 Patient has a long h/o AFib, which is now considered permanent. He is on rate control with metoprolol 25 mg bid, and anticoagulation with Pradaxa.  He has a long Hx of arthritis and had multiple hip replacements, bilateral knee replacements and also has neck and back pain, but is unable to take NSAIDs due to bleeding risks. He has an unsteady gait and uses a cane for assistance.  Patient presented on 11/13 for elective left atrial appendage occlusion with Watchman device/HARJINDER w/ Dr Bobo, and Pradaxa was held for 2 days prior to procedure. 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   Initial NIHSS 26, MRS 1. Patient s/p tenecteplase administration at 11:16 11/13/23 and TICI 2A thrombectomy with Dr. Soliman.    SUBJECTIVE: No events overnight.  No new neurologic complaints.  ROS reported negative unless otherwise noted.    acetaminophen     Tablet .. 650 milliGRAM(s) Oral every 6 hours PRN  albuterol/ipratropium (CFC free) Inhaler. 1 Puff(s) Inhalation four times a day PRN  allopurinol 300 milliGRAM(s) Oral daily  aspirin  chewable 81 milliGRAM(s) Oral daily  atorvastatin 40 milliGRAM(s) Oral at bedtime  chlorhexidine 2% Cloths 1 Application(s) Topical daily  dextrose 5%. 1000 milliLiter(s) IV Continuous <Continuous>  dextrose 5%. 1000 milliLiter(s) IV Continuous <Continuous>  dextrose 50% Injectable 12.5 Gram(s) IV Push once  dextrose 50% Injectable 25 Gram(s) IV Push once  dextrose 50% Injectable 25 Gram(s) IV Push once  dextrose Oral Gel 15 Gram(s) Oral once PRN  DULoxetine 30 milliGRAM(s) Oral daily  enoxaparin Injectable 40 milliGRAM(s) SubCutaneous <User Schedule>  famotidine    Tablet 20 milliGRAM(s) Oral two times a day  gabapentin 300 milliGRAM(s) Oral two times a day  glucagon  Injectable 1 milliGRAM(s) IntraMuscular once  insulin lispro (ADMELOG) corrective regimen sliding scale   SubCutaneous Before meals and at bedtime  metoprolol tartrate 25 milliGRAM(s) Oral two times a day  nystatin Powder 1 Application(s) Topical two times a day  polyethylene glycol 3350 17 Gram(s) Oral daily  predniSONE   Tablet 5 milliGRAM(s) Oral daily  senna 2 Tablet(s) Oral at bedtime      PHYSICAL EXAM:   Vital Signs Last 24 Hrs  T(C): 37.1 (22 Nov 2023 08:00), Max: 37.3 (21 Nov 2023 20:00)  T(F): 98.7 (22 Nov 2023 08:00), Max: 99.1 (21 Nov 2023 20:00)  HR: 65 (22 Nov 2023 08:00) (62 - 92)  BP: 125/78 (22 Nov 2023 08:00) (125/78 - 169/133)  BP(mean): 101 (21 Nov 2023 10:50) (101 - 103)  RR: 18 (22 Nov 2023 08:00) (18 - 22)  SpO2: 98% (22 Nov 2023 08:00) (91% - 99%)    Parameters below as of 22 Nov 2023 08:00  Patient On (Oxygen Delivery Method): room air      General: NAD.    Detailed Neurologic Exam:    Mental status: Patient is awake and alert and attends to examiner.  Right sided visual willow-neglect present. Global aphasia noted with no verbal output. Follows command to stick out tongue, however unable to follow any other commands. Unable to ID objects or repeat. Not oriented to self, place, or month. Does mimic examiner.    Cranial nerves: Pupils equal and react symmetrically to light. Blink to threat intermittently on right eye. Blink to threat present out of left eye. Left gaze preference with right gaze palsy that does cross midline. Right facial weakness noted.    Motor: There is normal bulk and tone.  There is no tremor.  RLE: slight withdrawal due to noxious stimuli. Leg seen externally rotated.   RUE: 0/5 strength with no movement. Grimaces to noxious stimuli with no movement.  Strength is 5/5 in the left arm and leg with no drift.    Sensation: Grimaces to noxious stimuli in all extremities.    Cerebellar: unable to assess dysmetria on finger to nose testing.    Gait : deferred  LABS:                        9.8    8.45  )-----------( 197      ( 21 Nov 2023 05:28 )             30.8    11-21    146<H>  |  108  |  17.3  ----------------------------<  141<H>  3.9   |  29.0  |  0.83    Ca    8.9      21 Nov 2023 05:28    IMAGING: Reviewed by me.     CT Heart Left Atrium w/ IV Cont (11.22.23 @ 11:22)   IMPRESSION:    Cardiac:  Multiple large filling defects noted at the distal appendage at least >2   cm and also near the proximal appendage measures 1.5 cm x 1.2 cm,   consistent with left appendage thrombi.  Dilated left atrial appendage windsock in appearance with orifice width   measures4.5 cm x 3.0 cm  Severely biatrial enlargement.     MR Head No Cont (11.19.23 @ 15:52)   IMPRESSION: Redemonstration of an evolving acute/subacute large   left-sided MCA distribution infarction with gross hemorrhagic   transformation in the left basal ganglia as well as evidence of petechial   hemorrhagic transformation within the left frontotemporal and parietal   cortices.    Associated cytotoxic edema, sulcal effacement, and mass effect as well as   shift of the midline structures from left to right measuring 5.5 mm   appears unchanged. There is no herniation.    CT Head No Cont (11.16.23 @ 18:05)   IMPRESSION: Stable moderate to large left MCA territory infarction   involving the left frontal lobe, left basal ganglia, left insular ribbon,   left parietal occipital lobe, and anterior left temporal lobe with stable   mild hemorrhage in the left basal ganglia. Left-to-right midline shift   measures 5.5 mm.    CT Head No Cont (11.14.23 @ 18:57)   IMPRESSION:  Stable moderate to large acute left MCA infarct with mild hemorrhage    TTE Echo Complete w/o Contrast w/ Doppler (11.14.23 @ 15:57)   Summary:   1. Left ventricular ejection fraction, by visual estimation, is 55 to   60%.   2. Normal global left ventricular systolic function.   3. The mitral in-flow pattern reveals no discernable A-wave, therefore   no comment on diastolic function can be made.   4. Normal right ventricular size and function.   5. Mild to moderately enlarged right atrium.   6. Moderately enlarged left atrium.   7. Trace mitral valve regurgitation.   8. Mild-moderate tricuspid regurgitation.   9. Sclerotic aortic valve with decreased opening.  10. Estimated pulmonary artery systolic pressure is 41.6 mmHg assuming a   right atrial pressure of 5 mmHg, which is consistent with mild pulmonary   hypertension.    CT Head No Cont (11.14.23 @ 11:36)   IMPRESSION: Evolving left MCA infarct is identified with hemorrhagic   transformation now seen with mass effect on left lateral ventricles and   left-to-right shift.    US Duplex Venous Lower Ext Complete, Bilateral (11.14.23 @ 10:38)   IMPRESSION:  No evidence of deep venous thrombosis in either lower extremity.  Left calf veins not visualized.  Small right popliteal fossa cyst.    CT Angio Brain, Neck and perfusion Stroke Protocol  w/ IV Cont (11.13.23 @ 11:18)   CTA BRAIN:  Acute thrombus in the distal left MCA M1 segment. Diminished flow in the   distal branches.  Severe bilateral cavernous carotid artery calcifications. Fetal origins   the bilateral posterior cerebral arteries. The [place] and   vertebrobasilar system are otherwise unremarkable without evidence of   stenosis, additional occlusion or saccular aneurysm dilation. No evidence   for arterial venous malformation. The vertebral arteries are codominant.    CTA NECK:  Mild bilateral carotid bulb calcifications. Mild stenosis in the left   carotid bulb. The right internal carotid arteries tortuous.  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 There is normal relationship to   the great vessels. The common carotid arteries, internal carotid arteries   and vertebral arteries shows no other evidence of significant stenosis,   occlusion or saccular aneurysm dilation. The vertebral arteries are   codominant.    CT PERFUSION:  Patient has only had less than 2 hours of symptoms may influence the CT   perfusion.  CBF<30% volume: 26 ml left MCA territory.  Tmax>6.0 s volume: 193 ml left MCA territory  Mismatch volume: 167 ml  Mismatch ratio: 7.4  IMPRESSION:  Thrombus in the distal left MCA M1 segment with diminished flow in the   distal MCA segments. 167 mL area of penumbra in the left MCA territory.     CT Brain Stroke Protocol (11.13.23 @ 11:15)   IMPRESSION:  Dense left MCA M1 segment suggesting intraluminal thrombus.   Mild chronic microvascular changes without evidence of an acute   transcortical infarction or hemorrhage.    TTE Echo Complete w/o Contrast w/ Doppler (11.14.23 @ 15:57)   Summary:   1. Left ventricular ejection fraction, by visual estimation, is 55 to   60%.   2. Normal global left ventricular systolic function.   3. The mitral in-flow pattern reveals no discernable A-wave, therefore   no comment on diastolic function can be made.   4. Normal right ventricular size and function.   5. Mild to moderately enlarged right atrium.   6. Moderatelyenlarged left atrium.   7. Trace mitral valve regurgitation.   8. Mild-moderate tricuspid regurgitation.   9. Sclerotic aortic valve with decreased opening.  10. Estimated pulmonary artery systolic pressure is 41.6 mmHg assuming a   right atrial pressure of 5 mmHg, which is consistent with mild pulmonary   hypertension.             Preliminary note, offical recommendations pending attending review/signature   [facility] Stroke Team  Progress Note     HPI:  77 yo M PMH of HTN, HLD, AFib, CKD, obesity, gout, prostate cancer, DVT/PE, OA s/p multiple orthopedic procedures, spinal stenosis, presented on 11/13/23 for elective left atrial appendage occlusion procedure, and developed a MCA syndrome due to acute L M1 occlusion as demonstrated on CTA Head and Neck. Patient has a long h/o AFib, which is now considered permanent. He is on rate control with metoprolol 25 mg bid, and anticoagulation with Pradaxa.  He has a long Hx of arthritis and had multiple hip replacements, bilateral knee replacements and also has neck and back pain, but is unable to take NSAIDs due to bleeding risks. He has an unsteady gait and uses a cane for assistance.  Patient presented on 11/13 for elective left atrial appendage occlusion with Watchman device/HARJINDER w/ Dr Bobo, and Pradaxa was held for 2 days prior to procedure. 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   Initial NIHSS 26, MRS 1. Patient s/p tenecteplase administration at 11:16 11/13/23 and TICI 2A thrombectomy with Dr. Soliman.    SUBJECTIVE: No events overnight.  No new neurologic complaints.  ROS reported negative unless otherwise noted.    acetaminophen     Tablet .. 650 milliGRAM(s) Oral every 6 hours PRN  albuterol/ipratropium (CFC free) Inhaler. 1 Puff(s) Inhalation four times a day PRN  allopurinol 300 milliGRAM(s) Oral daily  aspirin  chewable 81 milliGRAM(s) Oral daily  atorvastatin 40 milliGRAM(s) Oral at bedtime  chlorhexidine 2% Cloths 1 Application(s) Topical daily  dextrose 5%. 1000 milliLiter(s) IV Continuous <Continuous>  dextrose 5%. 1000 milliLiter(s) IV Continuous <Continuous>  dextrose 50% Injectable 12.5 Gram(s) IV Push once  dextrose 50% Injectable 25 Gram(s) IV Push once  dextrose 50% Injectable 25 Gram(s) IV Push once  dextrose Oral Gel 15 Gram(s) Oral once PRN  DULoxetine 30 milliGRAM(s) Oral daily  enoxaparin Injectable 40 milliGRAM(s) SubCutaneous <User Schedule>  famotidine    Tablet 20 milliGRAM(s) Oral two times a day  gabapentin 300 milliGRAM(s) Oral two times a day  glucagon  Injectable 1 milliGRAM(s) IntraMuscular once  insulin lispro (ADMELOG) corrective regimen sliding scale   SubCutaneous Before meals and at bedtime  metoprolol tartrate 25 milliGRAM(s) Oral two times a day  nystatin Powder 1 Application(s) Topical two times a day  polyethylene glycol 3350 17 Gram(s) Oral daily  predniSONE   Tablet 5 milliGRAM(s) Oral daily  senna 2 Tablet(s) Oral at bedtime      PHYSICAL EXAM:   Vital Signs Last 24 Hrs  T(C): 37.1 (22 Nov 2023 08:00), Max: 37.3 (21 Nov 2023 20:00)  T(F): 98.7 (22 Nov 2023 08:00), Max: 99.1 (21 Nov 2023 20:00)  HR: 65 (22 Nov 2023 08:00) (62 - 92)  BP: 125/78 (22 Nov 2023 08:00) (125/78 - 169/133)  BP(mean): 101 (21 Nov 2023 10:50) (101 - 103)  RR: 18 (22 Nov 2023 08:00) (18 - 22)  SpO2: 98% (22 Nov 2023 08:00) (91% - 99%)    Parameters below as of 22 Nov 2023 08:00  Patient On (Oxygen Delivery Method): room air      General: NAD.    Detailed Neurologic Exam:    Mental status: Patient is awake and alert and attends to examiner.  Right sided visual willow-neglect present. Global aphasia noted with no verbal output. Follows command to stick out tongue, however unable to follow any other commands. Unable to ID objects or repeat. Not oriented to self, place, or month. Does mimic examiner.    Cranial nerves: Pupils equal and react symmetrically to light. Blink to threat intermittently on right eye. Blink to threat present out of left eye. Left gaze preference with right gaze palsy that does cross midline. Right facial weakness noted.    Motor: There is normal bulk and tone.  There is no tremor.  RLE: slight withdrawal due to noxious stimuli. Leg seen externally rotated.   RUE: 0/5 strength with no movement. Grimaces to noxious stimuli with no movement.  Strength is 5/5 in the left arm and leg with no drift.    Sensation: Grimaces to noxious stimuli in all extremities.    Cerebellar: unable to assess dysmetria on finger to nose testing.    Gait : deferred  LABS:                        9.8    8.45  )-----------( 197      ( 21 Nov 2023 05:28 )             30.8    11-21    146<H>  |  108  |  17.3  ----------------------------<  141<H>  3.9   |  29.0  |  0.83    Ca    8.9      21 Nov 2023 05:28    IMAGING: Reviewed by me.     CT Heart Left Atrium w/ IV Cont (11.22.23 @ 11:22)   IMPRESSION:    Cardiac:  Multiple large filling defects noted at the distal appendage at least >2   cm and also near the proximal appendage measures 1.5 cm x 1.2 cm,   consistent with left appendage thrombi.  Dilated left atrial appendage windsock in appearance with orifice width   measures 4.5 cm x 3.0 cm  Severely biatrial enlargement.    Non-cardiac:  Right upper lobe pulmonary artery embolism.  Possible pulmonary hypertension.  New right lower lobe superior segment infiltrate (possibly infectious or   inflammatory)    IMPRESSION:    Cardiac:  Multiple large filling defects noted at the distal appendage at least >2   cm and also near the proximal appendage measures 1.5 cm x 1.2 cm,   consistent with left appendage thrombi.  Dilated left atrial appendage windsock in appearance with orifice width   measures4.5 cm x 3.0 cm  Severely biatrial enlargement.     MR Head No Cont (11.19.23 @ 15:52)   IMPRESSION: Redemonstration of an evolving acute/subacute large   left-sided MCA distribution infarction with gross hemorrhagic   transformation in the left basal ganglia as well as evidence of petechial   hemorrhagic transformation within the left frontotemporal and parietal   cortices.    Associated cytotoxic edema, sulcal effacement, and mass effect as well as   shift of the midline structures from left to right measuring 5.5 mm   appears unchanged. There is no herniation.    CT Head No Cont (11.16.23 @ 18:05)   IMPRESSION: Stable moderate to large left MCA territory infarction   involving the left frontal lobe, left basal ganglia, left insular ribbon,   left parietal occipital lobe, and anterior left temporal lobe with stable   mild hemorrhage in the left basal ganglia. Left-to-right midline shift   measures 5.5 mm.    CT Head No Cont (11.14.23 @ 18:57)   IMPRESSION:  Stable moderate to large acute left MCA infarct with mild hemorrhage    TTE Echo Complete w/o Contrast w/ Doppler (11.14.23 @ 15:57)   Summary:   1. Left ventricular ejection fraction, by visual estimation, is 55 to   60%.   2. Normal global left ventricular systolic function.   3. The mitral in-flow pattern reveals no discernable A-wave, therefore   no comment on diastolic function can be made.   4. Normal right ventricular size and function.   5. Mild to moderately enlarged right atrium.   6. Moderately enlarged left atrium.   7. Trace mitral valve regurgitation.   8. Mild-moderate tricuspid regurgitation.   9. Sclerotic aortic valve with decreased opening.  10. Estimated pulmonary artery systolic pressure is 41.6 mmHg assuming a   right atrial pressure of 5 mmHg, which is consistent with mild pulmonary   hypertension.    CT Head No Cont (11.14.23 @ 11:36)   IMPRESSION: Evolving left MCA infarct is identified with hemorrhagic   transformation now seen with mass effect on left lateral ventricles and   left-to-right shift.    US Duplex Venous Lower Ext Complete, Bilateral (11.14.23 @ 10:38)   IMPRESSION:  No evidence of deep venous thrombosis in either lower extremity.  Left calf veins not visualized.  Small right popliteal fossa cyst.    CT Angio Brain, Neck and perfusion Stroke Protocol  w/ IV Cont (11.13.23 @ 11:18)   CTA BRAIN:  Acute thrombus in the distal left MCA M1 segment. Diminished flow in the   distal branches.  Severe bilateral cavernous carotid artery calcifications. Fetal origins   the bilateral posterior cerebral arteries. The [place] of [place] and   vertebrobasilar system are otherwise unremarkable without evidence of   stenosis, additional occlusion or saccular aneurysm dilation. No evidence   for arterial venous malformation. The vertebral arteries are codominant.    CTA NECK:  Mild bilateral carotid bulb calcifications. Mild stenosis in the left   carotid bulb. The right internal carotid arteries tortuous.  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 There is normal relationship to   the great vessels. The common carotid arteries, internal carotid arteries   and vertebral arteries shows no other evidence of significant stenosis,   occlusion or saccular aneurysm dilation. The vertebral arteries are   codominant.    CT PERFUSION:  Patient has only had less than 2 hours of symptoms may influence the CT   perfusion.  CBF<30% volume: 26 ml left MCA territory.  Tmax>6.0 s volume: 193 ml left MCA territory  Mismatch volume: 167 ml  Mismatch ratio: 7.4  IMPRESSION:  Thrombus in the distal left MCA M1 segment with diminished flow in the   distal MCA segments. 167 mL area of penumbra in the left MCA territory.     CT Brain Stroke Protocol (11.13.23 @ 11:15)   IMPRESSION:  Dense left MCA M1 segment suggesting intraluminal thrombus.   Mild chronic microvascular changes without evidence of an acute   transcortical infarction or hemorrhage.    TTE Echo Complete w/o Contrast w/ Doppler (11.14.23 @ 15:57)   Summary:   1. Left ventricular ejection fraction, by visual estimation, is 55 to   60%.   2. Normal global left ventricular systolic function.   3. The mitral in-flow pattern reveals no discernable A-wave, therefore   no comment on diastolic function can be made.   4. Normal right ventricular size and function.   5. Mild to moderately enlarged right atrium.   6. Moderatelyenlarged left atrium.   7. Trace mitral valve regurgitation.   8. Mild-moderate tricuspid regurgitation.   9. Sclerotic aortic valve with decreased opening.  10. Estimated pulmonary artery systolic pressure is 41.6 mmHg assuming a   right atrial pressure of 5 mmHg, which is consistent with mild pulmonary   hypertension.               [facility] Stroke Team  Progress Note     HPI:  79 yo M PMH of HTN, HLD, AFib, CKD, obesity, gout, prostate cancer, DVT/PE, OA s/p multiple orthopedic procedures, spinal stenosis, presented on 11/13/23 for elective left atrial appendage occlusion procedure, and developed a MCA syndrome due to acute L M1 occlusion as demonstrated on CTA Head and Neck. Patient has a long h/o AFib, which is now considered permanent. He is on rate control with metoprolol 25 mg bid, and anticoagulation with Pradaxa.  He has a long Hx of arthritis and had multiple hip replacements, bilateral knee replacements and also has neck and back pain, but is unable to take NSAIDs due to bleeding risks. He has an unsteady gait and uses a cane for assistance.  Patient presented on 11/13 for elective left atrial appendage occlusion with Watchman device/HARJINDER w/ Dr Bobo, and Pradaxa was held for 2 days prior to procedure. 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   Initial NIHSS 26, MRS 1. Patient s/p tenecteplase administration at 11:16 11/13/23 and TICI 2A thrombectomy with Dr. Soliman.    SUBJECTIVE: No events overnight.  No new neurologic complaints.  ROS reported negative unless otherwise noted.    acetaminophen     Tablet .. 650 milliGRAM(s) Oral every 6 hours PRN  albuterol/ipratropium (CFC free) Inhaler. 1 Puff(s) Inhalation four times a day PRN  allopurinol 300 milliGRAM(s) Oral daily  aspirin  chewable 81 milliGRAM(s) Oral daily  atorvastatin 40 milliGRAM(s) Oral at bedtime  chlorhexidine 2% Cloths 1 Application(s) Topical daily  dextrose 5%. 1000 milliLiter(s) IV Continuous <Continuous>  dextrose 5%. 1000 milliLiter(s) IV Continuous <Continuous>  dextrose 50% Injectable 12.5 Gram(s) IV Push once  dextrose 50% Injectable 25 Gram(s) IV Push once  dextrose 50% Injectable 25 Gram(s) IV Push once  dextrose Oral Gel 15 Gram(s) Oral once PRN  DULoxetine 30 milliGRAM(s) Oral daily  enoxaparin Injectable 40 milliGRAM(s) SubCutaneous <User Schedule>  famotidine    Tablet 20 milliGRAM(s) Oral two times a day  gabapentin 300 milliGRAM(s) Oral two times a day  glucagon  Injectable 1 milliGRAM(s) IntraMuscular once  insulin lispro (ADMELOG) corrective regimen sliding scale   SubCutaneous Before meals and at bedtime  metoprolol tartrate 25 milliGRAM(s) Oral two times a day  nystatin Powder 1 Application(s) Topical two times a day  polyethylene glycol 3350 17 Gram(s) Oral daily  predniSONE   Tablet 5 milliGRAM(s) Oral daily  senna 2 Tablet(s) Oral at bedtime      PHYSICAL EXAM:   Vital Signs Last 24 Hrs  T(C): 37.1 (22 Nov 2023 08:00), Max: 37.3 (21 Nov 2023 20:00)  T(F): 98.7 (22 Nov 2023 08:00), Max: 99.1 (21 Nov 2023 20:00)  HR: 65 (22 Nov 2023 08:00) (62 - 92)  BP: 125/78 (22 Nov 2023 08:00) (125/78 - 169/133)  BP(mean): 101 (21 Nov 2023 10:50) (101 - 103)  RR: 18 (22 Nov 2023 08:00) (18 - 22)  SpO2: 98% (22 Nov 2023 08:00) (91% - 99%)    Parameters below as of 22 Nov 2023 08:00  Patient On (Oxygen Delivery Method): room air      General: NAD.    Detailed Neurologic Exam:    Mental status: Patient is awake and alert and attends to examiner.  Right sided visual willow-neglect present. Global aphasia noted with no verbal output. Follows command to stick out tongue, however unable to follow any other commands. Unable to ID objects or repeat. Not oriented to self, place, or month. Does mimic examiner.    Cranial nerves: Pupils equal and react symmetrically to light. Blink to threat intermittently on right eye. Blink to threat present out of left eye. Left gaze preference with right gaze palsy that does cross midline. Right facial weakness noted.    Motor: There is normal bulk and tone.  There is no tremor.  RLE: slight withdrawal due to noxious stimuli. Leg seen externally rotated.   RUE: 0/5 strength with no movement. Grimaces to noxious stimuli with no movement.  Strength is 5/5 in the left arm and leg with no drift.    Sensation: Grimaces to noxious stimuli in all extremities.    Cerebellar: unable to assess dysmetria on finger to nose testing.    Gait : deferred  LABS:                        9.8    8.45  )-----------( 197      ( 21 Nov 2023 05:28 )             30.8    11-21    146<H>  |  108  |  17.3  ----------------------------<  141<H>  3.9   |  29.0  |  0.83    Ca    8.9      21 Nov 2023 05:28    IMAGING: Reviewed by me.     CT Heart Left Atrium w/ IV Cont (11.22.23 @ 11:22)   IMPRESSION:    Cardiac:  Multiple large filling defects noted at the distal appendage at least >2   cm and also near the proximal appendage measures 1.5 cm x 1.2 cm,   consistent with left appendage thrombi.  Dilated left atrial appendage windsock in appearance with orifice width   measures 4.5 cm x 3.0 cm  Severely biatrial enlargement.    Non-cardiac:  Right upper lobe pulmonary artery embolism.  Possible pulmonary hypertension.  New right lower lobe superior segment infiltrate (possibly infectious or   inflammatory)    IMPRESSION:    Cardiac:  Multiple large filling defects noted at the distal appendage at least >2   cm and also near the proximal appendage measures 1.5 cm x 1.2 cm,   consistent with left appendage thrombi.  Dilated left atrial appendage windsock in appearance with orifice width   measures4.5 cm x 3.0 cm  Severely biatrial enlargement.     MR Head No Cont (11.19.23 @ 15:52)   IMPRESSION: Redemonstration of an evolving acute/subacute large   left-sided MCA distribution infarction with gross hemorrhagic   transformation in the left basal ganglia as well as evidence of petechial   hemorrhagic transformation within the left frontotemporal and parietal   cortices.    Associated cytotoxic edema, sulcal effacement, and mass effect as well as   shift of the midline structures from left to right measuring 5.5 mm   appears unchanged. There is no herniation.    CT Head No Cont (11.16.23 @ 18:05)   IMPRESSION: Stable moderate to large left MCA territory infarction   involving the left frontal lobe, left basal ganglia, left insular ribbon,   left parietal occipital lobe, and anterior left temporal lobe with stable   mild hemorrhage in the left basal ganglia. Left-to-right midline shift   measures 5.5 mm.    CT Head No Cont (11.14.23 @ 18:57)   IMPRESSION:  Stable moderate to large acute left MCA infarct with mild hemorrhage    TTE Echo Complete w/o Contrast w/ Doppler (11.14.23 @ 15:57)   Summary:   1. Left ventricular ejection fraction, by visual estimation, is 55 to   60%.   2. Normal global left ventricular systolic function.   3. The mitral in-flow pattern reveals no discernable A-wave, therefore   no comment on diastolic function can be made.   4. Normal right ventricular size and function.   5. Mild to moderately enlarged right atrium.   6. Moderately enlarged left atrium.   7. Trace mitral valve regurgitation.   8. Mild-moderate tricuspid regurgitation.   9. Sclerotic aortic valve with decreased opening.  10. Estimated pulmonary artery systolic pressure is 41.6 mmHg assuming a   right atrial pressure of 5 mmHg, which is consistent with mild pulmonary   hypertension.    CT Head No Cont (11.14.23 @ 11:36)   IMPRESSION: Evolving left MCA infarct is identified with hemorrhagic   transformation now seen with mass effect on left lateral ventricles and   left-to-right shift.    US Duplex Venous Lower Ext Complete, Bilateral (11.14.23 @ 10:38)   IMPRESSION:  No evidence of deep venous thrombosis in either lower extremity.  Left calf veins not visualized.  Small right popliteal fossa cyst.    CT Angio Brain, Neck and perfusion Stroke Protocol  w/ IV Cont (11.13.23 @ 11:18)   CTA BRAIN:  Acute thrombus in the distal left MCA M1 segment. Diminished flow in the   distal branches.  Severe bilateral cavernous carotid artery calcifications. Fetal origins   the bilateral posterior cerebral arteries. The [place] and   vertebrobasilar system are otherwise unremarkable without evidence of   stenosis, additional occlusion or saccular aneurysm dilation. No evidence   for arterial venous malformation. The vertebral arteries are codominant.    CTA NECK:  Mild bilateral carotid bulb calcifications. Mild stenosis in the left   carotid bulb. The right internal carotid arteries tortuous.  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 There is normal relationship to   the great vessels. The common carotid arteries, internal carotid arteries   and vertebral arteries shows no other evidence of significant stenosis,   occlusion or saccular aneurysm dilation. The vertebral arteries are   codominant.    CT PERFUSION:  Patient has only had less than 2 hours of symptoms may influence the CT   perfusion.  CBF<30% volume: 26 ml left MCA territory.  Tmax>6.0 s volume: 193 ml left MCA territory  Mismatch volume: 167 ml  Mismatch ratio: 7.4  IMPRESSION:  Thrombus in the distal left MCA M1 segment with diminished flow in the   distal MCA segments. 167 mL area of penumbra in the left MCA territory.     CT Brain Stroke Protocol (11.13.23 @ 11:15)   IMPRESSION:  Dense left MCA M1 segment suggesting intraluminal thrombus.   Mild chronic microvascular changes without evidence of an acute   transcortical infarction or hemorrhage.    TTE Echo Complete w/o Contrast w/ Doppler (11.14.23 @ 15:57)   Summary:   1. Left ventricular ejection fraction, by visual estimation, is 55 to   60%.   2. Normal global left ventricular systolic function.   3. The mitral in-flow pattern reveals no discernable A-wave, therefore   no comment on diastolic function can be made.   4. Normal right ventricular size and function.   5. Mild to moderately enlarged right atrium.   6. Moderatelyenlarged left atrium.   7. Trace mitral valve regurgitation.   8. Mild-moderate tricuspid regurgitation.   9. Sclerotic aortic valve with decreased opening.  10. Estimated pulmonary artery systolic pressure is 41.6 mmHg assuming a   right atrial pressure of 5 mmHg, which is consistent with mild pulmonary   hypertension.

## 2023-11-22 NOTE — SWALLOW VFSS/MBS ASSESSMENT ADULT - ORAL PHASE
mildly increased oral transit time, however functional/Uncontrolled bolus / spillover in marnie-pharynx trace b/l buccal stasis/Uncontrolled bolus / spillover in marnie-pharynx Uncontrolled bolus / spillover in marnie-pharynx/Uncontrolled bolus / spillover in hypopharynx Uncontrolled bolus / spillover in marnie-pharynx

## 2023-11-22 NOTE — SWALLOW VFSS/MBS ASSESSMENT ADULT - ROSENBEK'S PENETRATION ASPIRATION SCALE
complains of pain/discomfort (1) no aspiration, contrast does not enter airway (8) contrast passes glottis, visible subglottic residue remains, absent patient response (aspiration)

## 2023-11-23 LAB
ANION GAP SERPL CALC-SCNC: 8 MMOL/L — SIGNIFICANT CHANGE UP (ref 5–17)
ANION GAP SERPL CALC-SCNC: 8 MMOL/L — SIGNIFICANT CHANGE UP (ref 5–17)
APTT BLD: 38.4 SEC — HIGH (ref 24.5–35.6)
APTT BLD: 38.4 SEC — HIGH (ref 24.5–35.6)
APTT BLD: 41.4 SEC — HIGH (ref 24.5–35.6)
APTT BLD: 41.4 SEC — HIGH (ref 24.5–35.6)
APTT BLD: 47.2 SEC — HIGH (ref 24.5–35.6)
APTT BLD: 47.2 SEC — HIGH (ref 24.5–35.6)
APTT BLD: 49.1 SEC — HIGH (ref 24.5–35.6)
APTT BLD: 49.1 SEC — HIGH (ref 24.5–35.6)
APTT BLD: 59.9 SEC — HIGH (ref 24.5–35.6)
APTT BLD: 59.9 SEC — HIGH (ref 24.5–35.6)
BASOPHILS # BLD AUTO: 0.04 K/UL — SIGNIFICANT CHANGE UP (ref 0–0.2)
BASOPHILS # BLD AUTO: 0.04 K/UL — SIGNIFICANT CHANGE UP (ref 0–0.2)
BASOPHILS NFR BLD AUTO: 0.6 % — SIGNIFICANT CHANGE UP (ref 0–2)
BASOPHILS NFR BLD AUTO: 0.6 % — SIGNIFICANT CHANGE UP (ref 0–2)
BUN SERPL-MCNC: 19.4 MG/DL — SIGNIFICANT CHANGE UP (ref 8–20)
BUN SERPL-MCNC: 19.4 MG/DL — SIGNIFICANT CHANGE UP (ref 8–20)
CALCIUM SERPL-MCNC: 9.2 MG/DL — SIGNIFICANT CHANGE UP (ref 8.4–10.5)
CALCIUM SERPL-MCNC: 9.2 MG/DL — SIGNIFICANT CHANGE UP (ref 8.4–10.5)
CHLORIDE SERPL-SCNC: 108 MMOL/L — SIGNIFICANT CHANGE UP (ref 96–108)
CHLORIDE SERPL-SCNC: 108 MMOL/L — SIGNIFICANT CHANGE UP (ref 96–108)
CO2 SERPL-SCNC: 31 MMOL/L — HIGH (ref 22–29)
CO2 SERPL-SCNC: 31 MMOL/L — HIGH (ref 22–29)
CREAT SERPL-MCNC: 0.91 MG/DL — SIGNIFICANT CHANGE UP (ref 0.5–1.3)
CREAT SERPL-MCNC: 0.91 MG/DL — SIGNIFICANT CHANGE UP (ref 0.5–1.3)
EGFR: 86 ML/MIN/1.73M2 — SIGNIFICANT CHANGE UP
EGFR: 86 ML/MIN/1.73M2 — SIGNIFICANT CHANGE UP
EOSINOPHIL # BLD AUTO: 0.14 K/UL — SIGNIFICANT CHANGE UP (ref 0–0.5)
EOSINOPHIL # BLD AUTO: 0.14 K/UL — SIGNIFICANT CHANGE UP (ref 0–0.5)
EOSINOPHIL NFR BLD AUTO: 2.1 % — SIGNIFICANT CHANGE UP (ref 0–6)
EOSINOPHIL NFR BLD AUTO: 2.1 % — SIGNIFICANT CHANGE UP (ref 0–6)
GLUCOSE BLDC GLUCOMTR-MCNC: 182 MG/DL — HIGH (ref 70–99)
GLUCOSE BLDC GLUCOMTR-MCNC: 182 MG/DL — HIGH (ref 70–99)
GLUCOSE BLDC GLUCOMTR-MCNC: 211 MG/DL — HIGH (ref 70–99)
GLUCOSE BLDC GLUCOMTR-MCNC: 211 MG/DL — HIGH (ref 70–99)
GLUCOSE BLDC GLUCOMTR-MCNC: 225 MG/DL — HIGH (ref 70–99)
GLUCOSE BLDC GLUCOMTR-MCNC: 225 MG/DL — HIGH (ref 70–99)
GLUCOSE SERPL-MCNC: 160 MG/DL — HIGH (ref 70–99)
GLUCOSE SERPL-MCNC: 160 MG/DL — HIGH (ref 70–99)
HCT VFR BLD CALC: 32.2 % — LOW (ref 39–50)
HCT VFR BLD CALC: 32.2 % — LOW (ref 39–50)
HGB BLD-MCNC: 10.1 G/DL — LOW (ref 13–17)
HGB BLD-MCNC: 10.1 G/DL — LOW (ref 13–17)
IMM GRANULOCYTES NFR BLD AUTO: 1.2 % — HIGH (ref 0–0.9)
IMM GRANULOCYTES NFR BLD AUTO: 1.2 % — HIGH (ref 0–0.9)
INR BLD: 1.07 RATIO — SIGNIFICANT CHANGE UP (ref 0.85–1.18)
INR BLD: 1.07 RATIO — SIGNIFICANT CHANGE UP (ref 0.85–1.18)
LYMPHOCYTES # BLD AUTO: 0.96 K/UL — LOW (ref 1–3.3)
LYMPHOCYTES # BLD AUTO: 0.96 K/UL — LOW (ref 1–3.3)
LYMPHOCYTES # BLD AUTO: 14.1 % — SIGNIFICANT CHANGE UP (ref 13–44)
LYMPHOCYTES # BLD AUTO: 14.1 % — SIGNIFICANT CHANGE UP (ref 13–44)
MCHC RBC-ENTMCNC: 30 PG — SIGNIFICANT CHANGE UP (ref 27–34)
MCHC RBC-ENTMCNC: 30 PG — SIGNIFICANT CHANGE UP (ref 27–34)
MCHC RBC-ENTMCNC: 31.4 GM/DL — LOW (ref 32–36)
MCHC RBC-ENTMCNC: 31.4 GM/DL — LOW (ref 32–36)
MCV RBC AUTO: 95.5 FL — SIGNIFICANT CHANGE UP (ref 80–100)
MCV RBC AUTO: 95.5 FL — SIGNIFICANT CHANGE UP (ref 80–100)
MONOCYTES # BLD AUTO: 0.67 K/UL — SIGNIFICANT CHANGE UP (ref 0–0.9)
MONOCYTES # BLD AUTO: 0.67 K/UL — SIGNIFICANT CHANGE UP (ref 0–0.9)
MONOCYTES NFR BLD AUTO: 9.8 % — SIGNIFICANT CHANGE UP (ref 2–14)
MONOCYTES NFR BLD AUTO: 9.8 % — SIGNIFICANT CHANGE UP (ref 2–14)
NEUTROPHILS # BLD AUTO: 4.93 K/UL — SIGNIFICANT CHANGE UP (ref 1.8–7.4)
NEUTROPHILS # BLD AUTO: 4.93 K/UL — SIGNIFICANT CHANGE UP (ref 1.8–7.4)
NEUTROPHILS NFR BLD AUTO: 72.2 % — SIGNIFICANT CHANGE UP (ref 43–77)
NEUTROPHILS NFR BLD AUTO: 72.2 % — SIGNIFICANT CHANGE UP (ref 43–77)
PLATELET # BLD AUTO: 269 K/UL — SIGNIFICANT CHANGE UP (ref 150–400)
PLATELET # BLD AUTO: 269 K/UL — SIGNIFICANT CHANGE UP (ref 150–400)
POTASSIUM SERPL-MCNC: 4.4 MMOL/L — SIGNIFICANT CHANGE UP (ref 3.5–5.3)
POTASSIUM SERPL-MCNC: 4.4 MMOL/L — SIGNIFICANT CHANGE UP (ref 3.5–5.3)
POTASSIUM SERPL-SCNC: 4.4 MMOL/L — SIGNIFICANT CHANGE UP (ref 3.5–5.3)
POTASSIUM SERPL-SCNC: 4.4 MMOL/L — SIGNIFICANT CHANGE UP (ref 3.5–5.3)
PROTHROM AB SERPL-ACNC: 11.8 SEC — SIGNIFICANT CHANGE UP (ref 9.5–13)
PROTHROM AB SERPL-ACNC: 11.8 SEC — SIGNIFICANT CHANGE UP (ref 9.5–13)
RBC # BLD: 3.37 M/UL — LOW (ref 4.2–5.8)
RBC # BLD: 3.37 M/UL — LOW (ref 4.2–5.8)
RBC # FLD: 13.4 % — SIGNIFICANT CHANGE UP (ref 10.3–14.5)
RBC # FLD: 13.4 % — SIGNIFICANT CHANGE UP (ref 10.3–14.5)
SODIUM SERPL-SCNC: 147 MMOL/L — HIGH (ref 135–145)
SODIUM SERPL-SCNC: 147 MMOL/L — HIGH (ref 135–145)
WBC # BLD: 6.82 K/UL — SIGNIFICANT CHANGE UP (ref 3.8–10.5)
WBC # BLD: 6.82 K/UL — SIGNIFICANT CHANGE UP (ref 3.8–10.5)
WBC # FLD AUTO: 6.82 K/UL — SIGNIFICANT CHANGE UP (ref 3.8–10.5)
WBC # FLD AUTO: 6.82 K/UL — SIGNIFICANT CHANGE UP (ref 3.8–10.5)

## 2023-11-23 PROCEDURE — 99233 SBSQ HOSP IP/OBS HIGH 50: CPT

## 2023-11-23 PROCEDURE — 93970 EXTREMITY STUDY: CPT | Mod: 26

## 2023-11-23 RX ORDER — HEPARIN SODIUM 5000 [USP'U]/ML
1000 INJECTION INTRAVENOUS; SUBCUTANEOUS
Qty: 25000 | Refills: 0 | Status: DISCONTINUED | OUTPATIENT
Start: 2023-11-23 | End: 2023-11-24

## 2023-11-23 RX ADMIN — NYSTATIN CREAM 1 APPLICATION(S): 100000 CREAM TOPICAL at 18:50

## 2023-11-23 RX ADMIN — Medication 25 MILLIGRAM(S): at 18:50

## 2023-11-23 RX ADMIN — ATORVASTATIN CALCIUM 40 MILLIGRAM(S): 80 TABLET, FILM COATED ORAL at 21:12

## 2023-11-23 RX ADMIN — SENNA PLUS 2 TABLET(S): 8.6 TABLET ORAL at 21:12

## 2023-11-23 RX ADMIN — NYSTATIN CREAM 1 APPLICATION(S): 100000 CREAM TOPICAL at 06:43

## 2023-11-23 RX ADMIN — FAMOTIDINE 20 MILLIGRAM(S): 10 INJECTION INTRAVENOUS at 06:43

## 2023-11-23 RX ADMIN — Medication 25 MILLIGRAM(S): at 06:43

## 2023-11-23 RX ADMIN — GABAPENTIN 300 MILLIGRAM(S): 400 CAPSULE ORAL at 18:50

## 2023-11-23 RX ADMIN — CHLORHEXIDINE GLUCONATE 1 APPLICATION(S): 213 SOLUTION TOPICAL at 06:54

## 2023-11-23 RX ADMIN — Medication 2: at 13:02

## 2023-11-23 RX ADMIN — Medication 5 MILLIGRAM(S): at 06:44

## 2023-11-23 RX ADMIN — POLYETHYLENE GLYCOL 3350 17 GRAM(S): 17 POWDER, FOR SOLUTION ORAL at 13:02

## 2023-11-23 RX ADMIN — GABAPENTIN 300 MILLIGRAM(S): 400 CAPSULE ORAL at 06:43

## 2023-11-23 RX ADMIN — Medication 81 MILLIGRAM(S): at 13:03

## 2023-11-23 RX ADMIN — Medication 1: at 15:39

## 2023-11-23 RX ADMIN — HEPARIN SODIUM 10 UNIT(S)/HR: 5000 INJECTION INTRAVENOUS; SUBCUTANEOUS at 00:29

## 2023-11-23 RX ADMIN — FAMOTIDINE 20 MILLIGRAM(S): 10 INJECTION INTRAVENOUS at 18:51

## 2023-11-23 RX ADMIN — DULOXETINE HYDROCHLORIDE 30 MILLIGRAM(S): 30 CAPSULE, DELAYED RELEASE ORAL at 13:02

## 2023-11-23 RX ADMIN — HEPARIN SODIUM 10 UNIT(S)/HR: 5000 INJECTION INTRAVENOUS; SUBCUTANEOUS at 07:46

## 2023-11-23 RX ADMIN — HEPARIN SODIUM 11 UNIT(S)/HR: 5000 INJECTION INTRAVENOUS; SUBCUTANEOUS at 18:47

## 2023-11-23 RX ADMIN — HEPARIN SODIUM 11 UNIT(S)/HR: 5000 INJECTION INTRAVENOUS; SUBCUTANEOUS at 23:36

## 2023-11-23 RX ADMIN — Medication 300 MILLIGRAM(S): at 15:38

## 2023-11-23 RX ADMIN — Medication 2: at 21:18

## 2023-11-23 RX ADMIN — HEPARIN SODIUM 11 UNIT(S)/HR: 5000 INJECTION INTRAVENOUS; SUBCUTANEOUS at 19:29

## 2023-11-23 NOTE — CHART NOTE - NSCHARTNOTEFT_GEN_A_CORE
Called Neurology to clarify goal PTT: Goal PTT for today is 40 - 60.   Repeat CTH to be obtained tomorrow. Called Neurology to clarify goal PTT: Goal PTT for today is 40 - 60.   Repeat CTH to be obtained tomorrow.    PTT resulted at 38 and downtrending, will increase heparin gtt rate from 10 to 11.

## 2023-11-23 NOTE — PROGRESS NOTE ADULT - ASSESSMENT
78y/oM PMH of HTN, HLD, AFib on Pradaxa, CKD, Obesity, Gout, Prostate cancer, DVT/PE, OA s/p multiple orthopedic procedures, Spinal Stenosis, presented on 11/13/23 for elective left atrial appendage occlusion procedure, and developed right sided weakness. Imaging revealed an acute L M1 occlusion s/p TNK administration and TICI 2A thrombectomy. Patient was intubated for airway protection. Repeat CT head s/p tenecteplase administration showed evolving left MCA stroke with hemorrhagic transformation, and mass effects with left to right shift. Suspected etiology of stroke is cardioembolic from atrial fibrillation when off anticoagulation for recent procedure. Patient was subsequently extubated and is now stable for transfer to step down unit (11/17). Had MRI brain that confirmed Ischemic and hemorrhagic CVA. CTA cardiac was done which showed FROY thrombi and PE. Decision was made to start AC per Neurology team. Will updated to SDU again.     Left MCA CVA with M1 occlusion  Hemorrhagic transformation  -likely cardioembolic while AC had been on hold  -right sided weakness (RUE>RLE) and global aphasia  -s/p TNK and thrombectomy  -repeat CT head s/p TNK with evolving left MCA stroke with mild hemorrhage and mass effect  -No antiplatelets until cleared by stroke neuro team--> cleared to start aspirin 81mg qd 11/17  -continue statin  -repeat CTH 11/16 stable   -MRI brain completed with acute CVA and hemorrhagic transformation with   -TTE reviewed; no PFO  -PT - JHOAN  -OT -AR  -SLP eval appreciated; continue dysphagia diet  -aspiration/fall precautions  -stroke neuro recs appreciated  -Was planned to have HARJINDER today but cancelled by anesthesiology given high risk  -Patient underwent cardiac CTA - Shows FROY thrombi and RUL PE  -NEURO ICU CONSULTED ON 11/22  -Plan is to start heparin drip with specific goal of 40-60  -Will obtain STAT CT HEAD  -Neuro q2h now    Atrial Fibrillation  FROY thrombi  PE  -FROY Thrombi noted on CTA Heart  -on Hep GTT - PTT goal of 60 -80, NO BOLUS.  -continue metoprolol for rate control  -TTE reviewed - but repeat TTE requested for new PE   -EP on board    Hypernatremia  -goal sodium 145-150 per stroke neuro recs due to cerebral edema  -hold torsemide   -hold parenteral free water  -monitor I/os     Anemia likely due to chronic disease  -Hb relatively stable  -check iron studies  -monitor CBC closely    Acute Hypoxic Respiratory Failure - resolved  -intubated for airway protection on admission   -history of ILD; will clarify if it is the etiology of chronic prednisone use   -extubated on 11/14  -CXR no acute infiltrates  -bronchodilators as needed  -incentive spirometry     DM  -HbA1c noted  -continue ISS  -ADA diet    History of PE/DVT  -LE duplex negative for DVT  -Confirmed [facility] PE ON CT   -AC as above    HTN  -SBP 130s (goal 130-180 per neuro recs)  -continue metoprolol  -low sodium diet    GERD  -continue famotidine    Gout  -continue allopurinol    Neuropathy  -continue gabapentin     DVT ppx- heparin drip    Repeat CT HEAD TOMORROW IN THE AFTERNOON    Dispo: Patient remains acute  Now on heparin drip - Monitor ptt  Case discussed extensively with STROKE Neuro  Neuro ICU consulted today - states to be stable for SDU  EP made aware of current plan of care             78y/oM PMH of HTN, HLD, AFib on Pradaxa, CKD, Obesity, Gout, Prostate cancer, DVT/PE, OA s/p multiple orthopedic procedures, Spinal Stenosis, presented on 11/13/23 for elective left atrial appendage occlusion procedure, and developed right sided weakness. Imaging revealed an acute L M1 occlusion s/p TNK administration and TICI 2A thrombectomy. Patient was intubated for airway protection. Repeat CT head s/p tenecteplase administration showed evolving left MCA stroke with hemorrhagic transformation, and mass effects with left to right shift. Suspected etiology of stroke is cardioembolic from atrial fibrillation when off anticoagulation for recent procedure. Patient was subsequently extubated and is now stable for transfer to step down unit (11/17). Had MRI brain that confirmed Ischemic and hemorrhagic CVA. CTA cardiac was done which showed FROY thrombi and PE. Decision was made to start AC per Neurology team/NICU with target appt.     Left MCA CVA with M1 occlusion s/p TNK and thrombectomy with Hemorrhagic transformation  -likely cardioembolic while AC had been on hold  -MRI brain completed with acute/subacute large lt sided MCA infarct with gross hemorrhagic transformation 11/19  -repeat CT head s/p TNK with evolving left MCA stroke with mild hemorrhage and mass effect  - started aspirin 81mg qd 11/17  -continue statin  -repeat CTH 11/16 stable   -neurochecks q2h  - start heparin gtt, no bolus, PTT goal 60-80  - repeat CTH after PTT therapeutic  -TTE reviewed; no PFO  -PT - JHOAN  -OT -AR  -SLP eval appreciated; continue dysphagia diet  -aspiration/fall precautions  -Was planned to have HARJINDER today but cancelled by anesthesiology given high risk  -Patient underwent cardiac CTA - Shows FROY thrombi and RUL PE      Atrial Fibrillation  FROY thrombi  PE  -FROY Thrombi noted on CTA Heart  -on Hep GTT - PTT goal of 60 -80, NO BOLUS.  -continue metoprolol for rate control  -TTE reviewed - but repeat TTE requested for new PE   -EP on board    Hypernatremia  -goal sodium 145-150 per stroke neuro recs due to cerebral edema  -hold torsemide   -hold parenteral free water  -monitor I/os     Anemia likely due to chronic disease  -Hb relatively stable  - iron studies  -monitor CBC closely    Acute Hypoxic Respiratory Failure - resolved  -intubated for airway protection on admission   -history of ILD; will clarify if it is the etiology of chronic prednisone use   -extubated on 11/14  -CXR no acute infiltrates  -bronchodilators as needed  -incentive spirometry     DM  -HbA1c 6.2   -continue ISS  -ADA diet    History of PE/DVT  -LE duplex negative for DVT  -Confirmed [facility] PE ON CT   -AC as above    HTN  -SBP 130s (goal 130-180 per neuro recs)  -continue metoprolol  -low sodium diet    GERD  -continue famotidine    Gout  -continue allopurinol    Neuropathy  -continue gabapentin     DVT ppx- heparin drip      Dispo: Patient remains acute  Now on heparin drip - Monitor ptt. f/u cbc  will order cth  closely monitor at step down  dw rn             78y/oM PMH of HTN, HLD, AFib on Pradaxa, CKD, Obesity, Gout, Prostate cancer, DVT/PE, OA s/p multiple orthopedic procedures, Spinal Stenosis, presented on 11/13/23 for elective left atrial appendage occlusion procedure, and developed right sided weakness. Imaging revealed an acute L M1 occlusion s/p TNK administration and TICI 2A thrombectomy. Patient was intubated for airway protection. Repeat CT head s/p tenecteplase administration showed evolving left MCA stroke with hemorrhagic transformation, and mass effects with left to right shift. Suspected etiology of stroke is cardioembolic from atrial fibrillation when off anticoagulation for recent procedure. Patient was subsequently extubated and is now stable for transfer to step down unit (11/17). Had MRI brain that confirmed Ischemic and hemorrhagic CVA. CTA cardiac was done which showed FROY thrombi and PE. Decision was made to start AC per Neurology team/NICU with target appt.     Left MCA CVA with M1 occlusion s/p TNK and thrombectomy with Hemorrhagic transformation  -likely cardioembolic while AC had been on hold  -MRI brain completed with acute/subacute large lt sided MCA infarct with gross hemorrhagic transformation 11/19  -repeat CT head s/p TNK with evolving left MCA stroke with mild hemorrhage and mass effect  - started aspirin 81mg qd 11/17  -continue statin  -repeat CTH 11/16 stable   -neurochecks q2h  - start heparin gtt, no bolus, PTT goal 40-60  - repeat CTH after PTT therapeutic  -TTE reviewed; no PFO  -PT - JHOAN  -OT -AR  -SLP eval appreciated; continue dysphagia diet  -aspiration/fall precautions  -Was planned to have HARJINDER today but cancelled by anesthesiology given high risk  -Patient underwent cardiac CTA - Shows FROY thrombi and RUL PE      Atrial Fibrillation  FROY thrombi  PE  -FROY Thrombi noted on CTA Heart  -on Hep GTT - PTT goal of 60 -80, NO BOLUS.  -continue metoprolol for rate control  -TTE reviewed - but repeat TTE requested for new PE   -EP on board    Hypernatremia  -goal sodium 145-150 per stroke neuro recs due to cerebral edema  -hold torsemide   -hold parenteral free water  -monitor I/os     Anemia likely due to chronic disease  -Hb relatively stable  - iron studies  -monitor CBC closely    Acute Hypoxic Respiratory Failure - resolved  -intubated for airway protection on admission   -history of ILD; will clarify if it is the etiology of chronic prednisone use   -extubated on 11/14  -CXR no acute infiltrates  -bronchodilators as needed  -incentive spirometry     DM  -HbA1c 6.2   -continue ISS  -ADA diet    History of PE/DVT  -LE duplex negative for DVT  -Confirmed [facility] PE ON CT   -AC as above    HTN  -SBP 130s (goal 130-180 per neuro recs)  -continue metoprolol  -low sodium diet    GERD  -continue famotidine    Gout  -continue allopurinol    Neuropathy  -continue gabapentin     DVT ppx- heparin drip      Dispo: Patient remains acute  Now on heparin drip - Monitor ptt. f/u cbc  will order cth  closely monitor at step down  dw rn

## 2023-11-23 NOTE — PROGRESS NOTE ADULT - ASSESSMENT
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 Patient presented on 11/13/23 for elective left atrial appendage occlusion with Watchman device/HARJINDER w/ Dr Bobo, and Pradaxa was held for 2 days prior to procedure. Whilst in holding patient developed a left MCA syndrome due to acute left M1 occlusion as demonstrated on CTH & Neck. On admission, NIHSS 26, MRS 1. Patient is now s/p tenecteplase administration at 11:16 11/13/23, and TICI 2A thrombectomy with Dr. Soliman. 24 hour repeat CT head s/p tenecteplase administration showed evolving left MCA stroke with hemorrhagic transformation, and mass effect with left to right shift. MRI brain revealed redemonstration of an evolving acute/subacute large left-sided MCA distribution infarction with gross hemorrhagic transformation in the left basal ganglia as well as evidence of petechial hemorrhagic transformation within the left frontotemporal and parietal cortices. Suspected etiology of stroke is cardioembolic from atrial fibrillation when off anticoagulation for recent procedure, 2 cardiac thrombi were appreciated on CTA chest obtained 10 days prior to procedure, these were again seen on repeat CTA chest obtained 11/22/23 as well as new findings of right upper lobe PE.     NEURO:   -Neurologically with improvement as patient is more awake and does follow one command of "sticking out tongue"  -Continue close monitoring for neurologic deterioration    -Stroke neuro checks q2hrs  -Neurologically appears to be tolerating SBP of 130s-160s. Can keep this SBP goal and avoid hypotension or rapid fluctuations in blood pressure   -ANTITHROMBOTIC THERAPY: Heparin gtt w no bolus, goal ptt 40-60; although patient has a large left MCA stroke with hemorrhagic transformation, due to presence of cardiac thrombi on chest CTA, PE and recent embolic event, there is a high risk of recurrent stroke without anticoagulation therapy; overall benefit of anticoagulation (low ptt goal for now) outweighs risk of hemorrhage, now almost 10 days from stroke onset, as patient has evidence of active cardiac thrombi and recent cardioembolic event. This should be discussed with the family as well, regarding risk/benefits and alternatives. ASA if indicated from the cardiac standpoint if patient resumes anticoagulation. Do not bolus, avoid supra-therapeutic ptt levels, further titration pending clinical course. Repeat CT head for any acute change, otherwise please obtain once ptt range within goal to ensure stability. Would repeat CT head 11/23 afternoon as well.   -titrate statin to LDL goal less than 70, LDL=54, may resume home regimen of Rosuvastatin 10 mg PO QHS when patient able to tolerate PO   -MRI Brain w/o as noted  -Dysphagia screen: pass  -Physical therapy/OT/Speech eval/treatment.   -Neurosurgery input appreciated, patient not a hemicrani candidate     CARDIOVASCULAR:  -TTE findings as above, no acute findings   -HARJINDER deferred by anesthesia.   -CTA chest findings as above, see neuro portion for recommendations regarding anticoagulation; recommend repeat TTE to evaluate for right heart strain in the setting of PE   -No cardiac intervention at this time as the risks>benefits as per EP  -cardiac monitoring w/ telemetry for now, further evaluation pending findings of noted workup                              HEMATOLOGY:   -H/H 9.8/30.8. Platelets 197. Monitor anemia/thrombocytopenia per primary team, patient should have all age and risk appropriate malignancy screenings with PCP or sooner if clinically suspected   -Bilateral lower extremity venous duplex negative for DVT   -DVT ppx: Heparin s.c [] LMWH [x]     PULMONARY:   -Patient extubated 11/14/23  -protecting airway, saturating well     RENAL:   -BUN/Cr 17.3/0.83. monitor urine output, maintain adequate hydration    -Na Goal:  145-150 secondary to cerebral edema.    ID:   -afebrile, leukocytosis resolved, monitor for si/sx of infection     OTHER:    -Extensive discussion held with patient and family at bedside regarding clinical course, risk vs benefit discussion regarding need for anticoagulation given findings of cardiac thrombi and PE on CTA chest; family expresses understanding; all questions and concerns addressed      DISPOSITION: Rehab or home depending on PT eval once stable and workup is complete    CORE MEASURES:        Admission NIHSS: 26     Tenecteplase : [x] YES [] NO      LDL/HDL/A1C: 54/67/6.2     Depression Screen- if depression hx and/or present      Statin Therapy: Atorvastatin 40 mg PO Daily      Dysphagia Screen: [x] PASS [] FAIL      Smoking [] YES [x] NO      Afib [x] YES [] NO     Stroke Education [x] YES [] NO    Obtain screening lower extremity venous ultrasound in patients who meet 1 or more of the following criteria as patient is high risk for DVT/PE on admission:   [] History of DVT/PE  []Hypercoagulable states (Factor V Leiden, Cancer, OCP, etc. 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 Patient presented on 11/13/23 for elective left atrial appendage occlusion with Watchman device/HARJINDER w/ Dr Bobo, and Pradaxa was held for 2 days prior to procedure. Whilst in holding patient developed a left MCA syndrome due to acute left M1 occlusion as demonstrated on CTH & Neck. On admission, NIHSS 26, MRS 1. Patient is now s/p tenecteplase administration at 11:16 11/13/23, and TICI 2A thrombectomy with Dr. Soliman. 24 hour repeat CT head s/p tenecteplase administration showed evolving left MCA stroke with hemorrhagic transformation, and mass effect with left to right shift. MRI brain revealed redemonstration of an evolving acute/subacute large left-sided MCA distribution infarction with gross hemorrhagic transformation in the left basal ganglia as well as evidence of petechial hemorrhagic transformation within the left frontotemporal and parietal cortices. Suspected etiology of stroke is cardioembolic from atrial fibrillation when off anticoagulation for recent procedure, 2 cardiac thrombi were appreciated on CTA chest obtained 10 days prior to procedure. These were again seen on repeat CTA chest obtained 11/22/23 as well as new findings of right upper lobe PE.     NEURO:   -Neurologically appears drowsy on itinial exam. Likely due to patient just waking up. Patient was seen later in the day and was able to follow 2 commands, which is an improvement from previous examinations.   -Continue close monitoring for neurologic deterioration    -Stroke neuro checks q2hrs  -Neurologically appears to be tolerating SBP of 130s-160s. Can keep this SBP goal and avoid hypotension or rapid fluctuations in blood pressure   -ANTITHROMBOTIC THERAPY: Heparin gtt with goal ptt 40-60; although patient has a large left MCA stroke with hemorrhagic transformation, due to presence of cardiac thrombi on chest CTA, PE and recent embolic event, there is a high risk of recurrent stroke without anticoagulation therapy; overall benefit of anticoagulation (low ptt goal for now) outweighs risk of hemorrhage, now almost 10 days from stroke onset, as patient has evidence of active cardiac thrombi and recent cardioembolic event. This should be discussed with the family as well, regarding risk/benefits and alternatives. ASA if indicated from the cardiac standpoint if patient resumes anticoagulation. Do not bolus, avoid supra-therapeutic ptt levels, further titration pending clinical course. Repeat CT head for any acute change.  -Would obtain repeat head CT on 11/24/23 to ensure stability. Dependent on neuro imaging and clinical exam, would then consider increasing hep gtt PTT goal to 50-70.  -Would obtain head CT on 11/27 for further evaluation of evolution of stroke   -titrate statin to LDL goal less than 70, LDL=54, may resume home regimen of Rosuvastatin 10 mg PO QHS when patient able to tolerate PO   -MRI Brain w/o as noted  -Dysphagia screen: pass  -Physical therapy/OT/Speech eval/treatment.   -Neurosurgery input appreciated, patient not a hemicrani candidate     CARDIOVASCULAR:  -TTE findings as above, no acute findings   -HARJINDER deferred by anesthesia.   -CTA chest findings as above, see neuro portion for recommendations regarding anticoagulation; recommend repeat TTE to evaluate for right heart strain in the setting of PE   -No cardiac intervention at this time as the risks>benefits as per EP  -cardiac monitoring w/ telemetry for now, further evaluation pending findings of noted workup                              HEMATOLOGY:   -H/H 9.8/30.8. Platelets 197. Monitor anemia/thrombocytopenia per primary team, patient should have all age and risk appropriate malignancy screenings with PCP or sooner if clinically suspected   -Bilateral lower extremity venous duplex negative for DVT   -Would obtain LE dopplers in setting of imaging revealing PE  -DVT ppx: Heparin s.c [] LMWH [x]     PULMONARY:   -Patient extubated 11/14/23  -protecting airway, saturating well     RENAL:   -BUN/Cr 17.3/0.83. monitor urine output, maintain adequate hydration    -Na Goal:  145-150 secondary to cerebral edema.    ID:   -afebrile, leukocytosis resolved, monitor for si/sx of infection     OTHER:    -Extensive discussion held with patient and family at bedside regarding clinical course, risk vs benefit discussion regarding need for anticoagulation given findings of cardiac thrombi and PE on CTA chest; family expresses understanding; all questions and concerns addressed      DISPOSITION: Rehab or home depending on PT eval once stable and workup is complete    CORE MEASURES:        Admission NIHSS: 26     Tenecteplase : [x] YES [] NO      LDL/HDL/A1C: 54/67/6.2     Depression Screen- if depression hx and/or present      Statin Therapy: Atorvastatin 40 mg PO Daily      Dysphagia Screen: [x] PASS [] FAIL      Smoking [] YES [x] NO      Afib [x] YES [] NO     Stroke Education [x] YES [] NO    Obtain screening lower extremity venous ultrasound in patients who meet 1 or more of the following criteria as patient is high risk for DVT/PE on admission:   [] History of DVT/PE  []Hypercoagulable states (Factor V Leiden, Cancer, OCP, etc. )  []Prolonged immobility (hemiplegia/hemiparesis/post operative or any other extended immobilization)  [] Transferred from outside facility (Rehab or Long term care)  [] Age </= to 50   ASSESSMENT:   79 yo M PMH of HTN, HLD, AFib, CKD, obesity, gout, prostate cancer, DVT/PE, OA s/p multiple orthopedic procedures, spinal stenosis, presented on 11/13/23 for elective left atrial appendage occlusion procedure, and developed a left MCA syndrome due to acute left M1 occlusion as demonstrated on CT Head and Neck. Patient presented on 11/13/23 for elective left atrial appendage occlusion with Watchman device/HARJINDER w/ Dr Bobo, and Pradaxa was held for 2 days prior to procedure. Whilst in holding patient developed a left MCA syndrome due to acute left M1 occlusion as demonstrated on CTH & Neck. On admission, NIHSS 26, MRS 1. Patient is now s/p tenecteplase administration at 11:16 11/13/23, and TICI 2A thrombectomy with Dr. Soliman. 24 hour repeat CT head s/p tenecteplase administration showed evolving left MCA stroke with hemorrhagic transformation, and mass effect with left to right shift. MRI brain revealed redemonstration of an evolving acute/subacute large left-sided MCA distribution infarction with gross hemorrhagic transformation in the left basal ganglia as well as evidence of petechial hemorrhagic transformation within the left frontotemporal and parietal cortices. Suspected etiology of stroke is cardioembolic from atrial fibrillation when off anticoagulation for recent procedure, 2 cardiac thrombi were appreciated on CTA chest obtained 10 days prior to procedure. These were again seen on repeat CTA chest obtained 11/22/23 as well as new findings of right upper lobe PE.     NEURO:   -Neurologically appears drowsy on inital exam. Likely due to patient just waking up. Patient was seen later in the day and was able to follow 2 commands, which is an improvement from previous examinations.   -Continue close monitoring for neurologic deterioration    -Stroke neuro checks q2hrs  -Neurologically appears to be tolerating SBP of 130s-160s. Can keep this SBP goal and avoid hypotension or rapid fluctuations in blood pressure   -ANTITHROMBOTIC THERAPY: Heparin gtt with goal ptt 40-60; although patient has a large left MCA stroke with hemorrhagic transformation, due to presence of cardiac thrombi on chest CTA, PE and recent embolic event, there is a high risk of recurrent stroke without anticoagulation therapy; overall benefit of anticoagulation (low ptt goal for now) outweighs risk of hemorrhage, now almost 10 days from stroke onset, as patient has evidence of active cardiac thrombi and recent cardioembolic event. This should be discussed with the family as well, regarding risk/benefits and alternatives. ASA if indicated from the cardiac standpoint if patient resumes anticoagulation. Do not bolus, avoid supra-therapeutic ptt levels, further titration pending clinical course. Repeat CT head for any acute change.  -Would obtain repeat head CT on 11/24/23 to ensure stability. Dependent on neuro imaging and clinical exam, would then consider increasing hep gtt PTT goal to 50-70.  -Would obtain head CT on 11/27 for further evaluation of evolution of stroke   -titrate statin to LDL goal less than 70, LDL=54, may resume home regimen of Rosuvastatin 10 mg PO QHS when patient able to tolerate PO   -MRI Brain w/o as noted  -Dysphagia screen: pass  -Physical therapy/OT/Speech eval/treatment.   -Neurosurgery input appreciated, patient not a hemicrani candidate     CARDIOVASCULAR:  -TTE findings as above, no acute findings   -HARJINDER deferred by anesthesia.   -CTA chest findings as above, see neuro portion for recommendations regarding anticoagulation; recommend repeat TTE to evaluate for right heart strain in the setting of PE   -No cardiac intervention at this time as the risks>benefits as per EP  -cardiac monitoring w/ telemetry for now, further evaluation pending findings of noted workup                              HEMATOLOGY:   -H/H 9.8/30.8. Platelets 197. Monitor anemia/thrombocytopenia per primary team, patient should have all age and risk appropriate malignancy screenings with PCP or sooner if clinically suspected   -Bilateral lower extremity venous duplex negative for DVT   -Would obtain LE dopplers in setting of imaging revealing PE  -DVT ppx: Heparin s.c [] LMWH [x]     PULMONARY:   -Patient extubated 11/14/23  -protecting airway, saturating well     RENAL:   -BUN/Cr 17.3/0.83. monitor urine output, maintain adequate hydration    -Na Goal:  145-150 secondary to cerebral edema.    ID:   -afebrile, leukocytosis resolved, monitor for si/sx of infection     OTHER:    -Extensive discussion held with patient and family at bedside regarding clinical course, risk vs benefit discussion regarding need for anticoagulation given findings of cardiac thrombi and PE on CTA chest; family expresses understanding; all questions and concerns addressed      DISPOSITION: Rehab or home depending on PT eval once stable and workup is complete    CORE MEASURES:        Admission NIHSS: 26     Tenecteplase : [x] YES [] NO      LDL/HDL/A1C: 54/67/6.2     Depression Screen- if depression hx and/or present      Statin Therapy: Atorvastatin 40 mg PO Daily      Dysphagia Screen: [x] PASS [] FAIL      Smoking [] YES [x] NO      Afib [x] YES [] NO     Stroke Education [x] YES [] NO    Obtain screening lower extremity venous ultrasound in patients who meet 1 or more of the following criteria as patient is high risk for DVT/PE on admission:   [] History of DVT/PE  []Hypercoagulable states (Factor V Leiden, Cancer, OCP, etc. )  []Prolonged immobility (hemiplegia/hemiparesis/post operative or any other extended immobilization)  [] Transferred from outside facility (Rehab or Long term care)  [] Age </= to 50

## 2023-11-23 NOTE — PROGRESS NOTE ADULT - SUBJECTIVE AND OBJECTIVE BOX
Preliminary note, offical recommendations pending attending review/signature   [facility] Stroke Team  Progress Note     HPI:  77 yo M PMH of HTN, HLD, AFib, CKD, obesity, gout, prostate cancer, DVT/PE, OA s/p multiple orthopedic procedures, spinal stenosis, presented on 11/13/23 for elective left atrial appendage occlusion procedure, and developed a MCA syndrome due to acute L M1 occlusion as demonstrated on CTA Head and Neck. Patient has a long h/o AFib, which is now considered permanent. He is on rate control with metoprolol 25 mg bid, and anticoagulation with Pradaxa.  He has a long Hx of arthritis and had multiple hip replacements, bilateral knee replacements and also has neck and back pain, but is unable to take NSAIDs due to bleeding risks. He has an unsteady gait and uses a cane for assistance.  Patient presented on 11/13 for elective left atrial appendage occlusion with Watchman device/HARJINDER w/ Dr Bobo, and Pradaxa was held for 2 days prior to procedure. 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   Initial NIHSS 26, MRS 1. Patient s/p tenecteplase administration at 11:16 11/13/23 and TICI 2A thrombectomy with Dr. Soliman.    SUBJECTIVE: No events overnight.  No new neurologic complaints.  ROS reported negative unless otherwise noted.    acetaminophen     Tablet .. 650 milliGRAM(s) Oral every 6 hours PRN  albuterol/ipratropium (CFC free) Inhaler. 1 Puff(s) Inhalation four times a day PRN  allopurinol 300 milliGRAM(s) Oral daily  aspirin  chewable 81 milliGRAM(s) Oral daily  atorvastatin 40 milliGRAM(s) Oral at bedtime  bisacodyl Suppository 10 milliGRAM(s) Rectal daily PRN  chlorhexidine 2% Cloths 1 Application(s) Topical daily  dextrose 5%. 1000 milliLiter(s) IV Continuous <Continuous>  dextrose 5%. 1000 milliLiter(s) IV Continuous <Continuous>  dextrose 50% Injectable 25 Gram(s) IV Push once  dextrose 50% Injectable 12.5 Gram(s) IV Push once  dextrose 50% Injectable 25 Gram(s) IV Push once  dextrose Oral Gel 15 Gram(s) Oral once PRN  DULoxetine 30 milliGRAM(s) Oral daily  famotidine    Tablet 20 milliGRAM(s) Oral two times a day  gabapentin 300 milliGRAM(s) Oral two times a day  glucagon  Injectable 1 milliGRAM(s) IntraMuscular once  heparin  Infusion 1000 Unit(s)/Hr IV Continuous <Continuous>  insulin lispro (ADMELOG) corrective regimen sliding scale   SubCutaneous Before meals and at bedtime  metoprolol tartrate 25 milliGRAM(s) Oral two times a day  nystatin Powder 1 Application(s) Topical two times a day  polyethylene glycol 3350 17 Gram(s) Oral daily  predniSONE   Tablet 5 milliGRAM(s) Oral daily  senna 2 Tablet(s) Oral at bedtime      PHYSICAL EXAM:   Vital Signs Last 24 Hrs  T(C): 36.5 (23 Nov 2023 14:00), Max: 37.9 (22 Nov 2023 19:08)  T(F): 97.7 (23 Nov 2023 14:00), Max: 100.3 (22 Nov 2023 19:08)  HR: 94 (23 Nov 2023 14:00) (65 - 94)  BP: 116/59 (23 Nov 2023 14:00) (116/59 - 152/87)  BP(mean): 75 (23 Nov 2023 14:00) (69 - 104)  RR: 24 (23 Nov 2023 14:00) (15 - 28)  SpO2: 97% (23 Nov 2023 14:00) (93% - 100%)    Parameters below as of 23 Nov 2023 14:00  Patient On (Oxygen Delivery Method): room air        General: No acute distress    NEUROLOGICAL EXAM:  Mental status: Awake, alert, oriented x3, speech fluent, follows commands, no neglect, normal memory   Cranial Nerves: No facial asymmetry, no nystagmus, no dysarthria,  tongue midline  Motor exam: Normal tone, no drift, 5/5 RUE, 5/5 RLE, 5/5 LUE, 5/5 LLE, normal fine finger movements.  Sensation: Intact to light touch   Coordination/ Gait: No dysmetria, gait not tested    LABS:                        10.1   6.82  )-----------( 269      ( 23 Nov 2023 07:32 )             32.2    11-23    147<H>  |  108  |  19.4  ----------------------------<  160<H>  4.4   |  31.0<H>  |  0.91    Ca    9.2      23 Nov 2023 07:32    PT/INR - ( 23 Nov 2023 07:32 )   PT: 11.8 sec;   INR: 1.07 ratio         PTT - ( 23 Nov 2023 07:32 )  PTT:47.2 sec      IMAGING: Reviewed by me.      Preliminary note, offical recommendations pending attending review/signature   [facility] Stroke Team  Progress Note     HPI:  79 yo M PMH of HTN, HLD, AFib, CKD, obesity, gout, prostate cancer, DVT/PE, OA s/p multiple orthopedic procedures, spinal stenosis, presented on 11/13/23 for elective left atrial appendage occlusion procedure, and developed a MCA syndrome due to acute L M1 occlusion as demonstrated on CTA Head and Neck. Patient has a long h/o AFib, which is now considered permanent. He is on rate control with metoprolol 25 mg bid, and anticoagulation with Pradaxa.  He has a long Hx of arthritis and had multiple hip replacements, bilateral knee replacements and also has neck and back pain, but is unable to take NSAIDs due to bleeding risks. He has an unsteady gait and uses a cane for assistance.  Patient presented on 11/13 for elective left atrial appendage occlusion with Watchman device/HARJINDER w/ Dr Bobo, and Pradaxa was held for 2 days prior to procedure. 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   Initial NIHSS 26, MRS 1. Patient s/p tenecteplase administration at 11:16 11/13/23 and TICI 2A thrombectomy with Dr. Soliman.    SUBJECTIVE: No events overnight.  No new neurologic complaints.  ROS reported negative unless otherwise noted.    acetaminophen     Tablet .. 650 milliGRAM(s) Oral every 6 hours PRN  albuterol/ipratropium (CFC free) Inhaler. 1 Puff(s) Inhalation four times a day PRN  allopurinol 300 milliGRAM(s) Oral daily  aspirin  chewable 81 milliGRAM(s) Oral daily  atorvastatin 40 milliGRAM(s) Oral at bedtime  bisacodyl Suppository 10 milliGRAM(s) Rectal daily PRN  chlorhexidine 2% Cloths 1 Application(s) Topical daily  dextrose 5%. 1000 milliLiter(s) IV Continuous <Continuous>  dextrose 5%. 1000 milliLiter(s) IV Continuous <Continuous>  dextrose 50% Injectable 25 Gram(s) IV Push once  dextrose 50% Injectable 12.5 Gram(s) IV Push once  dextrose 50% Injectable 25 Gram(s) IV Push once  dextrose Oral Gel 15 Gram(s) Oral once PRN  DULoxetine 30 milliGRAM(s) Oral daily  famotidine    Tablet 20 milliGRAM(s) Oral two times a day  gabapentin 300 milliGRAM(s) Oral two times a day  glucagon  Injectable 1 milliGRAM(s) IntraMuscular once  heparin  Infusion 1000 Unit(s)/Hr IV Continuous <Continuous>  insulin lispro (ADMELOG) corrective regimen sliding scale   SubCutaneous Before meals and at bedtime  metoprolol tartrate 25 milliGRAM(s) Oral two times a day  nystatin Powder 1 Application(s) Topical two times a day  polyethylene glycol 3350 17 Gram(s) Oral daily  predniSONE   Tablet 5 milliGRAM(s) Oral daily  senna 2 Tablet(s) Oral at bedtime      PHYSICAL EXAM:   Vital Signs Last 24 Hrs  T(C): 36.5 (23 Nov 2023 14:00), Max: 37.9 (22 Nov 2023 19:08)  T(F): 97.7 (23 Nov 2023 14:00), Max: 100.3 (22 Nov 2023 19:08)  HR: 94 (23 Nov 2023 14:00) (65 - 94)  BP: 116/59 (23 Nov 2023 14:00) (116/59 - 152/87)  BP(mean): 75 (23 Nov 2023 14:00) (69 - 104)  RR: 24 (23 Nov 2023 14:00) (15 - 28)  SpO2: 97% (23 Nov 2023 14:00) (93% - 100%)    Parameters below as of 23 Nov 2023 14:00  Patient On (Oxygen Delivery Method): room air    General: Patient seen sleeping.     Detailed Neurologic Exam:    Mental status: Patient is asleep, however awakens to examiner's voice. Appears drowsy, but does attend to examiner. Right sided visual willow-neglect present. Global aphasia noted with no verbal output.  Unable to follow commands, ID objects, or repeat. Not oriented to self, place, or month.     Cranial nerves: Pupils equal and react symmetrically to light. Blink to threat intermittently on right eye. Blink to threat present out of left eye. Left gaze preference with right gaze palsy that does cross midline. Right facial weakness noted.    Motor: There is normal bulk and tone.  There is no tremor.  RLE: slight withdrawal due to noxious stimuli. Trace movement noted within bed.   RUE: 0/5 strength with no movement. Grimaces to noxious stimuli with no movement.  Strength is 5/5 in the left arm and leg with no drift.    Sensation: Grimaces to noxious stimuli in all extremities.    Cerebellar: unable to assess dysmetria on finger to nose testing.    Gait : deferred    Later seen at 17:00 on 11/23/23. Patient was already up and much more alert. Was able to close eyes and smile on exam. When asked to stick out tongue, patient opens mouth and attempts to stick out tongue.    LABS:                        10.1   6.82  )-----------( 269      ( 23 Nov 2023 07:32 )             32.2    11-23    147<H>  |  108  |  19.4  ----------------------------<  160<H>  4.4   |  31.0<H>  |  0.91    Ca    9.2      23 Nov 2023 07:32    PT/INR - ( 23 Nov 2023 07:32 )   PT: 11.8 sec;   INR: 1.07 ratio         PTT - ( 23 Nov 2023 07:32 )  PTT:47.2 sec    IMAGING: Reviewed by me.     CT Heart Left Atrium w/ IV Cont (11.22.23 @ 11:22)   IMPRESSION:  Cardiac:  Multiple large filling defects noted at the distal appendage at least >2   cm and also near the proximal appendage measures 1.5 cm x 1.2 cm,   consistent with left appendage thrombi.  Dilated left atrial appendage windsock in appearance with orifice width   measures 4.5 cm x 3.0 cm  Severely biatrial enlargement.    Non-cardiac:  Right upper lobe pulmonary artery embolism.  Possible pulmonary hypertension.  New right lower lobe superior segment infiltrate (possibly infectious or   inflammatory)    IMPRESSION:  Cardiac:  Multiple large filling defects noted at the distal appendage at least >2   cm and also near the proximal appendage measures 1.5 cm x 1.2 cm,   consistent with left appendage thrombi.  Dilated left atrial appendage windsock in appearance with orifice width   measures4.5 cm x 3.0 cm  Severely biatrial enlargement.     MR Head No Cont (11.19.23 @ 15:52)   IMPRESSION: Redemonstration of an evolving acute/subacute large   left-sided MCA distribution infarction with gross hemorrhagic   transformation in the left basal ganglia as well as evidence of petechial   hemorrhagic transformation within the left frontotemporal and parietal   cortices.    Associated cytotoxic edema, sulcal effacement, and mass effect as well as   shift of the midline structures from left to right measuring 5.5 mm   appears unchanged. There is no herniation.    CT Head No Cont (11.16.23 @ 18:05)   IMPRESSION: Stable moderate to large left MCA territory infarction   involving the left frontal lobe, left basal ganglia, left insular ribbon,   left parietal occipital lobe, and anterior left temporal lobe with stable   mild hemorrhage in the left basal ganglia. Left-to-right midline shift   measures 5.5 mm.    CT Head No Cont (11.14.23 @ 18:57)   IMPRESSION:  Stable moderate to large acute left MCA infarct with mild hemorrhage    TTE Echo Complete w/o Contrast w/ Doppler (11.14.23 @ 15:57)   Summary:   1. Left ventricular ejection fraction, by visual estimation, is 55 to   60%.   2. Normal global left ventricular systolic function.   3. The mitral in-flow pattern reveals no discernable A-wave, therefore   no comment on diastolic function can be made.   4. Normal right ventricular size and function.   5. Mild to moderately enlarged right atrium.   6. Moderately enlarged left atrium.   7. Trace mitral valve regurgitation.   8. Mild-moderate tricuspid regurgitation.   9. Sclerotic aortic valve with decreased opening.  10. Estimated pulmonary artery systolic pressure is 41.6 mmHg assuming a   right atrial pressure of 5 mmHg, which is consistent with mild pulmonary   hypertension.    CT Head No Cont (11.14.23 @ 11:36)   IMPRESSION: Evolving left MCA infarct is identified with hemorrhagic   transformation now seen with mass effect on left lateral ventricles and   left-to-right shift.    US Duplex Venous Lower Ext Complete, Bilateral (11.14.23 @ 10:38)   IMPRESSION:  No evidence of deep venous thrombosis in either lower extremity.  Left calf veins not visualized.  Small right popliteal fossa cyst.    CT Angio Brain, Neck and perfusion Stroke Protocol  w/ IV Cont (11.13.23 @ 11:18)   CTA BRAIN:  Acute thrombus in the distal left MCA M1 segment. Diminished flow in the   distal branches.  Severe bilateral cavernous carotid artery calcifications. Fetal origins   the bilateral posterior cerebral arteries. The [place] and   vertebrobasilar system are otherwise unremarkable without evidence of   stenosis, additional occlusion or saccular aneurysm dilation. No evidence   for arterial venous malformation. The vertebral arteries are codominant.    CTA NECK:  Mild bilateral carotid bulb calcifications. Mild stenosis in the left   carotid bulb. The right internal carotid arteries tortuous.  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 There is normal relationship to   the great vessels. The common carotid arteries, internal carotid arteries   and vertebral arteries shows no other evidence of significant stenosis,   occlusion or saccular aneurysm dilation. The vertebral arteries are   codominant.    CT PERFUSION:  Patient has only had less than 2 hours of symptoms may influence the CT   perfusion.  CBF<30% volume: 26 ml left MCA territory.  Tmax>6.0 s volume: 193 ml left MCA territory  Mismatch volume: 167 ml  Mismatch ratio: 7.4  IMPRESSION:  Thrombus in the distal left MCA M1 segment with diminished flow in the   distal MCA segments. 167 mL area of penumbra in the left MCA territory.     CT Brain Stroke Protocol (11.13.23 @ 11:15)   IMPRESSION:  Dense left MCA M1 segment suggesting intraluminal thrombus.   Mild chronic microvascular changes without evidence of an acute   transcortical infarction or hemorrhage.    TTE Echo Complete w/o Contrast w/ Doppler (11.14.23 @ 15:57)   Summary:   1. Left ventricular ejection fraction, by visual estimation, is 55 to   60%.   2. Normal global left ventricular systolic function.   3. The mitral in-flow pattern reveals no discernable A-wave, therefore   no comment on diastolic function can be made.   4. Normal right ventricular size and function.   5. Mild to moderately enlarged right atrium.   6. Moderatelyenlarged left atrium.   7. Trace mitral valve regurgitation.   8. Mild-moderate tricuspid regurgitation.   9. Sclerotic aortic valve with decreased opening.  10. Estimated pulmonary artery systolic pressure is 41.6 mmHg assuming a   right atrial pressure of 5 mmHg, which is consistent with mild pulmonary   hypertension.       [facility] Stroke Team  Progress Note     HPI:  79 yo M PMH of HTN, HLD, AFib, CKD, obesity, gout, prostate cancer, DVT/PE, OA s/p multiple orthopedic procedures, spinal stenosis, presented on 11/13/23 for elective left atrial appendage occlusion procedure, and developed a MCA syndrome due to acute L M1 occlusion as demonstrated on CTA Head and Neck. Patient has a long h/o AFib, which is now considered permanent. He is on rate control with metoprolol 25 mg bid, and anticoagulation with Pradaxa.  He has a long Hx of arthritis and had multiple hip replacements, bilateral knee replacements and also has neck and back pain, but is unable to take NSAIDs due to bleeding risks. He has an unsteady gait and uses a cane for assistance.  Patient presented on 11/13 for elective left atrial appendage occlusion with Watchman device/HARJINDER w/ Dr Bobo, and Pradaxa was held for 2 days prior to procedure. 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   Initial NIHSS 26, MRS 1. Patient s/p tenecteplase administration at 11:16 11/13/23 and TICI 2A thrombectomy with Dr. Soliman.    SUBJECTIVE: No events overnight.  No new neurologic complaints.  ROS reported negative unless otherwise noted.    acetaminophen     Tablet .. 650 milliGRAM(s) Oral every 6 hours PRN  albuterol/ipratropium (CFC free) Inhaler. 1 Puff(s) Inhalation four times a day PRN  allopurinol 300 milliGRAM(s) Oral daily  aspirin  chewable 81 milliGRAM(s) Oral daily  atorvastatin 40 milliGRAM(s) Oral at bedtime  bisacodyl Suppository 10 milliGRAM(s) Rectal daily PRN  chlorhexidine 2% Cloths 1 Application(s) Topical daily  dextrose 5%. 1000 milliLiter(s) IV Continuous <Continuous>  dextrose 5%. 1000 milliLiter(s) IV Continuous <Continuous>  dextrose 50% Injectable 25 Gram(s) IV Push once  dextrose 50% Injectable 12.5 Gram(s) IV Push once  dextrose 50% Injectable 25 Gram(s) IV Push once  dextrose Oral Gel 15 Gram(s) Oral once PRN  DULoxetine 30 milliGRAM(s) Oral daily  famotidine    Tablet 20 milliGRAM(s) Oral two times a day  gabapentin 300 milliGRAM(s) Oral two times a day  glucagon  Injectable 1 milliGRAM(s) IntraMuscular once  heparin  Infusion 1000 Unit(s)/Hr IV Continuous <Continuous>  insulin lispro (ADMELOG) corrective regimen sliding scale   SubCutaneous Before meals and at bedtime  metoprolol tartrate 25 milliGRAM(s) Oral two times a day  nystatin Powder 1 Application(s) Topical two times a day  polyethylene glycol 3350 17 Gram(s) Oral daily  predniSONE   Tablet 5 milliGRAM(s) Oral daily  senna 2 Tablet(s) Oral at bedtime      PHYSICAL EXAM:   Vital Signs Last 24 Hrs  T(C): 36.5 (23 Nov 2023 14:00), Max: 37.9 (22 Nov 2023 19:08)  T(F): 97.7 (23 Nov 2023 14:00), Max: 100.3 (22 Nov 2023 19:08)  HR: 94 (23 Nov 2023 14:00) (65 - 94)  BP: 116/59 (23 Nov 2023 14:00) (116/59 - 152/87)  BP(mean): 75 (23 Nov 2023 14:00) (69 - 104)  RR: 24 (23 Nov 2023 14:00) (15 - 28)  SpO2: 97% (23 Nov 2023 14:00) (93% - 100%)    Parameters below as of 23 Nov 2023 14:00  Patient On (Oxygen Delivery Method): room air    General: Patient seen sleeping.     Detailed Neurologic Exam:    Mental status: Patient is asleep, however awakens to examiner's voice. Appears drowsy, but does attend to examiner. Right sided visual willow-neglect present. Global aphasia noted with no verbal output.  Unable to follow commands, ID objects, or repeat. Not oriented to self, place, or month.     Cranial nerves: Pupils equal and react symmetrically to light. Blink to threat intermittently on right eye. Blink to threat present out of left eye. Left gaze preference with right gaze palsy that does cross midline. Right facial weakness noted.    Motor: There is normal bulk and tone.  There is no tremor.  RLE: slight withdrawal due to noxious stimuli. Trace movement noted within bed.   RUE: 0/5 strength with no movement. Grimaces to noxious stimuli with no movement.  Strength is 5/5 in the left arm and leg with no drift.    Sensation: Grimaces to noxious stimuli in all extremities.    Cerebellar: unable to assess dysmetria on finger to nose testing.    Gait : deferred    Later seen at 17:00 on 11/23/23. Patient was already up and much more alert. Was able to close eyes and smile on exam. When asked to stick out tongue, patient opens mouth and attempts to stick out tongue.    LABS:                        10.1   6.82  )-----------( 269      ( 23 Nov 2023 07:32 )             32.2    11-23    147<H>  |  108  |  19.4  ----------------------------<  160<H>  4.4   |  31.0<H>  |  0.91    Ca    9.2      23 Nov 2023 07:32    PT/INR - ( 23 Nov 2023 07:32 )   PT: 11.8 sec;   INR: 1.07 ratio         PTT - ( 23 Nov 2023 07:32 )  PTT:47.2 sec    IMAGING: Reviewed by me.     CT Heart Left Atrium w/ IV Cont (11.22.23 @ 11:22)   IMPRESSION:  Cardiac:  Multiple large filling defects noted at the distal appendage at least >2   cm and also near the proximal appendage measures 1.5 cm x 1.2 cm,   consistent with left appendage thrombi.  Dilated left atrial appendage windsock in appearance with orifice width   measures 4.5 cm x 3.0 cm  Severely biatrial enlargement.    Non-cardiac:  Right upper lobe pulmonary artery embolism.  Possible pulmonary hypertension.  New right lower lobe superior segment infiltrate (possibly infectious or   inflammatory)    IMPRESSION:  Cardiac:  Multiple large filling defects noted at the distal appendage at least >2   cm and also near the proximal appendage measures 1.5 cm x 1.2 cm,   consistent with left appendage thrombi.  Dilated left atrial appendage windsock in appearance with orifice width   measures4.5 cm x 3.0 cm  Severely biatrial enlargement.     MR Head No Cont (11.19.23 @ 15:52)   IMPRESSION: Redemonstration of an evolving acute/subacute large   left-sided MCA distribution infarction with gross hemorrhagic   transformation in the left basal ganglia as well as evidence of petechial   hemorrhagic transformation within the left frontotemporal and parietal   cortices.    Associated cytotoxic edema, sulcal effacement, and mass effect as well as   shift of the midline structures from left to right measuring 5.5 mm   appears unchanged. There is no herniation.    CT Head No Cont (11.16.23 @ 18:05)   IMPRESSION: Stable moderate to large left MCA territory infarction   involving the left frontal lobe, left basal ganglia, left insular ribbon,   left parietal occipital lobe, and anterior left temporal lobe with stable   mild hemorrhage in the left basal ganglia. Left-to-right midline shift   measures 5.5 mm.    CT Head No Cont (11.14.23 @ 18:57)   IMPRESSION:  Stable moderate to large acute left MCA infarct with mild hemorrhage    TTE Echo Complete w/o Contrast w/ Doppler (11.14.23 @ 15:57)   Summary:   1. Left ventricular ejection fraction, by visual estimation, is 55 to   60%.   2. Normal global left ventricular systolic function.   3. The mitral in-flow pattern reveals no discernable A-wave, therefore   no comment on diastolic function can be made.   4. Normal right ventricular size and function.   5. Mild to moderately enlarged right atrium.   6. Moderately enlarged left atrium.   7. Trace mitral valve regurgitation.   8. Mild-moderate tricuspid regurgitation.   9. Sclerotic aortic valve with decreased opening.  10. Estimated pulmonary artery systolic pressure is 41.6 mmHg assuming a   right atrial pressure of 5 mmHg, which is consistent with mild pulmonary   hypertension.    CT Head No Cont (11.14.23 @ 11:36)   IMPRESSION: Evolving left MCA infarct is identified with hemorrhagic   transformation now seen with mass effect on left lateral ventricles and   left-to-right shift.    US Duplex Venous Lower Ext Complete, Bilateral (11.14.23 @ 10:38)   IMPRESSION:  No evidence of deep venous thrombosis in either lower extremity.  Left calf veins not visualized.  Small right popliteal fossa cyst.    CT Angio Brain, Neck and perfusion Stroke Protocol  w/ IV Cont (11.13.23 @ 11:18)   CTA BRAIN:  Acute thrombus in the distal left MCA M1 segment. Diminished flow in the   distal branches.  Severe bilateral cavernous carotid artery calcifications. Fetal origins   the bilateral posterior cerebral arteries. The [place] and   vertebrobasilar system are otherwise unremarkable without evidence of   stenosis, additional occlusion or saccular aneurysm dilation. No evidence   for arterial venous malformation. The vertebral arteries are codominant.    CTA NECK:  Mild bilateral carotid bulb calcifications. Mild stenosis in the left   carotid bulb. The right internal carotid arteries tortuous.  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 There is normal relationship to   the great vessels. The common carotid arteries, internal carotid arteries   and vertebral arteries shows no other evidence of significant stenosis,   occlusion or saccular aneurysm dilation. The vertebral arteries are   codominant.    CT PERFUSION:  Patient has only had less than 2 hours of symptoms may influence the CT   perfusion.  CBF<30% volume: 26 ml left MCA territory.  Tmax>6.0 s volume: 193 ml left MCA territory  Mismatch volume: 167 ml  Mismatch ratio: 7.4  IMPRESSION:  Thrombus in the distal left MCA M1 segment with diminished flow in the   distal MCA segments. 167 mL area of penumbra in the left MCA territory.     CT Brain Stroke Protocol (11.13.23 @ 11:15)   IMPRESSION:  Dense left MCA M1 segment suggesting intraluminal thrombus.   Mild chronic microvascular changes without evidence of an acute   transcortical infarction or hemorrhage.    TTE Echo Complete w/o Contrast w/ Doppler (11.14.23 @ 15:57)   Summary:   1. Left ventricular ejection fraction, by visual estimation, is 55 to   60%.   2. Normal global left ventricular systolic function.   3. The mitral in-flow pattern reveals no discernable A-wave, therefore   no comment on diastolic function can be made.   4. Normal right ventricular size and function.   5. Mild to moderately enlarged right atrium.   6. Moderatelyenlarged left atrium.   7. Trace mitral valve regurgitation.   8. Mild-moderate tricuspid regurgitation.   9. Sclerotic aortic valve with decreased opening.  10. Estimated pulmonary artery systolic pressure is 41.6 mmHg assuming a   right atrial pressure of 5 mmHg, which is consistent with mild pulmonary   hypertension.

## 2023-11-23 NOTE — PROGRESS NOTE ADULT - SUBJECTIVE AND OBJECTIVE BOX
Patient is a 78y old  Male who presents with a chief complaint of Lt MCA M1 thrombus (23 Nov 2023 08:10)    pt is awake but does not follow commands  REVIEW OF SYSTEMS: Ams-unable    MEDICATIONS  (STANDING):  allopurinol 300 milliGRAM(s) Oral daily  aspirin  chewable 81 milliGRAM(s) Oral daily  atorvastatin 40 milliGRAM(s) Oral at bedtime  chlorhexidine 2% Cloths 1 Application(s) Topical daily  dextrose 5%. 1000 milliLiter(s) (50 mL/Hr) IV Continuous <Continuous>  dextrose 5%. 1000 milliLiter(s) (100 mL/Hr) IV Continuous <Continuous>  dextrose 50% Injectable 12.5 Gram(s) IV Push once  dextrose 50% Injectable 25 Gram(s) IV Push once  dextrose 50% Injectable 25 Gram(s) IV Push once  DULoxetine 30 milliGRAM(s) Oral daily  famotidine    Tablet 20 milliGRAM(s) Oral two times a day  gabapentin 300 milliGRAM(s) Oral two times a day  glucagon  Injectable 1 milliGRAM(s) IntraMuscular once  heparin  Infusion 1000 Unit(s)/Hr (10 mL/Hr) IV Continuous <Continuous>  insulin lispro (ADMELOG) corrective regimen sliding scale   SubCutaneous Before meals and at bedtime  metoprolol tartrate 25 milliGRAM(s) Oral two times a day  nystatin Powder 1 Application(s) Topical two times a day  polyethylene glycol 3350 17 Gram(s) Oral daily  predniSONE   Tablet 5 milliGRAM(s) Oral daily  senna 2 Tablet(s) Oral at bedtime    MEDICATIONS  (PRN):  acetaminophen     Tablet .. 650 milliGRAM(s) Oral every 6 hours PRN Temp greater or equal to 38C (100.4F), Mild Pain (1 - 3)  albuterol/ipratropium (CFC free) Inhaler. 1 Puff(s) Inhalation four times a day PRN Wheezing  bisacodyl Suppository 10 milliGRAM(s) Rectal daily PRN Constipation  dextrose Oral Gel 15 Gram(s) Oral once PRN Blood Glucose LESS THAN 70 milliGRAM(s)/deciliter      CAPILLARY BLOOD GLUCOSE      POCT Blood Glucose.: 225 mg/dL (23 Nov 2023 12:05)  POCT Blood Glucose.: 120 mg/dL (22 Nov 2023 21:52)  POCT Blood Glucose.: 160 mg/dL (22 Nov 2023 16:58)  POCT Blood Glucose.: 237 mg/dL (22 Nov 2023 13:19)    I&O's Summary    22 Nov 2023 07:01  -  23 Nov 2023 07:00  --------------------------------------------------------  IN: 503 mL / OUT: 1100 mL / NET: -597 mL        PHYSICAL EXAM:  Vital Signs Last 24 Hrs  T(C): 36.9 (23 Nov 2023 07:35), Max: 37.9 (22 Nov 2023 19:08)  T(F): 98.5 (23 Nov 2023 07:35), Max: 100.3 (22 Nov 2023 19:08)  HR: 76 (23 Nov 2023 10:00) (65 - 88)  BP: 122/65 (23 Nov 2023 10:00) (122/65 - 152/87)  BP(mean): 79 (23 Nov 2023 10:00) (79 - 104)  RR: 15 (23 Nov 2023 10:00) (15 - 28)  SpO2: 97% (23 Nov 2023 10:00) (93% - 100%)    Parameters below as of 23 Nov 2023 10:00  Patient On (Oxygen Delivery Method): room air        CONSTITUTIONAL: NAD,  EYES: PERRLA; conjunctiva and sclera clear  ENMT: Moist oral mucosa,   RESPIRATORY: Normal respiratory effort; lungs are clear to auscultation bilaterally  CARDIOVASCULAR: Regular rate and rhythm, normal S1 and S2, no murmur   EXTS: No lower extremity edema; Peripheral pulses are 2+ bilaterally  ABDOMEN: Nontender to palpation, normoactive bowel sounds, no rebound/guarding;   MUSCLOSKELETAL:   no joint swelling or tenderness to palpation  PSYCH: calm,confuse  NEUROLOGY: Awake does not follow commands. aphagic. RUE is 0/5, RLE 0-1/5, able to move foot, LUE/LLE is 5/5 .limited exam.      LABS:                        10.1   6.82  )-----------( 269      ( 23 Nov 2023 07:32 )             32.2     11-23    147<H>  |  108  |  19.4  ----------------------------<  160<H>  4.4   |  31.0<H>  |  0.91    Ca    9.2      23 Nov 2023 07:32      PT/INR - ( 23 Nov 2023 07:32 )   PT: 11.8 sec;   INR: 1.07 ratio         PTT - ( 23 Nov 2023 07:32 )  PTT:47.2 sec      Urinalysis Basic - ( 23 Nov 2023 07:32 )    Color: x / Appearance: x / SG: x / pH: x  Gluc: 160 mg/dL / Ketone: x  / Bili: x / Urobili: x   Blood: x / Protein: x / Nitrite: x   Leuk Esterase: x / RBC: x / WBC x   Sq Epi: x / Non Sq Epi: x / Bacteria: x          RADIOLOGY & ADDITIONAL TESTS:  Results Reviewed:   < from: MR Head No Cont (11.19.23 @ 15:52) >  IMPRESSION: Redemonstration of an evolving acute/subacute large   left-sided MCA distribution infarction with gross hemorrhagic   transformation in the left basal ganglia as well as evidence of petechial   hemorrhagic transformation within the left frontotemporal and parietal   cortices.    Associated cytotoxic edema, sulcal effacement, and mass effect as well as   shift of the midline structures from left to right measuring 5.5 mm   appears unchanged. There is no herniation.    < end of copied text >  < from: TTE Echo Limited or F/U (11.22.23 @ 16:32) >    Summary:   1. Left ventricular ejection fraction, by visual estimation, is 45 to   50%.   2. Mildly decreased global left ventricular systolic function.   3. The mitral in-flow pattern reveals no discernable A-wave, therefore   no comment on diastolic function can be made.   4. Mildly enlarged right ventricle with mildly reduced RV systolic   function, estimated PASP 31 mmHg.   5. Severely enlarged left atrium.   6. Right atrial enlargement.   7. Mild mitral annular calcification.   8. Mild mitral valve regurgitation.   9. Mild-moderate tricuspid regurgitation.  10. Sclerotic aortic valve with normal opening.  11. There is no evidence of pericardial effusion.  12. Compared to the prior TTE study from 11/14/23, interval reduction in   LV EF.    < end of copied text >

## 2023-11-23 NOTE — CHART NOTE - NSCHARTNOTEFT_GEN_A_CORE
Pt on Pt specific Heparin infusion  Infusing at 9ml/hr  As per Neurosurgery PTT goal 60-80  PTT 41.4  rate increased to 10ml/hr  PTT in 6 hrs  Continue to monitor

## 2023-11-24 LAB
ANION GAP SERPL CALC-SCNC: 10 MMOL/L — SIGNIFICANT CHANGE UP (ref 5–17)
ANION GAP SERPL CALC-SCNC: 10 MMOL/L — SIGNIFICANT CHANGE UP (ref 5–17)
APTT BLD: 54.3 SEC — HIGH (ref 24.5–35.6)
APTT BLD: 54.3 SEC — HIGH (ref 24.5–35.6)
APTT BLD: 61 SEC — HIGH (ref 24.5–35.6)
APTT BLD: 61 SEC — HIGH (ref 24.5–35.6)
APTT BLD: 66 SEC — HIGH (ref 24.5–35.6)
APTT BLD: 66 SEC — HIGH (ref 24.5–35.6)
BUN SERPL-MCNC: 18.7 MG/DL — SIGNIFICANT CHANGE UP (ref 8–20)
BUN SERPL-MCNC: 18.7 MG/DL — SIGNIFICANT CHANGE UP (ref 8–20)
CALCIUM SERPL-MCNC: 8.9 MG/DL — SIGNIFICANT CHANGE UP (ref 8.4–10.5)
CALCIUM SERPL-MCNC: 8.9 MG/DL — SIGNIFICANT CHANGE UP (ref 8.4–10.5)
CHLORIDE SERPL-SCNC: 107 MMOL/L — SIGNIFICANT CHANGE UP (ref 96–108)
CHLORIDE SERPL-SCNC: 107 MMOL/L — SIGNIFICANT CHANGE UP (ref 96–108)
CO2 SERPL-SCNC: 28 MMOL/L — SIGNIFICANT CHANGE UP (ref 22–29)
CO2 SERPL-SCNC: 28 MMOL/L — SIGNIFICANT CHANGE UP (ref 22–29)
CREAT SERPL-MCNC: 0.85 MG/DL — SIGNIFICANT CHANGE UP (ref 0.5–1.3)
CREAT SERPL-MCNC: 0.85 MG/DL — SIGNIFICANT CHANGE UP (ref 0.5–1.3)
EGFR: 88 ML/MIN/1.73M2 — SIGNIFICANT CHANGE UP
EGFR: 88 ML/MIN/1.73M2 — SIGNIFICANT CHANGE UP
GLUCOSE BLDC GLUCOMTR-MCNC: 141 MG/DL — HIGH (ref 70–99)
GLUCOSE BLDC GLUCOMTR-MCNC: 141 MG/DL — HIGH (ref 70–99)
GLUCOSE BLDC GLUCOMTR-MCNC: 151 MG/DL — HIGH (ref 70–99)
GLUCOSE BLDC GLUCOMTR-MCNC: 151 MG/DL — HIGH (ref 70–99)
GLUCOSE BLDC GLUCOMTR-MCNC: 170 MG/DL — HIGH (ref 70–99)
GLUCOSE BLDC GLUCOMTR-MCNC: 170 MG/DL — HIGH (ref 70–99)
GLUCOSE BLDC GLUCOMTR-MCNC: 203 MG/DL — HIGH (ref 70–99)
GLUCOSE BLDC GLUCOMTR-MCNC: 203 MG/DL — HIGH (ref 70–99)
GLUCOSE SERPL-MCNC: 153 MG/DL — HIGH (ref 70–99)
GLUCOSE SERPL-MCNC: 153 MG/DL — HIGH (ref 70–99)
HCT VFR BLD CALC: 31.8 % — LOW (ref 39–50)
HCT VFR BLD CALC: 31.8 % — LOW (ref 39–50)
HGB BLD-MCNC: 10 G/DL — LOW (ref 13–17)
HGB BLD-MCNC: 10 G/DL — LOW (ref 13–17)
MCHC RBC-ENTMCNC: 30.1 PG — SIGNIFICANT CHANGE UP (ref 27–34)
MCHC RBC-ENTMCNC: 30.1 PG — SIGNIFICANT CHANGE UP (ref 27–34)
MCHC RBC-ENTMCNC: 31.4 GM/DL — LOW (ref 32–36)
MCHC RBC-ENTMCNC: 31.4 GM/DL — LOW (ref 32–36)
MCV RBC AUTO: 95.8 FL — SIGNIFICANT CHANGE UP (ref 80–100)
MCV RBC AUTO: 95.8 FL — SIGNIFICANT CHANGE UP (ref 80–100)
PLATELET # BLD AUTO: 286 K/UL — SIGNIFICANT CHANGE UP (ref 150–400)
PLATELET # BLD AUTO: 286 K/UL — SIGNIFICANT CHANGE UP (ref 150–400)
POTASSIUM SERPL-MCNC: 3.7 MMOL/L — SIGNIFICANT CHANGE UP (ref 3.5–5.3)
POTASSIUM SERPL-MCNC: 3.7 MMOL/L — SIGNIFICANT CHANGE UP (ref 3.5–5.3)
POTASSIUM SERPL-SCNC: 3.7 MMOL/L — SIGNIFICANT CHANGE UP (ref 3.5–5.3)
POTASSIUM SERPL-SCNC: 3.7 MMOL/L — SIGNIFICANT CHANGE UP (ref 3.5–5.3)
RBC # BLD: 3.32 M/UL — LOW (ref 4.2–5.8)
RBC # BLD: 3.32 M/UL — LOW (ref 4.2–5.8)
RBC # FLD: 13.3 % — SIGNIFICANT CHANGE UP (ref 10.3–14.5)
RBC # FLD: 13.3 % — SIGNIFICANT CHANGE UP (ref 10.3–14.5)
SODIUM SERPL-SCNC: 145 MMOL/L — SIGNIFICANT CHANGE UP (ref 135–145)
SODIUM SERPL-SCNC: 145 MMOL/L — SIGNIFICANT CHANGE UP (ref 135–145)
WBC # BLD: 6.8 K/UL — SIGNIFICANT CHANGE UP (ref 3.8–10.5)
WBC # BLD: 6.8 K/UL — SIGNIFICANT CHANGE UP (ref 3.8–10.5)
WBC # FLD AUTO: 6.8 K/UL — SIGNIFICANT CHANGE UP (ref 3.8–10.5)
WBC # FLD AUTO: 6.8 K/UL — SIGNIFICANT CHANGE UP (ref 3.8–10.5)

## 2023-11-24 PROCEDURE — 70450 CT HEAD/BRAIN W/O DYE: CPT | Mod: 26

## 2023-11-24 PROCEDURE — 99233 SBSQ HOSP IP/OBS HIGH 50: CPT

## 2023-11-24 RX ORDER — HEPARIN SODIUM 5000 [USP'U]/ML
1050 INJECTION INTRAVENOUS; SUBCUTANEOUS
Qty: 25000 | Refills: 0 | Status: DISCONTINUED | OUTPATIENT
Start: 2023-11-24 | End: 2023-12-02

## 2023-11-24 RX ORDER — HEPARIN SODIUM 5000 [USP'U]/ML
1050 INJECTION INTRAVENOUS; SUBCUTANEOUS
Qty: 25000 | Refills: 0 | Status: DISCONTINUED | OUTPATIENT
Start: 2023-11-24 | End: 2023-11-24

## 2023-11-24 RX ADMIN — Medication 81 MILLIGRAM(S): at 12:07

## 2023-11-24 RX ADMIN — Medication 25 MILLIGRAM(S): at 17:32

## 2023-11-24 RX ADMIN — Medication 300 MILLIGRAM(S): at 12:08

## 2023-11-24 RX ADMIN — NYSTATIN CREAM 1 APPLICATION(S): 100000 CREAM TOPICAL at 05:59

## 2023-11-24 RX ADMIN — Medication 2: at 12:08

## 2023-11-24 RX ADMIN — NYSTATIN CREAM 1 APPLICATION(S): 100000 CREAM TOPICAL at 17:33

## 2023-11-24 RX ADMIN — Medication 1: at 22:58

## 2023-11-24 RX ADMIN — ATORVASTATIN CALCIUM 40 MILLIGRAM(S): 80 TABLET, FILM COATED ORAL at 21:23

## 2023-11-24 RX ADMIN — POLYETHYLENE GLYCOL 3350 17 GRAM(S): 17 POWDER, FOR SOLUTION ORAL at 12:08

## 2023-11-24 RX ADMIN — HEPARIN SODIUM 10.5 UNIT(S)/HR: 5000 INJECTION INTRAVENOUS; SUBCUTANEOUS at 16:38

## 2023-11-24 RX ADMIN — GABAPENTIN 300 MILLIGRAM(S): 400 CAPSULE ORAL at 05:58

## 2023-11-24 RX ADMIN — CHLORHEXIDINE GLUCONATE 1 APPLICATION(S): 213 SOLUTION TOPICAL at 05:58

## 2023-11-24 RX ADMIN — HEPARIN SODIUM 10.5 UNIT(S)/HR: 5000 INJECTION INTRAVENOUS; SUBCUTANEOUS at 06:11

## 2023-11-24 RX ADMIN — SENNA PLUS 2 TABLET(S): 8.6 TABLET ORAL at 21:23

## 2023-11-24 RX ADMIN — Medication 5 MILLIGRAM(S): at 05:58

## 2023-11-24 RX ADMIN — HEPARIN SODIUM 10.5 UNIT(S)/HR: 5000 INJECTION INTRAVENOUS; SUBCUTANEOUS at 19:18

## 2023-11-24 RX ADMIN — FAMOTIDINE 20 MILLIGRAM(S): 10 INJECTION INTRAVENOUS at 17:32

## 2023-11-24 RX ADMIN — HEPARIN SODIUM 10.5 UNIT(S)/HR: 5000 INJECTION INTRAVENOUS; SUBCUTANEOUS at 22:56

## 2023-11-24 RX ADMIN — GABAPENTIN 300 MILLIGRAM(S): 400 CAPSULE ORAL at 17:32

## 2023-11-24 RX ADMIN — Medication 25 MILLIGRAM(S): at 05:58

## 2023-11-24 RX ADMIN — FAMOTIDINE 20 MILLIGRAM(S): 10 INJECTION INTRAVENOUS at 05:58

## 2023-11-24 RX ADMIN — HEPARIN SODIUM 10.5 UNIT(S)/HR: 5000 INJECTION INTRAVENOUS; SUBCUTANEOUS at 07:23

## 2023-11-24 RX ADMIN — DULOXETINE HYDROCHLORIDE 30 MILLIGRAM(S): 30 CAPSULE, DELAYED RELEASE ORAL at 12:15

## 2023-11-24 RX ADMIN — Medication 1: at 17:33

## 2023-11-24 NOTE — PROGRESS NOTE ADULT - ASSESSMENT
78y/oM PMH of HTN, HLD, AFib on Pradaxa, CKD, Obesity, Gout, Prostate cancer, DVT/PE, OA s/p multiple orthopedic procedures, Spinal Stenosis, presented on 11/13/23 for elective left atrial appendage occlusion procedure, and developed right sided weakness. Imaging revealed an acute L M1 occlusion s/p TNK administration and TICI 2A thrombectomy. Patient was intubated for airway protection. Repeat CT head s/p tenecteplase administration showed evolving left MCA stroke with hemorrhagic transformation, and mass effects with left to right shift. Suspected etiology of stroke is cardioembolic from atrial fibrillation when off anticoagulation for recent procedure. Patient was subsequently extubated and is now stable for transfer to step down unit (11/17). Had MRI brain that confirmed Ischemic and hemorrhagic CVA. CTA cardiac was done which showed FROY thrombi and PE. Decision was made to start AC per Neurology team/NICU with target appt.     Left MCA CVA with M1 occlusion s/p TNK and thrombectomy with Hemorrhagic transformation  -likely cardioembolic while AC had been on hold  -MRI brain completed with acute/subacute large lt sided MCA infarct with gross hemorrhagic transformation 11/19  -repeat CT head s/p TNK with evolving left MCA stroke with mild hemorrhage and mass effect  - started aspirin 81mg qd 11/17  -continue statin  -repeat CTH 11/16 stable   -neurochecks q2h  - start heparin gtt, no bolus, PTT goal 40-60.plan to increase 50-70   - repeat CTH todat.APTT in target range  -TTE reviewed; no PFO  -PT - JHOAN  -OT -AR  -SLP eval appreciated; continue dysphagia diet  -aspiration/fall precautions  -Was planned to have HARJINDER today but cancelled by anesthesiology given high risk  -Patient underwent cardiac CTA - Shows FROY thrombi and RUL PE      Atrial Fibrillation  FROY thrombi  PE  Acute Rt  LE DVT  -Doppler LE Rt DVT  -FROY Thrombi noted on CTA Heart  -on Hep GTT - PTT goal of 40- 60. plan to increase 50-70.pending cth  -continue metoprolol for rate control  -TTE reviewed - but repeat TTE requested for new PE   -EP on board    Hypernatremia  -goal sodium 145-150 per stroke neuro recs due to cerebral edema  -hold torsemide   -hold parenteral free water  -monitor I/os     Anemia likely due to chronic disease  -Hb relatively stable  - iron studies  -monitor CBC closely    Acute Hypoxic Respiratory Failure - resolved  -intubated for airway protection on admission   -history of ILD; will clarify if it is the etiology of chronic prednisone use   -extubated on 11/14  -CXR no acute infiltrates  -bronchodilators as needed  -incentive spirometry     DM  -HbA1c 6.2   -continue ISS  -ADA diet    History of PE/DVT  -LE duplex negative for DVT  -Confirmed [facility] PE ON CT   -AC as above    HTN  -SBP 130s (goal 130-180 per neuro recs)  -continue metoprolol  -low sodium diet    GERD  -continue famotidine    Gout  -continue allopurinol    Neuropathy  -continue gabapentin     DVT ppx- heparin drip      Dispo: Patient remains acute  Now on heparin drip - Monitor ptt. f/u cbc  order cth  closely monitor at step down  dw rn             78y/oM PMH of HTN, HLD, AFib on Pradaxa, CKD, Obesity, Gout, Prostate cancer, DVT/PE, OA s/p multiple orthopedic procedures, Spinal Stenosis, presented on 11/13/23 for elective left atrial appendage occlusion procedure, and developed right sided weakness. Imaging revealed an acute L M1 occlusion s/p TNK administration and TICI 2A thrombectomy. Patient was intubated for airway protection. Repeat CT head s/p tenecteplase administration showed evolving left MCA stroke with hemorrhagic transformation, and mass effects with left to right shift. Suspected etiology of stroke is cardioembolic from atrial fibrillation when off anticoagulation for recent procedure. Patient was subsequently extubated and is now stable for transfer to step down unit (11/17). Had MRI brain that confirmed Ischemic and hemorrhagic CVA. CTA cardiac was done which showed FROY thrombi and PE. Decision was made to start AC per Neurology team/NICU with target appt.     Left MCA CVA with M1 occlusion s/p TNK and thrombectomy with Hemorrhagic transformation  -likely cardioembolic while AC had been on hold  -MRI brain completed with acute/subacute large lt sided MCA infarct with gross hemorrhagic transformation 11/19  -repeat CT head s/p TNK with evolving left MCA stroke with mild hemorrhage and mass effect  - started aspirin 81mg qd 11/17  -continue statin  -repeat CTH 11/16 stable   -neurochecks q2h  - start heparin gtt, no bolus, PTT goal 40-60.plan to increase 50-70   - repeat CTH todat.APTT in target range  -TTE reviewed; no PFO  -PT - JHOAN  -OT -AR  -SLP eval appreciated; continue dysphagia diet  -aspiration/fall precautions  -Was planned to have HARJINDER today but cancelled by anesthesiology given high risk  -Patient underwent cardiac CTA - Shows FROY thrombi and RUL PE      Atrial Fibrillation  FROY thrombi  PE  Acute Rt  LE DVT  -Doppler LE Rt DVT  -FROY Thrombi noted on CTA Heart  -on Hep GTT - PTT goal of 40- 60. plan to increase 50-70.pending cth  -continue metoprolol for rate control  -TTE reviewed - but repeat TTE requested for new PE   -EP on board    Hypernatremia  -goal sodium 145-150 per stroke neuro recs due to cerebral edema  -hold torsemide   -hold parenteral free water  -monitor I/os     Anemia likely due to chronic disease  -Hb relatively stable  - iron studies  -monitor CBC closely    Acute Hypoxic Respiratory Failure - resolved  -intubated for airway protection on admission   -history of ILD; will clarify if it is the etiology of chronic prednisone use   -extubated on 11/14  -CXR no acute infiltrates  -bronchodilators as needed  -incentive spirometry     DM  -HbA1c 6.2   -continue ISS  -ADA diet    History of PE/DVT  -Doppler LE rt dvt  -Confirmed [facility] PE ON CT   -AC as above    HTN  -SBP 130s (goal 130-180 per neuro recs)  -continue metoprolol  -low sodium diet    GERD  -continue famotidine    Gout  -continue allopurinol    Neuropathy  -continue gabapentin     DVT ppx- heparin drip      Dispo: Patient remains acute  Now on heparin drip - Monitor ptt. f/u cbc  order cth  closely monitor at step down  dw rn             78y/oM PMH of HTN, HLD, AFib on Pradaxa, CKD, Obesity, Gout, Prostate cancer, DVT/PE, OA s/p multiple orthopedic procedures, Spinal Stenosis, presented on 11/13/23 for elective left atrial appendage occlusion procedure, and developed right sided weakness. Imaging revealed an acute L M1 occlusion s/p TNK administration and TICI 2A thrombectomy. Patient was intubated for airway protection. Repeat CT head s/p tenecteplase administration showed evolving left MCA stroke with hemorrhagic transformation, and mass effects with left to right shift. Suspected etiology of stroke is cardioembolic from atrial fibrillation when off anticoagulation for recent procedure. Patient was subsequently extubated and is now stable for transfer to step down unit (11/17). Had MRI brain that confirmed Ischemic and hemorrhagic CVA. CTA cardiac was done which showed FROY thrombi and PE. Decision was made to start AC per Neurology team/NICU with target appt.     Left MCA CVA with M1 occlusion s/p TNK and thrombectomy with Hemorrhagic transformation  -likely cardioembolic while AC had been on hold  -MRI brain completed with acute/subacute large lt sided MCA infarct with gross hemorrhagic transformation 11/19  -repeat CT head s/p TNK with evolving left MCA stroke with mild hemorrhage and mass effect  - started aspirin 81mg qd 11/17  -continue statin  -repeat CTH 11/16 stable   -neurochecks q2h  - start heparin gtt, no bolus, PTT goal 40-60.plan to increase 50-70   - repeat CTH todat.APTT in target range  -TTE reviewed; no PFO  -PT - JHOAN  -OT -AR  -SLP eval appreciated; continue dysphagia diet  -aspiration/fall precautions  -Was planned to have HARJINDER today but cancelled by anesthesiology given high risk  -Patient underwent cardiac CTA - Shows FROY thrombi and RUL PE      Atrial Fibrillation  FROY thrombi  PE  Acute Rt  LE DVT  -Doppler LE Rt DVT  -FROY Thrombi noted on CTA Heart  -on Hep GTT - PTT goal of 40- 60. plan to increase 50-70.pending cth  -continue metoprolol for rate control  -TTE reviewed - but repeat TTE requested for new PE   -EP on board    Hypernatremia  -goal sodium 145-150 per stroke neuro recs due to cerebral edema  -hold torsemide   -hold parenteral free water  -monitor I/os     Anemia likely due to chronic disease  -Hb relatively stable  - iron studies  -monitor CBC closely    Acute Hypoxic Respiratory Failure - resolved  -intubated for airway protection on admission   -history of ILD; will clarify if it is the etiology of chronic prednisone use   -extubated on 11/14  -CXR no acute infiltrates  -bronchodilators as needed  -incentive spirometry     DM  -HbA1c 6.2   -continue ISS  -ADA diet    History of PE/DVT  -Doppler LE rt dvt 11/23/23. doppler le was negative 11/14/23  -Confirmed [facility] PE ON CT   -AC as above    HTN  -SBP 130s (goal 130-180 per neuro recs)  -continue metoprolol  -low sodium diet    GERD  -continue famotidine    Gout  -continue allopurinol    Neuropathy  -continue gabapentin     DVT ppx- heparin drip      Dispo: Patient remains acute  Now on heparin drip - Monitor ptt. f/u cbc  order cth  closely monitor at step down  plan of care dw daughter and wife at bedside.  kush rn

## 2023-11-24 NOTE — PROGRESS NOTE ADULT - SUBJECTIVE AND OBJECTIVE BOX
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 Patient has a long h/o AFib, which is now considered permanent. He is on rate control with metoprolol 25 mg bid, and anticoagulation with Pradaxa.  He has a long Hx of arthritis and had multiple hip replacements, bilateral knee replacements and also has neck and back pain, but is unable to take NSAIDs due to bleeding risks. He has an unsteady gait and uses a cane for assistance.  Patient presented on 11/13 for elective left atrial appendage occlusion with Watchman device/HARJINDER w/ Dr Bobo, and Pradaxa was held for 2 days prior to procedure. 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   Initial NIHSS 26, MRS 1. Patient s/p tenecteplase administration at 11:16 11/13/23 and TICI 2A thrombectomy with Dr. Soliman.    SUBJECTIVE: No events overnight.  No new neurologic complaints.  ROS reported negative unless otherwise noted.    acetaminophen     Tablet .. 650 milliGRAM(s) Oral every 6 hours PRN  albuterol/ipratropium (CFC free) Inhaler. 1 Puff(s) Inhalation four times a day PRN  allopurinol 300 milliGRAM(s) Oral daily  aspirin  chewable 81 milliGRAM(s) Oral daily  atorvastatin 40 milliGRAM(s) Oral at bedtime  bisacodyl Suppository 10 milliGRAM(s) Rectal daily PRN  chlorhexidine 2% Cloths 1 Application(s) Topical daily  dextrose 5%. 1000 milliLiter(s) IV Continuous <Continuous>  dextrose 5%. 1000 milliLiter(s) IV Continuous <Continuous>  dextrose 50% Injectable 25 Gram(s) IV Push once  dextrose 50% Injectable 12.5 Gram(s) IV Push once  dextrose 50% Injectable 25 Gram(s) IV Push once  dextrose Oral Gel 15 Gram(s) Oral once PRN  DULoxetine 30 milliGRAM(s) Oral daily  famotidine    Tablet 20 milliGRAM(s) Oral two times a day  gabapentin 300 milliGRAM(s) Oral two times a day  glucagon  Injectable 1 milliGRAM(s) IntraMuscular once  heparin  Infusion 1050 Unit(s)/Hr IV Continuous <Continuous>  insulin lispro (ADMELOG) corrective regimen sliding scale   SubCutaneous Before meals and at bedtime  metoprolol tartrate 25 milliGRAM(s) Oral two times a day  nystatin Powder 1 Application(s) Topical two times a day  polyethylene glycol 3350 17 Gram(s) Oral daily  predniSONE   Tablet 5 milliGRAM(s) Oral daily  senna 2 Tablet(s) Oral at bedtime      PHYSICAL EXAM:   Vital Signs Last 24 Hrs  T(C): 36.8 (24 Nov 2023 08:00), Max: 36.9 (24 Nov 2023 00:13)  T(F): 98.2 (24 Nov 2023 08:00), Max: 98.5 (24 Nov 2023 00:13)  HR: 88 (24 Nov 2023 08:00) (68 - 94)  BP: 139/74 (24 Nov 2023 08:00) (112/64 - 139/74)  BP(mean): 89 (24 Nov 2023 08:00) (69 - 89)  RR: 20 (24 Nov 2023 08:00) (15 - 25)  SpO2: 100% (24 Nov 2023 08:00) (94% - 100%)    Parameters below as of 24 Nov 2023 08:00  Patient On (Oxygen Delivery Method): room air    COMPLETE A/P PENDING     General: No acute distress    NEUROLOGICAL EXAM:  Mental status: Awake, globally aphasic with no verbal output. Right sided visual willow-neglect present.     Cranial Nerves:  Pupils equal and react symmetrically to light. Blink to threat intermittently on right eye. Blink to threat present out of left eye. Left gaze preference with right gaze palsy that does cross midline. Right facial weakness noted.    Motor exam: There is normal bulk and tone.  There is no tremor.  RLE: slight withdrawal due to noxious stimuli. Trace movement noted within bed.   RUE: 0/5 strength with no movement. Grimaces to noxious stimuli with no movement.  Strength is 5/5 in the left arm and leg with no drift.    Sensation: Grimaces to noxious stimuli in all extremities.    Coordination/ Gait: unable to assess dysmetria on finger to nose testing    LABS:                        10.0   6.80  )-----------( 286      ( 24 Nov 2023 06:22 )             31.8    11-24    145  |  107  |  18.7  ----------------------------<  153<H>  3.7   |  28.0  |  0.85    Ca    8.9      24 Nov 2023 06:22    PT/INR - ( 23 Nov 2023 07:32 )   PT: 11.8 sec;   INR: 1.07 ratio         PTT - ( 24 Nov 2023 04:45 )  PTT:61.0 sec      IMAGING: Reviewed by me.     US Duplex Venous Lower Ext Complete, Bilateral (11.23.23 @ 18:30)   IMPRESSION:  1. Findings compatible with deep venous thrombosis involving the right   popliteal vein, gastrocnemius vein, posterior tibial and peroneal veins.  2. No evidence for deep venous thrombosis within the left lower extremity   veins.    TTE Echo Limited or F/U (11.22.23 @ 16:32)   Summary:   1. Left ventricular ejection fraction, by visual estimation, is 45 to   50%.   2. Mildly decreased global left ventricular systolic function.   3. The mitral in-flow pattern reveals no discernable A-wave, therefore   no comment on diastolic function can be made.   4. Mildly enlarged right ventricle with mildly reduced RV systolic   function, estimated PASP 31 mmHg.   5. Severely enlarged left atrium.   6. Right atrial enlargement.   7. Mild mitral annular calcification.   8. Mild mitral valve regurgitation.   9. Mild-moderate tricuspid regurgitation.  10. Sclerotic aortic valve with normal opening.  11. There is no evidence of pericardial effusion.  12. Compared to the prior TTE study from 11/14/23, interval reduction in   LV EF.    CT Head No Cont (11.22.23 @ 16:25)   IMPRESSION:  Decreased mass effect on the left lateral ventricle compared   with the CT and MR of 11/16/2023 and 11/19/2023 respectively. There is   residual hemorrhage seen in association with hemorrhagic component of   infarct in the left lentiform nucleus region decreased in conspicuity   compared with the prior. Left insular hemorrhage in association with the   left MCA infarct is also noted consistent with hemorrhagic transformation   of infarct in this region. The hemorrhage in the left insular region does   appear slightly more conspicuous compared with the prior CT 11/16/2023   and is consistent with the findings of the patient's MR 11/19/2023.    CT Heart Left Atrium w/ IV Cont (11.22.23 @ 11:22)   IMPRESSION:  Cardiac:  Multiple large filling defects noted at the distal appendage at least >2   cm and also near the proximal appendage measures 1.5 cm x 1.2 cm,   consistent with left appendage thrombi.  Dilated left atrial appendage windsock in appearance with orifice width   measures 4.5 cm x 3.0 cm  Severely biatrial enlargement.  Non-cardiac:  Right upper lobe pulmonary artery embolism.  Possible pulmonary hypertension.  New right lower lobe superior segment infiltrate (possibly infectious or   inflammatory)    MR Head No Cont (11.19.23 @ 15:52)   IMPRESSION: Redemonstration of an evolving acute/subacute large   left-sided MCA distribution infarction with gross hemorrhagic   transformation in the left basal ganglia as well as evidence of petechial   hemorrhagic transformation within the left frontotemporal and parietal   cortices.  Associated cytotoxic edema, sulcal effacement, and mass effect as well as   shift of the midline structures from left to right measuring 5.5 mm   appears unchanged. There is no herniation.    CT Head No Cont (11.16.23 @ 18:05)   IMPRESSION: Stable moderate to large left MCA territory infarction   involving the left frontal lobe, left basal ganglia, left insular ribbon,   left parietal occipital lobe, and anterior left temporal lobe with stable   mild hemorrhage in the left basal ganglia. Left-to-right midline shift   measures 5.5 mm.    CT Head No Cont (11.14.23 @ 18:57)   IMPRESSION:  Stable moderate to large acute left MCA infarct with mild hemorrhage    TTE Echo Complete w/o Contrast w/ Doppler (11.14.23 @ 15:57)   Summary:   1. Left ventricular ejection fraction, by visual estimation, is 55 to   60%.   2. Normal global left ventricular systolic function.   3. The mitral in-flow pattern reveals no discernable A-wave, therefore   no comment on diastolic function can be made.   4. Normal right ventricular size and function.   5. Mild to moderately enlarged right atrium.   6. Moderately enlarged left atrium.   7. Trace mitral valve regurgitation.   8. Mild-moderate tricuspid regurgitation.   9. Sclerotic aortic valve with decreased opening.  10. Estimated pulmonary artery systolic pressure is 41.6 mmHg assuming a   right atrial pressure of 5 mmHg, which is consistent with mild pulmonary   hypertension.    CT Head No Cont (11.14.23 @ 11:36)   IMPRESSION: Evolving left MCA infarct is identified with hemorrhagic   transformation now seen with mass effect on left lateral ventricles and   left-to-right shift.    US Duplex Venous Lower Ext Complete, Bilateral (11.14.23 @ 10:38)   IMPRESSION:  No evidence of deep venous thrombosis in either lower extremity.  Left calf veins not visualized.  Small right popliteal fossa cyst.    CT Angio Brain, Neck and perfusion Stroke Protocol  w/ IV Cont (11.13.23 @ 11:18)   CTA BRAIN:  Acute thrombus in the distal left MCA M1 segment. Diminished flow in the   distal branches.  Severe bilateral cavernous carotid artery calcifications. Fetal origins   the bilateral posterior cerebral arteries. The [place] and   vertebrobasilar system are otherwise unremarkable without evidence of   stenosis, additional occlusion or saccular aneurysm dilation. No evidence   for arterial venous malformation. The vertebral arteries are codominant.    CTA NECK:  Mild bilateral carotid bulb calcifications. Mild stenosis in the left   carotid bulb. The right internal carotid arteries tortuous.  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 There is normal relationship to   the great vessels. The common carotid arteries, internal carotid arteries   and vertebral arteries shows no other evidence of significant stenosis,   occlusion or saccular aneurysm dilation. The vertebral arteries are   codominant.    CT PERFUSION:  Patient has only had less than 2 hours of symptoms may influence the CT   perfusion.  CBF<30% volume: 26 ml left MCA territory.  Tmax>6.0 s volume: 193 ml left MCA territory  Mismatch volume: 167 ml  Mismatch ratio: 7.4  IMPRESSION:  Thrombus in the distal left MCA M1 segment with diminished flow in the   distal MCA segments. 167 mL area of penumbra in the left MCA territory.     CT Brain Stroke Protocol (11.13.23 @ 11:15)   IMPRESSION:  Dense left MCA M1 segment suggesting intraluminal thrombus.   Mild chronic microvascular changes without evidence of an acute   transcortical infarction or hemorrhage.   Preliminary note, offical recommendations pending attending review/signature   [facility] Stroke Team  Progress Note     HPI:  77 yo M PMH of HTN, HLD, AFib, CKD, obesity, gout, prostate cancer, DVT/PE, OA s/p multiple orthopedic procedures, spinal stenosis, presented on 11/13/23 for elective left atrial appendage occlusion procedure, and developed a MCA syndrome due to acute L M1 occlusion as demonstrated on CTA Head and Neck. Patient has a long h/o AFib, which is now considered permanent. He is on rate control with metoprolol 25 mg bid, and anticoagulation with Pradaxa.  He has a long Hx of arthritis and had multiple hip replacements, bilateral knee replacements and also has neck and back pain, but is unable to take NSAIDs due to bleeding risks. He has an unsteady gait and uses a cane for assistance.  Patient presented on 11/13 for elective left atrial appendage occlusion with Watchman device/HARJINDER w/ Dr Bobo, and Pradaxa was held for 2 days prior to procedure. 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   Initial NIHSS 26, MRS 1. Patient s/p tenecteplase administration at 11:16 11/13/23 and TICI 2A thrombectomy with Dr. Soliman.    SUBJECTIVE: No events overnight.  No new neurologic complaints.  ROS reported negative unless otherwise noted.    acetaminophen     Tablet .. 650 milliGRAM(s) Oral every 6 hours PRN  albuterol/ipratropium (CFC free) Inhaler. 1 Puff(s) Inhalation four times a day PRN  allopurinol 300 milliGRAM(s) Oral daily  aspirin  chewable 81 milliGRAM(s) Oral daily  atorvastatin 40 milliGRAM(s) Oral at bedtime  bisacodyl Suppository 10 milliGRAM(s) Rectal daily PRN  chlorhexidine 2% Cloths 1 Application(s) Topical daily  dextrose 5%. 1000 milliLiter(s) IV Continuous <Continuous>  dextrose 5%. 1000 milliLiter(s) IV Continuous <Continuous>  dextrose 50% Injectable 25 Gram(s) IV Push once  dextrose 50% Injectable 12.5 Gram(s) IV Push once  dextrose 50% Injectable 25 Gram(s) IV Push once  dextrose Oral Gel 15 Gram(s) Oral once PRN  DULoxetine 30 milliGRAM(s) Oral daily  famotidine    Tablet 20 milliGRAM(s) Oral two times a day  gabapentin 300 milliGRAM(s) Oral two times a day  glucagon  Injectable 1 milliGRAM(s) IntraMuscular once  heparin  Infusion 1050 Unit(s)/Hr IV Continuous <Continuous>  insulin lispro (ADMELOG) corrective regimen sliding scale   SubCutaneous Before meals and at bedtime  metoprolol tartrate 25 milliGRAM(s) Oral two times a day  nystatin Powder 1 Application(s) Topical two times a day  polyethylene glycol 3350 17 Gram(s) Oral daily  predniSONE   Tablet 5 milliGRAM(s) Oral daily  senna 2 Tablet(s) Oral at bedtime      PHYSICAL EXAM:   Vital Signs Last 24 Hrs  T(C): 36.8 (24 Nov 2023 08:00), Max: 36.9 (24 Nov 2023 00:13)  T(F): 98.2 (24 Nov 2023 08:00), Max: 98.5 (24 Nov 2023 00:13)  HR: 88 (24 Nov 2023 08:00) (68 - 94)  BP: 139/74 (24 Nov 2023 08:00) (112/64 - 139/74)  BP(mean): 89 (24 Nov 2023 08:00) (69 - 89)  RR: 20 (24 Nov 2023 08:00) (15 - 25)  SpO2: 100% (24 Nov 2023 08:00) (94% - 100%)    Parameters below as of 24 Nov 2023 08:00  Patient On (Oxygen Delivery Method): room air    General: No acute distress    NEUROLOGICAL EXAM:  Mental status: Awake, globally aphasic with no verbal output. Right sided visual willow-neglect present.     Cranial Nerves:  Pupils equal and react symmetrically to light. Blink to threat intermittently on right eye. Blink to threat present out of left eye. Left gaze preference with right gaze palsy that does cross midline. Right facial weakness noted.    Motor exam: There is normal bulk and tone.  There is no tremor.  RLE: slight withdrawal due to noxious stimuli. Trace movement noted within bed.   RUE: 0/5 strength with no movement. Grimaces to noxious stimuli with no movement.  Strength is 5/5 in the left arm and leg with no drift.    Sensation: Grimaces to noxious stimuli in all extremities.    Coordination/ Gait: unable to assess dysmetria on finger to nose testing    LABS:                        10.0   6.80  )-----------( 286      ( 24 Nov 2023 06:22 )             31.8    11-24    145  |  107  |  18.7  ----------------------------<  153<H>  3.7   |  28.0  |  0.85    Ca    8.9      24 Nov 2023 06:22    PT/INR - ( 23 Nov 2023 07:32 )   PT: 11.8 sec;   INR: 1.07 ratio         PTT - ( 24 Nov 2023 04:45 )  PTT:61.0 sec      IMAGING: Reviewed by me.     US Duplex Venous Lower Ext Complete, Bilateral (11.23.23 @ 18:30)   IMPRESSION:  1. Findings compatible with deep venous thrombosis involving the right   popliteal vein, gastrocnemius vein, posterior tibial and peroneal veins.  2. No evidence for deep venous thrombosis within the left lower extremity   veins.    TTE Echo Limited or F/U (11.22.23 @ 16:32)   Summary:   1. Left ventricular ejection fraction, by visual estimation, is 45 to   50%.   2. Mildly decreased global left ventricular systolic function.   3. The mitral in-flow pattern reveals no discernable A-wave, therefore   no comment on diastolic function can be made.   4. Mildly enlarged right ventricle with mildly reduced RV systolic   function, estimated PASP 31 mmHg.   5. Severely enlarged left atrium.   6. Right atrial enlargement.   7. Mild mitral annular calcification.   8. Mild mitral valve regurgitation.   9. Mild-moderate tricuspid regurgitation.  10. Sclerotic aortic valve with normal opening.  11. There is no evidence of pericardial effusion.  12. Compared to the prior TTE study from 11/14/23, interval reduction in   LV EF.    CT Head No Cont (11.22.23 @ 16:25)   IMPRESSION:  Decreased mass effect on the left lateral ventricle compared   with the CT and MR of 11/16/2023 and 11/19/2023 respectively. There is   residual hemorrhage seen in association with hemorrhagic component of   infarct in the left lentiform nucleus region decreased in conspicuity   compared with the prior. Left insular hemorrhage in association with the   left MCA infarct is also noted consistent with hemorrhagic transformation   of infarct in this region. The hemorrhage in the left insular region does   appear slightly more conspicuous compared with the prior CT 11/16/2023   and is consistent with the findings of the patient's MR 11/19/2023.    CT Heart Left Atrium w/ IV Cont (11.22.23 @ 11:22)   IMPRESSION:  Cardiac:  Multiple large filling defects noted at the distal appendage at least >2   cm and also near the proximal appendage measures 1.5 cm x 1.2 cm,   consistent with left appendage thrombi.  Dilated left atrial appendage windsock in appearance with orifice width   measures 4.5 cm x 3.0 cm  Severely biatrial enlargement.  Non-cardiac:  Right upper lobe pulmonary artery embolism.  Possible pulmonary hypertension.  New right lower lobe superior segment infiltrate (possibly infectious or   inflammatory)    MR Head No Cont (11.19.23 @ 15:52)   IMPRESSION: Redemonstration of an evolving acute/subacute large   left-sided MCA distribution infarction with gross hemorrhagic   transformation in the left basal ganglia as well as evidence of petechial   hemorrhagic transformation within the left frontotemporal and parietal   cortices.  Associated cytotoxic edema, sulcal effacement, and mass effect as well as   shift of the midline structures from left to right measuring 5.5 mm   appears unchanged. There is no herniation.    CT Head No Cont (11.16.23 @ 18:05)   IMPRESSION: Stable moderate to large left MCA territory infarction   involving the left frontal lobe, left basal ganglia, left insular ribbon,   left parietal occipital lobe, and anterior left temporal lobe with stable   mild hemorrhage in the left basal ganglia. Left-to-right midline shift   measures 5.5 mm.    CT Head No Cont (11.14.23 @ 18:57)   IMPRESSION:  Stable moderate to large acute left MCA infarct with mild hemorrhage    TTE Echo Complete w/o Contrast w/ Doppler (11.14.23 @ 15:57)   Summary:   1. Left ventricular ejection fraction, by visual estimation, is 55 to   60%.   2. Normal global left ventricular systolic function.   3. The mitral in-flow pattern reveals no discernable A-wave, therefore   no comment on diastolic function can be made.   4. Normal right ventricular size and function.   5. Mild to moderately enlarged right atrium.   6. Moderately enlarged left atrium.   7. Trace mitral valve regurgitation.   8. Mild-moderate tricuspid regurgitation.   9. Sclerotic aortic valve with decreased opening.  10. Estimated pulmonary artery systolic pressure is 41.6 mmHg assuming a   right atrial pressure of 5 mmHg, which is consistent with mild pulmonary   hypertension.    CT Head No Cont (11.14.23 @ 11:36)   IMPRESSION: Evolving left MCA infarct is identified with hemorrhagic   transformation now seen with mass effect on left lateral ventricles and   left-to-right shift.    US Duplex Venous Lower Ext Complete, Bilateral (11.14.23 @ 10:38)   IMPRESSION:  No evidence of deep venous thrombosis in either lower extremity.  Left calf veins not visualized.  Small right popliteal fossa cyst.    CT Angio Brain, Neck and perfusion Stroke Protocol  w/ IV Cont (11.13.23 @ 11:18)   CTA BRAIN:  Acute thrombus in the distal left MCA M1 segment. Diminished flow in the   distal branches.  Severe bilateral cavernous carotid artery calcifications. Fetal origins   the bilateral posterior cerebral arteries. The [place] and   vertebrobasilar system are otherwise unremarkable without evidence of   stenosis, additional occlusion or saccular aneurysm dilation. No evidence   for arterial venous malformation. The vertebral arteries are codominant.    CTA NECK:  Mild bilateral carotid bulb calcifications. Mild stenosis in the left   carotid bulb. The right internal carotid arteries tortuous.  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 There is normal relationship to   the great vessels. The common carotid arteries, internal carotid arteries   and vertebral arteries shows no other evidence of significant stenosis,   occlusion or saccular aneurysm dilation. The vertebral arteries are   codominant.    CT PERFUSION:  Patient has only had less than 2 hours of symptoms may influence the CT   perfusion.  CBF<30% volume: 26 ml left MCA territory.  Tmax>6.0 s volume: 193 ml left MCA territory  Mismatch volume: 167 ml  Mismatch ratio: 7.4  IMPRESSION:  Thrombus in the distal left MCA M1 segment with diminished flow in the   distal MCA segments. 167 mL area of penumbra in the left MCA territory.     CT Brain Stroke Protocol (11.13.23 @ 11:15)   IMPRESSION:  Dense left MCA M1 segment suggesting intraluminal thrombus.   Mild chronic microvascular changes without evidence of an acute   transcortical infarction or hemorrhage.   Preliminary note, offical recommendations pending attending review/signature   [facility] Stroke Team  Progress Note     HPI:  77 yo M PMH of HTN, HLD, AFib, CKD, obesity, gout, prostate cancer, DVT/PE, OA s/p multiple orthopedic procedures, spinal stenosis, presented on 11/13/23 for elective left atrial appendage occlusion procedure, and developed a MCA syndrome due to acute L M1 occlusion as demonstrated on CTA Head and Neck. Patient has a long h/o AFib, which is now considered permanent. He is on rate control with metoprolol 25 mg bid, and anticoagulation with Pradaxa.  He has a long Hx of arthritis and had multiple hip replacements, bilateral knee replacements and also has neck and back pain, but is unable to take NSAIDs due to bleeding risks. He has an unsteady gait and uses a cane for assistance.  Patient presented on 11/13 for elective left atrial appendage occlusion with Watchman device/HARJINDER w/ Dr Bobo, and Pradaxa was held for 2 days prior to procedure. 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   Initial NIHSS 26, MRS 1. Patient s/p tenecteplase administration at 11:16 11/13/23 and TICI 2A thrombectomy with Dr. Soliman.    SUBJECTIVE: No events overnight.  No new neurologic complaints.  ROS reported negative unless otherwise noted.    acetaminophen     Tablet .. 650 milliGRAM(s) Oral every 6 hours PRN  albuterol/ipratropium (CFC free) Inhaler. 1 Puff(s) Inhalation four times a day PRN  allopurinol 300 milliGRAM(s) Oral daily  aspirin  chewable 81 milliGRAM(s) Oral daily  atorvastatin 40 milliGRAM(s) Oral at bedtime  bisacodyl Suppository 10 milliGRAM(s) Rectal daily PRN  chlorhexidine 2% Cloths 1 Application(s) Topical daily  dextrose 5%. 1000 milliLiter(s) IV Continuous <Continuous>  dextrose 5%. 1000 milliLiter(s) IV Continuous <Continuous>  dextrose 50% Injectable 25 Gram(s) IV Push once  dextrose 50% Injectable 12.5 Gram(s) IV Push once  dextrose 50% Injectable 25 Gram(s) IV Push once  dextrose Oral Gel 15 Gram(s) Oral once PRN  DULoxetine 30 milliGRAM(s) Oral daily  famotidine    Tablet 20 milliGRAM(s) Oral two times a day  gabapentin 300 milliGRAM(s) Oral two times a day  glucagon  Injectable 1 milliGRAM(s) IntraMuscular once  heparin  Infusion 1050 Unit(s)/Hr IV Continuous <Continuous>  insulin lispro (ADMELOG) corrective regimen sliding scale   SubCutaneous Before meals and at bedtime  metoprolol tartrate 25 milliGRAM(s) Oral two times a day  nystatin Powder 1 Application(s) Topical two times a day  polyethylene glycol 3350 17 Gram(s) Oral daily  predniSONE   Tablet 5 milliGRAM(s) Oral daily  senna 2 Tablet(s) Oral at bedtime      PHYSICAL EXAM:   Vital Signs Last 24 Hrs  T(C): 36.8 (24 Nov 2023 08:00), Max: 36.9 (24 Nov 2023 00:13)  T(F): 98.2 (24 Nov 2023 08:00), Max: 98.5 (24 Nov 2023 00:13)  HR: 88 (24 Nov 2023 08:00) (68 - 94)  BP: 139/74 (24 Nov 2023 08:00) (112/64 - 139/74)  BP(mean): 89 (24 Nov 2023 08:00) (69 - 89)  RR: 20 (24 Nov 2023 08:00) (15 - 25)  SpO2: 100% (24 Nov 2023 08:00) (94% - 100%)    Parameters below as of 24 Nov 2023 08:00  Patient On (Oxygen Delivery Method): room air    General: No acute distress    NEUROLOGICAL EXAM:  Mental status: Awake, globally aphasic with no verbal output. Right sided visual willow-neglect present.     Cranial Nerves:  Pupils equal and react symmetrically to light. Blink to threat intermittently on right eye. Blink to threat present out of left eye. Left gaze preference with right gaze palsy that does cross midline. Right facial weakness noted.    Motor exam: There is normal bulk and tone.  There is no tremor.  RLE: slight withdrawal due to noxious stimuli. Trace movement noted within bed.   RUE: 0/5 strength with no movement. Grimaces to noxious stimuli with no movement.  Strength is 5/5 in the left arm and leg with no drift.    Sensation: Grimaces to noxious stimuli in all extremities.    Coordination/ Gait: unable to assess dysmetria on finger to nose testing    LABS:                        10.0   6.80  )-----------( 286      ( 24 Nov 2023 06:22 )             31.8    11-24    145  |  107  |  18.7  ----------------------------<  153<H>  3.7   |  28.0  |  0.85    Ca    8.9      24 Nov 2023 06:22    PT/INR - ( 23 Nov 2023 07:32 )   PT: 11.8 sec;   INR: 1.07 ratio         PTT - ( 24 Nov 2023 04:45 )  PTT:61.0 sec      IMAGING: Reviewed by me.     CT Head No Cont (11.24.23 @ 11:12)   IMPRESSION:  Redemonstration of acute left frontotemporal infarct.  Decreased conspicuity of subtly increased attenuation of gray matter   within the region of infarction. This may represent spared tissue and/or   petechial hemorrhagic transformation.  Slightly decreased mass effect on the left lateral ventricle and   rightward midline shift, currently1 mm, previously 2 mm.  Basal cisterns are visualized. No hydrocephalus.    US Duplex Venous Lower Ext Complete, Bilateral (11.23.23 @ 18:30)   IMPRESSION:  1. Findings compatible with deep venous thrombosis involving the right   popliteal vein, gastrocnemius vein, posterior tibial and peroneal veins.  2. No evidence for deep venous thrombosis within the left lower extremity   veins.    TTE Echo Limited or F/U (11.22.23 @ 16:32)   Summary:   1. Left ventricular ejection fraction, by visual estimation, is 45 to   50%.   2. Mildly decreased global left ventricular systolic function.   3. The mitral in-flow pattern reveals no discernable A-wave, therefore   no comment on diastolic function can be made.   4. Mildly enlarged right ventricle with mildly reduced RV systolic   function, estimated PASP 31 mmHg.   5. Severely enlarged left atrium.   6. Right atrial enlargement.   7. Mild mitral annular calcification.   8. Mild mitral valve regurgitation.   9. Mild-moderate tricuspid regurgitation.  10. Sclerotic aortic valve with normal opening.  11. There is no evidence of pericardial effusion.  12. Compared to the prior TTE study from 11/14/23, interval reduction in   LV EF.    CT Head No Cont (11.22.23 @ 16:25)   IMPRESSION:  Decreased mass effect on the left lateral ventricle compared   with the CT and MR of 11/16/2023 and 11/19/2023 respectively. There is   residual hemorrhage seen in association with hemorrhagic component of   infarct in the left lentiform nucleus region decreased in conspicuity   compared with the prior. Left insular hemorrhage in association with the   left MCA infarct is also noted consistent with hemorrhagic transformation   of infarct in this region. The hemorrhage in the left insular region does   appear slightly more conspicuous compared with the prior CT 11/16/2023   and is consistent with the findings of the patient's MR 11/19/2023.    CT Heart Left Atrium w/ IV Cont (11.22.23 @ 11:22)   IMPRESSION:  Cardiac:  Multiple large filling defects noted at the distal appendage at least >2   cm and also near the proximal appendage measures 1.5 cm x 1.2 cm,   consistent with left appendage thrombi.  Dilated left atrial appendage windsock in appearance with orifice width   measures 4.5 cm x 3.0 cm  Severely biatrial enlargement.  Non-cardiac:  Right upper lobe pulmonary artery embolism.  Possible pulmonary hypertension.  New right lower lobe superior segment infiltrate (possibly infectious or   inflammatory)    MR Head No Cont (11.19.23 @ 15:52)   IMPRESSION: Redemonstration of an evolving acute/subacute large   left-sided MCA distribution infarction with gross hemorrhagic   transformation in the left basal ganglia as well as evidence of petechial   hemorrhagic transformation within the left frontotemporal and parietal   cortices.  Associated cytotoxic edema, sulcal effacement, and mass effect as well as   shift of the midline structures from left to right measuring 5.5 mm   appears unchanged. There is no herniation.    CT Head No Cont (11.16.23 @ 18:05)   IMPRESSION: Stable moderate to large left MCA territory infarction   involving the left frontal lobe, left basal ganglia, left insular ribbon,   left parietal occipital lobe, and anterior left temporal lobe with stable   mild hemorrhage in the left basal ganglia. Left-to-right midline shift   measures 5.5 mm.    CT Head No Cont (11.14.23 @ 18:57)   IMPRESSION:  Stable moderate to large acute left MCA infarct with mild hemorrhage    TTE Echo Complete w/o Contrast w/ Doppler (11.14.23 @ 15:57)   Summary:   1. Left ventricular ejection fraction, by visual estimation, is 55 to   60%.   2. Normal global left ventricular systolic function.   3. The mitral in-flow pattern reveals no discernable A-wave, therefore   no comment on diastolic function can be made.   4. Normal right ventricular size and function.   5. Mild to moderately enlarged right atrium.   6. Moderately enlarged left atrium.   7. Trace mitral valve regurgitation.   8. Mild-moderate tricuspid regurgitation.   9. Sclerotic aortic valve with decreased opening.  10. Estimated pulmonary artery systolic pressure is 41.6 mmHg assuming a   right atrial pressure of 5 mmHg, which is consistent with mild pulmonary   hypertension.    CT Head No Cont (11.14.23 @ 11:36)   IMPRESSION: Evolving left MCA infarct is identified with hemorrhagic   transformation now seen with mass effect on left lateral ventricles and   left-to-right shift.    US Duplex Venous Lower Ext Complete, Bilateral (11.14.23 @ 10:38)   IMPRESSION:  No evidence of deep venous thrombosis in either lower extremity.  Left calf veins not visualized.  Small right popliteal fossa cyst.    CT Angio Brain, Neck and perfusion Stroke Protocol  w/ IV Cont (11.13.23 @ 11:18)   CTA BRAIN:  Acute thrombus in the distal left MCA M1 segment. Diminished flow in the   distal branches.  Severe bilateral cavernous carotid artery calcifications. Fetal origins   the bilateral posterior cerebral arteries. The [place] and   vertebrobasilar system are otherwise unremarkable without evidence of   stenosis, additional occlusion or saccular aneurysm dilation. No evidence   for arterial venous malformation. The vertebral arteries are codominant.    CTA NECK:  Mild bilateral carotid bulb calcifications. Mild stenosis in the left   carotid bulb. The right internal carotid arteries tortuous.  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 There is normal relationship to   the great vessels. The common carotid arteries, internal carotid arteries   and vertebral arteries shows no other evidence of significant stenosis,   occlusion or saccular aneurysm dilation. The vertebral arteries are   codominant.    CT PERFUSION:  Patient has only had less than 2 hours of symptoms may influence the CT   perfusion.  CBF<30% volume: 26 ml left MCA territory.  Tmax>6.0 s volume: 193 ml left MCA territory  Mismatch volume: 167 ml  Mismatch ratio: 7.4  IMPRESSION:  Thrombus in the distal left MCA M1 segment with diminished flow in the   distal MCA segments. 167 mL area of penumbra in the left MCA territory.     CT Brain Stroke Protocol (11.13.23 @ 11:15)   IMPRESSION:  Dense left MCA M1 segment suggesting intraluminal thrombus.   Mild chronic microvascular changes without evidence of an acute   transcortical infarction or hemorrhage.   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 Patient has a long h/o AFib, which is now considered permanent. He is on rate control with metoprolol 25 mg bid, and anticoagulation with Pradaxa.  He has a long Hx of arthritis and had multiple hip replacements, bilateral knee replacements and also has neck and back pain, but is unable to take NSAIDs due to bleeding risks. He has an unsteady gait and uses a cane for assistance.  Patient presented on 11/13 for elective left atrial appendage occlusion with Watchman device/HARJINDER w/ Dr Bobo, and Pradaxa was held for 2 days prior to procedure. 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   Initial NIHSS 26, MRS 1. Patient s/p tenecteplase administration at 11:16 11/13/23 and TICI 2A thrombectomy with Dr. Soliman.    SUBJECTIVE: No events overnight.  No new neurologic complaints.  ROS reported negative unless otherwise noted.    acetaminophen     Tablet .. 650 milliGRAM(s) Oral every 6 hours PRN  albuterol/ipratropium (CFC free) Inhaler. 1 Puff(s) Inhalation four times a day PRN  allopurinol 300 milliGRAM(s) Oral daily  aspirin  chewable 81 milliGRAM(s) Oral daily  atorvastatin 40 milliGRAM(s) Oral at bedtime  bisacodyl Suppository 10 milliGRAM(s) Rectal daily PRN  chlorhexidine 2% Cloths 1 Application(s) Topical daily  dextrose 5%. 1000 milliLiter(s) IV Continuous <Continuous>  dextrose 5%. 1000 milliLiter(s) IV Continuous <Continuous>  dextrose 50% Injectable 25 Gram(s) IV Push once  dextrose 50% Injectable 12.5 Gram(s) IV Push once  dextrose 50% Injectable 25 Gram(s) IV Push once  dextrose Oral Gel 15 Gram(s) Oral once PRN  DULoxetine 30 milliGRAM(s) Oral daily  famotidine    Tablet 20 milliGRAM(s) Oral two times a day  gabapentin 300 milliGRAM(s) Oral two times a day  glucagon  Injectable 1 milliGRAM(s) IntraMuscular once  heparin  Infusion 1050 Unit(s)/Hr IV Continuous <Continuous>  insulin lispro (ADMELOG) corrective regimen sliding scale   SubCutaneous Before meals and at bedtime  metoprolol tartrate 25 milliGRAM(s) Oral two times a day  nystatin Powder 1 Application(s) Topical two times a day  polyethylene glycol 3350 17 Gram(s) Oral daily  predniSONE   Tablet 5 milliGRAM(s) Oral daily  senna 2 Tablet(s) Oral at bedtime      PHYSICAL EXAM:   Vital Signs Last 24 Hrs  T(C): 36.8 (24 Nov 2023 08:00), Max: 36.9 (24 Nov 2023 00:13)  T(F): 98.2 (24 Nov 2023 08:00), Max: 98.5 (24 Nov 2023 00:13)  HR: 88 (24 Nov 2023 08:00) (68 - 94)  BP: 139/74 (24 Nov 2023 08:00) (112/64 - 139/74)  BP(mean): 89 (24 Nov 2023 08:00) (69 - 89)  RR: 20 (24 Nov 2023 08:00) (15 - 25)  SpO2: 100% (24 Nov 2023 08:00) (94% - 100%)    Parameters below as of 24 Nov 2023 08:00  Patient On (Oxygen Delivery Method): room air    General: No acute distress    NEUROLOGICAL EXAM:  Mental status: Awake, globally aphasic with no verbal output. Nods and mimics. Right sided visual willow-neglect present.     Cranial Nerves:  Pupils equal and react symmetrically to light. Blink to threat intermittently on right eye. Blink to threat present out of left eye. Left gaze preference with right gaze palsy that does cross midline. Right facial weakness noted.    Motor exam: There is normal bulk and tone.  There is no tremor.  RLE: slight withdrawal due to noxious stimuli. Trace movement noted within bed.   RUE: 0/5 strength with no movement. Grimaces to noxious stimuli with no movement.  Strength is 5/5 in the left arm and leg with no drift.    Sensation: Grimaces to noxious stimuli in all extremities.    Coordination/ Gait: unable to assess dysmetria on finger to nose testing    LABS:                        10.0   6.80  )-----------( 286      ( 24 Nov 2023 06:22 )             31.8    11-24    145  |  107  |  18.7  ----------------------------<  153<H>  3.7   |  28.0  |  0.85    Ca    8.9      24 Nov 2023 06:22    PT/INR - ( 23 Nov 2023 07:32 )   PT: 11.8 sec;   INR: 1.07 ratio         PTT - ( 24 Nov 2023 04:45 )  PTT:61.0 sec      IMAGING: Reviewed by me.     CT Head No Cont (11.24.23 @ 11:12)   IMPRESSION:  Redemonstration of acute left frontotemporal infarct.  Decreased conspicuity of subtly increased attenuation of gray matter   within the region of infarction. This may represent spared tissue and/or   petechial hemorrhagic transformation.  Slightly decreased mass effect on the left lateral ventricle and   rightward midline shift, currently1 mm, previously 2 mm.  Basal cisterns are visualized. No hydrocephalus.    US Duplex Venous Lower Ext Complete, Bilateral (11.23.23 @ 18:30)   IMPRESSION:  1. Findings compatible with deep venous thrombosis involving the right   popliteal vein, gastrocnemius vein, posterior tibial and peroneal veins.  2. No evidence for deep venous thrombosis within the left lower extremity   veins.    TTE Echo Limited or F/U (11.22.23 @ 16:32)   Summary:   1. Left ventricular ejection fraction, by visual estimation, is 45 to   50%.   2. Mildly decreased global left ventricular systolic function.   3. The mitral in-flow pattern reveals no discernable A-wave, therefore   no comment on diastolic function can be made.   4. Mildly enlarged right ventricle with mildly reduced RV systolic   function, estimated PASP 31 mmHg.   5. Severely enlarged left atrium.   6. Right atrial enlargement.   7. Mild mitral annular calcification.   8. Mild mitral valve regurgitation.   9. Mild-moderate tricuspid regurgitation.  10. Sclerotic aortic valve with normal opening.  11. There is no evidence of pericardial effusion.  12. Compared to the prior TTE study from 11/14/23, interval reduction in   LV EF.    CT Head No Cont (11.22.23 @ 16:25)   IMPRESSION:  Decreased mass effect on the left lateral ventricle compared   with the CT and MR of 11/16/2023 and 11/19/2023 respectively. There is   residual hemorrhage seen in association with hemorrhagic component of   infarct in the left lentiform nucleus region decreased in conspicuity   compared with the prior. Left insular hemorrhage in association with the   left MCA infarct is also noted consistent with hemorrhagic transformation   of infarct in this region. The hemorrhage in the left insular region does   appear slightly more conspicuous compared with the prior CT 11/16/2023   and is consistent with the findings of the patient's MR 11/19/2023.    CT Heart Left Atrium w/ IV Cont (11.22.23 @ 11:22)   IMPRESSION:  Cardiac:  Multiple large filling defects noted at the distal appendage at least >2   cm and also near the proximal appendage measures 1.5 cm x 1.2 cm,   consistent with left appendage thrombi.  Dilated left atrial appendage windsock in appearance with orifice width   measures 4.5 cm x 3.0 cm  Severely biatrial enlargement.  Non-cardiac:  Right upper lobe pulmonary artery embolism.  Possible pulmonary hypertension.  New right lower lobe superior segment infiltrate (possibly infectious or   inflammatory)    MR Head No Cont (11.19.23 @ 15:52)   IMPRESSION: Redemonstration of an evolving acute/subacute large   left-sided MCA distribution infarction with gross hemorrhagic   transformation in the left basal ganglia as well as evidence of petechial   hemorrhagic transformation within the left frontotemporal and parietal   cortices.  Associated cytotoxic edema, sulcal effacement, and mass effect as well as   shift of the midline structures from left to right measuring 5.5 mm   appears unchanged. There is no herniation.    CT Head No Cont (11.16.23 @ 18:05)   IMPRESSION: Stable moderate to large left MCA territory infarction   involving the left frontal lobe, left basal ganglia, left insular ribbon,   left parietal occipital lobe, and anterior left temporal lobe with stable   mild hemorrhage in the left basal ganglia. Left-to-right midline shift   measures 5.5 mm.    CT Head No Cont (11.14.23 @ 18:57)   IMPRESSION:  Stable moderate to large acute left MCA infarct with mild hemorrhage    TTE Echo Complete w/o Contrast w/ Doppler (11.14.23 @ 15:57)   Summary:   1. Left ventricular ejection fraction, by visual estimation, is 55 to   60%.   2. Normal global left ventricular systolic function.   3. The mitral in-flow pattern reveals no discernable A-wave, therefore   no comment on diastolic function can be made.   4. Normal right ventricular size and function.   5. Mild to moderately enlarged right atrium.   6. Moderately enlarged left atrium.   7. Trace mitral valve regurgitation.   8. Mild-moderate tricuspid regurgitation.   9. Sclerotic aortic valve with decreased opening.  10. Estimated pulmonary artery systolic pressure is 41.6 mmHg assuming a   right atrial pressure of 5 mmHg, which is consistent with mild pulmonary   hypertension.    CT Head No Cont (11.14.23 @ 11:36)   IMPRESSION: Evolving left MCA infarct is identified with hemorrhagic   transformation now seen with mass effect on left lateral ventricles and   left-to-right shift.    US Duplex Venous Lower Ext Complete, Bilateral (11.14.23 @ 10:38)   IMPRESSION:  No evidence of deep venous thrombosis in either lower extremity.  Left calf veins not visualized.  Small right popliteal fossa cyst.    CT Angio Brain, Neck and perfusion Stroke Protocol  w/ IV Cont (11.13.23 @ 11:18)   CTA BRAIN:  Acute thrombus in the distal left MCA M1 segment. Diminished flow in the   distal branches.  Severe bilateral cavernous carotid artery calcifications. Fetal origins   the bilateral posterior cerebral arteries. The [place] and   vertebrobasilar system are otherwise unremarkable without evidence of   stenosis, additional occlusion or saccular aneurysm dilation. No evidence   for arterial venous malformation. The vertebral arteries are codominant.    CTA NECK:  Mild bilateral carotid bulb calcifications. Mild stenosis in the left   carotid bulb. The right internal carotid arteries tortuous.  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 There is normal relationship to   the great vessels. The common carotid arteries, internal carotid arteries   and vertebral arteries shows no other evidence of significant stenosis,   occlusion or saccular aneurysm dilation. The vertebral arteries are   codominant.    CT PERFUSION:  Patient has only had less than 2 hours of symptoms may influence the CT   perfusion.  CBF<30% volume: 26 ml left MCA territory.  Tmax>6.0 s volume: 193 ml left MCA territory  Mismatch volume: 167 ml  Mismatch ratio: 7.4  IMPRESSION:  Thrombus in the distal left MCA M1 segment with diminished flow in the   distal MCA segments. 167 mL area of penumbra in the left MCA territory.     CT Brain Stroke Protocol (11.13.23 @ 11:15)   IMPRESSION:  Dense left MCA M1 segment suggesting intraluminal thrombus.   Mild chronic microvascular changes without evidence of an acute   transcortical infarction or hemorrhage.

## 2023-11-24 NOTE — PROGRESS NOTE ADULT - SUBJECTIVE AND OBJECTIVE BOX
Patient nonverbal.  Tracks and attempts to communicate.  Limited command following.    FUNCTIONAL PROGRESS  11/22 SLP  Speech Language Pathology Recommendations: 1. Continue Easy to chew solids, MILDLY thick liquids as tolerated.2. Aspiration precautions3. Crush meds4. 1:1 assist for all PO5. Upright for all PO, small bites/sips, slow rate6. Check oral cavity after each bite for oral stasis (written on whiteboard) 7. Oral care8. SLP to follow for speech and a trial of dysphagia tx as schedule allows     11/20 PT  Bed Mobility  Bed Mobility Training Rolling/Turning: moderate assist (50% patient effort);  2 person assist;  verbal cues;  bed rails  Bed Mobility Training Sit-to-Supine: maximum assist (25% patient effort);  2 person assist;  verbal cues  Bed Mobility Training Supine-to-Sit: maximum assist (25% patient effort);  2 person assist;  verbal cues  Bed Mobility Training Limitations: decreased ability to use arms for pushing/pulling;  decreased ability to use legs for bridging/pushing;  impaired ability to control trunk for mobility;  decreased strength;  impaired balance;  impaired postural control;  impaired motor control    Sit-Stand Transfer Training  Transfer Training Sit-to-Stand Transfer: attempted with max assist from front blocking knees, but pt unable to clear buttocks from bed.  Sit-to-Stand Transfer Training Transfer Safety Analysis: decreased weight-shifting ability;  decreased strength;  impaired coordination        VITALS  T(C): 36.8 (11-24-23 @ 08:00), Max: 36.9 (11-24-23 @ 00:13)  HR: 88 (11-24-23 @ 08:00) (68 - 94)  BP: 139/74 (11-24-23 @ 08:00) (112/64 - 139/74)  RR: 20 (11-24-23 @ 08:00) (15 - 25)  SpO2: 100% (11-24-23 @ 08:00) (94% - 100%)  Wt(kg): --    MEDICATIONS   acetaminophen     Tablet .. 650 milliGRAM(s) every 6 hours PRN  albuterol/ipratropium (CFC free) Inhaler. 1 Puff(s) four times a day PRN  allopurinol 300 milliGRAM(s) daily  aspirin  chewable 81 milliGRAM(s) daily  atorvastatin 40 milliGRAM(s) at bedtime  bisacodyl Suppository 10 milliGRAM(s) daily PRN  chlorhexidine 2% Cloths 1 Application(s) daily  dextrose 5%. 1000 milliLiter(s) <Continuous>  dextrose 5%. 1000 milliLiter(s) <Continuous>  dextrose 50% Injectable 12.5 Gram(s) once  dextrose 50% Injectable 25 Gram(s) once  dextrose 50% Injectable 25 Gram(s) once  dextrose Oral Gel 15 Gram(s) once PRN  DULoxetine 30 milliGRAM(s) daily  famotidine    Tablet 20 milliGRAM(s) two times a day  gabapentin 300 milliGRAM(s) two times a day  glucagon  Injectable 1 milliGRAM(s) once  heparin  Infusion 1050 Unit(s)/Hr <Continuous>  insulin lispro (ADMELOG) corrective regimen sliding scale   Before meals and at bedtime  metoprolol tartrate 25 milliGRAM(s) two times a day  nystatin Powder 1 Application(s) two times a day  polyethylene glycol 3350 17 Gram(s) daily  predniSONE   Tablet 5 milliGRAM(s) daily  senna 2 Tablet(s) at bedtime      RECENT LABS/IMAGING  - Reviewed Today                        10.0   6.80  )-----------( 286      ( 24 Nov 2023 06:22 )             31.8     11-24    145  |  107  |  18.7  ----------------------------<  153<H>  3.7   |  28.0  |  0.85    Ca    8.9      24 Nov 2023 06:22      PT/INR - ( 23 Nov 2023 07:32 )   PT: 11.8 sec;   INR: 1.07 ratio         PTT - ( 24 Nov 2023 04:45 )  PTT:61.0 sec  Urinalysis Basic - ( 24 Nov 2023 06:22 )    Color: x / Appearance: x / SG: x / pH: x  Gluc: 153 mg/dL / Ketone: x  / Bili: x / Urobili: x   Blood: x / Protein: x / Nitrite: x   Leuk Esterase: x / RBC: x / WBC x   Sq Epi: x / Non Sq Epi: x / Bacteria: x        MRI HEAD - Redemonstration of an evolving acute/subacute large   left-sided MCA distribution infarction with gross hemorrhagic   transformation in the left basal ganglia as well as evidence of petechial   hemorrhagic transformation within the left frontotemporal and parietal   cortices. Associated cytotoxic edema, sulcal effacement, and mass effect as well as   shift of the midline structures from left to right measuring 5.5 mm   appears unchanged. There is no herniation.      ----------------------------------------------------------------------------------------  PHYSICAL EXAM  Constitutional - NAD, Appears Comfortable  Extremities - No edema  Neurologic Exam -                    Cognitive - Awake, Alert      Communication - Nonverbal      Motor - No spontaneous movement of the BLE and right UE  Psychiatric - Calm   ----------------------------------------------------------------------------------------  ASSESSMENT/PLAN  78yMale with functional deficits after an acute CVA  Acute left MCA CVA - ASA, Heparin DRIP, Lipitor  HTN - Lopressor  Pain - Tylenol, Neurontin   DVT PPX - SCDs, Lovenox  Rehab/Impaired mobility and function - Patient continues to require hospitalization for the above diagnoses and ongoing active management of comorbid complications  that are substantially impairing functional ability and impairing quality of life necessitating ongoing medical management of these complications.     Recommend ongoing mobilization by staff to maintain cardiopulmonary function and prevention of secondary complications related to debility.      When medically optimized, based on the patient's diagnosis, current functional status, and potential for progress, recommend JHOAN, patient DOES NOT meet acute inpatient rehabilitation criteria. Patient needs a more prolonged stay to achieve transition to community living and would not be able to tolerate a comprehensive/intense rehab program of 3hours/day.     Will sign off at this time. Thank you for allowing me to be part of your patient's care. Please reconsult PMR for additional rehab recommendations or dispo needs if functional status changes. Discussed the specific management and recommendations above with rehab clinical care team/rehab liaison.

## 2023-11-24 NOTE — PROGRESS NOTE ADULT - ASSESSMENT
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 Patient presented on 11/13/23 for elective left atrial appendage occlusion with Watchman device/HARJINDER w/ Dr Bobo, and Pradaxa was held for 2 days prior to procedure. Whilst in holding patient developed a left MCA syndrome due to acute left M1 occlusion as demonstrated on CTH & Neck. On admission, NIHSS 26, MRS 1. Patient was given tenecteplase at 11:16 11/13/23, and subsequently underwent mechanical thrombectomy with TICI 2A recannulization with Dr. Soliman. 24 hour repeat CT head s/p tenecteplase administration showed evolving left MCA stroke with hemorrhagic transformation, and mass effect with left to right shift. MRI brain revealed redemonstration of an evolving acute/subacute large left-sided MCA distribution infarction with gross hemorrhagic transformation in the left basal ganglia as well as evidence of petechial hemorrhagic transformation within the left frontotemporal and parietal cortices. Suspected etiology of stroke is cardioembolic from atrial fibrillation when off anticoagulation for recent procedure, 2 cardiac thrombi were appreciated on CTA chest obtained 10 days prior to procedure. These were again seen on repeat CTA chest obtained 11/22/23 as well as new findings of right upper lobe PE.     NEURO:   -Neurologically ---  -Continue close monitoring for neurologic deterioration    -Stroke neuro checks q2hrs  -Neurologically appears to be tolerating SBP of 130s-160s. Can keep this SBP goal and avoid hypotension or rapid fluctuations in blood pressure   -ANTITHROMBOTIC THERAPY: Heparin gtt with goal ptt 40-60; although patient has a large left MCA stroke with hemorrhagic transformation, due to presence of cardiac thrombi on chest CTA, PE and recent embolic event, there is a high risk of recurrent stroke without anticoagulation therapy; overall benefit of anticoagulation (low ptt goal for now) outweighs risk of hemorrhage, now almost 10 days from stroke onset, as patient has evidence of active cardiac thrombi and recent cardioembolic event. This should be discussed with the family as well, regarding risk/benefits and alternatives. ASA if indicated from the cardiac standpoint if patient resumes anticoagulation. Do not bolus, avoid supra-therapeutic ptt levels, further titration pending clinical course. Repeat CT head for any acute change.  -Would obtain repeat head CT on 11/24/23 to ensure stability. Dependent on neuro imaging and clinical exam, would then consider increasing hep gtt PTT goal to 50-70.  -Would obtain head CT on 11/27 for further evaluation of evolution of stroke   -titrate statin to LDL goal less than 70, LDL=54, may resume home regimen of Rosuvastatin 10 mg PO QHS when patient able to tolerate PO   -MRI Brain w/o as noted  -Dysphagia screen: pass  -Physical therapy/OT/Speech eval/treatment.   -Neurosurgery input appreciated, patient not a hemicrani candidate     CARDIOVASCULAR:  -TTE findings as above, no acute findings; repeat TTE 11/22 with interval decrease in EF   -HARJINDER deferred by anesthesia.   -CTA chest findings as above, see neuro portion for recommendations regarding anticoagulation  -No cardiac intervention at this time as the risks>benefits as per EP  -cardiac monitoring w/ telemetry for now, further evaluation pending findings of noted workup                              HEMATOLOGY:   -H/H 10.0/31.8. Platelets 286. Monitor anemia/thrombocytopenia per primary team, patient should have all age and risk appropriate malignancy screenings with PCP or sooner if clinically suspected   -Bilateral lower extremity venous duplex negative for DVT   -Would obtain LE dopplers in setting of imaging revealing PE  -DVT ppx: Heparin s.c [] LMWH [x]     PULMONARY:   -Patient extubated 11/14/23  -protecting airway, saturating well     RENAL:   -BUN/Cr ___***. monitor urine output, maintain adequate hydration    -Na Goal:  145-150 secondary to cerebral edema.    ID:   -afebrile, leukocytosis resolved, monitor for si/sx of infection     OTHER:    -Extensive discussion held with patient and family at bedside regarding clinical course, risk vs benefit discussion regarding need for anticoagulation given findings of cardiac thrombi and PE on CTA chest; family expresses understanding; all questions and concerns addressed      DISPOSITION: Rehab or home depending on PT eval once stable and workup is complete    CORE MEASURES:        Admission NIHSS: 26     Tenecteplase : [x] YES [] NO      LDL/HDL/A1C: 54/67/6.2     Depression Screen- if depression hx and/or present      Statin Therapy: Atorvastatin 40 mg PO Daily      Dysphagia Screen: [x] PASS [] FAIL      Smoking [] YES [x] NO      Afib [x] YES [] NO     Stroke Education [x] YES [] NO    Obtain screening lower extremity venous ultrasound in patients who meet 1 or more of the following criteria as patient is high risk for DVT/PE on admission:   [] History of DVT/PE  []Hypercoagulable states (Factor V Leiden, Cancer, OCP, etc. )  []Prolonged immobility (hemiplegia/hemiparesis/post operative or any other extended immobilization)  [] Transferred from outside facility (Rehab or Long term care)  [] Age </= to 50   ASSESSMENT:   79 yo M PMH of HTN, HLD, AFib, CKD, obesity, gout, prostate cancer, DVT/PE, OA s/p multiple orthopedic procedures, spinal stenosis, presented on 11/13/23 for elective left atrial appendage occlusion procedure, and developed a left MCA syndrome due to acute left M1 occlusion as demonstrated on CT Head and Neck. Patient presented on 11/13/23 for elective left atrial appendage occlusion with Watchman device/HARJINDER w/ Dr Bobo, and Pradaxa was held for 2 days prior to procedure. Whilst in holding patient developed a left MCA syndrome due to acute left M1 occlusion as demonstrated on CTH & Neck. On admission, NIHSS 26, MRS 1. Patient was given tenecteplase at 11:16 11/13/23, and subsequently underwent mechanical thrombectomy with TICI 2A recannulization with Dr. Soliman. 24 hour repeat CT head s/p tenecteplase administration showed evolving left MCA stroke with hemorrhagic transformation, and mass effect with left to right shift. MRI brain revealed redemonstration of an evolving acute/subacute large left-sided MCA distribution infarction with gross hemorrhagic transformation in the left basal ganglia as well as evidence of petechial hemorrhagic transformation within the left frontotemporal and parietal cortices. Suspected etiology of stroke is cardioembolic from atrial fibrillation when off anticoagulation for recent procedure, 2 cardiac thrombi were appreciated on CTA chest obtained 10 days prior to procedure. These were again seen on repeat CTA chest obtained 11/22/23 as well as new findings of right upper lobe PE.     NEURO:   -Neurologically with no acute changes   -Continue close monitoring for neurologic deterioration    -Stroke neuro checks q2hrs  -Neurologically appears to be tolerating SBP of 130s-160s. Can keep this SBP goal and avoid hypotension or rapid fluctuations in blood pressure   -ANTITHROMBOTIC THERAPY: Heparin gtt with goal ptt 40-60; although patient has a large left MCA stroke with hemorrhagic transformation, due to presence of cardiac thrombi on chest CTA, PE and recent embolic event, there is a high risk of recurrent stroke without anticoagulation therapy; overall benefit of anticoagulation (low ptt goal for now) outweighs risk of hemorrhage, now almost 10 days from stroke onset, as patient has evidence of active cardiac thrombi and recent cardioembolic event. This should be discussed with the family as well, regarding risk/benefits and alternatives. ASA if indicated from the cardiac standpoint if patient resumes anticoagulation. Do not bolus, avoid supra-therapeutic ptt levels, further titration pending clinical course. Repeat CT head for any acute change.  -Repeat head CT 11/24 pending, if stable then consider increasing heparin gtt goal to 50-70.  -Would obtain head CT on 11/27 for further evaluation of evolution of stroke; once patient is stable on heparin gtt plan to transition to DOAC for long term AC   -titrate statin to LDL goal less than 70, LDL=54, may resume home regimen of Rosuvastatin 10 mg PO QHS when patient able to tolerate PO   -MRI Brain w/o as noted  -Dysphagia screen: pass  -Physical therapy/OT/Speech eval/treatment.   -Neurosurgery input appreciated, patient not a hemicrani candidate     CARDIOVASCULAR:  -TTE findings as above, no acute findings; repeat TTE 11/22 with interval decrease in EF   -HARJINDER deferred by anesthesia.   -CTA chest findings as above, see neuro portion for recommendations regarding anticoagulation  -No cardiac intervention at this time as the risks>benefits as per EP  -cardiac monitoring w/ telemetry for now, further evaluation pending findings of noted workup                              HEMATOLOGY:   -H/H 10.0/31.8. Platelets 286. Monitor anemia/thrombocytopenia per primary team, patient should have all age and risk appropriate malignancy screenings with PCP or sooner if clinically suspected   -Bilateral lower extremity venous duplex negative for DVT   -Would obtain LE dopplers in setting of imaging revealing PE  -DVT ppx: Heparin s.c [] LMWH [x]     PULMONARY:   -Patient extubated 11/14/23  -protecting airway, saturating well     RENAL:   -BUN/Cr 18.7/0.85. monitor urine output, maintain adequate hydration    -Na Goal:  145-150 secondary to cerebral edema.    ID:   -afebrile, leukocytosis resolved, monitor for si/sx of infection     OTHER:    -Extensive discussion held with patient and family at bedside regarding clinical course, risk vs benefit discussion regarding need for anticoagulation given findings of cardiac thrombi and PE on CTA chest; family expresses understanding; all questions and concerns addressed      DISPOSITION: Rehab or home depending on PT eval once stable and workup is complete    CORE MEASURES:        Admission NIHSS: 26     Tenecteplase : [x] YES [] NO      LDL/HDL/A1C: 54/67/6.2     Depression Screen- if depression hx and/or present      Statin Therapy: Atorvastatin 40 mg PO Daily      Dysphagia Screen: [x] PASS [] FAIL      Smoking [] YES [x] NO      Afib [x] YES [] NO     Stroke Education [x] YES [] NO    Obtain screening lower extremity venous ultrasound in patients who meet 1 or more of the following criteria as patient is high risk for DVT/PE on admission:   [] History of DVT/PE  []Hypercoagulable states (Factor V Leiden, Cancer, OCP, etc. 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 Patient presented on 11/13/23 for elective left atrial appendage occlusion with Watchman device/HARJINDER w/ Dr Bobo, and Pradaxa was held for 2 days prior to procedure. Whilst in holding patient developed a left MCA syndrome due to acute left M1 occlusion as demonstrated on CTH & Neck. On admission, NIHSS 26, MRS 1. Patient was given tenecteplase at 11:16 11/13/23, and subsequently underwent mechanical thrombectomy with TICI 2A recannulization with Dr. Soliman. 24 hour repeat CT head s/p tenecteplase administration showed evolving left MCA stroke with hemorrhagic transformation, and mass effect with left to right shift. MRI brain revealed redemonstration of an evolving acute/subacute large left-sided MCA distribution infarction with gross hemorrhagic transformation in the left basal ganglia as well as evidence of petechial hemorrhagic transformation within the left frontotemporal and parietal cortices. Suspected etiology of stroke is cardioembolic from atrial fibrillation when off anticoagulation for recent procedure, 2 cardiac thrombi were appreciated on CTA chest obtained 10 days prior to procedure. These were again seen on repeat CTA chest obtained 11/22/23 as well as new findings of right upper lobe PE.     NEURO:   -Neurologically with no acute changes   -Continue close monitoring for neurologic deterioration    -Stroke neuro checks q2hrs  -Neurologically appears to be tolerating SBP of 130s-160s. Can keep this SBP goal and avoid hypotension or rapid fluctuations in blood pressure   -ANTITHROMBOTIC THERAPY: Heparin gtt with goal ptt 40-60; although patient has a large left MCA stroke with hemorrhagic transformation, due to presence of cardiac thrombi on chest CTA, PE and recent embolic event, there is a high risk of recurrent stroke without anticoagulation therapy; overall benefit of anticoagulation (low ptt goal for now) outweighs risk of hemorrhage, now almost 10 days from stroke onset, as patient has evidence of active cardiac thrombi and recent cardioembolic event. This should be discussed with the family as well, regarding risk/benefits and alternatives. ASA if indicated from the cardiac standpoint if patient resumes anticoagulation. Do not bolus, avoid supra-therapeutic ptt levels, further titration pending clinical course. Repeat CT head for any acute change.  -Repeat CT Head 11/24 with findings of possible petechial hemorrhagic transformation, obtain repeat CTH 11/25 to assess for stability.   -Would obtain head CT on 11/27 for further evaluation of evolution of stroke; once patient is stable on heparin gtt plan to transition to DOAC for long term AC   -titrate statin to LDL goal less than 70, LDL=54, may resume home regimen of Rosuvastatin 10 mg PO QHS when patient able to tolerate PO   -MRI Brain w/o as noted  -Dysphagia screen: pass  -Physical therapy/OT/Speech eval/treatment.   -Neurosurgery input appreciated, patient not a hemicrani candidate     CARDIOVASCULAR:  -TTE findings as above, no acute findings; repeat TTE 11/22 with interval decrease in EF   -HARJINDER deferred by anesthesia.   -CTA chest findings as above, see neuro portion for recommendations regarding anticoagulation  -No cardiac intervention at this time as the risks>benefits as per EP  -cardiac monitoring w/ telemetry for now, further evaluation pending findings of noted workup                              HEMATOLOGY:   -H/H 10.0/31.8. Platelets 286. Monitor anemia/thrombocytopenia per primary team, patient should have all age and risk appropriate malignancy screenings with PCP or sooner if clinically suspected   -Bilateral lower extremity venous duplex negative for DVT   -Would obtain LE dopplers in setting of imaging revealing PE  -DVT ppx: Heparin s.c [] LMWH [x]     PULMONARY:   -Patient extubated 11/14/23  -protecting airway, saturating well     RENAL:   -BUN/Cr 18.7/0.85. monitor urine output, maintain adequate hydration    -Na Goal:  145-150 secondary to cerebral edema.    ID:   -afebrile, leukocytosis resolved, monitor for si/sx of infection     OTHER:    -Extensive discussion held with patient and family at bedside regarding clinical course, risk vs benefit discussion regarding need for anticoagulation given findings of cardiac thrombi and PE on CTA chest; family expresses understanding; all questions and concerns addressed      DISPOSITION: Rehab or home depending on PT eval once stable and workup is complete    CORE MEASURES:        Admission NIHSS: 26     Tenecteplase : [x] YES [] NO      LDL/HDL/A1C: 54/67/6.2     Depression Screen- if depression hx and/or present      Statin Therapy: Atorvastatin 40 mg PO Daily      Dysphagia Screen: [x] PASS [] FAIL      Smoking [] YES [x] NO      Afib [x] YES [] NO     Stroke Education [x] YES [] NO    Obtain screening lower extremity venous ultrasound in patients who meet 1 or more of the following criteria as patient is high risk for DVT/PE on admission:   [] History of DVT/PE  []Hypercoagulable states (Factor V Leiden, Cancer, OCP, etc. )  []Prolonged immobility (hemiplegia/hemiparesis/post operative or any other extended immobilization)  [] Transferred from outside facility (Rehab or Long term care)  [] Age </= to 50   ASSESSMENT:   79 yo M PMH of HTN, HLD, AFib, CKD, obesity, gout, prostate cancer, DVT/PE, OA s/p multiple orthopedic procedures, spinal stenosis, presented on 11/13/23 for elective left atrial appendage occlusion procedure, and developed a left MCA syndrome due to acute left M1 occlusion as demonstrated on CT Head and Neck. Patient presented on 11/13/23 for elective left atrial appendage occlusion with Watchman device/HARJINDER w/ Dr Bobo, and Pradaxa was held for 2 days prior to procedure. Whilst in holding patient developed a left MCA syndrome due to acute left M1 occlusion as demonstrated on CTH & Neck. On admission, NIHSS 26, MRS 1. Patient was given tenecteplase at 11:16 11/13/23, and subsequently underwent mechanical thrombectomy with TICI 2A recannulization with Dr. Soliman. 24 hour repeat CT head s/p tenecteplase administration showed evolving left MCA stroke with hemorrhagic transformation, and mass effect with left to right shift. MRI brain revealed redemonstration of an evolving acute/subacute large left-sided MCA distribution infarction with gross hemorrhagic transformation in the left basal ganglia as well as evidence of petechial hemorrhagic transformation within the left frontotemporal and parietal cortices. Suspected etiology of stroke is cardioembolic from atrial fibrillation when off anticoagulation for recent procedure, 2 cardiac thrombi were appreciated on CTA chest obtained 10 days prior to procedure. These were again seen on repeat CTA chest obtained 11/22/23 as well as new findings of right upper lobe PE.     NEURO:   -Neurologically with no acute changes   -Continue close monitoring for neurologic deterioration    -Stroke neuro checks q2hrs  -Neurologically appears to be tolerating SBP of 130s-160s. Can keep this SBP goal and avoid hypotension or rapid fluctuations in blood pressure   -ANTITHROMBOTIC THERAPY: Heparin gtt with goal ptt 50-70; although patient has a large left MCA stroke with hemorrhagic transformation, due to presence of cardiac thrombi on chest CTA, PE and recent embolic event, there is a high risk of recurrent stroke without anticoagulation therapy; overall benefit of anticoagulation (low ptt goal for now) outweighs risk of hemorrhage, now almost 10 days from stroke onset, as patient has evidence of active cardiac thrombi and recent cardioembolic event. This should be discussed with the family as well, regarding risk/benefits and alternatives. ASA if indicated from the cardiac standpoint if patient resumes anticoagulation. Do not bolus, avoid supra-therapeutic ptt levels, further titration pending clinical course. Repeat CT head for any acute change.  -Repeat CT Head 11/24 as above, obtain repeat CTH 11/25 to assess for stability.   -Would obtain head CT on 11/27 for further evaluation of evolution of stroke; once patient is stable on heparin gtt plan to transition to DOAC for long term AC   -titrate statin to LDL goal less than 70, LDL=54, may resume home regimen of Rosuvastatin 10 mg PO QHS when patient able to tolerate PO   -MRI Brain w/o as noted  -Dysphagia screen: pass  -Physical therapy/OT/Speech eval/treatment.   -Neurosurgery input appreciated, patient not a hemicrani candidate     CARDIOVASCULAR:  -TTE findings as above, no acute findings; repeat TTE 11/22 with interval decrease in EF   -HARJINDER deferred by anesthesia.   -CTA chest findings as above, see neuro portion for recommendations regarding anticoagulation  -No cardiac intervention at this time as the risks>benefits as per EP  -cardiac monitoring w/ telemetry for now, further evaluation pending findings of noted workup                              HEMATOLOGY:   -H/H 10.0/31.8. Platelets 286. Monitor anemia/thrombocytopenia per primary team, patient should have all age and risk appropriate malignancy screenings with PCP or sooner if clinically suspected   -Bilateral lower extremity venous duplex negative for DVT   -Would obtain LE dopplers in setting of imaging revealing PE  -DVT ppx: Heparin s.c [] LMWH [x]     PULMONARY:   -Patient extubated 11/14/23  -protecting airway, saturating well     RENAL:   -BUN/Cr 18.7/0.85. monitor urine output, maintain adequate hydration    -Na Goal:  145-150 secondary to cerebral edema.    ID:   -afebrile, leukocytosis resolved, monitor for si/sx of infection     OTHER:    -Extensive discussion held with patient and family at bedside regarding clinical course, risk vs benefit discussion regarding need for anticoagulation given findings of cardiac thrombi and PE on CTA chest; family expresses understanding; all questions and concerns addressed      DISPOSITION: Rehab or home depending on PT eval once stable and workup is complete    CORE MEASURES:        Admission NIHSS: 26     Tenecteplase : [x] YES [] NO      LDL/HDL/A1C: 54/67/6.2     Depression Screen- if depression hx and/or present      Statin Therapy: Atorvastatin 40 mg PO Daily      Dysphagia Screen: [x] PASS [] FAIL      Smoking [] YES [x] NO      Afib [x] YES [] NO     Stroke Education [x] YES [] NO    Obtain screening lower extremity venous ultrasound in patients who meet 1 or more of the following criteria as patient is high risk for DVT/PE on admission:   [] History of DVT/PE  []Hypercoagulable states (Factor V Leiden, Cancer, OCP, etc. )  []Prolonged immobility (hemiplegia/hemiparesis/post operative or any other extended immobilization)  [] Transferred from outside facility (Rehab or Long term care)  [] Age </= to 50

## 2023-11-24 NOTE — PROGRESS NOTE ADULT - SUBJECTIVE AND OBJECTIVE BOX
< from: US Duplex Venous Lower Ext Complete, Bilateral (11.23.23 @ 18:30) >  IMPRESSION:    1. Findings compatible with deep venous thrombosis involving the right   popliteal vein, gastrocnemius vein, posterior tibial and peroneal veins.  2. No evidence for deep venous thrombosis within the left lower extremity   veins.       Patient is a 78y old  Male who presents with a chief complaint of Lt MCA M1 thrombus (24 Nov 2023 08:40)    awake, follows few commands am  REVIEW OF SYSTEMS: Ams-unable    MEDICATIONS  (STANDING):  allopurinol 300 milliGRAM(s) Oral daily  aspirin  chewable 81 milliGRAM(s) Oral daily  atorvastatin 40 milliGRAM(s) Oral at bedtime  chlorhexidine 2% Cloths 1 Application(s) Topical daily  dextrose 5%. 1000 milliLiter(s) (50 mL/Hr) IV Continuous <Continuous>  dextrose 5%. 1000 milliLiter(s) (100 mL/Hr) IV Continuous <Continuous>  dextrose 50% Injectable 25 Gram(s) IV Push once  dextrose 50% Injectable 12.5 Gram(s) IV Push once  dextrose 50% Injectable 25 Gram(s) IV Push once  DULoxetine 30 milliGRAM(s) Oral daily  famotidine    Tablet 20 milliGRAM(s) Oral two times a day  gabapentin 300 milliGRAM(s) Oral two times a day  glucagon  Injectable 1 milliGRAM(s) IntraMuscular once  heparin  Infusion 1050 Unit(s)/Hr (10.5 mL/Hr) IV Continuous <Continuous>  insulin lispro (ADMELOG) corrective regimen sliding scale   SubCutaneous Before meals and at bedtime  metoprolol tartrate 25 milliGRAM(s) Oral two times a day  nystatin Powder 1 Application(s) Topical two times a day  polyethylene glycol 3350 17 Gram(s) Oral daily  predniSONE   Tablet 5 milliGRAM(s) Oral daily  senna 2 Tablet(s) Oral at bedtime    MEDICATIONS  (PRN):  acetaminophen     Tablet .. 650 milliGRAM(s) Oral every 6 hours PRN Temp greater or equal to 38C (100.4F), Mild Pain (1 - 3)  albuterol/ipratropium (CFC free) Inhaler. 1 Puff(s) Inhalation four times a day PRN Wheezing  bisacodyl Suppository 10 milliGRAM(s) Rectal daily PRN Constipation  dextrose Oral Gel 15 Gram(s) Oral once PRN Blood Glucose LESS THAN 70 milliGRAM(s)/deciliter      CAPILLARY BLOOD GLUCOSE      POCT Blood Glucose.: 203 mg/dL (24 Nov 2023 12:06)  POCT Blood Glucose.: 141 mg/dL (24 Nov 2023 08:54)  POCT Blood Glucose.: 211 mg/dL (23 Nov 2023 21:14)  POCT Blood Glucose.: 182 mg/dL (23 Nov 2023 15:38)    I&O's Summary    23 Nov 2023 07:01  -  24 Nov 2023 07:00  --------------------------------------------------------  IN: 121 mL / OUT: 260 mL / NET: -139 mL        PHYSICAL EXAM:  Vital Signs Last 24 Hrs  T(C): 36.8 (24 Nov 2023 08:00), Max: 36.9 (24 Nov 2023 00:13)  T(F): 98.2 (24 Nov 2023 08:00), Max: 98.5 (24 Nov 2023 00:13)  HR: 77 (24 Nov 2023 12:00) (68 - 94)  BP: 110/60 (24 Nov 2023 12:00) (108/59 - 139/74)  BP(mean): 66 (24 Nov 2023 10:00) (66 - 89)  RR: 15 (24 Nov 2023 12:00) (15 - 25)  SpO2: 97% (24 Nov 2023 12:00) (94% - 100%)    Parameters below as of 24 Nov 2023 12:00  Patient On (Oxygen Delivery Method): room air        CONSTITUTIONAL: NAD,  EYES: PERRLA; conjunctiva and sclera clear  ENMT: Moist oral mucosa,   RESPIRATORY: Normal respiratory effort; lungs are clear to auscultation bilaterally  CARDIOVASCULAR: Regular rate and rhythm, normal S1 and S2, no murmur   EXTS: No lower extremity edema; Peripheral pulses are 2+ bilaterally  ABDOMEN: Nontender to palpation, normoactive bowel sounds, no rebound/guarding;   MUSCLOSKELETAL:  no joint swelling or tenderness to palpation  PSYCH: calm  NEUROLOGY: ;  Awake, does follow commands. aphagic. RUE is 0/5, RLE 0-1/5. LUE/LLE is 5/5.aphagia      LABS:                        10.0   6.80  )-----------( 286      ( 24 Nov 2023 06:22 )             31.8     11-24    145  |  107  |  18.7  ----------------------------<  153<H>  3.7   |  28.0  |  0.85    Ca    8.9      24 Nov 2023 06:22      PT/INR - ( 23 Nov 2023 07:32 )   PT: 11.8 sec;   INR: 1.07 ratio         PTT - ( 24 Nov 2023 04:45 )  PTT:61.0 sec      Urinalysis Basic - ( 24 Nov 2023 06:22 )    Color: x / Appearance: x / SG: x / pH: x  Gluc: 153 mg/dL / Ketone: x  / Bili: x / Urobili: x   Blood: x / Protein: x / Nitrite: x   Leuk Esterase: x / RBC: x / WBC x   Sq Epi: x / Non Sq Epi: x / Bacteria: x          RADIOLOGY & ADDITIONAL TESTS:  Results Reviewed:              from: US Duplex Venous Lower Ext Complete, Bilateral (11.23.23 @ 18:30) >  IMPRESSION:    1. Findings compatible with deep venous thrombosis involving the right   popliteal vein, gastrocnemius vein, posterior tibial and peroneal veins.  2. No evidence for deep venous thrombosis within the left lower extremity   veins.

## 2023-11-24 NOTE — PROGRESS NOTE ADULT - NS ATTEND AMEND GEN_ALL_CORE FT
Close monitoring due to large stroke on AC as well as PE  Transition to DOAC when 2 weeks from date of stroke - 11/27    Skye Sierra DO  Vascular Neurology  Office [phone]

## 2023-11-25 LAB
ANION GAP SERPL CALC-SCNC: 8 MMOL/L — SIGNIFICANT CHANGE UP (ref 5–17)
ANION GAP SERPL CALC-SCNC: 8 MMOL/L — SIGNIFICANT CHANGE UP (ref 5–17)
APTT BLD: 64.1 SEC — HIGH (ref 24.5–35.6)
APTT BLD: 64.1 SEC — HIGH (ref 24.5–35.6)
BUN SERPL-MCNC: 17.9 MG/DL — SIGNIFICANT CHANGE UP (ref 8–20)
BUN SERPL-MCNC: 17.9 MG/DL — SIGNIFICANT CHANGE UP (ref 8–20)
CALCIUM SERPL-MCNC: 9.2 MG/DL — SIGNIFICANT CHANGE UP (ref 8.4–10.5)
CALCIUM SERPL-MCNC: 9.2 MG/DL — SIGNIFICANT CHANGE UP (ref 8.4–10.5)
CHLORIDE SERPL-SCNC: 108 MMOL/L — SIGNIFICANT CHANGE UP (ref 96–108)
CHLORIDE SERPL-SCNC: 108 MMOL/L — SIGNIFICANT CHANGE UP (ref 96–108)
CO2 SERPL-SCNC: 30 MMOL/L — HIGH (ref 22–29)
CO2 SERPL-SCNC: 30 MMOL/L — HIGH (ref 22–29)
CREAT SERPL-MCNC: 0.72 MG/DL — SIGNIFICANT CHANGE UP (ref 0.5–1.3)
CREAT SERPL-MCNC: 0.72 MG/DL — SIGNIFICANT CHANGE UP (ref 0.5–1.3)
EGFR: 93 ML/MIN/1.73M2 — SIGNIFICANT CHANGE UP
EGFR: 93 ML/MIN/1.73M2 — SIGNIFICANT CHANGE UP
GLUCOSE BLDC GLUCOMTR-MCNC: 132 MG/DL — HIGH (ref 70–99)
GLUCOSE BLDC GLUCOMTR-MCNC: 132 MG/DL — HIGH (ref 70–99)
GLUCOSE BLDC GLUCOMTR-MCNC: 143 MG/DL — HIGH (ref 70–99)
GLUCOSE BLDC GLUCOMTR-MCNC: 143 MG/DL — HIGH (ref 70–99)
GLUCOSE BLDC GLUCOMTR-MCNC: 185 MG/DL — HIGH (ref 70–99)
GLUCOSE BLDC GLUCOMTR-MCNC: 185 MG/DL — HIGH (ref 70–99)
GLUCOSE SERPL-MCNC: 155 MG/DL — HIGH (ref 70–99)
GLUCOSE SERPL-MCNC: 155 MG/DL — HIGH (ref 70–99)
HCT VFR BLD CALC: 32.6 % — LOW (ref 39–50)
HCT VFR BLD CALC: 32.6 % — LOW (ref 39–50)
HGB BLD-MCNC: 10.3 G/DL — LOW (ref 13–17)
HGB BLD-MCNC: 10.3 G/DL — LOW (ref 13–17)
MCHC RBC-ENTMCNC: 29.9 PG — SIGNIFICANT CHANGE UP (ref 27–34)
MCHC RBC-ENTMCNC: 29.9 PG — SIGNIFICANT CHANGE UP (ref 27–34)
MCHC RBC-ENTMCNC: 31.6 GM/DL — LOW (ref 32–36)
MCHC RBC-ENTMCNC: 31.6 GM/DL — LOW (ref 32–36)
MCV RBC AUTO: 94.5 FL — SIGNIFICANT CHANGE UP (ref 80–100)
MCV RBC AUTO: 94.5 FL — SIGNIFICANT CHANGE UP (ref 80–100)
PLATELET # BLD AUTO: 299 K/UL — SIGNIFICANT CHANGE UP (ref 150–400)
PLATELET # BLD AUTO: 299 K/UL — SIGNIFICANT CHANGE UP (ref 150–400)
POTASSIUM SERPL-MCNC: 3.8 MMOL/L — SIGNIFICANT CHANGE UP (ref 3.5–5.3)
POTASSIUM SERPL-MCNC: 3.8 MMOL/L — SIGNIFICANT CHANGE UP (ref 3.5–5.3)
POTASSIUM SERPL-SCNC: 3.8 MMOL/L — SIGNIFICANT CHANGE UP (ref 3.5–5.3)
POTASSIUM SERPL-SCNC: 3.8 MMOL/L — SIGNIFICANT CHANGE UP (ref 3.5–5.3)
RBC # BLD: 3.45 M/UL — LOW (ref 4.2–5.8)
RBC # BLD: 3.45 M/UL — LOW (ref 4.2–5.8)
RBC # FLD: 13.2 % — SIGNIFICANT CHANGE UP (ref 10.3–14.5)
RBC # FLD: 13.2 % — SIGNIFICANT CHANGE UP (ref 10.3–14.5)
SODIUM SERPL-SCNC: 146 MMOL/L — HIGH (ref 135–145)
SODIUM SERPL-SCNC: 146 MMOL/L — HIGH (ref 135–145)
WBC # BLD: 7.43 K/UL — SIGNIFICANT CHANGE UP (ref 3.8–10.5)
WBC # BLD: 7.43 K/UL — SIGNIFICANT CHANGE UP (ref 3.8–10.5)
WBC # FLD AUTO: 7.43 K/UL — SIGNIFICANT CHANGE UP (ref 3.8–10.5)
WBC # FLD AUTO: 7.43 K/UL — SIGNIFICANT CHANGE UP (ref 3.8–10.5)

## 2023-11-25 PROCEDURE — 70450 CT HEAD/BRAIN W/O DYE: CPT | Mod: 26

## 2023-11-25 PROCEDURE — 99233 SBSQ HOSP IP/OBS HIGH 50: CPT

## 2023-11-25 RX ADMIN — DULOXETINE HYDROCHLORIDE 30 MILLIGRAM(S): 30 CAPSULE, DELAYED RELEASE ORAL at 12:12

## 2023-11-25 RX ADMIN — HEPARIN SODIUM 10.5 UNIT(S)/HR: 5000 INJECTION INTRAVENOUS; SUBCUTANEOUS at 17:57

## 2023-11-25 RX ADMIN — GABAPENTIN 300 MILLIGRAM(S): 400 CAPSULE ORAL at 17:18

## 2023-11-25 RX ADMIN — Medication 300 MILLIGRAM(S): at 12:08

## 2023-11-25 RX ADMIN — POLYETHYLENE GLYCOL 3350 17 GRAM(S): 17 POWDER, FOR SOLUTION ORAL at 12:10

## 2023-11-25 RX ADMIN — Medication 1: at 17:17

## 2023-11-25 RX ADMIN — ATORVASTATIN CALCIUM 40 MILLIGRAM(S): 80 TABLET, FILM COATED ORAL at 21:07

## 2023-11-25 RX ADMIN — Medication 25 MILLIGRAM(S): at 17:18

## 2023-11-25 RX ADMIN — FAMOTIDINE 20 MILLIGRAM(S): 10 INJECTION INTRAVENOUS at 17:18

## 2023-11-25 RX ADMIN — NYSTATIN CREAM 1 APPLICATION(S): 100000 CREAM TOPICAL at 05:48

## 2023-11-25 RX ADMIN — GABAPENTIN 300 MILLIGRAM(S): 400 CAPSULE ORAL at 05:48

## 2023-11-25 RX ADMIN — CHLORHEXIDINE GLUCONATE 1 APPLICATION(S): 213 SOLUTION TOPICAL at 05:48

## 2023-11-25 RX ADMIN — HEPARIN SODIUM 10.5 UNIT(S)/HR: 5000 INJECTION INTRAVENOUS; SUBCUTANEOUS at 19:14

## 2023-11-25 RX ADMIN — Medication 5 MILLIGRAM(S): at 05:48

## 2023-11-25 RX ADMIN — Medication 81 MILLIGRAM(S): at 12:08

## 2023-11-25 RX ADMIN — Medication 25 MILLIGRAM(S): at 05:48

## 2023-11-25 RX ADMIN — FAMOTIDINE 20 MILLIGRAM(S): 10 INJECTION INTRAVENOUS at 05:48

## 2023-11-25 RX ADMIN — SENNA PLUS 2 TABLET(S): 8.6 TABLET ORAL at 21:07

## 2023-11-25 RX ADMIN — Medication 1: at 08:42

## 2023-11-25 RX ADMIN — NYSTATIN CREAM 1 APPLICATION(S): 100000 CREAM TOPICAL at 17:19

## 2023-11-25 RX ADMIN — HEPARIN SODIUM 10.5 UNIT(S)/HR: 5000 INJECTION INTRAVENOUS; SUBCUTANEOUS at 08:01

## 2023-11-25 NOTE — PROGRESS NOTE ADULT - NS ATTEND AMEND GEN_ALL_CORE FT
Seen and examined with the ACP team. Plan discussed with ACPs.    I agree with assessment and plan  as written with modifications made above.    will follow with you    Link Casas MD PhD   [phone]

## 2023-11-25 NOTE — PROGRESS NOTE ADULT - ASSESSMENT
78y/oM with HTN, HLD, AFib, CKD, Obesity, Gout, Prostate cancer, DVT/PE, OA s/p multiple orthopedic procedures, Spinal Stenosis admitted for elective FROY ligation (11/13). Developed acute Rt sided CVA with imaging notable for acute Left M1 occlusion. Given TNK with TICI 2A thrombectomy, Subsequently noted to have evolving left MCA CVA with hemorrhagic transformation with mass effect (left to right shift). Intubated and monitored in NICU. Subsequently extubated and transferred to Medicine (11/17). Restarted on AC after imaging confirmed FROY thrombi + PE    Left MCA CVA with M1 occlusion s/p TNK and thrombectomy with Hemorrhagic transformation  Likely cardioembolic while AC had been on hold  Repeat CT head noted  Restarted on ASA 81 mg daily   Continue Statin   Continue Neurochecks Q2 with BP goal 130-160. Avoid rapid fluctuations   Continue Heparin ggt   - start heparin gtt, gaol 50-70  PT - JHOAN  OT -AR  SLP eval appreciated; continue dysphagia diet  aspiration/fall precautions    Atrial Fibrillation  FROY thrombi  PE  Acute Rt  LE DVT  Doppler LE Rt DVT  FROY Thrombi noted on CTA Heart  on Hep GTT - PTT goal of 50-70  Continue BB  EP on board    Hypernatremia  goal sodium 145-150 per stroke neuro recs due to cerebral edema  hold torsemide   hold parenteral free water  monitor I/os     Anemia likely due to chronic disease  Hb relatively stable  iron studies  monitor CBC closely    Acute Hypoxic Respiratory Failure - resolved  intubated for airway protection on admission   history of ILD; continue ch prednisone   extubated on 11/14  CXR no acute infiltrates  bronchodilators as needed  incentive spirometry     DM  -HbA1c 6.2   -continue ISS  -ADA diet    History of PE/DVT, now with new  [facility] PE and RLE DVT confirmed   NO VCDS on RLE   AC as above    HTN  SBP 130s (goal 130-180 per neuro recs)  continue metoprolol  low sodium diet    GERD  continue famotidine    Gout  continue allopurinol    Neuropathy  continue gabapentin     DVT ppx- heparin drip      Dispo: Patient remains acute  Now on heparin drip - Monitor ptt. f/u cbc  order cth  closely monitor at step down  plan of care dw daughter and wife at bedside.  kush strauss

## 2023-11-25 NOTE — PROGRESS NOTE ADULT - ASSESSMENT
INCOMPLETE NOTE   ASSESSMENT:   79 yo M PMH of HTN, HLD, AFib, CKD, obesity, gout, prostate cancer, DVT/PE, OA s/p multiple orthopedic procedures, spinal stenosis, presented on 11/13/23 for elective left atrial appendage occlusion procedure, and developed a left MCA syndrome due to acute left M1 occlusion as demonstrated on CT Head and Neck. Patient presented on 11/13/23 for elective left atrial appendage occlusion with Watchman device/HARJINDER w/ Dr Bobo, and Pradaxa was held for 2 days prior to procedure. Whilst in holding patient developed a left MCA syndrome due to acute left M1 occlusion as demonstrated on CTH & Neck. On admission, NIHSS 26, MRS 1. Patient was given tenecteplase at 11:16 11/13/23, and subsequently underwent mechanical thrombectomy with TICI 2A recannulization with Dr. Soliman. 24 hour repeat CT head s/p tenecteplase administration showed evolving left MCA stroke with hemorrhagic transformation, and mass effect with left to right shift. MRI brain revealed redemonstration of an evolving acute/subacute large left-sided MCA distribution infarction with gross hemorrhagic transformation in the left basal ganglia as well as evidence of petechial hemorrhagic transformation within the left frontotemporal and parietal cortices. Suspected etiology of stroke is cardioembolic from atrial fibrillation when off anticoagulation for recent procedure, 2 cardiac thrombi were appreciated on CTA chest obtained 10 days prior to procedure. These were again seen on repeat CTA chest obtained 11/22/23 as well as new findings of right upper lobe PE.     NEURO:   -Neurologically with no acute changes   -Continue close monitoring for neurologic deterioration    -Stroke neuro checks q2hrs  -Neurologically appears to be tolerating SBP of 130s-160s. Can keep this SBP goal and avoid hypotension or rapid fluctuations in blood pressure   -ANTITHROMBOTIC THERAPY: Heparin gtt with goal ptt 50-70; although patient has a large left MCA stroke with hemorrhagic transformation, due to presence of cardiac thrombi on chest CTA, PE and recent embolic event, there is a high risk of recurrent stroke without anticoagulation therapy; overall benefit of anticoagulation (low ptt goal for now) outweighs risk of hemorrhage, now almost 10 days from stroke onset, as patient has evidence of active cardiac thrombi and recent cardioembolic event. This should be discussed with the family as well, regarding risk/benefits and alternatives. ASA if indicated from the cardiac standpoint if patient resumes anticoagulation. Do not bolus, avoid supra-therapeutic ptt levels, further titration pending clinical course. Repeat CT head for any acute change.  -Repeat CT Head 11/24 as above, obtain repeat CTH 11/25 to assess for stability.   -Would obtain head CT on 11/27 for further evaluation of evolution of stroke; once patient is stable on heparin gtt plan to transition to DOAC for long term AC   -titrate statin to LDL goal less than 70, LDL=54, may resume home regimen of Rosuvastatin 10 mg PO QHS when patient able to tolerate PO   -MRI Brain w/o as noted  -Dysphagia screen: pass  -Physical therapy/OT/Speech eval/treatment.   -Neurosurgery input appreciated, patient not a hemicrani candidate     CARDIOVASCULAR:  -TTE findings as above, no acute findings; repeat TTE 11/22 with interval decrease in EF   -HARJINDER deferred by anesthesia.   -CTA chest findings as above, see neuro portion for recommendations regarding anticoagulation  -No cardiac intervention at this time as the risks>benefits as per EP  -cardiac monitoring w/ telemetry for now, further evaluation pending findings of noted workup                              HEMATOLOGY:   -H/H 10.0/31.8. Platelets 286. Monitor anemia/thrombocytopenia per primary team, patient should have all age and risk appropriate malignancy screenings with PCP or sooner if clinically suspected   -Bilateral lower extremity venous duplex negative for DVT   -Would obtain LE dopplers in setting of imaging revealing PE  -DVT ppx: Heparin s.c [] LMWH [x]     PULMONARY:   -Patient extubated 11/14/23  -protecting airway, saturating well     RENAL:   -BUN/Cr 18.7/0.85. monitor urine output, maintain adequate hydration    -Na Goal:  145-150 secondary to cerebral edema.    ID:   -afebrile, leukocytosis resolved, monitor for si/sx of infection     OTHER:    -Extensive discussion held with patient and family at bedside regarding clinical course, risk vs benefit discussion regarding need for anticoagulation given findings of cardiac thrombi and PE on CTA chest; family expresses understanding; all questions and concerns addressed      DISPOSITION: Rehab or home depending on PT eval once stable and workup is complete    CORE MEASURES:        Admission NIHSS: 26     Tenecteplase : [x] YES [] NO      LDL/HDL/A1C: 54/67/6.2     Depression Screen- if depression hx and/or present      Statin Therapy: Atorvastatin 40 mg PO Daily      Dysphagia Screen: [x] PASS [] FAIL      Smoking [] YES [x] NO      Afib [x] YES [] NO     Stroke Education [x] YES [] NO    Obtain screening lower extremity venous ultrasound in patients who meet 1 or more of the following criteria as patient is high risk for DVT/PE on admission:   [] History of DVT/PE  []Hypercoagulable states (Factor V Leiden, Cancer, OCP, etc. )  []Prolonged immobility (hemiplegia/hemiparesis/post operative or any other extended immobilization)  [] Transferred from outside facility (Rehab or Long term care)  [] Age </= to 50     ASSESSMENT:   77 yo M PMH of HTN, HLD, AFib, CKD, obesity, gout, prostate cancer, DVT/PE, OA s/p multiple orthopedic procedures, spinal stenosis, presented on 11/13/23 for elective left atrial appendage occlusion procedure, and developed a left MCA syndrome due to acute left M1 occlusion as demonstrated on CT Head and Neck. Patient presented on 11/13/23 for elective left atrial appendage occlusion with Watchman device/HARJINDER w/ Dr Bobo, and Pradaxa was held for 2 days prior to procedure. Whilst in holding patient developed a left MCA syndrome due to acute left M1 occlusion as demonstrated on CTH & Neck. On admission, NIHSS 26, MRS 1. Patient was given tenecteplase at 11:16 11/13/23, and subsequently underwent mechanical thrombectomy with TICI 2A recannulization with Dr. Soliman. 24 hour repeat CT head s/p tenecteplase administration showed evolving left MCA stroke with hemorrhagic transformation, and mass effect with left to right shift. MRI brain revealed redemonstration of an evolving acute/subacute large left-sided MCA distribution infarction with gross hemorrhagic transformation in the left basal ganglia as well as evidence of petechial hemorrhagic transformation within the left frontotemporal and parietal cortices. Suspected etiology of stroke is cardioembolic from atrial fibrillation when off anticoagulation for recent procedure, 2 cardiac thrombi were appreciated on CTA chest obtained 10 days prior to procedure. These were again seen on repeat CTA chest obtained 11/22/23 as well as new findings of right upper lobe PE.     NEURO:   -Neurologically with no acute changes   -Continue close monitoring for neurologic deterioration    -Stroke neuro checks q2hrs  -Neurologically appears to be tolerating SBP of 130s-160s. Can keep this SBP goal and avoid hypotension or rapid fluctuations in blood pressure   -ANTITHROMBOTIC THERAPY: on ASA 81 mg daily and Heparin gtt with goal ptt 50-70 PTT 64.1 as of 11/25 ; although patient has a large left MCA stroke with hemorrhagic transformation, due to presence of cardiac thrombi on chest CTA, PE and recent embolic event, there is a high risk of recurrent stroke without anticoagulation therapy; overall benefit of anticoagulation (low ptt goal for now) outweighs risk of hemorrhage, now almost 10 days from stroke onset, as patient has evidence of active cardiac thrombi and recent cardioembolic event. This should be discussed with the family as well, regarding risk/benefits and alternatives. ASA if indicated from the cardiac standpoint if patient resumes anticoagulation. Do not bolus, avoid supra-therapeutic ptt levels, further titration pending clinical course. Repeat CT head for any acute change.  -Repeat CT Head 11/25 noted above, stable   -Would obtain head CT on 11/27 for further evaluation of evolution of stroke; once patient is stable on heparin gtt plan to transition to DOAC for long term AC   -titrate statin to LDL goal less than 70, LDL=54, may resume home regimen of Rosuvastatin 10 mg PO QHS when patient able to tolerate PO   -MRI Brain w/o as noted  -Dysphagia screen: pass  -Physical therapy/OT/Speech eval/treatment.   -Neurosurgery input appreciated, patient not a hemicrani candidate     CARDIOVASCULAR:  -TTE findings as above, no acute findings; repeat TTE 11/22 with interval decrease in EF   -HARJINDER deferred by anesthesia.   -CTA chest findings as above, see neuro portion for recommendations regarding anticoagulation  -No cardiac intervention at this time as the risks>benefits as per EP  -cardiac monitoring w/ telemetry for now, further evaluation pending findings of noted workup                              HEMATOLOGY:   -H/H 10.3/32.6 Platelets 299. Monitor anemia/thrombocytopenia per primary team, patient should have all age and risk appropriate malignancy screenings with PCP or sooner if clinically suspected   -Bilateral lower extremity venous duplex negative for DVT   -Would obtain LE dopplers in setting of imaging revealing PE  -DVT ppx: Heparin s.c [] LMWH [x]     PULMONARY:   -Patient extubated 11/14/23  -protecting airway, saturating well     RENAL:   -BUN/Cr 17.9/0.72. monitor urine output, maintain adequate hydration    -Na Goal:  145-150 secondary to cerebral edema.    ID:   -afebrile, leukocytosis resolved, monitor for si/sx of infection     OTHER:    -Discussion plan with patient and family at bedside regarding clinical course    DISPOSITION: Rehab or home depending on PT eval once stable and workup is complete    CORE MEASURES:        Admission NIHSS: 26     Tenecteplase : [x] YES [] NO      LDL/HDL/A1C: 54/67/6.2     Depression Screen- if depression hx and/or present      Statin Therapy: Atorvastatin 40 mg PO Daily      Dysphagia Screen: [x] PASS [] FAIL      Smoking [] YES [x] NO      Afib [x] YES [] NO     Stroke Education [x] YES [] NO    Obtain screening lower extremity venous ultrasound in patients who meet 1 or more of the following criteria as patient is high risk for DVT/PE on admission:   [] History of DVT/PE  []Hypercoagulable states (Factor V Leiden, Cancer, OCP, etc. )  []Prolonged immobility (hemiplegia/hemiparesis/post operative or any other extended immobilization)  [] Transferred from outside facility (Rehab or Long term care)  [] Age </= to 50

## 2023-11-25 NOTE — PROGRESS NOTE ADULT - SUBJECTIVE AND OBJECTIVE BOX
INCOMPLETE NOTE   Preliminary note, offical recommendations pending attending review/signature   [facility] Stroke Team  Progress Note     HPI:   79 yo M PMH of HTN, HLD, AFib, CKD, obesity, gout, prostate cancer, DVT/PE, OA s/p multiple orthopedic procedures, spinal stenosis, presented on 11/13/23 for elective left atrial appendage occlusion procedure, and developed a MCA syndrome due to acute L M1 occlusion as demonstrated on CTA Head and Neck. Patient has a long h/o AFib, which is now considered permanent. He is on rate control with metoprolol 25 mg bid, and anticoagulation with Pradaxa.  He has a long Hx of arthritis and had multiple hip replacements, bilateral knee replacements and also has neck and back pain, but is unable to take NSAIDs due to bleeding risks. He has an unsteady gait and uses a cane for assistance.  Patient presented on 11/13 for elective left atrial appendage occlusion with Watchman device/HARJINDER w/ Dr Bobo, and Pradaxa was held for 2 days prior to procedure. 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   Initial NIHSS 26, MRS 1. Patient s/p tenecteplase administration at 11:16 11/13/23 and TICI 2A thrombectomy with Dr. Soliman.      SUBJECTIVE: No events overnight.  No new neurologic complaints.  ROS reported negative unless otherwise noted.        acetaminophen     Tablet .. 650 milliGRAM(s) Oral every 6 hours PRN  albuterol/ipratropium (CFC free) Inhaler. 1 Puff(s) Inhalation four times a day PRN  allopurinol 300 milliGRAM(s) Oral daily  aspirin  chewable 81 milliGRAM(s) Oral daily  atorvastatin 40 milliGRAM(s) Oral at bedtime  bisacodyl Suppository 10 milliGRAM(s) Rectal daily PRN  chlorhexidine 2% Cloths 1 Application(s) Topical daily  dextrose 5%. 1000 milliLiter(s) IV Continuous <Continuous>  dextrose 5%. 1000 milliLiter(s) IV Continuous <Continuous>  dextrose 50% Injectable 25 Gram(s) IV Push once  dextrose 50% Injectable 12.5 Gram(s) IV Push once  dextrose 50% Injectable 25 Gram(s) IV Push once  dextrose Oral Gel 15 Gram(s) Oral once PRN  DULoxetine 30 milliGRAM(s) Oral daily  famotidine    Tablet 20 milliGRAM(s) Oral two times a day  gabapentin 300 milliGRAM(s) Oral two times a day  glucagon  Injectable 1 milliGRAM(s) IntraMuscular once  heparin  Infusion 1050 Unit(s)/Hr IV Continuous <Continuous>  insulin lispro (ADMELOG) corrective regimen sliding scale   SubCutaneous Before meals and at bedtime  metoprolol tartrate 25 milliGRAM(s) Oral two times a day  nystatin Powder 1 Application(s) Topical two times a day  polyethylene glycol 3350 17 Gram(s) Oral daily  predniSONE   Tablet 5 milliGRAM(s) Oral daily  senna 2 Tablet(s) Oral at bedtime      PHYSICAL EXAM:   Vital Signs Last 24 Hrs  T(C): 37 (25 Nov 2023 04:00), Max: 37.5 (24 Nov 2023 15:29)  T(F): 98.6 (25 Nov 2023 04:00), Max: 99.5 (24 Nov 2023 15:29)  HR: 66 (25 Nov 2023 04:00) (66 - 88)  BP: 144/42 (25 Nov 2023 04:00) (108/59 - 144/42)  BP(mean): 69 (25 Nov 2023 04:00) (66 - 107)  RR: 18 (25 Nov 2023 04:00) (15 - 20)  SpO2: 98% (25 Nov 2023 04:00) (97% - 100%)    Parameters below as of 25 Nov 2023 04:00  Patient On (Oxygen Delivery Method): nasal cannula  O2 Flow (L/min): 1    EXAM PENDING     General: No acute distress    NEUROLOGICAL EXAM:  Mental status: Awake, globally aphasic with no verbal output. Nods and mimics. Right sided visual willow-neglect present.     Cranial Nerves:  Pupils equal and react symmetrically to light. Blink to threat intermittently on right eye. Blink to threat present out of left eye. Left gaze preference with right gaze palsy that does cross midline. Right facial weakness noted.    Motor exam: There is normal bulk and tone.  There is no tremor.  RLE: slight withdrawal due to noxious stimuli. Trace movement noted within bed.   RUE: 0/5 strength with no movement. Grimaces to noxious stimuli with no movement.  Strength is 5/5 in the left arm and leg with no drift.    Sensation: Grimaces to noxious stimuli in all extremities.    Coordination/ Gait: unable to assess dysmetria on finger to nose testing    LABS:                        10.3   7.43  )-----------( 299      ( 25 Nov 2023 06:15 )             32.6    11-25    146<H>  |  108  |  17.9  ----------------------------<  155<H>  3.8   |  30.0<H>  |  0.72    Ca    9.2      25 Nov 2023 06:15    PTT - ( 25 Nov 2023 06:15 )  PTT:64.1 sec      IMAGING: Reviewed by me.         CT Head No Cont (11.24.23 @ 11:12)   IMPRESSION:  Redemonstration of acute left frontotemporal infarct.  Decreased conspicuity of subtly increased attenuation of gray matter   within the region of infarction. This may represent spared tissue and/or   petechial hemorrhagic transformation.  Slightly decreased mass effect on the left lateral ventricle and   rightward midline shift, currently1 mm, previously 2 mm.  Basal cisterns are visualized. No hydrocephalus.    US Duplex Venous Lower Ext Complete, Bilateral (11.23.23 @ 18:30)   IMPRESSION:  1. Findings compatible with deep venous thrombosis involving the right   popliteal vein, gastrocnemius vein, posterior tibial and peroneal veins.  2. No evidence for deep venous thrombosis within the left lower extremity   veins.    TTE Echo Limited or F/U (11.22.23 @ 16:32)   Summary:   1. Left ventricular ejection fraction, by visual estimation, is 45 to   50%.   2. Mildly decreased global left ventricular systolic function.   3. The mitral in-flow pattern reveals no discernable A-wave, therefore   no comment on diastolic function can be made.   4. Mildly enlarged right ventricle with mildly reduced RV systolic   function, estimated PASP 31 mmHg.   5. Severely enlarged left atrium.   6. Right atrial enlargement.   7. Mild mitral annular calcification.   8. Mild mitral valve regurgitation.   9. Mild-moderate tricuspid regurgitation.  10. Sclerotic aortic valve with normal opening.  11. There is no evidence of pericardial effusion.  12. Compared to the prior TTE study from 11/14/23, interval reduction in   LV EF.    CT Head No Cont (11.22.23 @ 16:25)   IMPRESSION:  Decreased mass effect on the left lateral ventricle compared   with the CT and MR of 11/16/2023 and 11/19/2023 respectively. There is   residual hemorrhage seen in association with hemorrhagic component of   infarct in the left lentiform nucleus region decreased in conspicuity   compared with the prior. Left insular hemorrhage in association with the   left MCA infarct is also noted consistent with hemorrhagic transformation   of infarct in this region. The hemorrhage in the left insular region does   appear slightly more conspicuous compared with the prior CT 11/16/2023   and is consistent with the findings of the patient's MR 11/19/2023.    CT Heart Left Atrium w/ IV Cont (11.22.23 @ 11:22)   IMPRESSION:  Cardiac:  Multiple large filling defects noted at the distal appendage at least >2   cm and also near the proximal appendage measures 1.5 cm x 1.2 cm,   consistent with left appendage thrombi.  Dilated left atrial appendage windsock in appearance with orifice width   measures 4.5 cm x 3.0 cm  Severely biatrial enlargement.  Non-cardiac:  Right upper lobe pulmonary artery embolism.  Possible pulmonary hypertension.  New right lower lobe superior segment infiltrate (possibly infectious or   inflammatory)    MR Head No Cont (11.19.23 @ 15:52)   IMPRESSION: Redemonstration of an evolving acute/subacute large   left-sided MCA distribution infarction with gross hemorrhagic   transformation in the left basal ganglia as well as evidence of petechial   hemorrhagic transformation within the left frontotemporal and parietal   cortices.  Associated cytotoxic edema, sulcal effacement, and mass effect as well as   shift of the midline structures from left to right measuring 5.5 mm   appears unchanged. There is no herniation.    CT Head No Cont (11.16.23 @ 18:05)   IMPRESSION: Stable moderate to large left MCA territory infarction   involving the left frontal lobe, left basal ganglia, left insular ribbon,   left parietal occipital lobe, and anterior left temporal lobe with stable   mild hemorrhage in the left basal ganglia. Left-to-right midline shift   measures 5.5 mm.    CT Head No Cont (11.14.23 @ 18:57)   IMPRESSION:  Stable moderate to large acute left MCA infarct with mild hemorrhage    TTE Echo Complete w/o Contrast w/ Doppler (11.14.23 @ 15:57)   Summary:   1. Left ventricular ejection fraction, by visual estimation, is 55 to   60%.   2. Normal global left ventricular systolic function.   3. The mitral in-flow pattern reveals no discernable A-wave, therefore   no comment on diastolic function can be made.   4. Normal right ventricular size and function.   5. Mild to moderately enlarged right atrium.   6. Moderately enlarged left atrium.   7. Trace mitral valve regurgitation.   8. Mild-moderate tricuspid regurgitation.   9. Sclerotic aortic valve with decreased opening.  10. Estimated pulmonary artery systolic pressure is 41.6 mmHg assuming a   right atrial pressure of 5 mmHg, which is consistent with mild pulmonary   hypertension.    CT Head No Cont (11.14.23 @ 11:36)   IMPRESSION: Evolving left MCA infarct is identified with hemorrhagic   transformation now seen with mass effect on left lateral ventricles and   left-to-right shift.    US Duplex Venous Lower Ext Complete, Bilateral (11.14.23 @ 10:38)   IMPRESSION:  No evidence of deep venous thrombosis in either lower extremity.  Left calf veins not visualized.  Small right popliteal fossa cyst.    CT Angio Brain, Neck and perfusion Stroke Protocol  w/ IV Cont (11.13.23 @ 11:18)   CTA BRAIN:  Acute thrombus in the distal left MCA M1 segment. Diminished flow in the   distal branches.  Severe bilateral cavernous carotid artery calcifications. Fetal origins   the bilateral posterior cerebral arteries. The [place] and   vertebrobasilar system are otherwise unremarkable without evidence of   stenosis, additional occlusion or saccular aneurysm dilation. No evidence   for arterial venous malformation. The vertebral arteries are codominant.    CTA NECK:  Mild bilateral carotid bulb calcifications. Mild stenosis in the left   carotid bulb. The right internal carotid arteries tortuous.  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 There is normal relationship to   the great vessels. The common carotid arteries, internal carotid arteries   and vertebral arteries shows no other evidence of significant stenosis,   occlusion or saccular aneurysm dilation. The vertebral arteries are   codominant.    CT PERFUSION:  Patient has only had less than 2 hours of symptoms may influence the CT   perfusion.  CBF<30% volume: 26 ml left MCA territory.  Tmax>6.0 s volume: 193 ml left MCA territory  Mismatch volume: 167 ml  Mismatch ratio: 7.4  IMPRESSION:  Thrombus in the distal left MCA M1 segment with diminished flow in the   distal MCA segments. 167 mL area of penumbra in the left MCA territory.     CT Brain Stroke Protocol (11.13.23 @ 11:15)   IMPRESSION:  Dense left MCA M1 segment suggesting intraluminal thrombus.   Mild chronic microvascular changes without evidence of an acute   transcortical infarction or hemorrhage.     Preliminary note, offical recommendations pending attending review/signature   [facility] Stroke Team  Progress Note     HPI:   79 yo M PMH of HTN, HLD, AFib, CKD, obesity, gout, prostate cancer, DVT/PE, OA s/p multiple orthopedic procedures, spinal stenosis, presented on 11/13/23 for elective left atrial appendage occlusion procedure, and developed a MCA syndrome due to acute L M1 occlusion as demonstrated on CTA Head and Neck. Patient has a long h/o AFib, which is now considered permanent. He is on rate control with metoprolol 25 mg bid, and anticoagulation with Pradaxa.  He has a long Hx of arthritis and had multiple hip replacements, bilateral knee replacements and also has neck and back pain, but is unable to take NSAIDs due to bleeding risks. He has an unsteady gait and uses a cane for assistance.  Patient presented on 11/13 for elective left atrial appendage occlusion with Watchman device/HARJINDER w/ Dr Bobo, and Pradaxa was held for 2 days prior to procedure. 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   Initial NIHSS 26, MRS 1. Patient s/p tenecteplase administration at 11:16 11/13/23 and TICI 2A thrombectomy with Dr. Soliman.      SUBJECTIVE: No events overnight.  No new neurologic complaints.  ROS reported negative unless otherwise noted.        acetaminophen     Tablet .. 650 milliGRAM(s) Oral every 6 hours PRN  albuterol/ipratropium (CFC free) Inhaler. 1 Puff(s) Inhalation four times a day PRN  allopurinol 300 milliGRAM(s) Oral daily  aspirin  chewable 81 milliGRAM(s) Oral daily  atorvastatin 40 milliGRAM(s) Oral at bedtime  bisacodyl Suppository 10 milliGRAM(s) Rectal daily PRN  chlorhexidine 2% Cloths 1 Application(s) Topical daily  dextrose 5%. 1000 milliLiter(s) IV Continuous <Continuous>  dextrose 5%. 1000 milliLiter(s) IV Continuous <Continuous>  dextrose 50% Injectable 25 Gram(s) IV Push once  dextrose 50% Injectable 12.5 Gram(s) IV Push once  dextrose 50% Injectable 25 Gram(s) IV Push once  dextrose Oral Gel 15 Gram(s) Oral once PRN  DULoxetine 30 milliGRAM(s) Oral daily  famotidine    Tablet 20 milliGRAM(s) Oral two times a day  gabapentin 300 milliGRAM(s) Oral two times a day  glucagon  Injectable 1 milliGRAM(s) IntraMuscular once  heparin  Infusion 1050 Unit(s)/Hr IV Continuous <Continuous>  insulin lispro (ADMELOG) corrective regimen sliding scale   SubCutaneous Before meals and at bedtime  metoprolol tartrate 25 milliGRAM(s) Oral two times a day  nystatin Powder 1 Application(s) Topical two times a day  polyethylene glycol 3350 17 Gram(s) Oral daily  predniSONE   Tablet 5 milliGRAM(s) Oral daily  senna 2 Tablet(s) Oral at bedtime      PHYSICAL EXAM:   Vital Signs Last 24 Hrs  T(C): 37 (25 Nov 2023 04:00), Max: 37.5 (24 Nov 2023 15:29)  T(F): 98.6 (25 Nov 2023 04:00), Max: 99.5 (24 Nov 2023 15:29)  HR: 66 (25 Nov 2023 04:00) (66 - 88)  BP: 144/42 (25 Nov 2023 04:00) (108/59 - 144/42)  BP(mean): 69 (25 Nov 2023 04:00) (66 - 107)  RR: 18 (25 Nov 2023 04:00) (15 - 20)  SpO2: 98% (25 Nov 2023 04:00) (97% - 100%)    Parameters below as of 25 Nov 2023 04:00  Patient On (Oxygen Delivery Method): nasal cannula  O2 Flow (L/min): 1        General: No acute distress, more brisk than previous encounter, family members at bedside    NEUROLOGICAL EXAM:  Mental status: Awake, globally aphasic, expressive > receptive  with no verbal output. Nods and mimics - appears to want to stick his tongue out and raises left hand when ask to to show 2 fingers. Right sided visual willow-neglect present.     Cranial Nerves:  Pupils equal and react symmetrically to light. Blink to threat intermittently on right eye. Blink to threat present out of left eye. Left gaze preference with right gaze palsy that does cross midline. Right facial weakness noted.    Motor exam: There is normal bulk and tone.  There is no tremor.  Right side: 0/5 strength with no movement. Grimaces to noxious stimuli with no movement.  Strength is 5/5 in the left arm and leg with no drift.    Sensation: Grimaces to noxious stimuli in all extremities.    Coordination/ Gait: unable to assess dysmetria on finger to nose testing      LABS:                        10.3   7.43  )-----------( 299      ( 25 Nov 2023 06:15 )             32.6    11-25    146<H>  |  108  |  17.9  ----------------------------<  155<H>  3.8   |  30.0<H>  |  0.72    Ca    9.2      25 Nov 2023 06:15    PTT - ( 25 Nov 2023 06:15 )  PTT:64.1 sec      IMAGING: Reviewed by me.       CT Head No Cont (11.25.23 @ 10:03)   IMPRESSION: Acute left middle cerebral artery infarct with petechial   hemorrhage without change since 11/24/2023.        CT Head No Cont (11.24.23 @ 11:12)   IMPRESSION:  Redemonstration of acute left frontotemporal infarct.  Decreased conspicuity of subtly increased attenuation of gray matter   within the region of infarction. This may represent spared tissue and/or   petechial hemorrhagic transformation.  Slightly decreased mass effect on the left lateral ventricle and   rightward midline shift, currently1 mm, previously 2 mm.  Basal cisterns are visualized. No hydrocephalus.    US Duplex Venous Lower Ext Complete, Bilateral (11.23.23 @ 18:30)   IMPRESSION:  1. Findings compatible with deep venous thrombosis involving the right   popliteal vein, gastrocnemius vein, posterior tibial and peroneal veins.  2. No evidence for deep venous thrombosis within the left lower extremity   veins.    TTE Echo Limited or F/U (11.22.23 @ 16:32)   Summary:   1. Left ventricular ejection fraction, by visual estimation, is 45 to   50%.   2. Mildly decreased global left ventricular systolic function.   3. The mitral in-flow pattern reveals no discernable A-wave, therefore   no comment on diastolic function can be made.   4. Mildly enlarged right ventricle with mildly reduced RV systolic   function, estimated PASP 31 mmHg.   5. Severely enlarged left atrium.   6. Right atrial enlargement.   7. Mild mitral annular calcification.   8. Mild mitral valve regurgitation.   9. Mild-moderate tricuspid regurgitation.  10. Sclerotic aortic valve with normal opening.  11. There is no evidence of pericardial effusion.  12. Compared to the prior TTE study from 11/14/23, interval reduction in   LV EF.    CT Head No Cont (11.22.23 @ 16:25)   IMPRESSION:  Decreased mass effect on the left lateral ventricle compared   with the CT and MR of 11/16/2023 and 11/19/2023 respectively. There is   residual hemorrhage seen in association with hemorrhagic component of   infarct in the left lentiform nucleus region decreased in conspicuity   compared with the prior. Left insular hemorrhage in association with the   left MCA infarct is also noted consistent with hemorrhagic transformation   of infarct in this region. The hemorrhage in the left insular region does   appear slightly more conspicuous compared with the prior CT 11/16/2023   and is consistent with the findings of the patient's MR 11/19/2023.    CT Heart Left Atrium w/ IV Cont (11.22.23 @ 11:22)   IMPRESSION:  Cardiac:  Multiple large filling defects noted at the distal appendage at least >2   cm and also near the proximal appendage measures 1.5 cm x 1.2 cm,   consistent with left appendage thrombi.  Dilated left atrial appendage windsock in appearance with orifice width   measures 4.5 cm x 3.0 cm  Severely biatrial enlargement.  Non-cardiac:  Right upper lobe pulmonary artery embolism.  Possible pulmonary hypertension.  New right lower lobe superior segment infiltrate (possibly infectious or   inflammatory)    MR Head No Cont (11.19.23 @ 15:52)   IMPRESSION: Redemonstration of an evolving acute/subacute large   left-sided MCA distribution infarction with gross hemorrhagic   transformation in the left basal ganglia as well as evidence of petechial   hemorrhagic transformation within the left frontotemporal and parietal   cortices.  Associated cytotoxic edema, sulcal effacement, and mass effect as well as   shift of the midline structures from left to right measuring 5.5 mm   appears unchanged. There is no herniation.    CT Head No Cont (11.16.23 @ 18:05)   IMPRESSION: Stable moderate to large left MCA territory infarction   involving the left frontal lobe, left basal ganglia, left insular ribbon,   left parietal occipital lobe, and anterior left temporal lobe with stable   mild hemorrhage in the left basal ganglia. Left-to-right midline shift   measures 5.5 mm.    CT Head No Cont (11.14.23 @ 18:57)   IMPRESSION:  Stable moderate to large acute left MCA infarct with mild hemorrhage    TTE Echo Complete w/o Contrast w/ Doppler (11.14.23 @ 15:57)   Summary:   1. Left ventricular ejection fraction, by visual estimation, is 55 to   60%.   2. Normal global left ventricular systolic function.   3. The mitral in-flow pattern reveals no discernable A-wave, therefore   no comment on diastolic function can be made.   4. Normal right ventricular size and function.   5. Mild to moderately enlarged right atrium.   6. Moderately enlarged left atrium.   7. Trace mitral valve regurgitation.   8. Mild-moderate tricuspid regurgitation.   9. Sclerotic aortic valve with decreased opening.  10. Estimated pulmonary artery systolic pressure is 41.6 mmHg assuming a   right atrial pressure of 5 mmHg, which is consistent with mild pulmonary   hypertension.    CT Head No Cont (11.14.23 @ 11:36)   IMPRESSION: Evolving left MCA infarct is identified with hemorrhagic   transformation now seen with mass effect on left lateral ventricles and   left-to-right shift.    US Duplex Venous Lower Ext Complete, Bilateral (11.14.23 @ 10:38)   IMPRESSION:  No evidence of deep venous thrombosis in either lower extremity.  Left calf veins not visualized.  Small right popliteal fossa cyst.    CT Angio Brain, Neck and perfusion Stroke Protocol  w/ IV Cont (11.13.23 @ 11:18)   CTA BRAIN:  Acute thrombus in the distal left MCA M1 segment. Diminished flow in the   distal branches.  Severe bilateral cavernous carotid artery calcifications. Fetal origins   the bilateral posterior cerebral arteries. The [place] and   vertebrobasilar system are otherwise unremarkable without evidence of   stenosis, additional occlusion or saccular aneurysm dilation. No evidence   for arterial venous malformation. The vertebral arteries are codominant.    CTA NECK:  Mild bilateral carotid bulb calcifications. Mild stenosis in the left   carotid bulb. The right internal carotid arteries tortuous.  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 There is normal relationship to   the great vessels. The common carotid arteries, internal carotid arteries   and vertebral arteries shows no other evidence of significant stenosis,   occlusion or saccular aneurysm dilation. The vertebral arteries are   codominant.    CT PERFUSION:  Patient has only had less than 2 hours of symptoms may influence the CT   perfusion.  CBF<30% volume: 26 ml left MCA territory.  Tmax>6.0 s volume: 193 ml left MCA territory  Mismatch volume: 167 ml  Mismatch ratio: 7.4  IMPRESSION:  Thrombus in the distal left MCA M1 segment with diminished flow in the   distal MCA segments. 167 mL area of penumbra in the left MCA territory.     CT Brain Stroke Protocol (11.13.23 @ 11:15)   IMPRESSION:  Dense left MCA M1 segment suggesting intraluminal thrombus.   Mild chronic microvascular changes without evidence of an acute   transcortical infarction or hemorrhage.   [facility] Stroke Team  Progress Note     HPI:   79 yo M PMH of HTN, HLD, AFib, CKD, obesity, gout, prostate cancer, DVT/PE, OA s/p multiple orthopedic procedures, spinal stenosis, presented on 11/13/23 for elective left atrial appendage occlusion procedure, and developed a MCA syndrome due to acute L M1 occlusion as demonstrated on CTA Head and Neck. Patient has a long h/o AFib, which is now considered permanent. He is on rate control with metoprolol 25 mg bid, and anticoagulation with Pradaxa.  He has a long Hx of arthritis and had multiple hip replacements, bilateral knee replacements and also has neck and back pain, but is unable to take NSAIDs due to bleeding risks. He has an unsteady gait and uses a cane for assistance.  Patient presented on 11/13 for elective left atrial appendage occlusion with Watchman device/HARJINDER w/ Dr Bobo, and Pradaxa was held for 2 days prior to procedure. 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   Initial NIHSS 26, MRS 1. Patient s/p tenecteplase administration at 11:16 11/13/23 and TICI 2A thrombectomy with Dr. Soliman.    SUBJECTIVE: No events overnight.  No new neurologic complaints.  ROS reported negative unless otherwise noted.    acetaminophen     Tablet .. 650 milliGRAM(s) Oral every 6 hours PRN  albuterol/ipratropium (CFC free) Inhaler. 1 Puff(s) Inhalation four times a day PRN  allopurinol 300 milliGRAM(s) Oral daily  aspirin  chewable 81 milliGRAM(s) Oral daily  atorvastatin 40 milliGRAM(s) Oral at bedtime  bisacodyl Suppository 10 milliGRAM(s) Rectal daily PRN  chlorhexidine 2% Cloths 1 Application(s) Topical daily  dextrose 5%. 1000 milliLiter(s) IV Continuous <Continuous>  dextrose 5%. 1000 milliLiter(s) IV Continuous <Continuous>  dextrose 50% Injectable 25 Gram(s) IV Push once  dextrose 50% Injectable 12.5 Gram(s) IV Push once  dextrose 50% Injectable 25 Gram(s) IV Push once  dextrose Oral Gel 15 Gram(s) Oral once PRN  DULoxetine 30 milliGRAM(s) Oral daily  famotidine    Tablet 20 milliGRAM(s) Oral two times a day  gabapentin 300 milliGRAM(s) Oral two times a day  glucagon  Injectable 1 milliGRAM(s) IntraMuscular once  heparin  Infusion 1050 Unit(s)/Hr IV Continuous <Continuous>  insulin lispro (ADMELOG) corrective regimen sliding scale   SubCutaneous Before meals and at bedtime  metoprolol tartrate 25 milliGRAM(s) Oral two times a day  nystatin Powder 1 Application(s) Topical two times a day  polyethylene glycol 3350 17 Gram(s) Oral daily  predniSONE   Tablet 5 milliGRAM(s) Oral daily  senna 2 Tablet(s) Oral at bedtime      PHYSICAL EXAM:   Vital Signs Last 24 Hrs  T(C): 37 (25 Nov 2023 04:00), Max: 37.5 (24 Nov 2023 15:29)  T(F): 98.6 (25 Nov 2023 04:00), Max: 99.5 (24 Nov 2023 15:29)  HR: 66 (25 Nov 2023 04:00) (66 - 88)  BP: 144/42 (25 Nov 2023 04:00) (108/59 - 144/42)  BP(mean): 69 (25 Nov 2023 04:00) (66 - 107)  RR: 18 (25 Nov 2023 04:00) (15 - 20)  SpO2: 98% (25 Nov 2023 04:00) (97% - 100%)    Parameters below as of 25 Nov 2023 04:00  Patient On (Oxygen Delivery Method): nasal cannula  O2 Flow (L/min): 1    General: No acute distress, more brisk than previous encounter, family members at bedside    NEUROLOGICAL EXAM:  Mental status: Awake, globally aphasic, expressive > receptive  with no verbal output. Nods and mimics - appears to want to stick his tongue out and raises left hand when ask to to show 2 fingers. Right sided visual willow-neglect present.     Cranial Nerves:  Pupils equal and react symmetrically to light. Blink to threat intermittently on right eye. Blink to threat present out of left eye. Left gaze preference with right gaze palsy that does cross midline. Right facial weakness noted.    Motor exam: There is normal bulk and tone.  There is no tremor.  Right side: 0/5 strength with no movement. Grimaces to noxious stimuli with no movement.  Strength is 5/5 in the left arm and leg with no drift.    Sensation: Grimaces to noxious stimuli in all extremities.    Coordination/ Gait: unable to assess dysmetria on finger to nose testing      LABS:                        10.3   7.43  )-----------( 299      ( 25 Nov 2023 06:15 )             32.6    11-25    146<H>  |  108  |  17.9  ----------------------------<  155<H>  3.8   |  30.0<H>  |  0.72    Ca    9.2      25 Nov 2023 06:15    PTT - ( 25 Nov 2023 06:15 )  PTT:64.1 sec      IMAGING: Reviewed by me.     CT Head No Cont (11.25.23 @ 10:03)   IMPRESSION: Acute left middle cerebral artery infarct with petechial   hemorrhage without change since 11/24/2023.      CT Head No Cont (11.24.23 @ 11:12)   IMPRESSION:  Redemonstration of acute left frontotemporal infarct.  Decreased conspicuity of subtly increased attenuation of gray matter   within the region of infarction. This may represent spared tissue and/or   petechial hemorrhagic transformation.  Slightly decreased mass effect on the left lateral ventricle and   rightward midline shift, currently1 mm, previously 2 mm.  Basal cisterns are visualized. No hydrocephalus.    US Duplex Venous Lower Ext Complete, Bilateral (11.23.23 @ 18:30)   IMPRESSION:  1. Findings compatible with deep venous thrombosis involving the right   popliteal vein, gastrocnemius vein, posterior tibial and peroneal veins.  2. No evidence for deep venous thrombosis within the left lower extremity   veins.    TTE Echo Limited or F/U (11.22.23 @ 16:32)   Summary:   1. Left ventricular ejection fraction, by visual estimation, is 45 to   50%.   2. Mildly decreased global left ventricular systolic function.   3. The mitral in-flow pattern reveals no discernable A-wave, therefore   no comment on diastolic function can be made.   4. Mildly enlarged right ventricle with mildly reduced RV systolic   function, estimated PASP 31 mmHg.   5. Severely enlarged left atrium.   6. Right atrial enlargement.   7. Mild mitral annular calcification.   8. Mild mitral valve regurgitation.   9. Mild-moderate tricuspid regurgitation.  10. Sclerotic aortic valve with normal opening.  11. There is no evidence of pericardial effusion.  12. Compared to the prior TTE study from 11/14/23, interval reduction in   LV EF.    CT Head No Cont (11.22.23 @ 16:25)   IMPRESSION:  Decreased mass effect on the left lateral ventricle compared   with the CT and MR of 11/16/2023 and 11/19/2023 respectively. There is   residual hemorrhage seen in association with hemorrhagic component of   infarct in the left lentiform nucleus region decreased in conspicuity   compared with the prior. Left insular hemorrhage in association with the   left MCA infarct is also noted consistent with hemorrhagic transformation   of infarct in this region. The hemorrhage in the left insular region does   appear slightly more conspicuous compared with the prior CT 11/16/2023   and is consistent with the findings of the patient's MR 11/19/2023.    CT Heart Left Atrium w/ IV Cont (11.22.23 @ 11:22)   IMPRESSION:  Cardiac:  Multiple large filling defects noted at the distal appendage at least >2   cm and also near the proximal appendage measures 1.5 cm x 1.2 cm,   consistent with left appendage thrombi.  Dilated left atrial appendage windsock in appearance with orifice width   measures 4.5 cm x 3.0 cm  Severely biatrial enlargement.  Non-cardiac:  Right upper lobe pulmonary artery embolism.  Possible pulmonary hypertension.  New right lower lobe superior segment infiltrate (possibly infectious or   inflammatory)    MR Head No Cont (11.19.23 @ 15:52)   IMPRESSION: Redemonstration of an evolving acute/subacute large   left-sided MCA distribution infarction with gross hemorrhagic   transformation in the left basal ganglia as well as evidence of petechial   hemorrhagic transformation within the left frontotemporal and parietal   cortices.  Associated cytotoxic edema, sulcal effacement, and mass effect as well as   shift of the midline structures from left to right measuring 5.5 mm   appears unchanged. There is no herniation.    CT Head No Cont (11.16.23 @ 18:05)   IMPRESSION: Stable moderate to large left MCA territory infarction   involving the left frontal lobe, left basal ganglia, left insular ribbon,   left parietal occipital lobe, and anterior left temporal lobe with stable   mild hemorrhage in the left basal ganglia. Left-to-right midline shift   measures 5.5 mm.    CT Head No Cont (11.14.23 @ 18:57)   IMPRESSION:  Stable moderate to large acute left MCA infarct with mild hemorrhage    TTE Echo Complete w/o Contrast w/ Doppler (11.14.23 @ 15:57)   Summary:   1. Left ventricular ejection fraction, by visual estimation, is 55 to   60%.   2. Normal global left ventricular systolic function.   3. The mitral in-flow pattern reveals no discernable A-wave, therefore   no comment on diastolic function can be made.   4. Normal right ventricular size and function.   5. Mild to moderately enlarged right atrium.   6. Moderately enlarged left atrium.   7. Trace mitral valve regurgitation.   8. Mild-moderate tricuspid regurgitation.   9. Sclerotic aortic valve with decreased opening.  10. Estimated pulmonary artery systolic pressure is 41.6 mmHg assuming a   right atrial pressure of 5 mmHg, which is consistent with mild pulmonary   hypertension.    CT Head No Cont (11.14.23 @ 11:36)   IMPRESSION: Evolving left MCA infarct is identified with hemorrhagic   transformation now seen with mass effect on left lateral ventricles and   left-to-right shift.    US Duplex Venous Lower Ext Complete, Bilateral (11.14.23 @ 10:38)   IMPRESSION:  No evidence of deep venous thrombosis in either lower extremity.  Left calf veins not visualized.  Small right popliteal fossa cyst.    CT Angio Brain, Neck and perfusion Stroke Protocol  w/ IV Cont (11.13.23 @ 11:18)   CTA BRAIN:  Acute thrombus in the distal left MCA M1 segment. Diminished flow in the   distal branches.  Severe bilateral cavernous carotid artery calcifications. Fetal origins   the bilateral posterior cerebral arteries. The [place] and   vertebrobasilar system are otherwise unremarkable without evidence of   stenosis, additional occlusion or saccular aneurysm dilation. No evidence   for arterial venous malformation. The vertebral arteries are codominant.    CTA NECK:  Mild bilateral carotid bulb calcifications. Mild stenosis in the left   carotid bulb. The right internal carotid arteries tortuous.  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 There is normal relationship to   the great vessels. The common carotid arteries, internal carotid arteries   and vertebral arteries shows no other evidence of significant stenosis,   occlusion or saccular aneurysm dilation. The vertebral arteries are   codominant.    CT PERFUSION:  Patient has only had less than 2 hours of symptoms may influence the CT   perfusion.  CBF<30% volume: 26 ml left MCA territory.  Tmax>6.0 s volume: 193 ml left MCA territory  Mismatch volume: 167 ml  Mismatch ratio: 7.4  IMPRESSION:  Thrombus in the distal left MCA M1 segment with diminished flow in the   distal MCA segments. 167 mL area of penumbra in the left MCA territory.     CT Brain Stroke Protocol (11.13.23 @ 11:15)   IMPRESSION:  Dense left MCA M1 segment suggesting intraluminal thrombus.   Mild chronic microvascular changes without evidence of an acute   transcortical infarction or hemorrhage.

## 2023-11-25 NOTE — PROGRESS NOTE ADULT - SUBJECTIVE AND OBJECTIVE BOX
[facility] Division of Hospital Medicine    Chief Complaint:  CVA    SUBJECTIVE / OVERNIGHT EVENTS:  Pt examined lying in bed  Arousable but sleepy, able to partcipate with min neuro exam   ROS not obtained 2/2 pts aphasia   Patient denies chest pain, SOB, abd pain, N/V, fever, chills, dysuria or any other complaints. All remainder ROS negative.     MEDICATIONS  (STANDING):  allopurinol 300 milliGRAM(s) Oral daily  aspirin  chewable 81 milliGRAM(s) Oral daily  atorvastatin 40 milliGRAM(s) Oral at bedtime  chlorhexidine 2% Cloths 1 Application(s) Topical daily  dextrose 5%. 1000 milliLiter(s) (50 mL/Hr) IV Continuous <Continuous>  dextrose 5%. 1000 milliLiter(s) (100 mL/Hr) IV Continuous <Continuous>  dextrose 50% Injectable 25 Gram(s) IV Push once  dextrose 50% Injectable 25 Gram(s) IV Push once  dextrose 50% Injectable 12.5 Gram(s) IV Push once  DULoxetine 30 milliGRAM(s) Oral daily  famotidine    Tablet 20 milliGRAM(s) Oral two times a day  gabapentin 300 milliGRAM(s) Oral two times a day  glucagon  Injectable 1 milliGRAM(s) IntraMuscular once  heparin  Infusion 1050 Unit(s)/Hr (10.5 mL/Hr) IV Continuous <Continuous>  insulin lispro (ADMELOG) corrective regimen sliding scale   SubCutaneous Before meals and at bedtime  metoprolol tartrate 25 milliGRAM(s) Oral two times a day  nystatin Powder 1 Application(s) Topical two times a day  polyethylene glycol 3350 17 Gram(s) Oral daily  predniSONE   Tablet 5 milliGRAM(s) Oral daily  senna 2 Tablet(s) Oral at bedtime    MEDICATIONS  (PRN):  acetaminophen     Tablet .. 650 milliGRAM(s) Oral every 6 hours PRN Temp greater or equal to 38C (100.4F), Mild Pain (1 - 3)  albuterol/ipratropium (CFC free) Inhaler. 1 Puff(s) Inhalation four times a day PRN Wheezing  bisacodyl Suppository 10 milliGRAM(s) Rectal daily PRN Constipation  dextrose Oral Gel 15 Gram(s) Oral once PRN Blood Glucose LESS THAN 70 milliGRAM(s)/deciliter        I&O's Summary    24 Nov 2023 07:01  -  25 Nov 2023 07:00  --------------------------------------------------------  IN: 136.5 mL / OUT: 100 mL / NET: 36.5 mL    25 Nov 2023 07:01  -  25 Nov 2023 20:18  --------------------------------------------------------  IN: 126 mL / OUT: 725 mL / NET: -599 mL        PHYSICAL EXAM:  Vital Signs Last 24 Hrs  T(C): 37 (25 Nov 2023 16:05), Max: 37.4 (25 Nov 2023 10:00)  T(F): 98.6 (25 Nov 2023 16:05), Max: 99.3 (25 Nov 2023 10:00)  HR: 83 (25 Nov 2023 20:00) (65 - 88)  BP: 122/72 (25 Nov 2023 20:00) (108/74 - 144/42)  BP(mean): 83 (25 Nov 2023 20:00) (67 - 92)  RR: 18 (25 Nov 2023 20:00) (18 - 24)  SpO2: 97% (25 Nov 2023 20:00) (97% - 98%)    Parameters below as of 25 Nov 2023 20:00  Patient On (Oxygen Delivery Method): nasal cannula  O2 Flow (L/min): 2          CONSTITUTIONAL: Non toxic appearing male lying in bed  ENMT: Moist oral mucosa, no pharyngeal injection or exudates  RESPIRATORY: Normal respiratory effort; lungs are clear to auscultation bilaterally  CARDIOVASCULAR: Irregularly irregular   ABDOMEN: Nontender to palpation, normoactive bowel sounds, no rebound/guarding; No hepatosplenomegaly  MUSCLOSKELETAL:  no clubbing or cyanosis of digits; no joint swelling or tenderness to palpation  PSYCH: Awake and alert when awakened.   NEUROLOGY: Rt facial assymmerty, RUE /RLE 0/5. LUE/LLE 4-5/5  SKIN: No rashes; no palpable lesions    LABS:                        10.3   7.43  )-----------( 299      ( 25 Nov 2023 06:15 )             32.6     11-25    146<H>  |  108  |  17.9  ----------------------------<  155<H>  3.8   |  30.0<H>  |  0.72    Ca    9.2      25 Nov 2023 06:15      PTT - ( 25 Nov 2023 06:15 )  PTT:64.1 sec      Urinalysis Basic - ( 25 Nov 2023 06:15 )    Color: x / Appearance: x / SG: x / pH: x  Gluc: 155 mg/dL / Ketone: x  / Bili: x / Urobili: x   Blood: x / Protein: x / Nitrite: x   Leuk Esterase: x / RBC: x / WBC x   Sq Epi: x / Non Sq Epi: x / Bacteria: x        CAPILLARY BLOOD GLUCOSE      POCT Blood Glucose.: 185 mg/dL (25 Nov 2023 17:04)  POCT Blood Glucose.: 143 mg/dL (25 Nov 2023 11:24)  POCT Blood Glucose.: 170 mg/dL (24 Nov 2023 22:48)        RADIOLOGY & ADDITIONAL TESTS:  Results Reviewed:   Imaging Personally Reviewed:  Electrocardiogram Personally Reviewed:

## 2023-11-26 LAB
ANION GAP SERPL CALC-SCNC: 10 MMOL/L — SIGNIFICANT CHANGE UP (ref 5–17)
ANION GAP SERPL CALC-SCNC: 10 MMOL/L — SIGNIFICANT CHANGE UP (ref 5–17)
APTT BLD: 62.2 SEC — HIGH (ref 24.5–35.6)
APTT BLD: 62.2 SEC — HIGH (ref 24.5–35.6)
BUN SERPL-MCNC: 18.4 MG/DL — SIGNIFICANT CHANGE UP (ref 8–20)
BUN SERPL-MCNC: 18.4 MG/DL — SIGNIFICANT CHANGE UP (ref 8–20)
CALCIUM SERPL-MCNC: 9.2 MG/DL — SIGNIFICANT CHANGE UP (ref 8.4–10.5)
CALCIUM SERPL-MCNC: 9.2 MG/DL — SIGNIFICANT CHANGE UP (ref 8.4–10.5)
CHLORIDE SERPL-SCNC: 108 MMOL/L — SIGNIFICANT CHANGE UP (ref 96–108)
CHLORIDE SERPL-SCNC: 108 MMOL/L — SIGNIFICANT CHANGE UP (ref 96–108)
CO2 SERPL-SCNC: 29 MMOL/L — SIGNIFICANT CHANGE UP (ref 22–29)
CO2 SERPL-SCNC: 29 MMOL/L — SIGNIFICANT CHANGE UP (ref 22–29)
CREAT SERPL-MCNC: 0.91 MG/DL — SIGNIFICANT CHANGE UP (ref 0.5–1.3)
CREAT SERPL-MCNC: 0.91 MG/DL — SIGNIFICANT CHANGE UP (ref 0.5–1.3)
EGFR: 86 ML/MIN/1.73M2 — SIGNIFICANT CHANGE UP
EGFR: 86 ML/MIN/1.73M2 — SIGNIFICANT CHANGE UP
GLUCOSE BLDC GLUCOMTR-MCNC: 135 MG/DL — HIGH (ref 70–99)
GLUCOSE BLDC GLUCOMTR-MCNC: 135 MG/DL — HIGH (ref 70–99)
GLUCOSE BLDC GLUCOMTR-MCNC: 153 MG/DL — HIGH (ref 70–99)
GLUCOSE BLDC GLUCOMTR-MCNC: 153 MG/DL — HIGH (ref 70–99)
GLUCOSE BLDC GLUCOMTR-MCNC: 169 MG/DL — HIGH (ref 70–99)
GLUCOSE BLDC GLUCOMTR-MCNC: 169 MG/DL — HIGH (ref 70–99)
GLUCOSE BLDC GLUCOMTR-MCNC: 179 MG/DL — HIGH (ref 70–99)
GLUCOSE BLDC GLUCOMTR-MCNC: 179 MG/DL — HIGH (ref 70–99)
GLUCOSE SERPL-MCNC: 162 MG/DL — HIGH (ref 70–99)
GLUCOSE SERPL-MCNC: 162 MG/DL — HIGH (ref 70–99)
HCT VFR BLD CALC: 34.3 % — LOW (ref 39–50)
HCT VFR BLD CALC: 34.3 % — LOW (ref 39–50)
HGB BLD-MCNC: 10.6 G/DL — LOW (ref 13–17)
HGB BLD-MCNC: 10.6 G/DL — LOW (ref 13–17)
MAGNESIUM SERPL-MCNC: 2.1 MG/DL — SIGNIFICANT CHANGE UP (ref 1.6–2.6)
MAGNESIUM SERPL-MCNC: 2.1 MG/DL — SIGNIFICANT CHANGE UP (ref 1.6–2.6)
MCHC RBC-ENTMCNC: 29.3 PG — SIGNIFICANT CHANGE UP (ref 27–34)
MCHC RBC-ENTMCNC: 29.3 PG — SIGNIFICANT CHANGE UP (ref 27–34)
MCHC RBC-ENTMCNC: 30.9 GM/DL — LOW (ref 32–36)
MCHC RBC-ENTMCNC: 30.9 GM/DL — LOW (ref 32–36)
MCV RBC AUTO: 94.8 FL — SIGNIFICANT CHANGE UP (ref 80–100)
MCV RBC AUTO: 94.8 FL — SIGNIFICANT CHANGE UP (ref 80–100)
PLATELET # BLD AUTO: 349 K/UL — SIGNIFICANT CHANGE UP (ref 150–400)
PLATELET # BLD AUTO: 349 K/UL — SIGNIFICANT CHANGE UP (ref 150–400)
POTASSIUM SERPL-MCNC: 4.1 MMOL/L — SIGNIFICANT CHANGE UP (ref 3.5–5.3)
POTASSIUM SERPL-MCNC: 4.1 MMOL/L — SIGNIFICANT CHANGE UP (ref 3.5–5.3)
POTASSIUM SERPL-SCNC: 4.1 MMOL/L — SIGNIFICANT CHANGE UP (ref 3.5–5.3)
POTASSIUM SERPL-SCNC: 4.1 MMOL/L — SIGNIFICANT CHANGE UP (ref 3.5–5.3)
RBC # BLD: 3.62 M/UL — LOW (ref 4.2–5.8)
RBC # BLD: 3.62 M/UL — LOW (ref 4.2–5.8)
RBC # FLD: 13.4 % — SIGNIFICANT CHANGE UP (ref 10.3–14.5)
RBC # FLD: 13.4 % — SIGNIFICANT CHANGE UP (ref 10.3–14.5)
SODIUM SERPL-SCNC: 147 MMOL/L — HIGH (ref 135–145)
SODIUM SERPL-SCNC: 147 MMOL/L — HIGH (ref 135–145)
WBC # BLD: 8.41 K/UL — SIGNIFICANT CHANGE UP (ref 3.8–10.5)
WBC # BLD: 8.41 K/UL — SIGNIFICANT CHANGE UP (ref 3.8–10.5)
WBC # FLD AUTO: 8.41 K/UL — SIGNIFICANT CHANGE UP (ref 3.8–10.5)
WBC # FLD AUTO: 8.41 K/UL — SIGNIFICANT CHANGE UP (ref 3.8–10.5)

## 2023-11-26 PROCEDURE — 99233 SBSQ HOSP IP/OBS HIGH 50: CPT

## 2023-11-26 RX ADMIN — Medication 5 MILLIGRAM(S): at 05:12

## 2023-11-26 RX ADMIN — Medication 300 MILLIGRAM(S): at 13:07

## 2023-11-26 RX ADMIN — CHLORHEXIDINE GLUCONATE 1 APPLICATION(S): 213 SOLUTION TOPICAL at 05:24

## 2023-11-26 RX ADMIN — NYSTATIN CREAM 1 APPLICATION(S): 100000 CREAM TOPICAL at 17:26

## 2023-11-26 RX ADMIN — Medication 81 MILLIGRAM(S): at 12:21

## 2023-11-26 RX ADMIN — Medication 25 MILLIGRAM(S): at 05:12

## 2023-11-26 RX ADMIN — GABAPENTIN 300 MILLIGRAM(S): 400 CAPSULE ORAL at 05:12

## 2023-11-26 RX ADMIN — HEPARIN SODIUM 10.5 UNIT(S)/HR: 5000 INJECTION INTRAVENOUS; SUBCUTANEOUS at 07:23

## 2023-11-26 RX ADMIN — HEPARIN SODIUM 10.5 UNIT(S)/HR: 5000 INJECTION INTRAVENOUS; SUBCUTANEOUS at 05:14

## 2023-11-26 RX ADMIN — ATORVASTATIN CALCIUM 40 MILLIGRAM(S): 80 TABLET, FILM COATED ORAL at 21:19

## 2023-11-26 RX ADMIN — HEPARIN SODIUM 10.5 UNIT(S)/HR: 5000 INJECTION INTRAVENOUS; SUBCUTANEOUS at 19:14

## 2023-11-26 RX ADMIN — Medication 1: at 07:55

## 2023-11-26 RX ADMIN — Medication 650 MILLIGRAM(S): at 16:04

## 2023-11-26 RX ADMIN — FAMOTIDINE 20 MILLIGRAM(S): 10 INJECTION INTRAVENOUS at 17:26

## 2023-11-26 RX ADMIN — DULOXETINE HYDROCHLORIDE 30 MILLIGRAM(S): 30 CAPSULE, DELAYED RELEASE ORAL at 12:20

## 2023-11-26 RX ADMIN — Medication 25 MILLIGRAM(S): at 17:26

## 2023-11-26 RX ADMIN — FAMOTIDINE 20 MILLIGRAM(S): 10 INJECTION INTRAVENOUS at 05:12

## 2023-11-26 RX ADMIN — NYSTATIN CREAM 1 APPLICATION(S): 100000 CREAM TOPICAL at 05:13

## 2023-11-26 RX ADMIN — GABAPENTIN 300 MILLIGRAM(S): 400 CAPSULE ORAL at 17:26

## 2023-11-26 RX ADMIN — Medication 1: at 17:16

## 2023-11-26 RX ADMIN — SENNA PLUS 2 TABLET(S): 8.6 TABLET ORAL at 21:19

## 2023-11-26 RX ADMIN — Medication 1 PUFF(S): at 18:06

## 2023-11-26 RX ADMIN — POLYETHYLENE GLYCOL 3350 17 GRAM(S): 17 POWDER, FOR SOLUTION ORAL at 12:34

## 2023-11-26 RX ADMIN — Medication 650 MILLIGRAM(S): at 18:25

## 2023-11-26 RX ADMIN — Medication 1: at 13:03

## 2023-11-26 NOTE — PROGRESS NOTE ADULT - SUBJECTIVE AND OBJECTIVE BOX
[facility] Division of Hospital Medicine    Chief Complaint:  CVA    SUBJECTIVE / OVERNIGHT EVENTS:  Pt examined lying in bed  More awake this am  ROS not obtained 2/2 pts aphasia     MEDICATIONS  (STANDING):  allopurinol 300 milliGRAM(s) Oral daily  aspirin  chewable 81 milliGRAM(s) Oral daily  atorvastatin 40 milliGRAM(s) Oral at bedtime  chlorhexidine 2% Cloths 1 Application(s) Topical daily  dextrose 5%. 1000 milliLiter(s) (50 mL/Hr) IV Continuous <Continuous>  dextrose 5%. 1000 milliLiter(s) (100 mL/Hr) IV Continuous <Continuous>  dextrose 50% Injectable 25 Gram(s) IV Push once  dextrose 50% Injectable 25 Gram(s) IV Push once  dextrose 50% Injectable 12.5 Gram(s) IV Push once  DULoxetine 30 milliGRAM(s) Oral daily  famotidine    Tablet 20 milliGRAM(s) Oral two times a day  gabapentin 300 milliGRAM(s) Oral two times a day  glucagon  Injectable 1 milliGRAM(s) IntraMuscular once  heparin  Infusion 1050 Unit(s)/Hr (10.5 mL/Hr) IV Continuous <Continuous>  insulin lispro (ADMELOG) corrective regimen sliding scale   SubCutaneous Before meals and at bedtime  metoprolol tartrate 25 milliGRAM(s) Oral two times a day  nystatin Powder 1 Application(s) Topical two times a day  polyethylene glycol 3350 17 Gram(s) Oral daily  predniSONE   Tablet 5 milliGRAM(s) Oral daily  senna 2 Tablet(s) Oral at bedtime    MEDICATIONS  (PRN):  acetaminophen     Tablet .. 650 milliGRAM(s) Oral every 6 hours PRN Temp greater or equal to 38C (100.4F), Mild Pain (1 - 3)  albuterol/ipratropium (CFC free) Inhaler. 1 Puff(s) Inhalation four times a day PRN Wheezing  bisacodyl Suppository 10 milliGRAM(s) Rectal daily PRN Constipation  dextrose Oral Gel 15 Gram(s) Oral once PRN Blood Glucose LESS THAN 70 milliGRAM(s)/deciliter              PHYSICAL EXAM:  Vital Signs Last 24 Hrs  T(C): 36.8 (26 Nov 2023 08:02), Max: 37.9 (25 Nov 2023 20:00)  T(F): 98.2 (26 Nov 2023 08:02), Max: 100.2 (25 Nov 2023 20:00)  HR: 103 (26 Nov 2023 14:00) (72 - 103)  BP: 129/62 (26 Nov 2023 14:00) (115/57 - 132/52)  BP(mean): 80 (26 Nov 2023 14:00) (68 - 87)  RR: 22 (26 Nov 2023 14:00) (16 - 24)  SpO2: 95% (26 Nov 2023 14:00) (94% - 97%)    Parameters below as of 26 Nov 2023 14:00  Patient On (Oxygen Delivery Method): room air        CONSTITUTIONAL: Non toxic appearing male lying in bed  ENMT: Moist oral mucosa, no pharyngeal injection or exudates  RESPIRATORY: Normal respiratory effort; lungs are clear to auscultation bilaterally  CARDIOVASCULAR: Irregularly irregular   ABDOMEN: Nontender to palpation, normoactive bowel sounds, no rebound/guarding; No hepatosplenomegaly  MUSCLOSKELETAL:  no clubbing or cyanosis of digits; no joint swelling or tenderness to palpation  PSYCH: Awake and alert when awakened.   NEUROLOGY: Rt facial assymmerty, RUE /RLE 0/5. LUE/LLE 4-5/5  SKIN: No rashes; no palpable lesions    LABS:                                   10.6   8.41  )-----------( 349      ( 26 Nov 2023 04:40 )             34.3   11-26    147<H>  |  108  |  18.4  ----------------------------<  162<H>  4.1   |  29.0  |  0.91    Ca    9.2      26 Nov 2023 04:40  Mg     2.1     11-26      Urinalysis Basic - ( 25 Nov 2023 06:15 )    Color: x / Appearance: x / SG: x / pH: x  Gluc: 155 mg/dL / Ketone: x  / Bili: x / Urobili: x   Blood: x / Protein: x / Nitrite: x   Leuk Esterase: x / RBC: x / WBC x   Sq Epi: x / Non Sq Epi: x / Bacteria: x        CAPILLARY BLOOD GLUCOSE      POCT Blood Glucose.: 185 mg/dL (25 Nov 2023 17:04)  POCT Blood Glucose.: 143 mg/dL (25 Nov 2023 11:24)  POCT Blood Glucose.: 170 mg/dL (24 Nov 2023 22:48)        RADIOLOGY & ADDITIONAL TESTS:  Results Reviewed:   Imaging Personally Reviewed:  Electrocardiogram Personally Reviewed:

## 2023-11-26 NOTE — PROGRESS NOTE ADULT - ASSESSMENT
78y/oM with HTN, HLD, AFib, CKD, Obesity, Gout, Prostate cancer, DVT/PE, OA s/p multiple orthopedic procedures, Spinal Stenosis admitted for elective FROY ligation (11/13). Developed acute Rt sided CVA with imaging notable for acute Left M1 occlusion. Given TNK with TICI 2A thrombectomy, Subsequently noted to have evolving left MCA CVA with hemorrhagic transformation with mass effect (left to right shift). Intubated and monitored in NICU. Subsequently extubated and transferred to Medicine (11/17). Restarted on AC after imaging confirmed FROY thrombi + PE    Left MCA CVA with M1 occlusion s/p TNK and thrombectomy with Hemorrhagic transformation  Likely cardioembolic while AC had been on hold  Repeat CT head noted  Restarted on ASA 81 mg daily   Continue Statin   Continue Neurochecks Q2 with BP goal 130-160. Avoid rapid fluctuations   Continue Heparin ggt goal 50-70  f/u CT head 11/27 to determine if safe to transition to PO AC  PT - JHOAN  OT -AR  SLP eval appreciated; continue dysphagia diet  aspiration/fall precautions    Atrial Fibrillation  FROY thrombi  PE  Acute Rt  LE DVT  Doppler LE Rt DVT  FROY Thrombi noted on CTA Heart  on Hep GTT - PTT goal of 50-70  Continue BB  EP on board    Hypernatremia  goal sodium 145-150 per stroke neuro recs due to cerebral edema  hold torsemide   hold parenteral free water  monitor I/os     Anemia likely due to chronic disease  Hb relatively stable  iron studies  monitor CBC closely    Acute Hypoxic Respiratory Failure - resolved  intubated for airway protection on admission   history of ILD; continue ch prednisone   extubated on 11/14  CXR no acute infiltrates  bronchodilators as needed  incentive spirometry     DM  -HbA1c 6.2   -continue ISS  -ADA diet    History of PE/DVT, now with new  [facility] PE and RLE DVT confirmed   NO VCDS on RLE   AC as above    HTN  SBP 130s (goal 130-180 per neuro recs)  continue metoprolol  low sodium diet    GERD  continue famotidine    Gout  continue allopurinol    Neuropathy  continue gabapentin     DVT ppx- heparin drip      Dispo: Patient remains acute  Now on heparin drip - Monitor ptt. f/u cbc  order cth  closely monitor at step down  plan of care dw daughter and wife at bedside.  kush rn

## 2023-11-27 LAB
ANION GAP SERPL CALC-SCNC: 10 MMOL/L — SIGNIFICANT CHANGE UP (ref 5–17)
ANION GAP SERPL CALC-SCNC: 10 MMOL/L — SIGNIFICANT CHANGE UP (ref 5–17)
APTT BLD: 60.2 SEC — HIGH (ref 24.5–35.6)
APTT BLD: 60.2 SEC — HIGH (ref 24.5–35.6)
BUN SERPL-MCNC: 21.9 MG/DL — HIGH (ref 8–20)
BUN SERPL-MCNC: 21.9 MG/DL — HIGH (ref 8–20)
CALCIUM SERPL-MCNC: 9.3 MG/DL — SIGNIFICANT CHANGE UP (ref 8.4–10.5)
CALCIUM SERPL-MCNC: 9.3 MG/DL — SIGNIFICANT CHANGE UP (ref 8.4–10.5)
CHLORIDE SERPL-SCNC: 109 MMOL/L — HIGH (ref 96–108)
CHLORIDE SERPL-SCNC: 109 MMOL/L — HIGH (ref 96–108)
CO2 SERPL-SCNC: 29 MMOL/L — SIGNIFICANT CHANGE UP (ref 22–29)
CO2 SERPL-SCNC: 29 MMOL/L — SIGNIFICANT CHANGE UP (ref 22–29)
CREAT SERPL-MCNC: 0.89 MG/DL — SIGNIFICANT CHANGE UP (ref 0.5–1.3)
CREAT SERPL-MCNC: 0.89 MG/DL — SIGNIFICANT CHANGE UP (ref 0.5–1.3)
EGFR: 87 ML/MIN/1.73M2 — SIGNIFICANT CHANGE UP
EGFR: 87 ML/MIN/1.73M2 — SIGNIFICANT CHANGE UP
GLUCOSE BLDC GLUCOMTR-MCNC: 136 MG/DL — HIGH (ref 70–99)
GLUCOSE BLDC GLUCOMTR-MCNC: 136 MG/DL — HIGH (ref 70–99)
GLUCOSE BLDC GLUCOMTR-MCNC: 157 MG/DL — HIGH (ref 70–99)
GLUCOSE BLDC GLUCOMTR-MCNC: 157 MG/DL — HIGH (ref 70–99)
GLUCOSE BLDC GLUCOMTR-MCNC: 170 MG/DL — HIGH (ref 70–99)
GLUCOSE BLDC GLUCOMTR-MCNC: 170 MG/DL — HIGH (ref 70–99)
GLUCOSE BLDC GLUCOMTR-MCNC: 224 MG/DL — HIGH (ref 70–99)
GLUCOSE BLDC GLUCOMTR-MCNC: 224 MG/DL — HIGH (ref 70–99)
GLUCOSE SERPL-MCNC: 153 MG/DL — HIGH (ref 70–99)
GLUCOSE SERPL-MCNC: 153 MG/DL — HIGH (ref 70–99)
HCT VFR BLD CALC: 34.5 % — LOW (ref 39–50)
HCT VFR BLD CALC: 34.5 % — LOW (ref 39–50)
HGB BLD-MCNC: 10.7 G/DL — LOW (ref 13–17)
HGB BLD-MCNC: 10.7 G/DL — LOW (ref 13–17)
MCHC RBC-ENTMCNC: 29.9 PG — SIGNIFICANT CHANGE UP (ref 27–34)
MCHC RBC-ENTMCNC: 29.9 PG — SIGNIFICANT CHANGE UP (ref 27–34)
MCHC RBC-ENTMCNC: 31 GM/DL — LOW (ref 32–36)
MCHC RBC-ENTMCNC: 31 GM/DL — LOW (ref 32–36)
MCV RBC AUTO: 96.4 FL — SIGNIFICANT CHANGE UP (ref 80–100)
MCV RBC AUTO: 96.4 FL — SIGNIFICANT CHANGE UP (ref 80–100)
PLATELET # BLD AUTO: 375 K/UL — SIGNIFICANT CHANGE UP (ref 150–400)
PLATELET # BLD AUTO: 375 K/UL — SIGNIFICANT CHANGE UP (ref 150–400)
POTASSIUM SERPL-MCNC: 4.1 MMOL/L — SIGNIFICANT CHANGE UP (ref 3.5–5.3)
POTASSIUM SERPL-MCNC: 4.1 MMOL/L — SIGNIFICANT CHANGE UP (ref 3.5–5.3)
POTASSIUM SERPL-SCNC: 4.1 MMOL/L — SIGNIFICANT CHANGE UP (ref 3.5–5.3)
POTASSIUM SERPL-SCNC: 4.1 MMOL/L — SIGNIFICANT CHANGE UP (ref 3.5–5.3)
RBC # BLD: 3.58 M/UL — LOW (ref 4.2–5.8)
RBC # BLD: 3.58 M/UL — LOW (ref 4.2–5.8)
RBC # FLD: 13.4 % — SIGNIFICANT CHANGE UP (ref 10.3–14.5)
RBC # FLD: 13.4 % — SIGNIFICANT CHANGE UP (ref 10.3–14.5)
SODIUM SERPL-SCNC: 148 MMOL/L — HIGH (ref 135–145)
SODIUM SERPL-SCNC: 148 MMOL/L — HIGH (ref 135–145)
WBC # BLD: 8.57 K/UL — SIGNIFICANT CHANGE UP (ref 3.8–10.5)
WBC # BLD: 8.57 K/UL — SIGNIFICANT CHANGE UP (ref 3.8–10.5)
WBC # FLD AUTO: 8.57 K/UL — SIGNIFICANT CHANGE UP (ref 3.8–10.5)
WBC # FLD AUTO: 8.57 K/UL — SIGNIFICANT CHANGE UP (ref 3.8–10.5)

## 2023-11-27 PROCEDURE — 99232 SBSQ HOSP IP/OBS MODERATE 35: CPT

## 2023-11-27 PROCEDURE — 71045 X-RAY EXAM CHEST 1 VIEW: CPT | Mod: 26

## 2023-11-27 RX ORDER — IPRATROPIUM/ALBUTEROL SULFATE 18-103MCG
3 AEROSOL WITH ADAPTER (GRAM) INHALATION EVERY 6 HOURS
Refills: 0 | Status: DISCONTINUED | OUTPATIENT
Start: 2023-11-27 | End: 2023-12-04

## 2023-11-27 RX ORDER — SODIUM CHLORIDE 9 MG/ML
1000 INJECTION, SOLUTION INTRAVENOUS
Refills: 0 | Status: DISCONTINUED | OUTPATIENT
Start: 2023-11-27 | End: 2023-12-01

## 2023-11-27 RX ADMIN — SENNA PLUS 2 TABLET(S): 8.6 TABLET ORAL at 21:00

## 2023-11-27 RX ADMIN — CHLORHEXIDINE GLUCONATE 1 APPLICATION(S): 213 SOLUTION TOPICAL at 05:18

## 2023-11-27 RX ADMIN — GABAPENTIN 300 MILLIGRAM(S): 400 CAPSULE ORAL at 18:55

## 2023-11-27 RX ADMIN — FAMOTIDINE 20 MILLIGRAM(S): 10 INJECTION INTRAVENOUS at 18:55

## 2023-11-27 RX ADMIN — Medication 5 MILLIGRAM(S): at 05:18

## 2023-11-27 RX ADMIN — Medication 25 MILLIGRAM(S): at 18:55

## 2023-11-27 RX ADMIN — SODIUM CHLORIDE 60 MILLILITER(S): 9 INJECTION, SOLUTION INTRAVENOUS at 18:55

## 2023-11-27 RX ADMIN — Medication 1: at 07:56

## 2023-11-27 RX ADMIN — FAMOTIDINE 20 MILLIGRAM(S): 10 INJECTION INTRAVENOUS at 05:18

## 2023-11-27 RX ADMIN — Medication 2: at 21:00

## 2023-11-27 RX ADMIN — ATORVASTATIN CALCIUM 40 MILLIGRAM(S): 80 TABLET, FILM COATED ORAL at 21:00

## 2023-11-27 RX ADMIN — HEPARIN SODIUM 10.5 UNIT(S)/HR: 5000 INJECTION INTRAVENOUS; SUBCUTANEOUS at 18:56

## 2023-11-27 RX ADMIN — NYSTATIN CREAM 1 APPLICATION(S): 100000 CREAM TOPICAL at 18:58

## 2023-11-27 RX ADMIN — Medication 650 MILLIGRAM(S): at 10:17

## 2023-11-27 RX ADMIN — SODIUM CHLORIDE 60 MILLILITER(S): 9 INJECTION, SOLUTION INTRAVENOUS at 13:45

## 2023-11-27 RX ADMIN — Medication 1: at 12:06

## 2023-11-27 RX ADMIN — NYSTATIN CREAM 1 APPLICATION(S): 100000 CREAM TOPICAL at 05:18

## 2023-11-27 RX ADMIN — Medication 25 MILLIGRAM(S): at 05:18

## 2023-11-27 RX ADMIN — Medication 650 MILLIGRAM(S): at 05:17

## 2023-11-27 RX ADMIN — GABAPENTIN 300 MILLIGRAM(S): 400 CAPSULE ORAL at 05:18

## 2023-11-27 NOTE — PROGRESS NOTE ADULT - NS ATTEND AMEND GEN_ALL_CORE FT
Seen and examined with the ACP team. Plan discussed with ACPs.    I agree with assessment and plan  as written with modifications made above.    will follow with you    Link Casas MD PhD   [id number]

## 2023-11-27 NOTE — PROGRESS NOTE ADULT - ASSESSMENT
INCOMPLETE  ASSESSMENT:   77 yo M PMH of HTN, HLD, AFib, CKD, obesity, gout, prostate cancer, DVT/PE, OA s/p multiple orthopedic procedures, spinal stenosis, presented on 11/13/23 for elective left atrial appendage occlusion procedure, and developed a left MCA syndrome due to acute left M1 occlusion as demonstrated on CT Head and Neck. Patient presented on 11/13/23 for elective left atrial appendage occlusion with Watchman device/HARJINDER w/ Dr Bobo, and Pradaxa was held for 2 days prior to procedure. Whilst in holding patient developed a left MCA syndrome due to acute left M1 occlusion as demonstrated on CTH & Neck. On admission, NIHSS 26, MRS 1. Patient was given tenecteplase at 11:16 11/13/23, and subsequently underwent mechanical thrombectomy with TICI 2A recannulization with Dr. Soliman. 24 hour repeat CT head s/p tenecteplase administration showed evolving left MCA stroke with hemorrhagic transformation, and mass effect with left to right shift. MRI brain revealed redemonstration of an evolving acute/subacute large left-sided MCA distribution infarction with gross hemorrhagic transformation in the left basal ganglia as well as evidence of petechial hemorrhagic transformation within the left frontotemporal and parietal cortices. Suspected etiology of stroke is cardioembolic from atrial fibrillation when off anticoagulation for recent procedure, 2 cardiac thrombi were appreciated on CTA chest obtained 10 days prior to procedure. These were again seen on repeat CTA chest obtained 11/22/23 as well as new findings of right upper lobe PE.     NEURO:   -Neurologically with no acute changes   -Continue close monitoring for neurologic deterioration    -Stroke neuro checks q2hrs  -Neurologically appears to be tolerating SBP of 130s-160s. Can keep this SBP goal and avoid hypotension or rapid fluctuations in blood pressure   -ANTITHROMBOTIC THERAPY: on ASA 81 mg daily and Heparin gtt with goal ptt 50-70. Although patient has a large left MCA stroke with hemorrhagic transformation, due to presence of cardiac thrombi on chest CTA, PE, DVT, and recent embolic event, there is a high risk of recurrent stroke without anticoagulation therapy; overall benefit of anticoagulation (low ptt goal for now) outweighs risk of hemorrhage, now 14 days from stroke onset, as patient has evidence of active cardiac thrombi and recent cardioembolic event. This should be discussed with the family as well, regarding risk/benefits and alternatives. ASA if indicated from the cardiac standpoint if patient resumes anticoagulation. Do not bolus, avoid supra-therapeutic ptt levels, further titration pending clinical course. Repeat CT head for any acute change.  -Pending CT scan today (11/27/23) for determination for transition to DOAC.  -titrate statin to LDL goal less than 70, LDL=54, may resume home regimen of Rosuvastatin 10 mg PO QHS as long as no medical contraindications. 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   -MRI Brain w/o as noted  -Dysphagia screen: pass  -Physical therapy/OT/Speech eval/treatment.   -Neurosurgery input appreciated, patient not a hemicrani candidate     CARDIOVASCULAR:  -TTE findings as above, no acute findings; repeat TTE 11/22 with interval decrease in EF   -HARJINDER deferred by anesthesia.   -CTA chest findings as above, see neuro portion for recommendations regarding anticoagulation  -No cardiac intervention at this time as the risks>benefits as per EP  -cardiac monitoring w/ telemetry for now, further evaluation pending findings of noted workup                              HEMATOLOGY:   -H/H 10.7/34.5. Platelets 375. Monitor anemia per primary team, patient should have all age and risk appropriate malignancy screenings with PCP or sooner if clinically suspected   -Bilateral lower extremity venous duplex positive for   -Would obtain LE dopplers in setting of imaging revealing PE  -DVT ppx: Heparin s.c [] LMWH [x]     PULMONARY:   -Patient extubated 11/14/23  -protecting airway, saturating well     RENAL:   -BUN/Cr 17.9/0.72. monitor urine output, maintain adequate hydration    -Na Goal:  145-150 secondary to cerebral edema.    ID:   -afebrile, leukocytosis resolved, monitor for si/sx of infection     OTHER:    -Discussion plan with patient and family at bedside regarding clinical course    DISPOSITION: Rehab or home depending on PT eval once stable and workup is complete    CORE MEASURES:        Admission NIHSS: 26     Tenecteplase : [x] YES [] NO      LDL/HDL/A1C: 54/67/6.2     Depression Screen- if depression hx and/or present      Statin Therapy: Atorvastatin 40 mg PO Daily      Dysphagia Screen: [x] PASS [] FAIL      Smoking [] YES [x] NO      Afib [x] YES [] NO     Stroke Education [x] YES [] NO    Obtain screening lower extremity venous ultrasound in patients who meet 1 or more of the following criteria as patient is high risk for DVT/PE on admission:   [] History of DVT/PE  []Hypercoagulable states (Factor V Leiden, Cancer, OCP, etc. )  []Prolonged immobility (hemiplegia/hemiparesis/post operative or any other extended immobilization)  [] Transferred from outside facility (Rehab or Long term care)  [] Age </= to 50   INCOMPLETE  ASSESSMENT:   77 yo M PMH of HTN, HLD, AFib, CKD, obesity, gout, prostate cancer, DVT/PE, OA s/p multiple orthopedic procedures, spinal stenosis, presented on 11/13/23 for elective left atrial appendage occlusion procedure, and developed a left MCA syndrome due to acute left M1 occlusion as demonstrated on CT Head and Neck. Patient presented on 11/13/23 for elective left atrial appendage occlusion with Watchman device/HARJINDER w/ Dr Bobo, and Pradaxa was held for 2 days prior to procedure. Whilst in holding patient developed a left MCA syndrome due to acute left M1 occlusion as demonstrated on CTH & Neck. On admission, NIHSS 26, MRS 1. Patient was given tenecteplase at 11:16 11/13/23, and subsequently underwent mechanical thrombectomy with TICI 2A recannulization with Dr. Soliman. 24 hour repeat CT head s/p tenecteplase administration showed evolving left MCA stroke with hemorrhagic transformation, and mass effect with left to right shift. MRI brain revealed redemonstration of an evolving acute/subacute large left-sided MCA distribution infarction with gross hemorrhagic transformation in the left basal ganglia as well as evidence of petechial hemorrhagic transformation within the left frontotemporal and parietal cortices. Suspected etiology of stroke is cardioembolic from atrial fibrillation when off anticoagulation for recent procedure, 2 cardiac thrombi were appreciated on CTA chest obtained 10 days prior to procedure. These were again seen on repeat CTA chest obtained 11/22/23 as well as new findings of right upper lobe PE.     NEURO:   -Neurologically with no acute changes   -Continue close monitoring for neurologic deterioration    -Stroke neuro checks q2hrs  -Neurologically appears to be tolerating SBP of 130s-160s. Can keep this SBP goal and avoid hypotension or rapid fluctuations in blood pressure   -ANTITHROMBOTIC THERAPY: on ASA 81 mg daily and Heparin gtt with goal ptt 50-70. Although patient has a large left MCA stroke with hemorrhagic transformation, due to presence of cardiac thrombi on chest CTA, PE, DVT, and recent embolic event, there is a high risk of recurrent stroke without anticoagulation therapy; overall benefit of anticoagulation outweighs risk of hemorrhage, now 14 days from stroke onset, as patient has evidence of active cardiac thrombi and recent cardioembolic event. This should be discussed with the family as well, regarding risk/benefits and alternatives. ASA if indicated from the cardiac standpoint if patient resumes anticoagulation. Do not bolus, avoid supra-therapeutic ptt levels, further titration pending clinical course. Repeat CT head for any acute change.  -Pending CT scan today (11/27/23) for determination for transition to DOAC.  -titrate statin to LDL goal less than 70, LDL=54, may resume home regimen of Rosuvastatin 10 mg PO QHS as long as no medical contraindications. 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   -MRI Brain w/o as noted  -Dysphagia screen: pass  -Physical therapy/OT/Speech eval/treatment.   -Neurosurgery input appreciated, patient not a hemicrani candidate     CARDIOVASCULAR:  -TTE findings as above, no acute findings; repeat TTE 11/22 with interval decrease in EF   -HARJINDER deferred by anesthesia.   -CTA chest findings as above, see neuro portion for recommendations regarding anticoagulation  -No cardiac intervention at this time as the risks>benefits as per EP  -cardiac monitoring w/ telemetry for now, further evaluation pending findings of noted workup                              HEMATOLOGY:   -H/H 21.9/0.89. Platelets 375. Monitor anemia per primary team, patient should have all age and risk appropriate malignancy screenings with PCP or sooner if clinically suspected   -Bilateral lower extremity venous duplex positive for DVT involving the right   popliteal vein, gastrocnemius vein, posterior tibial and peroneal veins.  -DVT ppx: Heparin s.c [] LMWH [] - hep gtt    PULMONARY:   -Patient extubated 11/14/23  -protecting airway, saturating well     RENAL:   -BUN/Cr 17.9/0.72. monitor urine output, maintain adequate hydration    -Na Goal:  145-150 secondary to cerebral edema. Currently 148    ID:   -afebrile, leukocytosis resolved, monitor for si/sx of infection     OTHER:    -Discussion plan with patient and family at bedside regarding clinical course    DISPOSITION: Rehab or home depending on PT eval once stable and workup is complete    CORE MEASURES:        Admission NIHSS: 26     Tenecteplase : [x] YES [] NO      LDL/HDL/A1C: 54/67/6.2     Depression Screen- if depression hx and/or present      Statin Therapy: Atorvastatin 40 mg PO Daily      Dysphagia Screen: [x] PASS [] FAIL      Smoking [] YES [x] NO      Afib [x] YES [] NO     Stroke Education [x] YES [] NO    Obtain screening lower extremity venous ultrasound in patients who meet 1 or more of the following criteria as patient is high risk for DVT/PE on admission:   [] History of DVT/PE  []Hypercoagulable states (Factor V Leiden, Cancer, OCP, etc. )  []Prolonged immobility (hemiplegia/hemiparesis/post operative or any other extended immobilization)  [] Transferred from outside facility (Rehab or Long term care)  [] Age </= to 50   ASSESSMENT:   79 yo M PMH of HTN, HLD, AFib, CKD, obesity, gout, prostate cancer, DVT/PE, OA s/p multiple orthopedic procedures, spinal stenosis, presented on 11/13/23 for elective left atrial appendage occlusion procedure, and developed a left MCA syndrome due to acute left M1 occlusion as demonstrated on CT Head and Neck. Patient presented on 11/13/23 for elective left atrial appendage occlusion with Watchman device/HARJINDER w/ Dr Bobo, and Pradaxa was held for 2 days prior to procedure. Whilst in holding patient developed a left MCA syndrome due to acute left M1 occlusion as demonstrated on CTH & Neck. On admission, NIHSS 26, MRS 1. Patient was given tenecteplase at 11:16 11/13/23, and subsequently underwent mechanical thrombectomy with TICI 2A recannulization with Dr. Soliman. 24 hour repeat CT head s/p tenecteplase administration showed evolving left MCA stroke with hemorrhagic transformation, and mass effect with left to right shift. MRI brain revealed redemonstration of an evolving acute/subacute large left-sided MCA distribution infarction with gross hemorrhagic transformation in the left basal ganglia as well as evidence of petechial hemorrhagic transformation within the left frontotemporal and parietal cortices. Suspected etiology of stroke is cardioembolic from atrial fibrillation when off anticoagulation for recent procedure, 2 cardiac thrombi were appreciated on CTA chest obtained 10 days prior to procedure. These were again seen on repeat CTA chest obtained 11/22/23 as well as new findings of right upper lobe PE.     NEURO:   -Neurologically with no acute changes   -Continue close monitoring for neurologic deterioration    -Stroke neuro checks q2hrs  -Neurologically appears to be tolerating SBP of 130s-160s. Can keep this SBP goal and avoid hypotension or rapid fluctuations in blood pressure   -ANTITHROMBOTIC THERAPY: Heparin gtt with goal ptt 50-70. Although patient has a large left MCA stroke with hemorrhagic transformation, due to presence of cardiac thrombi on chest CTA, PE, DVT, and recent embolic event, there is a high risk of recurrent stroke without anticoagulation therapy; overall benefit of anticoagulation outweighs risk of hemorrhage. Exam appears to be stable with no acute neurological change. If repeat head CT on 11/27/23 is stable can then transition to DOAC. Optimal agent as per Cardiology. No neurological indications for ASA once on PO anticoagulation, however no neurological contraindications if needed from the cardiac/medical stanpoint.  Do not bolus, avoid supra-therapeutic ptt levels. Repeat CT head for any acute change. Risks and benefits were discussed with wife at bedside, who expressed understanding.   -Pending CT scan today (11/27/23) for determination for transition to DOAC.  -titrate statin to LDL goal less than 70, LDL=54, may resume home regimen of Rosuvastatin 10 mg PO QHS as long as no medical contraindications. 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   -MRI Brain w/o as noted  -Dysphagia screen: pass  -Physical therapy/OT/Speech eval/treatment.   -Neurosurgery input appreciated, patient not a hemicrani candidate     CARDIOVASCULAR:  -TTE findings as above, no acute findings; repeat TTE 11/22 with interval decrease in EF   -HARJINDER deferred by anesthesia.   -CTA chest findings as above, see neuro portion for recommendations regarding anticoagulation  -No cardiac intervention at this time as the risks>benefits as per EP  -cardiac monitoring w/ telemetry for now, further evaluation pending findings of noted workup                              HEMATOLOGY:   -H/H 21.9/0.89. Platelets 375. Monitor anemia per primary team, patient should have all age and risk appropriate malignancy screenings with PCP or sooner if clinically suspected   -Bilateral lower extremity venous duplex positive for DVT involving the right   popliteal vein, gastrocnemius vein, posterior tibial and peroneal veins.  -DVT ppx: Heparin s.c [] LMWH [] - hep gtt    PULMONARY:   -Patient extubated 11/14/23  -protecting airway, saturating well     RENAL:   -BUN/Cr 17.9/0.72. monitor urine output, maintain adequate hydration    -Na Goal:  145-150 secondary to cerebral edema. Currently 148    ID:   -afebrile, leukocytosis resolved, monitor for si/sx of infection     OTHER:    -Discussion plan with patient and family at bedside regarding clinical course    DISPOSITION: Rehab or home depending on PT eval once stable and workup is complete    CORE MEASURES:        Admission NIHSS: 26     Tenecteplase : [x] YES [] NO      LDL/HDL/A1C: 54/67/6.2     Depression Screen- if depression hx and/or present      Statin Therapy: Atorvastatin 40 mg PO Daily      Dysphagia Screen: [x] PASS [] FAIL      Smoking [] YES [x] NO      Afib [x] YES [] NO     Stroke Education [x] YES [] NO    Obtain screening lower extremity venous ultrasound in patients who meet 1 or more of the following criteria as patient is high risk for DVT/PE on admission:   [] History of DVT/PE  []Hypercoagulable states (Factor V Leiden, Cancer, OCP, etc. )  []Prolonged immobility (hemiplegia/hemiparesis/post operative or any other extended immobilization)  [] Transferred from outside facility (Rehab or Long term care)  [] Age </= to 50   ASSESSMENT:   79 yo M PMH of HTN, HLD, AFib, CKD, obesity, gout, prostate cancer, DVT/PE, OA s/p multiple orthopedic procedures, spinal stenosis, presented on 11/13/23 for elective left atrial appendage occlusion procedure, and developed a left MCA syndrome due to acute left M1 occlusion as demonstrated on CT Head and Neck. Patient presented on 11/13/23 for elective left atrial appendage occlusion with Watchman device/HARJINDER w/ Dr Bobo, and Pradaxa was held for 2 days prior to procedure. Whilst in holding patient developed a left MCA syndrome due to acute left M1 occlusion as demonstrated on CTH & Neck. On admission, NIHSS 26, MRS 1. Patient was given tenecteplase at 11:16 11/13/23, and subsequently underwent mechanical thrombectomy with TICI 2A recannulization with Dr. Soliman. 24 hour repeat CT head s/p tenecteplase administration showed evolving left MCA stroke with hemorrhagic transformation, and mass effect with left to right shift. MRI brain revealed redemonstration of an evolving acute/subacute large left-sided MCA distribution infarction with gross hemorrhagic transformation in the left basal ganglia as well as evidence of petechial hemorrhagic transformation within the left frontotemporal and parietal cortices. Suspected etiology of stroke is cardioembolic from atrial fibrillation when off anticoagulation for recent procedure, 2 cardiac thrombi were appreciated on CTA chest obtained 10 days prior to procedure. These were again seen on repeat CTA chest obtained 11/22/23 as well as new findings of right upper lobe PE.     NEURO:   -Neurologically with no acute changes   -Continue close monitoring for neurologic deterioration    -Stroke neuro checks q2hrs  -Neurologically appears to be tolerating SBP of 130s-160s. Can keep this SBP goal and avoid hypotension or rapid fluctuations in blood pressure   -ANTITHROMBOTIC THERAPY: Heparin gtt with goal ptt 50-70. Although patient has a large left MCA stroke with hemorrhagic transformation, due to presence of cardiac thrombi on chest CTA, PE, DVT, hx of atrial fibrillation, and recent embolic event, there is a high risk of recurrent stroke without anticoagulation therapy; overall benefit of anticoagulation outweighs risk of hemorrhage. Exam appears to be stable with no acute neurological change. If repeat head CT on 11/27/23 is stable can then transition to DOAC. Optimal agent as per Cardiology. No neurological indications for ASA once on PO anticoagulation, however no neurological contraindications if needed from the cardiac/medical stanpoint.  Do not bolus, avoid supra-therapeutic ptt levels. Repeat CT head for any acute change. Risks and benefits were discussed with wife at bedside, who expressed understanding.   -Pending CT scan today (11/27/23) for determination for transition to DOAC.  -titrate statin to LDL goal less than 70, LDL=54, may resume home regimen of Rosuvastatin 10 mg PO QHS as long as no medical contraindications. 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   -MRI Brain w/o as noted  -Dysphagia screen: pass  -Physical therapy/OT/Speech eval/treatment.   -Neurosurgery input appreciated, patient not a hemicrani candidate     CARDIOVASCULAR:  -TTE findings as above, no acute findings; repeat TTE 11/22 with interval decrease in EF   -HARJINDER deferred by anesthesia.   -CTA chest findings as above, see neuro portion for recommendations regarding anticoagulation  -No cardiac intervention at this time as the risks>benefits as per EP  -cardiac monitoring w/ telemetry for now, further evaluation pending findings of noted workup                              HEMATOLOGY:   -H/H 21.9/0.89. Platelets 375. Monitor anemia per primary team, patient should have all age and risk appropriate malignancy screenings with PCP or sooner if clinically suspected   -Bilateral lower extremity venous duplex positive for DVT involving the right   popliteal vein, gastrocnemius vein, posterior tibial and peroneal veins.  -DVT ppx: Heparin s.c [] LMWH [] - hep gtt    PULMONARY:   -Patient extubated 11/14/23  -protecting airway, saturating well     RENAL:   -BUN/Cr 17.9/0.72. monitor urine output, maintain adequate hydration    -Na Goal:  145-150 secondary to cerebral edema. Currently 148    ID:   -afebrile, leukocytosis resolved, monitor for si/sx of infection     OTHER:    -Discussion plan with patient and family at bedside regarding clinical course    DISPOSITION: Rehab or home depending on PT eval once stable and workup is complete    CORE MEASURES:        Admission NIHSS: 26     Tenecteplase : [x] YES [] NO      LDL/HDL/A1C: 54/67/6.2     Depression Screen- if depression hx and/or present      Statin Therapy: Atorvastatin 40 mg PO Daily      Dysphagia Screen: [x] PASS [] FAIL      Smoking [] YES [x] NO      Afib [x] YES [] NO     Stroke Education [x] YES [] NO    Obtain screening lower extremity venous ultrasound in patients who meet 1 or more of the following criteria as patient is high risk for DVT/PE on admission:   [] History of DVT/PE  []Hypercoagulable states (Factor V Leiden, Cancer, OCP, etc. 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 Patient presented on 11/13/23 for elective left atrial appendage occlusion with Watchman device/HARJINDER w/ Dr Bobo, and Pradaxa was held for 2 days prior to procedure. Whilst in holding patient developed a left MCA syndrome due to acute left M1 occlusion as demonstrated on CTH & Neck. On admission, NIHSS 26, MRS 1. Patient was given tenecteplase at 11:16 11/13/23, and subsequently underwent mechanical thrombectomy with TICI 2A recannulization with Dr. Soliman. 24 hour repeat CT head s/p tenecteplase administration showed evolving left MCA stroke with hemorrhagic transformation, and mass effect with left to right shift. MRI brain revealed redemonstration of an evolving acute/subacute large left-sided MCA distribution infarction with gross hemorrhagic transformation in the left basal ganglia as well as evidence of petechial hemorrhagic transformation within the left frontotemporal and parietal cortices. Suspected etiology of stroke is cardioembolic from atrial fibrillation when off anticoagulation for recent procedure, 2 cardiac thrombi were appreciated on CTA chest obtained 10 days prior to procedure. These were again seen on repeat CTA chest obtained 11/22/23 as well as new findings of right upper lobe PE.     NEURO:   -Neurologically with no acute changes   -Continue close monitoring for neurologic deterioration    -Stroke neuro checks q2hrs  -Neurologically appears to be tolerating SBP of 130s-160s. Can keep this SBP goal and avoid hypotension or rapid fluctuations in blood pressure   -ANTITHROMBOTIC THERAPY: Heparin gtt with goal ptt 50-70. Although patient has a large left MCA stroke with hemorrhagic transformation, due to presence of cardiac thrombi on chest CTA, PE, DVT, hx of atrial fibrillation, and recent embolic event, there is a high risk of recurrent stroke without anticoagulation therapy; overall benefit of anticoagulation outweighs risk of hemorrhage. Exam appears to be stable with no acute neurological change. If repeat head CT on 11/27/23 is stable can then transition to DOAC. Optimal agent as per Cardiology. No neurological indications for ASA once on PO anticoagulation, however no neurological contraindications if needed from the cardiac/medical standpoint.  Do not bolus, avoid supra-therapeutic ptt levels. Repeat CT head for any acute change. Risks and benefits were discussed with wife at bedside, who expressed understanding.   -Pending CT scan today (11/27/23) for determination for transition to [facility], CT not done will follow up 11/28  -titrate statin to LDL goal less than 70, LDL=54, may resume home regimen of Rosuvastatin 10 mg PO QHS as long as no medical contraindications. 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   -MRI Brain w/o as noted  -Dysphagia screen: pass  -Physical therapy/OT/Speech eval/treatment.   -Neurosurgery input appreciated, patient not a hemicrani candidate     CARDIOVASCULAR:  -TTE findings as above, no acute findings; repeat TTE 11/22 with interval decrease in EF   -HARJINDER deferred by anesthesia.   -CTA chest findings as above, see neuro portion for recommendations regarding anticoagulation  -No cardiac intervention at this time as the risks>benefits as per EP  -cardiac monitoring w/ telemetry for now, further evaluation pending findings of noted workup                              HEMATOLOGY:   -H/H 21.9/0.89. Platelets 375. Monitor anemia per primary team, patient should have all age and risk appropriate malignancy screenings with PCP or sooner if clinically suspected   -Bilateral lower extremity venous duplex positive for DVT involving the right   popliteal vein, gastrocnemius vein, posterior tibial and peroneal veins.  -DVT ppx: Heparin s.c [] LMWH [] - hep gtt    PULMONARY:   -Patient extubated 11/14/23  -protecting airway, saturating well     RENAL:   -BUN/Cr 17.9/0.72. monitor urine output, maintain adequate hydration    -Na Goal:  145-150 secondary to cerebral edema. Currently 148    ID:   -afebrile, leukocytosis resolved, monitor for si/sx of infection     OTHER:    -Discussion plan with patient and family at bedside regarding clinical course    DISPOSITION: Rehab or home depending on PT eval once stable and workup is complete    CORE MEASURES:        Admission NIHSS: 26     Tenecteplase : [x] YES [] NO      LDL/HDL/A1C: 54/67/6.2     Depression Screen- if depression hx and/or present      Statin Therapy: Atorvastatin 40 mg PO Daily      Dysphagia Screen: [x] PASS [] FAIL      Smoking [] YES [x] NO      Afib [x] YES [] NO     Stroke Education [x] YES [] NO    Obtain screening lower extremity venous ultrasound in patients who meet 1 or more of the following criteria as patient is high risk for DVT/PE on admission:   [] History of DVT/PE  []Hypercoagulable states (Factor V Leiden, Cancer, OCP, etc. )  []Prolonged immobility (hemiplegia/hemiparesis/post operative or any other extended immobilization)  [] Transferred from outside facility (Rehab or Long term care)  [] Age </= to 50

## 2023-11-27 NOTE — PROGRESS NOTE ADULT - SUBJECTIVE AND OBJECTIVE BOX
Preliminary note, offical recommendations pending attending review/signature   [facility] Stroke Team  Progress Note     HPI:   77 yo M PMH of HTN, HLD, AFib, CKD, obesity, gout, prostate cancer, DVT/PE, OA s/p multiple orthopedic procedures, spinal stenosis, presented on 11/13/23 for elective left atrial appendage occlusion procedure, and 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  Patient has a long h/o AFib, which is now considered permanent. He is on rate control with metoprolol 25 mg bid, and anticoagulation with Pradaxa.  He has a long Hx of arthritis and had multiple hip replacements, bilateral knee replacements and also has neck and back pain, but is unable to take NSAIDs due to bleeding risks. He has an unsteady gait and uses a cane for assistance.  Patient presented for elective left atrial appendage occlusion with Watchman device/HARJINDER w/ Dr Bobo, and Pradaxa was held for 2 days prior to procedure. 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   Reported per neurology that on admission, NIHSS 26, MRS 1.  Patient is now s/p TNK administration at 11:16, and TICI 2A thrombectomy with Dr. Soliman, admitted to the NeuroICU intubated and on phenylephrine.     NIH SS: 11/13 at 10:53 per neurology  1A: Level of consciousness (0-3): 1  1B: Questions (0-2): 2  1C: Commands (0-2): 2  2: Gaze (0-2): 2  3: Visual fields (0-3): 2  4: Facial palsy (0-3): 1  MOTOR:  5A: Left arm motor drift (0-4): 0  5B: Right arm motor drift (0-4): 4  6A: Left leg motor drift (0-4): 0  6B: Right leg motor drift (0-4): 4  7: Limb ataxia (0-2): 0  SENSORY:  8: Sensation (0-2): 2  SPEECH:  9: Language (0-3): 3  10: Dysarthria (0-2): 2  EXTINCTION:  11: Extinction/inattention (0-2): 2    TOTAL SCORE: 26 (13 Nov 2023 16:32)      Admission NIHSS  Pre-MRS  ICH Score (if applicable)    SUBJECTIVE: No events overnight.  No new neurologic complaints.  ROS reported negative unless otherwise noted.    acetaminophen     Tablet .. 650 milliGRAM(s) Oral every 6 hours PRN  albuterol/ipratropium (CFC free) Inhaler. 1 Puff(s) Inhalation four times a day PRN  allopurinol 300 milliGRAM(s) Oral daily  aspirin  chewable 81 milliGRAM(s) Oral daily  atorvastatin 40 milliGRAM(s) Oral at bedtime  bisacodyl Suppository 10 milliGRAM(s) Rectal daily PRN  chlorhexidine 2% Cloths 1 Application(s) Topical daily  dextrose 5%. 1000 milliLiter(s) IV Continuous <Continuous>  dextrose 5%. 1000 milliLiter(s) IV Continuous <Continuous>  dextrose 50% Injectable 12.5 Gram(s) IV Push once  dextrose 50% Injectable 25 Gram(s) IV Push once  dextrose 50% Injectable 25 Gram(s) IV Push once  dextrose Oral Gel 15 Gram(s) Oral once PRN  DULoxetine 30 milliGRAM(s) Oral daily  famotidine    Tablet 20 milliGRAM(s) Oral two times a day  gabapentin 300 milliGRAM(s) Oral two times a day  glucagon  Injectable 1 milliGRAM(s) IntraMuscular once  heparin  Infusion 1050 Unit(s)/Hr IV Continuous <Continuous>  insulin lispro (ADMELOG) corrective regimen sliding scale   SubCutaneous Before meals and at bedtime  metoprolol tartrate 25 milliGRAM(s) Oral two times a day  nystatin Powder 1 Application(s) Topical two times a day  polyethylene glycol 3350 17 Gram(s) Oral daily  predniSONE   Tablet 5 milliGRAM(s) Oral daily  senna 2 Tablet(s) Oral at bedtime      PHYSICAL EXAM:   Vital Signs Last 24 Hrs  T(C): 37.7 (27 Nov 2023 08:00), Max: 38.2 (26 Nov 2023 16:01)  T(F): 99.9 (27 Nov 2023 08:00), Max: 100.8 (26 Nov 2023 16:01)  HR: 73 (27 Nov 2023 08:00) (73 - 103)  BP: 105/69 (27 Nov 2023 08:00) (105/69 - 143/59)  BP(mean): 77 (27 Nov 2023 08:00) (72 - 86)  RR: 24 (27 Nov 2023 08:00) (17 - 24)  SpO2: 94% (27 Nov 2023 08:00) (93% - 95%)    Parameters below as of 27 Nov 2023 08:00  Patient On (Oxygen Delivery Method): room air        General: No acute distress    NEUROLOGICAL EXAM:  Mental status: Awake, alert, oriented x3, speech fluent, follows commands, no neglect, normal memory   Cranial Nerves: No facial asymmetry, no nystagmus, no dysarthria,  tongue midline  Motor exam: Normal tone, no drift, 5/5 RUE, 5/5 RLE, 5/5 LUE, 5/5 LLE, normal fine finger movements.  Sensation: Intact to light touch   Coordination/ Gait: No dysmetria, gait not tested    LABS:                        10.7   8.57  )-----------( 375      ( 27 Nov 2023 05:53 )             34.5    11-27    148<H>  |  109<H>  |  21.9<H>  ----------------------------<  153<H>  4.1   |  29.0  |  0.89    Ca    9.3      27 Nov 2023 05:53  Mg     2.1     11-26    PTT - ( 27 Nov 2023 05:53 )  PTT:60.2 sec      IMAGING: Reviewed by me.      Preliminary note, offical recommendations pending attending review/signature   [facility] Stroke Team  Progress Note     HPI:  79 yo M PMH of HTN, HLD, AFib, CKD, obesity, gout, prostate cancer, DVT/PE, OA s/p multiple orthopedic procedures, spinal stenosis, presented on 11/13/23 for elective left atrial appendage occlusion procedure, and developed a MCA syndrome due to acute L M1 occlusion as demonstrated on CTA Head and Neck. Patient has a long h/o AFib, which is now considered permanent. He is on rate control with metoprolol 25 mg bid, and anticoagulation with Pradaxa.  He has a long Hx of arthritis and had multiple hip replacements, bilateral knee replacements and also has neck and back pain, but is unable to take NSAIDs due to bleeding risks. He has an unsteady gait and uses a cane for assistance.  Patient presented on 11/13 for elective left atrial appendage occlusion with Watchman device/HARJINDER w/ Dr Bobo, and Pradaxa was held for 2 days prior to procedure. 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   Initial NIHSS 26, MRS 1. Patient s/p tenecteplase administration at 11:16 11/13/23 and TICI 2A thrombectomy with Dr. Soliman.    SUBJECTIVE: No events overnight.  No new neurologic complaints.  ROS reported negative unless otherwise noted.    acetaminophen     Tablet .. 650 milliGRAM(s) Oral every 6 hours PRN  albuterol/ipratropium (CFC free) Inhaler. 1 Puff(s) Inhalation four times a day PRN  allopurinol 300 milliGRAM(s) Oral daily  aspirin  chewable 81 milliGRAM(s) Oral daily  atorvastatin 40 milliGRAM(s) Oral at bedtime  bisacodyl Suppository 10 milliGRAM(s) Rectal daily PRN  chlorhexidine 2% Cloths 1 Application(s) Topical daily  dextrose 5%. 1000 milliLiter(s) IV Continuous <Continuous>  dextrose 5%. 1000 milliLiter(s) IV Continuous <Continuous>  dextrose 50% Injectable 12.5 Gram(s) IV Push once  dextrose 50% Injectable 25 Gram(s) IV Push once  dextrose 50% Injectable 25 Gram(s) IV Push once  dextrose Oral Gel 15 Gram(s) Oral once PRN  DULoxetine 30 milliGRAM(s) Oral daily  famotidine    Tablet 20 milliGRAM(s) Oral two times a day  gabapentin 300 milliGRAM(s) Oral two times a day  glucagon  Injectable 1 milliGRAM(s) IntraMuscular once  heparin  Infusion 1050 Unit(s)/Hr IV Continuous <Continuous>  insulin lispro (ADMELOG) corrective regimen sliding scale   SubCutaneous Before meals and at bedtime  metoprolol tartrate 25 milliGRAM(s) Oral two times a day  nystatin Powder 1 Application(s) Topical two times a day  polyethylene glycol 3350 17 Gram(s) Oral daily  predniSONE   Tablet 5 milliGRAM(s) Oral daily  senna 2 Tablet(s) Oral at bedtime      PHYSICAL EXAM:   Vital Signs Last 24 Hrs  T(C): 37.7 (27 Nov 2023 08:00), Max: 38.2 (26 Nov 2023 16:01)  T(F): 99.9 (27 Nov 2023 08:00), Max: 100.8 (26 Nov 2023 16:01)  HR: 73 (27 Nov 2023 08:00) (73 - 103)  BP: 105/69 (27 Nov 2023 08:00) (105/69 - 143/59)  BP(mean): 77 (27 Nov 2023 08:00) (72 - 86)  RR: 24 (27 Nov 2023 08:00) (17 - 24)  SpO2: 94% (27 Nov 2023 08:00) (93% - 95%)    Parameters below as of 27 Nov 2023 08:00  Patient On (Oxygen Delivery Method): room air    PENDING  General: No acute distress, more brisk than previous encounter, family members at bedside    NEUROLOGICAL EXAM:  Mental status: Awake, globally aphasic, expressive > receptive  with no verbal output. Nods and mimics - appears to want to stick his tongue out and raises left hand when ask to to show 2 fingers. Right sided visual willow-neglect present.     Cranial Nerves:  Pupils equal and react symmetrically to light. Blink to threat intermittently on right eye. Blink to threat present out of left eye. Left gaze preference with right gaze palsy that does cross midline. Right facial weakness noted.    Motor exam: There is normal bulk and tone.  There is no tremor.  Right side: 0/5 strength with no movement. Grimaces to noxious stimuli with no movement.  Strength is 5/5 in the left arm and leg with no drift.    Sensation: Grimaces to noxious stimuli in all extremities.    Coordination/ Gait: unable to assess dysmetria on finger to nose testing  LABS:                        10.7   8.57  )-----------( 375      ( 27 Nov 2023 05:53 )             34.5    11-27    148<H>  |  109<H>  |  21.9<H>  ----------------------------<  153<H>  4.1   |  29.0  |  0.89    Ca    9.3      27 Nov 2023 05:53  Mg     2.1     11-26    PTT - ( 27 Nov 2023 05:53 )  PTT:60.2 sec    IMAGING: Reviewed by me.   CT Head No Cont (11.25.23 @ 10:03)   IMPRESSION: Acute left middle cerebral artery infarct with petechial   hemorrhage without change since 11/24/2023.    CT Head No Cont (11.24.23 @ 11:12)   IMPRESSION:  Redemonstration of acute left frontotemporal infarct.  Decreased conspicuity of subtly increased attenuation of gray matter   within the region of infarction. This may represent spared tissue and/or   petechial hemorrhagic transformation.  Slightly decreased mass effect on the left lateral ventricle and   rightward midline shift, currently1 mm, previously 2 mm.  Basal cisterns are visualized. No hydrocephalus.    US Duplex Venous Lower Ext Complete, Bilateral (11.23.23 @ 18:30)   IMPRESSION:  1. Findings compatible with deep venous thrombosis involving the right   popliteal vein, gastrocnemius vein, posterior tibial and peroneal veins.  2. No evidence for deep venous thrombosis within the left lower extremity   veins.    TTE Echo Limited or F/U (11.22.23 @ 16:32)   Summary:   1. Left ventricular ejection fraction, by visual estimation, is 45 to   50%.   2. Mildly decreased global left ventricular systolic function.   3. The mitral in-flow pattern reveals no discernable A-wave, therefore   no comment on diastolic function can be made.   4. Mildly enlarged right ventricle with mildly reduced RV systolic   function, estimated PASP 31 mmHg.   5. Severely enlarged left atrium.   6. Right atrial enlargement.   7. Mild mitral annular calcification.   8. Mild mitral valve regurgitation.   9. Mild-moderate tricuspid regurgitation.  10. Sclerotic aortic valve with normal opening.  11. There is no evidence of pericardial effusion.  12. Compared to the prior TTE study from 11/14/23, interval reduction in   LV EF.    CT Head No Cont (11.22.23 @ 16:25)   IMPRESSION:  Decreased mass effect on the left lateral ventricle compared   with the CT and MR of 11/16/2023 and 11/19/2023 respectively. There is   residual hemorrhage seen in association with hemorrhagic component of   infarct in the left lentiform nucleus region decreased in conspicuity   compared with the prior. Left insular hemorrhage in association with the   left MCA infarct is also noted consistent with hemorrhagic transformation   of infarct in this region. The hemorrhage in the left insular region does   appear slightly more conspicuous compared with the prior CT 11/16/2023   and is consistent with the findings of the patient's MR 11/19/2023.    CT Heart Left Atrium w/ IV Cont (11.22.23 @ 11:22)   IMPRESSION:  Cardiac:  Multiple large filling defects noted at the distal appendage at least >2   cm and also near the proximal appendage measures 1.5 cm x 1.2 cm,   consistent with left appendage thrombi.  Dilated left atrial appendage windsock in appearance with orifice width   measures 4.5 cm x 3.0 cm  Severely biatrial enlargement.  Non-cardiac:  Right upper lobe pulmonary artery embolism.  Possible pulmonary hypertension.  New right lower lobe superior segment infiltrate (possibly infectious or   inflammatory)    MR Head No Cont (11.19.23 @ 15:52)   IMPRESSION: Redemonstration of an evolving acute/subacute large   left-sided MCA distribution infarction with gross hemorrhagic   transformation in the left basal ganglia as well as evidence of petechial   hemorrhagic transformation within the left frontotemporal and parietal   cortices.  Associated cytotoxic edema, sulcal effacement, and mass effect as well as   shift of the midline structures from left to right measuring 5.5 mm   appears unchanged. There is no herniation.    CT Head No Cont (11.16.23 @ 18:05)   IMPRESSION: Stable moderate to large left MCA territory infarction   involving the left frontal lobe, left basal ganglia, left insular ribbon,   left parietal occipital lobe, and anterior left temporal lobe with stable   mild hemorrhage in the left basal ganglia. Left-to-right midline shift   measures 5.5 mm.    CT Head No Cont (11.14.23 @ 18:57)   IMPRESSION:  Stable moderate to large acute left MCA infarct with mild hemorrhage    TTE Echo Complete w/o Contrast w/ Doppler (11.14.23 @ 15:57)   Summary:   1. Left ventricular ejection fraction, by visual estimation, is 55 to   60%.   2. Normal global left ventricular systolic function.   3. The mitral in-flow pattern reveals no discernable A-wave, therefore   no comment on diastolic function can be made.   4. Normal right ventricular size and function.   5. Mild to moderately enlarged right atrium.   6. Moderately enlarged left atrium.   7. Trace mitral valve regurgitation.   8. Mild-moderate tricuspid regurgitation.   9. Sclerotic aortic valve with decreased opening.  10. Estimated pulmonary artery systolic pressure is 41.6 mmHg assuming a   right atrial pressure of 5 mmHg, which is consistent with mild pulmonary   hypertension.    CT Head No Cont (11.14.23 @ 11:36)   IMPRESSION: Evolving left MCA infarct is identified with hemorrhagic   transformation now seen with mass effect on left lateral ventricles and   left-to-right shift.    US Duplex Venous Lower Ext Complete, Bilateral (11.14.23 @ 10:38)   IMPRESSION:  No evidence of deep venous thrombosis in either lower extremity.  Left calf veins not visualized.  Small right popliteal fossa cyst.    CT Angio Brain, Neck and perfusion Stroke Protocol  w/ IV Cont (11.13.23 @ 11:18)   CTA BRAIN:  Acute thrombus in the distal left MCA M1 segment. Diminished flow in the   distal branches.  Severe bilateral cavernous carotid artery calcifications. Fetal origins   the bilateral posterior cerebral arteries. The [place] of [place] and   vertebrobasilar system are otherwise unremarkable without evidence of   stenosis, additional occlusion or saccular aneurysm dilation. No evidence   for arterial venous malformation. The vertebral arteries are codominant.    CTA NECK:  Mild bilateral carotid bulb calcifications. Mild stenosis in the left   carotid bulb. The right internal carotid arteries tortuous.  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 There is normal relationship to   the great vessels. The common carotid arteries, internal carotid arteries   and vertebral arteries shows no other evidence of significant stenosis,   occlusion or saccular aneurysm dilation. The vertebral arteries are   codominant.    CT PERFUSION:  Patient has only had less than 2 hours of symptoms may influence the CT   perfusion.  CBF<30% volume: 26 ml left MCA territory.  Tmax>6.0 s volume: 193 ml left MCA territory  Mismatch volume: 167 ml  Mismatch ratio: 7.4  IMPRESSION:  Thrombus in the distal left MCA M1 segment with diminished flow in the   distal MCA segments. 167 mL area of penumbra in the left MCA territory.     CT Brain Stroke Protocol (11.13.23 @ 11:15)   IMPRESSION:  Dense left MCA M1 segment suggesting intraluminal thrombus.   Mild chronic microvascular changes without evidence of an acute   transcortical infarction or hemorrhage.       Preliminary note, offical recommendations pending attending review/signature   [facility] Stroke Team  Progress Note     HPI:  77 yo M PMH of HTN, HLD, AFib, CKD, obesity, gout, prostate cancer, DVT/PE, OA s/p multiple orthopedic procedures, spinal stenosis, presented on 11/13/23 for elective left atrial appendage occlusion procedure, and developed a MCA syndrome due to acute L M1 occlusion as demonstrated on CTA Head and Neck. Patient has a long h/o AFib, which is now considered permanent. He is on rate control with metoprolol 25 mg bid, and anticoagulation with Pradaxa.  He has a long Hx of arthritis and had multiple hip replacements, bilateral knee replacements and also has neck and back pain, but is unable to take NSAIDs due to bleeding risks. He has an unsteady gait and uses a cane for assistance.  Patient presented on 11/13 for elective left atrial appendage occlusion with Watchman device/HARJINDER w/ Dr Bobo, and Pradaxa was held for 2 days prior to procedure. 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   Initial NIHSS 26, MRS 1. Patient s/p tenecteplase administration at 11:16 11/13/23 and TICI 2A thrombectomy with Dr. Soliman.    SUBJECTIVE: No events overnight.  No new neurologic complaints.  ROS reported negative unless otherwise noted.    acetaminophen     Tablet .. 650 milliGRAM(s) Oral every 6 hours PRN  albuterol/ipratropium (CFC free) Inhaler. 1 Puff(s) Inhalation four times a day PRN  allopurinol 300 milliGRAM(s) Oral daily  aspirin  chewable 81 milliGRAM(s) Oral daily  atorvastatin 40 milliGRAM(s) Oral at bedtime  bisacodyl Suppository 10 milliGRAM(s) Rectal daily PRN  chlorhexidine 2% Cloths 1 Application(s) Topical daily  dextrose 5%. 1000 milliLiter(s) IV Continuous <Continuous>  dextrose 5%. 1000 milliLiter(s) IV Continuous <Continuous>  dextrose 50% Injectable 12.5 Gram(s) IV Push once  dextrose 50% Injectable 25 Gram(s) IV Push once  dextrose 50% Injectable 25 Gram(s) IV Push once  dextrose Oral Gel 15 Gram(s) Oral once PRN  DULoxetine 30 milliGRAM(s) Oral daily  famotidine    Tablet 20 milliGRAM(s) Oral two times a day  gabapentin 300 milliGRAM(s) Oral two times a day  glucagon  Injectable 1 milliGRAM(s) IntraMuscular once  heparin  Infusion 1050 Unit(s)/Hr IV Continuous <Continuous>  insulin lispro (ADMELOG) corrective regimen sliding scale   SubCutaneous Before meals and at bedtime  metoprolol tartrate 25 milliGRAM(s) Oral two times a day  nystatin Powder 1 Application(s) Topical two times a day  polyethylene glycol 3350 17 Gram(s) Oral daily  predniSONE   Tablet 5 milliGRAM(s) Oral daily  senna 2 Tablet(s) Oral at bedtime      PHYSICAL EXAM:   Vital Signs Last 24 Hrs  T(C): 37.7 (27 Nov 2023 08:00), Max: 38.2 (26 Nov 2023 16:01)  T(F): 99.9 (27 Nov 2023 08:00), Max: 100.8 (26 Nov 2023 16:01)  HR: 73 (27 Nov 2023 08:00) (73 - 103)  BP: 105/69 (27 Nov 2023 08:00) (105/69 - 143/59)  BP(mean): 77 (27 Nov 2023 08:00) (72 - 86)  RR: 24 (27 Nov 2023 08:00) (17 - 24)  SpO2: 94% (27 Nov 2023 08:00) (93% - 95%)    Parameters below as of 27 Nov 2023 08:00  Patient On (Oxygen Delivery Method): room air      General: No acute distress. Wife at bedside.     NEUROLOGICAL EXAM:  Mental status: Awake, globally aphasic, expressive > receptive  with no verbal output, however attempts to make speak. Attends to examiner.  Nods and mimics. Does not follow commands, however appears to intermittently follow commands based on previous exams. Right sided visual willow-neglect present.     Cranial Nerves:  Pupils equal and react symmetrically to light. Blink to threat intermittently on right eye. Blink to threat present out of left eye. Left gaze preference with right gaze palsy that does cross midline. Right facial weakness noted.    Motor exam: There is normal bulk and tone.  There is no tremor.  RUE: 0/5 strength with no movement. Grimaces to noxious stimuli with no movement.  RLE: Trace movement within bed. Grimaces to noxious stimuli.   Strength is 5/5 in the left arm and leg with no drift.    Sensation: Grimaces to noxious stimuli in all extremities.    Coordination/ Gait: unable to assess dysmetria on finger to nose testing    LABS:                        10.7   8.57  )-----------( 375      ( 27 Nov 2023 05:53 )             34.5    11-27    148<H>  |  109<H>  |  21.9<H>  ----------------------------<  153<H>  4.1   |  29.0  |  0.89    Ca    9.3      27 Nov 2023 05:53  Mg     2.1     11-26    PTT - ( 27 Nov 2023 05:53 )  PTT:60.2 sec    IMAGING: Reviewed by me.   CT Head No Cont (11.25.23 @ 10:03)   IMPRESSION: Acute left middle cerebral artery infarct with petechial   hemorrhage without change since 11/24/2023.    CT Head No Cont (11.24.23 @ 11:12)   IMPRESSION:  Redemonstration of acute left frontotemporal infarct.  Decreased conspicuity of subtly increased attenuation of gray matter   within the region of infarction. This may represent spared tissue and/or   petechial hemorrhagic transformation.  Slightly decreased mass effect on the left lateral ventricle and   rightward midline shift, currently1 mm, previously 2 mm.  Basal cisterns are visualized. No hydrocephalus.    US Duplex Venous Lower Ext Complete, Bilateral (11.23.23 @ 18:30)   IMPRESSION:  1. Findings compatible with deep venous thrombosis involving the right   popliteal vein, gastrocnemius vein, posterior tibial and peroneal veins.  2. No evidence for deep venous thrombosis within the left lower extremity   veins.    TTE Echo Limited or F/U (11.22.23 @ 16:32)   Summary:   1. Left ventricular ejection fraction, by visual estimation, is 45 to   50%.   2. Mildly decreased global left ventricular systolic function.   3. The mitral in-flow pattern reveals no discernable A-wave, therefore   no comment on diastolic function can be made.   4. Mildly enlarged right ventricle with mildly reduced RV systolic   function, estimated PASP 31 mmHg.   5. Severely enlarged left atrium.   6. Right atrial enlargement.   7. Mild mitral annular calcification.   8. Mild mitral valve regurgitation.   9. Mild-moderate tricuspid regurgitation.  10. Sclerotic aortic valve with normal opening.  11. There is no evidence of pericardial effusion.  12. Compared to the prior TTE study from 11/14/23, interval reduction in   LV EF.    CT Head No Cont (11.22.23 @ 16:25)   IMPRESSION:  Decreased mass effect on the left lateral ventricle compared   with the CT and MR of 11/16/2023 and 11/19/2023 respectively. There is   residual hemorrhage seen in association with hemorrhagic component of   infarct in the left lentiform nucleus region decreased in conspicuity   compared with the prior. Left insular hemorrhage in association with the   left MCA infarct is also noted consistent with hemorrhagic transformation   of infarct in this region. The hemorrhage in the left insular region does   appear slightly more conspicuous compared with the prior CT 11/16/2023   and is consistent with the findings of the patient's MR 11/19/2023.    CT Heart Left Atrium w/ IV Cont (11.22.23 @ 11:22)   IMPRESSION:  Cardiac:  Multiple large filling defects noted at the distal appendage at least >2   cm and also near the proximal appendage measures 1.5 cm x 1.2 cm,   consistent with left appendage thrombi.  Dilated left atrial appendage windsock in appearance with orifice width   measures 4.5 cm x 3.0 cm  Severely biatrial enlargement.  Non-cardiac:  Right upper lobe pulmonary artery embolism.  Possible pulmonary hypertension.  New right lower lobe superior segment infiltrate (possibly infectious or   inflammatory)    MR Head No Cont (11.19.23 @ 15:52)   IMPRESSION: Redemonstration of an evolving acute/subacute large   left-sided MCA distribution infarction with gross hemorrhagic   transformation in the left basal ganglia as well as evidence of petechial   hemorrhagic transformation within the left frontotemporal and parietal   cortices.  Associated cytotoxic edema, sulcal effacement, and mass effect as well as   shift of the midline structures from left to right measuring 5.5 mm   appears unchanged. There is no herniation.    CT Head No Cont (11.16.23 @ 18:05)   IMPRESSION: Stable moderate to large left MCA territory infarction   involving the left frontal lobe, left basal ganglia, left insular ribbon,   left parietal occipital lobe, and anterior left temporal lobe with stable   mild hemorrhage in the left basal ganglia. Left-to-right midline shift   measures 5.5 mm.    CT Head No Cont (11.14.23 @ 18:57)   IMPRESSION:  Stable moderate to large acute left MCA infarct with mild hemorrhage    TTE Echo Complete w/o Contrast w/ Doppler (11.14.23 @ 15:57)   Summary:   1. Left ventricular ejection fraction, by visual estimation, is 55 to   60%.   2. Normal global left ventricular systolic function.   3. The mitral in-flow pattern reveals no discernable A-wave, therefore   no comment on diastolic function can be made.   4. Normal right ventricular size and function.   5. Mild to moderately enlarged right atrium.   6. Moderately enlarged left atrium.   7. Trace mitral valve regurgitation.   8. Mild-moderate tricuspid regurgitation.   9. Sclerotic aortic valve with decreased opening.  10. Estimated pulmonary artery systolic pressure is 41.6 mmHg assuming a   right atrial pressure of 5 mmHg, which is consistent with mild pulmonary   hypertension.    CT Head No Cont (11.14.23 @ 11:36)   IMPRESSION: Evolving left MCA infarct is identified with hemorrhagic   transformation now seen with mass effect on left lateral ventricles and   left-to-right shift.    US Duplex Venous Lower Ext Complete, Bilateral (11.14.23 @ 10:38)   IMPRESSION:  No evidence of deep venous thrombosis in either lower extremity.  Left calf veins not visualized.  Small right popliteal fossa cyst.    CT Angio Brain, Neck and perfusion Stroke Protocol  w/ IV Cont (11.13.23 @ 11:18)   CTA BRAIN:  Acute thrombus in the distal left MCA M1 segment. Diminished flow in the   distal branches.  Severe bilateral cavernous carotid artery calcifications. Fetal origins   the bilateral posterior cerebral arteries. The [place] and   vertebrobasilar system are otherwise unremarkable without evidence of   stenosis, additional occlusion or saccular aneurysm dilation. No evidence   for arterial venous malformation. The vertebral arteries are codominant.    CTA NECK:  Mild bilateral carotid bulb calcifications. Mild stenosis in the left   carotid bulb. The right internal carotid arteries tortuous.  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 There is normal relationship to   the great vessels. The common carotid arteries, internal carotid arteries   and vertebral arteries shows no other evidence of significant stenosis,   occlusion or saccular aneurysm dilation. The vertebral arteries are   codominant.    CT PERFUSION:  Patient has only had less than 2 hours of symptoms may influence the CT   perfusion.  CBF<30% volume: 26 ml left MCA territory.  Tmax>6.0 s volume: 193 ml left MCA territory  Mismatch volume: 167 ml  Mismatch ratio: 7.4  IMPRESSION:  Thrombus in the distal left MCA M1 segment with diminished flow in the   distal MCA segments. 167 mL area of penumbra in the left MCA territory.     CT Brain Stroke Protocol (11.13.23 @ 11:15)   IMPRESSION:  Dense left MCA M1 segment suggesting intraluminal thrombus.   Mild chronic microvascular changes without evidence of an acute   transcortical infarction or hemorrhage.       [facility] Stroke Team  Progress Note     HPI:  79 yo M PMH of HTN, HLD, AFib, CKD, obesity, gout, prostate cancer, DVT/PE, OA s/p multiple orthopedic procedures, spinal stenosis, presented on 11/13/23 for elective left atrial appendage occlusion procedure, and developed a MCA syndrome due to acute L M1 occlusion as demonstrated on CTA Head and Neck. Patient has a long h/o AFib, which is now considered permanent. He is on rate control with metoprolol 25 mg bid, and anticoagulation with Pradaxa.  He has a long Hx of arthritis and had multiple hip replacements, bilateral knee replacements and also has neck and back pain, but is unable to take NSAIDs due to bleeding risks. He has an unsteady gait and uses a cane for assistance.  Patient presented on 11/13 for elective left atrial appendage occlusion with Watchman device/HARJINDER w/ Dr Bobo, and Pradaxa was held for 2 days prior to procedure. 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   Initial NIHSS 26, MRS 1. Patient s/p tenecteplase administration at 11:16 11/13/23 and TICI 2A thrombectomy with Dr. Soliman.    SUBJECTIVE: No events overnight.  No new neurologic complaints.  ROS reported negative unless otherwise noted.    acetaminophen     Tablet .. 650 milliGRAM(s) Oral every 6 hours PRN  albuterol/ipratropium (CFC free) Inhaler. 1 Puff(s) Inhalation four times a day PRN  allopurinol 300 milliGRAM(s) Oral daily  aspirin  chewable 81 milliGRAM(s) Oral daily  atorvastatin 40 milliGRAM(s) Oral at bedtime  bisacodyl Suppository 10 milliGRAM(s) Rectal daily PRN  chlorhexidine 2% Cloths 1 Application(s) Topical daily  dextrose 5%. 1000 milliLiter(s) IV Continuous <Continuous>  dextrose 5%. 1000 milliLiter(s) IV Continuous <Continuous>  dextrose 50% Injectable 12.5 Gram(s) IV Push once  dextrose 50% Injectable 25 Gram(s) IV Push once  dextrose 50% Injectable 25 Gram(s) IV Push once  dextrose Oral Gel 15 Gram(s) Oral once PRN  DULoxetine 30 milliGRAM(s) Oral daily  famotidine    Tablet 20 milliGRAM(s) Oral two times a day  gabapentin 300 milliGRAM(s) Oral two times a day  glucagon  Injectable 1 milliGRAM(s) IntraMuscular once  heparin  Infusion 1050 Unit(s)/Hr IV Continuous <Continuous>  insulin lispro (ADMELOG) corrective regimen sliding scale   SubCutaneous Before meals and at bedtime  metoprolol tartrate 25 milliGRAM(s) Oral two times a day  nystatin Powder 1 Application(s) Topical two times a day  polyethylene glycol 3350 17 Gram(s) Oral daily  predniSONE   Tablet 5 milliGRAM(s) Oral daily  senna 2 Tablet(s) Oral at bedtime      PHYSICAL EXAM:   Vital Signs Last 24 Hrs  T(C): 37.7 (27 Nov 2023 08:00), Max: 38.2 (26 Nov 2023 16:01)  T(F): 99.9 (27 Nov 2023 08:00), Max: 100.8 (26 Nov 2023 16:01)  HR: 73 (27 Nov 2023 08:00) (73 - 103)  BP: 105/69 (27 Nov 2023 08:00) (105/69 - 143/59)  BP(mean): 77 (27 Nov 2023 08:00) (72 - 86)  RR: 24 (27 Nov 2023 08:00) (17 - 24)  SpO2: 94% (27 Nov 2023 08:00) (93% - 95%)    Parameters below as of 27 Nov 2023 08:00  Patient On (Oxygen Delivery Method): room air      General: No acute distress. Wife at bedside.     NEUROLOGICAL EXAM:  Mental status: Awake, globally aphasic, expressive > receptive  with no verbal output, however attempts to make speak. Attends to examiner.  Nods and mimics. Does not follow commands, however appears to intermittently follow commands based on previous exams. Right sided visual willow-neglect present.     Cranial Nerves:  Pupils equal and react symmetrically to light. Blink to threat intermittently on right eye. Blink to threat present out of left eye. Left gaze preference with right gaze palsy that does cross midline. Right facial weakness noted.    Motor exam: There is normal bulk and tone.  There is no tremor.  RUE: 0/5 strength with no movement. Grimaces to noxious stimuli with no movement.  RLE: Trace movement within bed. Grimaces to noxious stimuli.   Strength is 5/5 in the left arm and leg with no drift.    Sensation: Grimaces to noxious stimuli in all extremities.    Coordination/ Gait: unable to assess dysmetria on finger to nose testing    LABS:                        10.7   8.57  )-----------( 375      ( 27 Nov 2023 05:53 )             34.5    11-27    148<H>  |  109<H>  |  21.9<H>  ----------------------------<  153<H>  4.1   |  29.0  |  0.89    Ca    9.3      27 Nov 2023 05:53  Mg     2.1     11-26    PTT - ( 27 Nov 2023 05:53 )  PTT:60.2 sec    IMAGING: Reviewed by me.   CT Head No Cont (11.25.23 @ 10:03)   IMPRESSION: Acute left middle cerebral artery infarct with petechial   hemorrhage without change since 11/24/2023.    CT Head No Cont (11.24.23 @ 11:12)   IMPRESSION:  Redemonstration of acute left frontotemporal infarct.  Decreased conspicuity of subtly increased attenuation of gray matter   within the region of infarction. This may represent spared tissue and/or   petechial hemorrhagic transformation.  Slightly decreased mass effect on the left lateral ventricle and   rightward midline shift, currently1 mm, previously 2 mm.  Basal cisterns are visualized. No hydrocephalus.    US Duplex Venous Lower Ext Complete, Bilateral (11.23.23 @ 18:30)   IMPRESSION:  1. Findings compatible with deep venous thrombosis involving the right   popliteal vein, gastrocnemius vein, posterior tibial and peroneal veins.  2. No evidence for deep venous thrombosis within the left lower extremity   veins.    TTE Echo Limited or F/U (11.22.23 @ 16:32)   Summary:   1. Left ventricular ejection fraction, by visual estimation, is 45 to   50%.   2. Mildly decreased global left ventricular systolic function.   3. The mitral in-flow pattern reveals no discernable A-wave, therefore   no comment on diastolic function can be made.   4. Mildly enlarged right ventricle with mildly reduced RV systolic   function, estimated PASP 31 mmHg.   5. Severely enlarged left atrium.   6. Right atrial enlargement.   7. Mild mitral annular calcification.   8. Mild mitral valve regurgitation.   9. Mild-moderate tricuspid regurgitation.  10. Sclerotic aortic valve with normal opening.  11. There is no evidence of pericardial effusion.  12. Compared to the prior TTE study from 11/14/23, interval reduction in   LV EF.    CT Head No Cont (11.22.23 @ 16:25)   IMPRESSION:  Decreased mass effect on the left lateral ventricle compared   with the CT and MR of 11/16/2023 and 11/19/2023 respectively. There is   residual hemorrhage seen in association with hemorrhagic component of   infarct in the left lentiform nucleus region decreased in conspicuity   compared with the prior. Left insular hemorrhage in association with the   left MCA infarct is also noted consistent with hemorrhagic transformation   of infarct in this region. The hemorrhage in the left insular region does   appear slightly more conspicuous compared with the prior CT 11/16/2023   and is consistent with the findings of the patient's MR 11/19/2023.    CT Heart Left Atrium w/ IV Cont (11.22.23 @ 11:22)   IMPRESSION:  Cardiac:  Multiple large filling defects noted at the distal appendage at least >2   cm and also near the proximal appendage measures 1.5 cm x 1.2 cm,   consistent with left appendage thrombi.  Dilated left atrial appendage windsock in appearance with orifice width   measures 4.5 cm x 3.0 cm  Severely biatrial enlargement.  Non-cardiac:  Right upper lobe pulmonary artery embolism.  Possible pulmonary hypertension.  New right lower lobe superior segment infiltrate (possibly infectious or   inflammatory)    MR Head No Cont (11.19.23 @ 15:52)   IMPRESSION: Redemonstration of an evolving acute/subacute large   left-sided MCA distribution infarction with gross hemorrhagic   transformation in the left basal ganglia as well as evidence of petechial   hemorrhagic transformation within the left frontotemporal and parietal   cortices.  Associated cytotoxic edema, sulcal effacement, and mass effect as well as   shift of the midline structures from left to right measuring 5.5 mm   appears unchanged. There is no herniation.    CT Head No Cont (11.16.23 @ 18:05)   IMPRESSION: Stable moderate to large left MCA territory infarction   involving the left frontal lobe, left basal ganglia, left insular ribbon,   left parietal occipital lobe, and anterior left temporal lobe with stable   mild hemorrhage in the left basal ganglia. Left-to-right midline shift   measures 5.5 mm.    CT Head No Cont (11.14.23 @ 18:57)   IMPRESSION:  Stable moderate to large acute left MCA infarct with mild hemorrhage    TTE Echo Complete w/o Contrast w/ Doppler (11.14.23 @ 15:57)   Summary:   1. Left ventricular ejection fraction, by visual estimation, is 55 to   60%.   2. Normal global left ventricular systolic function.   3. The mitral in-flow pattern reveals no discernable A-wave, therefore   no comment on diastolic function can be made.   4. Normal right ventricular size and function.   5. Mild to moderately enlarged right atrium.   6. Moderately enlarged left atrium.   7. Trace mitral valve regurgitation.   8. Mild-moderate tricuspid regurgitation.   9. Sclerotic aortic valve with decreased opening.  10. Estimated pulmonary artery systolic pressure is 41.6 mmHg assuming a   right atrial pressure of 5 mmHg, which is consistent with mild pulmonary   hypertension.    CT Head No Cont (11.14.23 @ 11:36)   IMPRESSION: Evolving left MCA infarct is identified with hemorrhagic   transformation now seen with mass effect on left lateral ventricles and   left-to-right shift.    US Duplex Venous Lower Ext Complete, Bilateral (11.14.23 @ 10:38)   IMPRESSION:  No evidence of deep venous thrombosis in either lower extremity.  Left calf veins not visualized.  Small right popliteal fossa cyst.    CT Angio Brain, Neck and perfusion Stroke Protocol  w/ IV Cont (11.13.23 @ 11:18)   CTA BRAIN:  Acute thrombus in the distal left MCA M1 segment. Diminished flow in the   distal branches.  Severe bilateral cavernous carotid artery calcifications. Fetal origins   the bilateral posterior cerebral arteries. The [place] and   vertebrobasilar system are otherwise unremarkable without evidence of   stenosis, additional occlusion or saccular aneurysm dilation. No evidence   for arterial venous malformation. The vertebral arteries are codominant.    CTA NECK:  Mild bilateral carotid bulb calcifications. Mild stenosis in the left   carotid bulb. The right internal carotid arteries tortuous.  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 There is normal relationship to   the great vessels. The common carotid arteries, internal carotid arteries   and vertebral arteries shows no other evidence of significant stenosis,   occlusion or saccular aneurysm dilation. The vertebral arteries are   codominant.    CT PERFUSION:  Patient has only had less than 2 hours of symptoms may influence the CT   perfusion.  CBF<30% volume: 26 ml left MCA territory.  Tmax>6.0 s volume: 193 ml left MCA territory  Mismatch volume: 167 ml  Mismatch ratio: 7.4  IMPRESSION:  Thrombus in the distal left MCA M1 segment with diminished flow in the   distal MCA segments. 167 mL area of penumbra in the left MCA territory.     CT Brain Stroke Protocol (11.13.23 @ 11:15)   IMPRESSION:  Dense left MCA M1 segment suggesting intraluminal thrombus.   Mild chronic microvascular changes without evidence of an acute   transcortical infarction or hemorrhage.

## 2023-11-27 NOTE — PROGRESS NOTE ADULT - ASSESSMENT
78y/oM with HTN, HLD, AFib, CKD, Obesity, Gout, Prostate cancer, DVT/PE, OA s/p multiple orthopedic procedures, Spinal Stenosis admitted for elective FROY ligation (11/13). Developed acute Rt sided CVA with imaging notable for acute Left M1 occlusion. Given TNK with TICI 2A thrombectomy, Subsequently noted to have evolving left MCA CVA with hemorrhagic transformation with mass effect (left to right shift). Intubated and monitored in NICU. Subsequently extubated and transferred to Medicine (11/17). Restarted on AC after imaging confirmed FROY thrombi + PE    Plan:       Left MCA CVA with M1 occlusion s/p TNK and thrombectomy with Hemorrhagic transformation  Likely cardioembolic while AC had been on hold  Repeat CT head noted  Restarted on ASA 81 mg daily   Continue Statin   Continue Neurochecks Q2 with BP goal 130-160. Avoid rapid fluctuations   Continue Heparin ggt goal 50-70  f/u CT head today to determine if safe to transition to PO AC  PT - JHOAN  OT -AR  SLP eval appreciated; continue dysphagia diet, reval done today   aspiration/fall precautions    Atrial Fibrillation:   FROY thrombi  PE  Acute Rt  LE DVT  Doppler LE Rt DVT  FROY Thrombi noted on CTA Heart  on Hep GTT - PTT goal of 50-70  Continue BB  EP on board    Hypernatremia:   goal sodium 145-150 per stroke neuro recs due to cerebral edema  hold torsemide   hold parenteral free water  monitor I/os  will give half NS  will monitor BMP     Anemia likely due to chronic disease  Hb relatively stable  iron studies  monitor CBC closely    Acute Hypoxic Respiratory Failure - resolved  intubated for airway protection on admission   history of ILD; continue ch prednisone   extubated on 11/14  CXR no acute infiltrates  bronchodilators as needed  incentive spirometry     DM  -HbA1c 6.2   -continue ISS  -ADA diet    History of PE/DVT, now with new  [facility] PE and RLE DVT confirmed   NO VCDS on RLE   AC as above    HTN  SBP 130s (goal 130-180 per neuro recs)  continue metoprolol  low sodium diet    GERD  continue famotidine    Gout  continue allopurinol    Neuropathy  continue gabapentin     DVT ppx- heparin drip      Dispo: Patient remains acute  Now on heparin drip - Monitor ptt. f/u cbc  order cth  closely monitor at step down  Wife at bedside.  kush strauss.

## 2023-11-27 NOTE — PROGRESS NOTE ADULT - SUBJECTIVE AND OBJECTIVE BOX
YOLIS DWYERKG    [id number]    78y      Male    Patient is a 78y old  Male who presents with a chief complaint of Lt MCA M1 thrombus (27 Nov 2023 10:28)      INTERVAL HPI/OVERNIGHT EVENTS:    patient is aphasic, unable to get much info, seen by speech therapy and has been resumed on easy chew diet, as per nursing staff, no fever or new complains       Vital Signs Last 24 Hrs  T(C): 37.3 (27 Nov 2023 12:00), Max: 38.2 (26 Nov 2023 16:01)  T(F): 99.1 (27 Nov 2023 12:00), Max: 100.8 (26 Nov 2023 16:01)  HR: 79 (27 Nov 2023 12:00) (73 - 95)  BP: 129/73 (27 Nov 2023 12:00) (105/69 - 143/59)  BP(mean): 83 (27 Nov 2023 12:00) (72 - 86)  RR: 18 (27 Nov 2023 12:00) (17 - 24)  SpO2: 97% (27 Nov 2023 12:00) (93% - 97%)    Parameters below as of 27 Nov 2023 12:00  Patient On (Oxygen Delivery Method): room air        PHYSICAL EXAM:    GENERAL: Elderly male looking comfortable   HEENT: atraumatic   NECK: soft, Supple, No JVD  CHEST/LUNG: Clear to auscultate bilaterally; No wheezing  HEART: S1S2+, Regular rate and rhythm; No murmurs  ABDOMEN: Soft, Nontender, Nondistended; Bowel sounds present  EXTREMITIES:  1+ Peripheral Pulses, No edema  SKIN: No rashes or lesions  NEURO: Aphasia, not following commends       LABS:                        10.7   8.57  )-----------( 375      ( 27 Nov 2023 05:53 )             34.5     11-27    148<H>  |  109<H>  |  21.9<H>  ----------------------------<  153<H>  4.1   |  29.0  |  0.89    Ca    9.3      27 Nov 2023 05:53  Mg     2.1     11-26      PTT - ( 27 Nov 2023 05:53 )  PTT:60.2 sec        I&O's Summary    26 Nov 2023 07:01 - 27 Nov 2023 07:00  --------------------------------------------------------  IN: 126 mL / OUT: 0 mL / NET: 126 mL        MEDICATIONS  (STANDING):  allopurinol 300 milliGRAM(s) Oral daily  aspirin  chewable 81 milliGRAM(s) Oral daily  atorvastatin 40 milliGRAM(s) Oral at bedtime  chlorhexidine 2% Cloths 1 Application(s) Topical daily  dextrose 5%. 1000 milliLiter(s) (50 mL/Hr) IV Continuous <Continuous>  dextrose 5%. 1000 milliLiter(s) (100 mL/Hr) IV Continuous <Continuous>  dextrose 50% Injectable 25 Gram(s) IV Push once  dextrose 50% Injectable 12.5 Gram(s) IV Push once  dextrose 50% Injectable 25 Gram(s) IV Push once  DULoxetine 30 milliGRAM(s) Oral daily  famotidine    Tablet 20 milliGRAM(s) Oral two times a day  gabapentin 300 milliGRAM(s) Oral two times a day  glucagon  Injectable 1 milliGRAM(s) IntraMuscular once  heparin  Infusion 1050 Unit(s)/Hr (10.5 mL/Hr) IV Continuous <Continuous>  insulin lispro (ADMELOG) corrective regimen sliding scale   SubCutaneous Before meals and at bedtime  metoprolol tartrate 25 milliGRAM(s) Oral two times a day  nystatin Powder 1 Application(s) Topical two times a day  polyethylene glycol 3350 17 Gram(s) Oral daily  predniSONE   Tablet 5 milliGRAM(s) Oral daily  senna 2 Tablet(s) Oral at bedtime  sodium chloride 0.45%. 1000 milliLiter(s) (60 mL/Hr) IV Continuous <Continuous>    MEDICATIONS  (PRN):  acetaminophen     Tablet .. 650 milliGRAM(s) Oral every 6 hours PRN Temp greater or equal to 38C (100.4F), Mild Pain (1 - 3)  albuterol/ipratropium (CFC free) Inhaler. 1 Puff(s) Inhalation four times a day PRN Wheezing  albuterol/ipratropium for Nebulization 3 milliLiter(s) Nebulizer every 6 hours PRN SOB and wheezing  bisacodyl Suppository 10 milliGRAM(s) Rectal daily PRN Constipation  dextrose Oral Gel 15 Gram(s) Oral once PRN Blood Glucose LESS THAN 70 milliGRAM(s)/deciliter

## 2023-11-28 LAB
ALBUMIN SERPL ELPH-MCNC: 3.2 G/DL — LOW (ref 3.3–5.2)
ALBUMIN SERPL ELPH-MCNC: 3.2 G/DL — LOW (ref 3.3–5.2)
ALP SERPL-CCNC: 54 U/L — SIGNIFICANT CHANGE UP (ref 40–120)
ALP SERPL-CCNC: 54 U/L — SIGNIFICANT CHANGE UP (ref 40–120)
ALT FLD-CCNC: 16 U/L — SIGNIFICANT CHANGE UP
ALT FLD-CCNC: 16 U/L — SIGNIFICANT CHANGE UP
ANION GAP SERPL CALC-SCNC: 9 MMOL/L — SIGNIFICANT CHANGE UP (ref 5–17)
ANION GAP SERPL CALC-SCNC: 9 MMOL/L — SIGNIFICANT CHANGE UP (ref 5–17)
APTT BLD: 64 SEC — HIGH (ref 24.5–35.6)
APTT BLD: 64 SEC — HIGH (ref 24.5–35.6)
AST SERPL-CCNC: 24 U/L — SIGNIFICANT CHANGE UP
AST SERPL-CCNC: 24 U/L — SIGNIFICANT CHANGE UP
BILIRUB SERPL-MCNC: 0.6 MG/DL — SIGNIFICANT CHANGE UP (ref 0.4–2)
BILIRUB SERPL-MCNC: 0.6 MG/DL — SIGNIFICANT CHANGE UP (ref 0.4–2)
BUN SERPL-MCNC: 21.2 MG/DL — HIGH (ref 8–20)
BUN SERPL-MCNC: 21.2 MG/DL — HIGH (ref 8–20)
CALCIUM SERPL-MCNC: 9.2 MG/DL — SIGNIFICANT CHANGE UP (ref 8.4–10.5)
CALCIUM SERPL-MCNC: 9.2 MG/DL — SIGNIFICANT CHANGE UP (ref 8.4–10.5)
CHLORIDE SERPL-SCNC: 109 MMOL/L — HIGH (ref 96–108)
CHLORIDE SERPL-SCNC: 109 MMOL/L — HIGH (ref 96–108)
CO2 SERPL-SCNC: 30 MMOL/L — HIGH (ref 22–29)
CO2 SERPL-SCNC: 30 MMOL/L — HIGH (ref 22–29)
CREAT SERPL-MCNC: 0.79 MG/DL — SIGNIFICANT CHANGE UP (ref 0.5–1.3)
CREAT SERPL-MCNC: 0.79 MG/DL — SIGNIFICANT CHANGE UP (ref 0.5–1.3)
EGFR: 90 ML/MIN/1.73M2 — SIGNIFICANT CHANGE UP
EGFR: 90 ML/MIN/1.73M2 — SIGNIFICANT CHANGE UP
GLUCOSE BLDC GLUCOMTR-MCNC: 127 MG/DL — HIGH (ref 70–99)
GLUCOSE BLDC GLUCOMTR-MCNC: 127 MG/DL — HIGH (ref 70–99)
GLUCOSE BLDC GLUCOMTR-MCNC: 135 MG/DL — HIGH (ref 70–99)
GLUCOSE BLDC GLUCOMTR-MCNC: 135 MG/DL — HIGH (ref 70–99)
GLUCOSE BLDC GLUCOMTR-MCNC: 146 MG/DL — HIGH (ref 70–99)
GLUCOSE BLDC GLUCOMTR-MCNC: 146 MG/DL — HIGH (ref 70–99)
GLUCOSE BLDC GLUCOMTR-MCNC: 157 MG/DL — HIGH (ref 70–99)
GLUCOSE BLDC GLUCOMTR-MCNC: 157 MG/DL — HIGH (ref 70–99)
GLUCOSE SERPL-MCNC: 131 MG/DL — HIGH (ref 70–99)
GLUCOSE SERPL-MCNC: 131 MG/DL — HIGH (ref 70–99)
HCT VFR BLD CALC: 36.9 % — LOW (ref 39–50)
HCT VFR BLD CALC: 36.9 % — LOW (ref 39–50)
HGB BLD-MCNC: 11.4 G/DL — LOW (ref 13–17)
HGB BLD-MCNC: 11.4 G/DL — LOW (ref 13–17)
INR BLD: 1.13 RATIO — SIGNIFICANT CHANGE UP (ref 0.85–1.18)
INR BLD: 1.13 RATIO — SIGNIFICANT CHANGE UP (ref 0.85–1.18)
MCHC RBC-ENTMCNC: 29.5 PG — SIGNIFICANT CHANGE UP (ref 27–34)
MCHC RBC-ENTMCNC: 29.5 PG — SIGNIFICANT CHANGE UP (ref 27–34)
MCHC RBC-ENTMCNC: 30.9 GM/DL — LOW (ref 32–36)
MCHC RBC-ENTMCNC: 30.9 GM/DL — LOW (ref 32–36)
MCV RBC AUTO: 95.6 FL — SIGNIFICANT CHANGE UP (ref 80–100)
MCV RBC AUTO: 95.6 FL — SIGNIFICANT CHANGE UP (ref 80–100)
PLATELET # BLD AUTO: 358 K/UL — SIGNIFICANT CHANGE UP (ref 150–400)
PLATELET # BLD AUTO: 358 K/UL — SIGNIFICANT CHANGE UP (ref 150–400)
POTASSIUM SERPL-MCNC: 3.9 MMOL/L — SIGNIFICANT CHANGE UP (ref 3.5–5.3)
POTASSIUM SERPL-MCNC: 3.9 MMOL/L — SIGNIFICANT CHANGE UP (ref 3.5–5.3)
POTASSIUM SERPL-SCNC: 3.9 MMOL/L — SIGNIFICANT CHANGE UP (ref 3.5–5.3)
POTASSIUM SERPL-SCNC: 3.9 MMOL/L — SIGNIFICANT CHANGE UP (ref 3.5–5.3)
PROT SERPL-MCNC: 5.7 G/DL — LOW (ref 6.6–8.7)
PROT SERPL-MCNC: 5.7 G/DL — LOW (ref 6.6–8.7)
PROTHROM AB SERPL-ACNC: 12.5 SEC — SIGNIFICANT CHANGE UP (ref 9.5–13)
PROTHROM AB SERPL-ACNC: 12.5 SEC — SIGNIFICANT CHANGE UP (ref 9.5–13)
RBC # BLD: 3.86 M/UL — LOW (ref 4.2–5.8)
RBC # BLD: 3.86 M/UL — LOW (ref 4.2–5.8)
RBC # FLD: 13.7 % — SIGNIFICANT CHANGE UP (ref 10.3–14.5)
RBC # FLD: 13.7 % — SIGNIFICANT CHANGE UP (ref 10.3–14.5)
SODIUM SERPL-SCNC: 148 MMOL/L — HIGH (ref 135–145)
SODIUM SERPL-SCNC: 148 MMOL/L — HIGH (ref 135–145)
WBC # BLD: 10.75 K/UL — HIGH (ref 3.8–10.5)
WBC # BLD: 10.75 K/UL — HIGH (ref 3.8–10.5)
WBC # FLD AUTO: 10.75 K/UL — HIGH (ref 3.8–10.5)
WBC # FLD AUTO: 10.75 K/UL — HIGH (ref 3.8–10.5)

## 2023-11-28 PROCEDURE — 99233 SBSQ HOSP IP/OBS HIGH 50: CPT

## 2023-11-28 PROCEDURE — 70450 CT HEAD/BRAIN W/O DYE: CPT | Mod: 26

## 2023-11-28 RX ADMIN — Medication 5 MILLIGRAM(S): at 05:44

## 2023-11-28 RX ADMIN — HEPARIN SODIUM 10.5 UNIT(S)/HR: 5000 INJECTION INTRAVENOUS; SUBCUTANEOUS at 19:09

## 2023-11-28 RX ADMIN — POLYETHYLENE GLYCOL 3350 17 GRAM(S): 17 POWDER, FOR SOLUTION ORAL at 13:03

## 2023-11-28 RX ADMIN — HEPARIN SODIUM 10.5 UNIT(S)/HR: 5000 INJECTION INTRAVENOUS; SUBCUTANEOUS at 10:12

## 2023-11-28 RX ADMIN — SODIUM CHLORIDE 60 MILLILITER(S): 9 INJECTION, SOLUTION INTRAVENOUS at 05:43

## 2023-11-28 RX ADMIN — Medication 1: at 13:07

## 2023-11-28 RX ADMIN — NYSTATIN CREAM 1 APPLICATION(S): 100000 CREAM TOPICAL at 05:43

## 2023-11-28 RX ADMIN — FAMOTIDINE 20 MILLIGRAM(S): 10 INJECTION INTRAVENOUS at 05:44

## 2023-11-28 RX ADMIN — SODIUM CHLORIDE 60 MILLILITER(S): 9 INJECTION, SOLUTION INTRAVENOUS at 23:57

## 2023-11-28 RX ADMIN — Medication 25 MILLIGRAM(S): at 05:44

## 2023-11-28 RX ADMIN — DULOXETINE HYDROCHLORIDE 30 MILLIGRAM(S): 30 CAPSULE, DELAYED RELEASE ORAL at 13:03

## 2023-11-28 RX ADMIN — CHLORHEXIDINE GLUCONATE 1 APPLICATION(S): 213 SOLUTION TOPICAL at 07:18

## 2023-11-28 RX ADMIN — FAMOTIDINE 20 MILLIGRAM(S): 10 INJECTION INTRAVENOUS at 17:43

## 2023-11-28 RX ADMIN — HEPARIN SODIUM 10.5 UNIT(S)/HR: 5000 INJECTION INTRAVENOUS; SUBCUTANEOUS at 23:57

## 2023-11-28 RX ADMIN — HEPARIN SODIUM 10.5 UNIT(S)/HR: 5000 INJECTION INTRAVENOUS; SUBCUTANEOUS at 07:21

## 2023-11-28 RX ADMIN — Medication 300 MILLIGRAM(S): at 13:03

## 2023-11-28 RX ADMIN — NYSTATIN CREAM 1 APPLICATION(S): 100000 CREAM TOPICAL at 18:53

## 2023-11-28 RX ADMIN — Medication 81 MILLIGRAM(S): at 13:03

## 2023-11-28 RX ADMIN — SODIUM CHLORIDE 60 MILLILITER(S): 9 INJECTION, SOLUTION INTRAVENOUS at 21:20

## 2023-11-28 RX ADMIN — GABAPENTIN 300 MILLIGRAM(S): 400 CAPSULE ORAL at 05:43

## 2023-11-28 RX ADMIN — ATORVASTATIN CALCIUM 40 MILLIGRAM(S): 80 TABLET, FILM COATED ORAL at 21:20

## 2023-11-28 RX ADMIN — GABAPENTIN 300 MILLIGRAM(S): 400 CAPSULE ORAL at 17:43

## 2023-11-28 RX ADMIN — SENNA PLUS 2 TABLET(S): 8.6 TABLET ORAL at 21:20

## 2023-11-28 RX ADMIN — Medication 25 MILLIGRAM(S): at 17:43

## 2023-11-28 NOTE — PROGRESS NOTE ADULT - ASSESSMENT
INCOMPLETE  ASSESSMENT:   77 yo M PMH of HTN, HLD, AFib, CKD, obesity, gout, prostate cancer, DVT/PE, OA s/p multiple orthopedic procedures, spinal stenosis, presented on 11/13/23 for elective left atrial appendage occlusion procedure, and developed a left MCA syndrome due to acute left M1 occlusion as demonstrated on CT Head and Neck. Patient presented on 11/13/23 for elective left atrial appendage occlusion with Watchman device/HARJINDER w/ Dr Bobo, and Pradaxa was held for 2 days prior to procedure. Whilst in holding patient developed a left MCA syndrome due to acute left M1 occlusion as demonstrated on CTH & Neck. On admission, NIHSS 26, MRS 1. Patient was given tenecteplase at 11:16 11/13/23, and subsequently underwent mechanical thrombectomy with TICI 2A recannulization with Dr. Soliman. 24 hour repeat CT head s/p tenecteplase administration showed evolving left MCA stroke with hemorrhagic transformation, and mass effect with left to right shift. MRI brain revealed redemonstration of an evolving acute/subacute large left-sided MCA distribution infarction with gross hemorrhagic transformation in the left basal ganglia as well as evidence of petechial hemorrhagic transformation within the left frontotemporal and parietal cortices. Suspected etiology of stroke is cardioembolic from atrial fibrillation when off anticoagulation for recent procedure, 2 cardiac thrombi were appreciated on CTA chest obtained 10 days prior to procedure. These were again seen on repeat CTA chest obtained 11/22/23 as well as new findings of right upper lobe PE.     NEURO:   -Neurologically with no acute changes   -Repeat head CT on 11/28/23 revealed acute moderate to large left MCA territory infarct with slightly increased petechial hemorrhage. No herniation pattern.  -Obtain repeat head CT on 11/30/23 to evaluate for transition to DOAC  -Continue close monitoring for neurologic deterioration    -Stroke neuro checks q2hrs  -Neurologically appears to be tolerating SBP of 130s-160s. Can keep this SBP goal and avoid hypotension or rapid fluctuations in blood pressure   -titrate statin to LDL goal less than 70, LDL=54, may resume home regimen of Rosuvastatin 10 mg PO QHS as long as no medical contraindications. 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   -MRI Brain w/o as noted  -Dysphagia screen: pass  -Physical therapy/OT/Speech eval/treatment.   -Neurosurgery input appreciated, patient not a hemicrani candidate     ANTITHROMBOTIC THERAPY:   Heparin gtt with goal ptt 50-70. Although patient has a large left MCA stroke with hemorrhagic transformation, due to presence of cardiac thrombi on chest CTA, PE, DVT, hx of atrial fibrillation, and recent embolic event, there is a high risk of recurrent stroke without anticoagulation therapy; overall benefit of anticoagulation outweighs risk of hemorrhage.   -Due to repeat head CT on 11/28/23 revealing slightly increased petechial hemorrhage, at this time we will keep PTT goal 50-70 to minimize further hemorrhage.  Do not bolus, avoid supra-therapeutic ptt levels.  - Based on repeat head CT on 11/30/23 and neurological examination will then evaluate for transition to DOAC. Optimal agent as per Cardiology.   -No neurological indications for ASA once on PO anticoagulation, however no neurological contraindications if needed from the cardiac/medical standpoint.    - Repeat CT head for any acute change.   -Risks and benefits were discussed with wife at bedside, who expressed understanding.     CARDIOVASCULAR:  -TTE findings as above, no acute findings; repeat TTE 11/22 with interval decrease in EF   -HARJINDER deferred by anesthesia.   -CTA chest findings as above, see neuro portion for recommendations regarding anticoagulation  -No cardiac intervention at this time as the risks>benefits as per EP  -cardiac monitoring w/ telemetry for now, further evaluation pending findings of noted workup                              HEMATOLOGY:   -H/H 11.4/36.9. Monitor for signs and symptoms of further anemia. Platelets 358. Monitor anemia per primary team, patient should have all age and risk appropriate malignancy screenings with PCP or sooner if clinically suspected   -Bilateral lower extremity venous duplex positive for DVT involving the right   popliteal vein, gastrocnemius vein, posterior tibial and peroneal veins.  -DVT ppx: Heparin s.c [] LMWH [] - hep gtt    PULMONARY:   -Patient extubated 11/14/23  -protecting airway, saturating well     RENAL:   -BUN/Cr 21.2/0.79. monitor urine output, maintain adequate hydration    -Na Goal:  145-150 secondary to cerebral edema. Currently 148    ID:   -afebrile, leukocytosis 10.75. monitor for si/sx of infection     OTHER:    -Discussion plan with patient and family at bedside regarding clinical course    DISPOSITION: Rehab or home depending on PT eval once stable and workup is complete    CORE MEASURES:        Admission NIHSS: 26     Tenecteplase : [x] YES [] NO      LDL/HDL/A1C: 54/67/6.2     Depression Screen- if depression hx and/or present      Statin Therapy: Atorvastatin 40 mg PO Daily      Dysphagia Screen: [x] PASS [] FAIL      Smoking [] YES [x] NO      Afib [x] YES [] NO     Stroke Education [x] YES [] NO    Obtain screening lower extremity venous ultrasound in patients who meet 1 or more of the following criteria as patient is high risk for DVT/PE on admission:   [] History of DVT/PE  []Hypercoagulable states (Factor V Leiden, Cancer, OCP, etc. 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 Patient presented on 11/13/23 for elective left atrial appendage occlusion with Watchman device/HARJINDER w/ Dr Bobo, and Pradaxa was held for 2 days prior to procedure. Whilst in holding patient developed a left MCA syndrome due to acute left M1 occlusion as demonstrated on CTH & Neck. On admission, NIHSS 26, MRS 1. Patient was given tenecteplase at 11:16 11/13/23, and subsequently underwent mechanical thrombectomy with TICI 2A recannulization with Dr. Soliman. 24 hour repeat CT head s/p tenecteplase administration showed evolving left MCA stroke with hemorrhagic transformation, and mass effect with left to right shift. MRI brain revealed redemonstration of an evolving acute/subacute large left-sided MCA distribution infarction with gross hemorrhagic transformation in the left basal ganglia as well as evidence of petechial hemorrhagic transformation within the left frontotemporal and parietal cortices. Suspected etiology of stroke is cardioembolic from atrial fibrillation when off anticoagulation for recent procedure, 2 cardiac thrombi were appreciated on CTA chest obtained 10 days prior to procedure. These were again seen on repeat CTA chest obtained 11/22/23 as well as new findings of right upper lobe PE.     NEURO:   -Neurologically with no acute changes. Right lower extremity with triple flexion compared to previous exams of slight withdrawal to noxious stimuli Remainder of neurological exam is the same and patient has fluctuated on previous examinations.   -Repeat head CT on 11/28/23 revealed acute moderate to large left MCA territory infarct with slightly increased petechial hemorrhage. No herniation pattern.  -Continue close monitoring for neurologic deterioration    -Stroke neuro checks q2hrs  -Neurologically appears to be tolerating SBP of 130s-160s. Can keep this SBP goal and avoid hypotension or rapid fluctuations in blood pressure   -titrate statin to LDL goal less than 70, LDL=54, may resume home regimen of Rosuvastatin 10 mg PO QHS as long as no medical contraindications. 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   -MRI Brain w/o as noted  -Dysphagia screen: pass  -Physical therapy/OT/Speech eval/treatment.   -Neurosurgery input appreciated, patient not a hemicrani candidate     ANTITHROMBOTIC THERAPY:   -Heparin gtt with goal ptt 50-70. Although patient has a large left MCA stroke with hemorrhagic transformation, due to presence of cardiac thrombi on chest CTA, PE, DVT, hx of atrial fibrillation, and recent embolic event, there is a high risk of recurrent stroke without anticoagulation therapy; overall benefit of anticoagulation outweighs risk of hemorrhage.   -Due to repeat head CT on 11/28/23 revealing slightly increased petechial hemorrhage, at this time we will keep hep gtt with PTT goal 50-70. Do not bolus, avoid supra-therapeutic ptt levels.  - Obtain repeat head CT on 11/30/23 and based on these results and neurological examination will then evaluate for transition to DOAC. Optimal agent as per Cardiology.   -No neurological indications for ASA once on PO anticoagulation, however no neurological contraindications if needed from the cardiac/medical standpoint.    - Repeat CT head for any acute change.   -Risks and benefits were discussed with wife at bedside, who expressed understanding.     CARDIOVASCULAR:  -TTE findings as above, no acute findings; repeat TTE 11/22 with interval decrease in EF   -HARJINDER deferred by anesthesia.   -CTA chest findings as above, see neuro portion for recommendations regarding anticoagulation  -No cardiac intervention at this time as the risks>benefits as per EP  -cardiac monitoring w/ telemetry for now, further evaluation pending findings of noted workup                              HEMATOLOGY:   -H/H 11.4/36.9. Monitor for signs and symptoms of further anemia. Platelets 358. Monitor anemia per primary team, patient should have all age and risk appropriate malignancy screenings with PCP or sooner if clinically suspected   -Bilateral lower extremity venous duplex positive for DVT involving the right   popliteal vein, gastrocnemius vein, posterior tibial and peroneal veins.  -DVT ppx: Heparin s.c [] LMWH [] - hep gtt    PULMONARY:   -Patient extubated 11/14/23  -protecting airway, saturating well     RENAL:   -BUN/Cr 21.2/0.79. monitor urine output, maintain adequate hydration    -Na Goal:  145-150 secondary to cerebral edema. Currently 148    ID:   -afebrile, leukocytosis 10.75. monitor for si/sx of infection     OTHER:    -Discussion plan with patient and family at bedside regarding clinical course    DISPOSITION: Rehab or home depending on PT eval once stable and workup is complete    CORE MEASURES:        Admission NIHSS: 26     Tenecteplase : [x] YES [] NO      LDL/HDL/A1C: 54/67/6.2     Depression Screen- if depression hx and/or present      Statin Therapy: Atorvastatin 40 mg PO Daily      Dysphagia Screen: [x] PASS [] FAIL      Smoking [] YES [x] NO      Afib [x] YES [] NO     Stroke Education [x] YES [] NO    Obtain screening lower extremity venous ultrasound in patients who meet 1 or more of the following criteria as patient is high risk for DVT/PE on admission:   [] History of DVT/PE  []Hypercoagulable states (Factor V Leiden, Cancer, OCP, etc. )  []Prolonged immobility (hemiplegia/hemiparesis/post operative or any other extended immobilization)  [] Transferred from outside facility (Rehab or Long term care)  [] Age </= to 50

## 2023-11-28 NOTE — PROGRESS NOTE ADULT - SUBJECTIVE AND OBJECTIVE BOX
YOLIS DWYERKG    [id number]    78y      Male    Patient is a 78y old  Male who presents with a chief complaint of Lt MCA M1 thrombus (28 Nov 2023 10:26)      INTERVAL HPI/OVERNIGHT EVENTS:    Patient has aphasia, no over night issues, CT head repeated       Vital Signs Last 24 Hrs  T(C): 37.6 (28 Nov 2023 08:10), Max: 37.6 (28 Nov 2023 08:10)  T(F): 99.7 (28 Nov 2023 08:10), Max: 99.7 (28 Nov 2023 08:10)  HR: 74 (28 Nov 2023 10:23) (74 - 83)  BP: 109/50 (28 Nov 2023 10:23) (104/51 - 136/88)  BP(mean): 65 (28 Nov 2023 10:23) (64 - 97)  RR: 18 (28 Nov 2023 10:23) (18 - 23)  SpO2: 99% (28 Nov 2023 10:23) (96% - 99%)    Parameters below as of 28 Nov 2023 10:23  Patient On (Oxygen Delivery Method): room air        PHYSICAL EXAM:    GENERAL: Elderly male looking comfortable   HEENT: atraumatic   NECK: soft, Supple, No JVD  CHEST/LUNG: Clear to auscultate bilaterally; No wheezing  HEART: S1S2+, Regular rate and rhythm; No murmurs  ABDOMEN: Soft, Nontender, Nondistended; Bowel sounds present  EXTREMITIES:  1+ Peripheral Pulses, No edema  SKIN: No rashes or lesions  NEURO: Aphasia, not following commends     LABS:                        11.4   10.75 )-----------( 358      ( 28 Nov 2023 05:19 )             36.9     11-28    148<H>  |  109<H>  |  21.2<H>  ----------------------------<  131<H>  3.9   |  30.0<H>  |  0.79    Ca    9.2      28 Nov 2023 05:19    TPro  5.7<L>  /  Alb  3.2<L>  /  TBili  0.6  /  DBili  x   /  AST  24  /  ALT  16  /  AlkPhos  54  11-28    PT/INR - ( 28 Nov 2023 05:19 )   PT: 12.5 sec;   INR: 1.13 ratio         PTT - ( 28 Nov 2023 05:19 )  PTT:64.0 sec          I&O's Summary    27 Nov 2023 07:01  -  28 Nov 2023 07:00  --------------------------------------------------------  IN: 0 mL / OUT: 700 mL / NET: -700 mL        MEDICATIONS  (STANDING):  allopurinol 300 milliGRAM(s) Oral daily  aspirin  chewable 81 milliGRAM(s) Oral daily  atorvastatin 40 milliGRAM(s) Oral at bedtime  chlorhexidine 2% Cloths 1 Application(s) Topical daily  dextrose 5%. 1000 milliLiter(s) (50 mL/Hr) IV Continuous <Continuous>  dextrose 5%. 1000 milliLiter(s) (100 mL/Hr) IV Continuous <Continuous>  dextrose 50% Injectable 25 Gram(s) IV Push once  dextrose 50% Injectable 12.5 Gram(s) IV Push once  dextrose 50% Injectable 25 Gram(s) IV Push once  DULoxetine 30 milliGRAM(s) Oral daily  famotidine    Tablet 20 milliGRAM(s) Oral two times a day  gabapentin 300 milliGRAM(s) Oral two times a day  glucagon  Injectable 1 milliGRAM(s) IntraMuscular once  heparin  Infusion 1050 Unit(s)/Hr (10.5 mL/Hr) IV Continuous <Continuous>  insulin lispro (ADMELOG) corrective regimen sliding scale   SubCutaneous Before meals and at bedtime  metoprolol tartrate 25 milliGRAM(s) Oral two times a day  nystatin Powder 1 Application(s) Topical two times a day  polyethylene glycol 3350 17 Gram(s) Oral daily  predniSONE   Tablet 5 milliGRAM(s) Oral daily  senna 2 Tablet(s) Oral at bedtime  sodium chloride 0.45%. 1000 milliLiter(s) (60 mL/Hr) IV Continuous <Continuous>    MEDICATIONS  (PRN):  acetaminophen     Tablet .. 650 milliGRAM(s) Oral every 6 hours PRN Temp greater or equal to 38C (100.4F), Mild Pain (1 - 3)  albuterol/ipratropium (CFC free) Inhaler. 1 Puff(s) Inhalation four times a day PRN Wheezing  albuterol/ipratropium for Nebulization 3 milliLiter(s) Nebulizer every 6 hours PRN SOB and wheezing  bisacodyl Suppository 10 milliGRAM(s) Rectal daily PRN Constipation  dextrose Oral Gel 15 Gram(s) Oral once PRN Blood Glucose LESS THAN 70 milliGRAM(s)/deciliter

## 2023-11-28 NOTE — PROGRESS NOTE ADULT - ASSESSMENT
78y/oM with HTN, HLD, AFib, CKD, Obesity, Gout, Prostate cancer, DVT/PE, OA s/p multiple orthopedic procedures, Spinal Stenosis admitted for elective FROY ligation (11/13). Developed acute Rt sided CVA with imaging notable for acute Left M1 occlusion. Given TNK with TICI 2A thrombectomy, Subsequently noted to have evolving left MCA CVA with hemorrhagic transformation with mass effect (left to right shift). Intubated and monitored in NICU. Subsequently extubated and transferred to Medicine (11/17). Restarted on AC after imaging confirmed FROY thrombi + PE    Plan:       Left MCA CVA with M1 occlusion s/p TNK and thrombectomy with Hemorrhagic transformation  Likely cardioembolic while AC had been on hold  Repeat CT head noted  Restarted on ASA 81 mg daily   Continue Statin   Continue Neurochecks Q2 with BP goal 130-160. Avoid rapid fluctuations   Continue Heparin ggt goal 50-70  f/u CT done showed acute moderate to large left MCA territory infarct with slightly increased petechial hemorrhage. No herniation pattern.  PT - JHOAN  OT -AR  SLP eval appreciated; continue dysphagia diet, reval done today   aspiration/fall precautions  can not transition to oral anticoagulation untill ok from Neurology     Atrial Fibrillation:   FROY thrombi  PE  Acute Rt  LE DVT  Doppler LE Rt DVT  FROY Thrombi noted on CTA Heart  on Hep GTT - PTT goal of 50-70  Continue BB  EP on board    Hypernatremia:   goal sodium 145-150 per stroke neuro recs due to cerebral edema  hold torsemide   hold parenteral free water  monitor I/os  got half NS, will d/c   will monitor BMP     Anemia likely due to chronic disease  Hb relatively stable  iron studies  monitor CBC closely    Acute Hypoxic Respiratory Failure - resolved  intubated for airway protection on admission   history of ILD; continue ch prednisone   extubated on 11/14  CXR no acute infiltrates  bronchodilators as needed  incentive spirometry     DM  -HbA1c 6.2   -continue ISS  -ADA diet    History of PE/DVT, now with new  [facility] PE and RLE DVT confirmed   NO VCDS on RLE   AC as above    HTN  SBP 130s (goal 130-180 per neuro recs)  continue metoprolol  low sodium diet    GERD  continue famotidine    Gout  continue allopurinol    Neuropathy  continue gabapentin     DVT ppx- heparin drip      Dispo: Patient remains acute  Now on heparin drip - Monitor ptt. f/u cbc  order cth  closely monitor at step down  dw rn.

## 2023-11-28 NOTE — PROGRESS NOTE ADULT - NS ATTEND AMEND GEN_ALL_CORE FT
Seen and examined with the ACP team. Plan discussed with ACPs.    I agree with assessment and plan  as written with modifications made above.    will follow with you    Link Casas MD PhD   [id number]

## 2023-11-28 NOTE — PROGRESS NOTE ADULT - SUBJECTIVE AND OBJECTIVE BOX
Preliminary note, offical recommendations pending attending review/signature   [facility] Stroke Team  Progress Note     HPI:  79 yo M PMH of HTN, HLD, AFib, CKD, obesity, gout, prostate cancer, DVT/PE, OA s/p multiple orthopedic procedures, spinal stenosis, presented on 11/13/23 for elective left atrial appendage occlusion procedure, and developed a MCA syndrome due to acute L M1 occlusion as demonstrated on CTA Head and Neck. Patient has a long h/o AFib, which is now considered permanent. He is on rate control with metoprolol 25 mg bid, and anticoagulation with Pradaxa.  He has a long Hx of arthritis and had multiple hip replacements, bilateral knee replacements and also has neck and back pain, but is unable to take NSAIDs due to bleeding risks. He has an unsteady gait and uses a cane for assistance.  Patient presented on 11/13 for elective left atrial appendage occlusion with Watchman device/HARJINDER w/ Dr Bobo, and Pradaxa was held for 2 days prior to procedure. 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   Initial NIHSS 26, MRS 1. Patient s/p tenecteplase administration at 11:16 11/13/23 and TICI 2A thrombectomy with Dr. Soliman.    SUBJECTIVE: No events overnight.  No new neurologic complaints.  ROS reported negative unless otherwise noted.    acetaminophen     Tablet .. 650 milliGRAM(s) Oral every 6 hours PRN  albuterol/ipratropium (CFC free) Inhaler. 1 Puff(s) Inhalation four times a day PRN  albuterol/ipratropium for Nebulization 3 milliLiter(s) Nebulizer every 6 hours PRN  allopurinol 300 milliGRAM(s) Oral daily  aspirin  chewable 81 milliGRAM(s) Oral daily  atorvastatin 40 milliGRAM(s) Oral at bedtime  bisacodyl Suppository 10 milliGRAM(s) Rectal daily PRN  chlorhexidine 2% Cloths 1 Application(s) Topical daily  dextrose 5%. 1000 milliLiter(s) IV Continuous <Continuous>  dextrose 5%. 1000 milliLiter(s) IV Continuous <Continuous>  dextrose 50% Injectable 12.5 Gram(s) IV Push once  dextrose 50% Injectable 25 Gram(s) IV Push once  dextrose 50% Injectable 25 Gram(s) IV Push once  dextrose Oral Gel 15 Gram(s) Oral once PRN  DULoxetine 30 milliGRAM(s) Oral daily  famotidine    Tablet 20 milliGRAM(s) Oral two times a day  gabapentin 300 milliGRAM(s) Oral two times a day  glucagon  Injectable 1 milliGRAM(s) IntraMuscular once  heparin  Infusion 1050 Unit(s)/Hr IV Continuous <Continuous>  insulin lispro (ADMELOG) corrective regimen sliding scale   SubCutaneous Before meals and at bedtime  metoprolol tartrate 25 milliGRAM(s) Oral two times a day  nystatin Powder 1 Application(s) Topical two times a day  polyethylene glycol 3350 17 Gram(s) Oral daily  predniSONE   Tablet 5 milliGRAM(s) Oral daily  senna 2 Tablet(s) Oral at bedtime  sodium chloride 0.45%. 1000 milliLiter(s) IV Continuous <Continuous>      PHYSICAL EXAM:   Vital Signs Last 24 Hrs  T(C): 37.6 (28 Nov 2023 08:10), Max: 37.6 (28 Nov 2023 08:10)  T(F): 99.7 (28 Nov 2023 08:10), Max: 99.7 (28 Nov 2023 08:10)  HR: 79 (28 Nov 2023 08:00) (75 - 83)  BP: 113/77 (28 Nov 2023 08:00) (104/51 - 136/88)  BP(mean): 86 (28 Nov 2023 08:00) (64 - 97)  RR: 22 (28 Nov 2023 08:00) (18 - 23)  SpO2: 98% (28 Nov 2023 08:00) (96% - 98%)    Parameters below as of 28 Nov 2023 08:00  Patient On (Oxygen Delivery Method): room air    PENDING  General: No acute distress. Wife at bedside.     NEUROLOGICAL EXAM:  Mental status: Awake, globally aphasic, expressive > receptive  with no verbal output, however attempts to make speak. Attends to examiner.  Nods and mimics. Does not follow commands, however appears to intermittently follow commands based on previous exams. Right sided visual willow-neglect present.     Cranial Nerves:  Pupils equal and react symmetrically to light. Blink to threat intermittently on right eye. Blink to threat present out of left eye. Left gaze preference with right gaze palsy that does cross midline. Right facial weakness noted.    Motor exam: There is normal bulk and tone.  There is no tremor.  RUE: 0/5 strength with no movement. Grimaces to noxious stimuli with no movement.  RLE: Trace movement within bed. Grimaces to noxious stimuli.   Strength is 5/5 in the left arm and leg with no drift.    Sensation: Grimaces to noxious stimuli in all extremities.    Coordination/ Gait: unable to assess dysmetria on finger to nose testing    LABS:                        11.4   10.75 )-----------( 358      ( 28 Nov 2023 05:19 )             36.9    11-28    148<H>  |  109<H>  |  21.2<H>  ----------------------------<  131<H>  3.9   |  30.0<H>  |  0.79    Ca    9.2      28 Nov 2023 05:19    TPro  5.7<L>  /  Alb  3.2<L>  /  TBili  0.6  /  DBili  x   /  AST  24  /  ALT  16  /  AlkPhos  54  11-28  PT/INR - ( 28 Nov 2023 05:19 )   PT: 12.5 sec;   INR: 1.13 ratio         PTT - ( 28 Nov 2023 05:19 )  PTT:64.0 sec    IMAGING: Reviewed by me.   CT Head No Cont (11.28.23 @ 09:57)   IMPRESSION: Acute moderate to large left MCA territory infarct with   slightly increased petechial hemorrhage. No herniation pattern.    CT Head No Cont (11.25.23 @ 10:03)   IMPRESSION: Acute left middle cerebral artery infarct with petechial   hemorrhage without change since 11/24/2023.    CT Head No Cont (11.24.23 @ 11:12)   IMPRESSION:  Redemonstration of acute left frontotemporal infarct.  Decreased conspicuity of subtly increased attenuation of gray matter   within the region of infarction. This may represent spared tissue and/or   petechial hemorrhagic transformation.  Slightly decreased mass effect on the left lateral ventricle and   rightward midline shift, currently1 mm, previously 2 mm.  Basal cisterns are visualized. No hydrocephalus.    US Duplex Venous Lower Ext Complete, Bilateral (11.23.23 @ 18:30)   IMPRESSION:  1. Findings compatible with deep venous thrombosis involving the right   popliteal vein, gastrocnemius vein, posterior tibial and peroneal veins.  2. No evidence for deep venous thrombosis within the left lower extremity   veins.    TTE Echo Limited or F/U (11.22.23 @ 16:32)   Summary:   1. Left ventricular ejection fraction, by visual estimation, is 45 to   50%.   2. Mildly decreased global left ventricular systolic function.   3. The mitral in-flow pattern reveals no discernable A-wave, therefore   no comment on diastolic function can be made.   4. Mildly enlarged right ventricle with mildly reduced RV systolic   function, estimated PASP 31 mmHg.   5. Severely enlarged left atrium.   6. Right atrial enlargement.   7. Mild mitral annular calcification.   8. Mild mitral valve regurgitation.   9. Mild-moderate tricuspid regurgitation.  10. Sclerotic aortic valve with normal opening.  11. There is no evidence of pericardial effusion.  12. Compared to the prior TTE study from 11/14/23, interval reduction in   LV EF.    CT Head No Cont (11.22.23 @ 16:25)   IMPRESSION:  Decreased mass effect on the left lateral ventricle compared   with the CT and MR of 11/16/2023 and 11/19/2023 respectively. There is   residual hemorrhage seen in association with hemorrhagic component of   infarct in the left lentiform nucleus region decreased in conspicuity   compared with the prior. Left insular hemorrhage in association with the   left MCA infarct is also noted consistent with hemorrhagic transformation   of infarct in this region. The hemorrhage in the left insular region does   appear slightly more conspicuous compared with the prior CT 11/16/2023   and is consistent with the findings of the patient's MR 11/19/2023.    CT Heart Left Atrium w/ IV Cont (11.22.23 @ 11:22)   IMPRESSION:  Cardiac:  Multiple large filling defects noted at the distal appendage at least >2   cm and also near the proximal appendage measures 1.5 cm x 1.2 cm,   consistent with left appendage thrombi.  Dilated left atrial appendage windsock in appearance with orifice width   measures 4.5 cm x 3.0 cm  Severely biatrial enlargement.  Non-cardiac:  Right upper lobe pulmonary artery embolism.  Possible pulmonary hypertension.  New right lower lobe superior segment infiltrate (possibly infectious or   inflammatory)    MR Head No Cont (11.19.23 @ 15:52)   IMPRESSION: Redemonstration of an evolving acute/subacute large   left-sided MCA distribution infarction with gross hemorrhagic   transformation in the left basal ganglia as well as evidence of petechial   hemorrhagic transformation within the left frontotemporal and parietal   cortices.  Associated cytotoxic edema, sulcal effacement, and mass effect as well as   shift of the midline structures from left to right measuring 5.5 mm   appears unchanged. There is no herniation.    CT Head No Cont (11.16.23 @ 18:05)   IMPRESSION: Stable moderate to large left MCA territory infarction   involving the left frontal lobe, left basal ganglia, left insular ribbon,   left parietal occipital lobe, and anterior left temporal lobe with stable   mild hemorrhage in the left basal ganglia. Left-to-right midline shift   measures 5.5 mm.    CT Head No Cont (11.14.23 @ 18:57)   IMPRESSION:  Stable moderate to large acute left MCA infarct with mild hemorrhage    TTE Echo Complete w/o Contrast w/ Doppler (11.14.23 @ 15:57)   Summary:   1. Left ventricular ejection fraction, by visual estimation, is 55 to   60%.   2. Normal global left ventricular systolic function.   3. The mitral in-flow pattern reveals no discernable A-wave, therefore   no comment on diastolic function can be made.   4. Normal right ventricular size and function.   5. Mild to moderately enlarged right atrium.   6. Moderately enlarged left atrium.   7. Trace mitral valve regurgitation.   8. Mild-moderate tricuspid regurgitation.   9. Sclerotic aortic valve with decreased opening.  10. Estimated pulmonary artery systolic pressure is 41.6 mmHg assuming a   right atrial pressure of 5 mmHg, which is consistent with mild pulmonary   hypertension.    CT Head No Cont (11.14.23 @ 11:36)   IMPRESSION: Evolving left MCA infarct is identified with hemorrhagic   transformation now seen with mass effect on left lateral ventricles and   left-to-right shift.    US Duplex Venous Lower Ext Complete, Bilateral (11.14.23 @ 10:38)   IMPRESSION:  No evidence of deep venous thrombosis in either lower extremity.  Left calf veins not visualized.  Small right popliteal fossa cyst.    CT Angio Brain, Neck and perfusion Stroke Protocol  w/ IV Cont (11.13.23 @ 11:18)   CTA BRAIN:  Acute thrombus in the distal left MCA M1 segment. Diminished flow in the   distal branches.  Severe bilateral cavernous carotid artery calcifications. Fetal origins   the bilateral posterior cerebral arteries. The [place] and   vertebrobasilar system are otherwise unremarkable without evidence of   stenosis, additional occlusion or saccular aneurysm dilation. No evidence   for arterial venous malformation. The vertebral arteries are codominant.    CTA NECK:  Mild bilateral carotid bulb calcifications. Mild stenosis in the left   carotid bulb. The right internal carotid arteries tortuous.  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 There is normal relationship to   the great vessels. The common carotid arteries, internal carotid arteries   and vertebral arteries shows no other evidence of significant stenosis,   occlusion or saccular aneurysm dilation. The vertebral arteries are   codominant.    CT PERFUSION:  Patient has only had less than 2 hours of symptoms may influence the CT   perfusion.  CBF<30% volume: 26 ml left MCA territory.  Tmax>6.0 s volume: 193 ml left MCA territory  Mismatch volume: 167 ml  Mismatch ratio: 7.4  IMPRESSION:  Thrombus in the distal left MCA M1 segment with diminished flow in the   distal MCA segments. 167 mL area of penumbra in the left MCA territory.     CT Brain Stroke Protocol (11.13.23 @ 11:15)   IMPRESSION:  Dense left MCA M1 segment suggesting intraluminal thrombus.   Mild chronic microvascular changes without evidence of an acute   transcortical infarction or hemorrhage.          Preliminary note, offical recommendations pending attending review/signature   [facility] Stroke Team  Progress Note     HPI:  79 yo M PMH of HTN, HLD, AFib, CKD, obesity, gout, prostate cancer, DVT/PE, OA s/p multiple orthopedic procedures, spinal stenosis, presented on 11/13/23 for elective left atrial appendage occlusion procedure, and developed a MCA syndrome due to acute L M1 occlusion as demonstrated on CTA Head and Neck. Patient has a long h/o AFib, which is now considered permanent. He is on rate control with metoprolol 25 mg bid, and anticoagulation with Pradaxa.  He has a long Hx of arthritis and had multiple hip replacements, bilateral knee replacements and also has neck and back pain, but is unable to take NSAIDs due to bleeding risks. He has an unsteady gait and uses a cane for assistance.  Patient presented on 11/13 for elective left atrial appendage occlusion with Watchman device/HARJINDER w/ Dr Bobo, and Pradaxa was held for 2 days prior to procedure. 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   Initial NIHSS 26, MRS 1. Patient s/p tenecteplase administration at 11:16 11/13/23 and TICI 2A thrombectomy with Dr. Soliman.    SUBJECTIVE: No events overnight.  No new neurologic complaints.  ROS reported negative unless otherwise noted.    acetaminophen     Tablet .. 650 milliGRAM(s) Oral every 6 hours PRN  albuterol/ipratropium (CFC free) Inhaler. 1 Puff(s) Inhalation four times a day PRN  albuterol/ipratropium for Nebulization 3 milliLiter(s) Nebulizer every 6 hours PRN  allopurinol 300 milliGRAM(s) Oral daily  aspirin  chewable 81 milliGRAM(s) Oral daily  atorvastatin 40 milliGRAM(s) Oral at bedtime  bisacodyl Suppository 10 milliGRAM(s) Rectal daily PRN  chlorhexidine 2% Cloths 1 Application(s) Topical daily  dextrose 5%. 1000 milliLiter(s) IV Continuous <Continuous>  dextrose 5%. 1000 milliLiter(s) IV Continuous <Continuous>  dextrose 50% Injectable 12.5 Gram(s) IV Push once  dextrose 50% Injectable 25 Gram(s) IV Push once  dextrose 50% Injectable 25 Gram(s) IV Push once  dextrose Oral Gel 15 Gram(s) Oral once PRN  DULoxetine 30 milliGRAM(s) Oral daily  famotidine    Tablet 20 milliGRAM(s) Oral two times a day  gabapentin 300 milliGRAM(s) Oral two times a day  glucagon  Injectable 1 milliGRAM(s) IntraMuscular once  heparin  Infusion 1050 Unit(s)/Hr IV Continuous <Continuous>  insulin lispro (ADMELOG) corrective regimen sliding scale   SubCutaneous Before meals and at bedtime  metoprolol tartrate 25 milliGRAM(s) Oral two times a day  nystatin Powder 1 Application(s) Topical two times a day  polyethylene glycol 3350 17 Gram(s) Oral daily  predniSONE   Tablet 5 milliGRAM(s) Oral daily  senna 2 Tablet(s) Oral at bedtime  sodium chloride 0.45%. 1000 milliLiter(s) IV Continuous <Continuous>      PHYSICAL EXAM:   Vital Signs Last 24 Hrs  T(C): 37.6 (28 Nov 2023 08:10), Max: 37.6 (28 Nov 2023 08:10)  T(F): 99.7 (28 Nov 2023 08:10), Max: 99.7 (28 Nov 2023 08:10)  HR: 79 (28 Nov 2023 08:00) (75 - 83)  BP: 113/77 (28 Nov 2023 08:00) (104/51 - 136/88)  BP(mean): 86 (28 Nov 2023 08:00) (64 - 97)  RR: 22 (28 Nov 2023 08:00) (18 - 23)  SpO2: 98% (28 Nov 2023 08:00) (96% - 98%)    Parameters below as of 28 Nov 2023 08:00  Patient On (Oxygen Delivery Method): room air      General: No acute distress.  Wife at bedside.     NEUROLOGICAL EXAM:  Mental status: Awake, globally aphasic, expressive > receptive  with no verbal output, however attempts to make speak. Attends to examiner.  Nods and mimics. Does not follow commands, however appears to intermittently follow commands based on previous exams. Right sided visual willow-neglect present.     Cranial Nerves:  Pupils equal and react symmetrically to light. Blink to threat intermittently on right eye. Blink to threat present out of left eye. Left gaze preference with right gaze palsy that does cross midline. Right facial weakness noted.    Motor exam: There is normal bulk and tone.  There is no tremor.  RUE: 0/5 strength with no movement. Grimaces to noxious stimuli with no movement.  RLE: Triple flexion    Strength is 5/5 in the left arm and leg with no drift.    Sensation: Grimaces to noxious stimuli in all extremities.    Coordination/ Gait: unable to assess dysmetria on finger to nose testing    LABS:                        11.4   10.75 )-----------( 358      ( 28 Nov 2023 05:19 )             36.9    11-28    148<H>  |  109<H>  |  21.2<H>  ----------------------------<  131<H>  3.9   |  30.0<H>  |  0.79    Ca    9.2      28 Nov 2023 05:19    TPro  5.7<L>  /  Alb  3.2<L>  /  TBili  0.6  /  DBili  x   /  AST  24  /  ALT  16  /  AlkPhos  54  11-28  PT/INR - ( 28 Nov 2023 05:19 )   PT: 12.5 sec;   INR: 1.13 ratio         PTT - ( 28 Nov 2023 05:19 )  PTT:64.0 sec    IMAGING: Reviewed by me.   CT Head No Cont (11.28.23 @ 09:57)   IMPRESSION: Acute moderate to large left MCA territory infarct with   slightly increased petechial hemorrhage. No herniation pattern.    CT Head No Cont (11.25.23 @ 10:03)   IMPRESSION: Acute left middle cerebral artery infarct with petechial   hemorrhage without change since 11/24/2023.    CT Head No Cont (11.24.23 @ 11:12)   IMPRESSION:  Redemonstration of acute left frontotemporal infarct.  Decreased conspicuity of subtly increased attenuation of gray matter   within the region of infarction. This may represent spared tissue and/or   petechial hemorrhagic transformation.  Slightly decreased mass effect on the left lateral ventricle and   rightward midline shift, currently1 mm, previously 2 mm.  Basal cisterns are visualized. No hydrocephalus.    US Duplex Venous Lower Ext Complete, Bilateral (11.23.23 @ 18:30)   IMPRESSION:  1. Findings compatible with deep venous thrombosis involving the right   popliteal vein, gastrocnemius vein, posterior tibial and peroneal veins.  2. No evidence for deep venous thrombosis within the left lower extremity   veins.    TTE Echo Limited or F/U (11.22.23 @ 16:32)   Summary:   1. Left ventricular ejection fraction, by visual estimation, is 45 to   50%.   2. Mildly decreased global left ventricular systolic function.   3. The mitral in-flow pattern reveals no discernable A-wave, therefore   no comment on diastolic function can be made.   4. Mildly enlarged right ventricle with mildly reduced RV systolic   function, estimated PASP 31 mmHg.   5. Severely enlarged left atrium.   6. Right atrial enlargement.   7. Mild mitral annular calcification.   8. Mild mitral valve regurgitation.   9. Mild-moderate tricuspid regurgitation.  10. Sclerotic aortic valve with normal opening.  11. There is no evidence of pericardial effusion.  12. Compared to the prior TTE study from 11/14/23, interval reduction in   LV EF.    CT Head No Cont (11.22.23 @ 16:25)   IMPRESSION:  Decreased mass effect on the left lateral ventricle compared   with the CT and MR of 11/16/2023 and 11/19/2023 respectively. There is   residual hemorrhage seen in association with hemorrhagic component of   infarct in the left lentiform nucleus region decreased in conspicuity   compared with the prior. Left insular hemorrhage in association with the   left MCA infarct is also noted consistent with hemorrhagic transformation   of infarct in this region. The hemorrhage in the left insular region does   appear slightly more conspicuous compared with the prior CT 11/16/2023   and is consistent with the findings of the patient's MR 11/19/2023.    CT Heart Left Atrium w/ IV Cont (11.22.23 @ 11:22)   IMPRESSION:  Cardiac:  Multiple large filling defects noted at the distal appendage at least >2   cm and also near the proximal appendage measures 1.5 cm x 1.2 cm,   consistent with left appendage thrombi.  Dilated left atrial appendage windsock in appearance with orifice width   measures 4.5 cm x 3.0 cm  Severely biatrial enlargement.  Non-cardiac:  Right upper lobe pulmonary artery embolism.  Possible pulmonary hypertension.  New right lower lobe superior segment infiltrate (possibly infectious or   inflammatory)    MR Head No Cont (11.19.23 @ 15:52)   IMPRESSION: Redemonstration of an evolving acute/subacute large   left-sided MCA distribution infarction with gross hemorrhagic   transformation in the left basal ganglia as well as evidence of petechial   hemorrhagic transformation within the left frontotemporal and parietal   cortices.  Associated cytotoxic edema, sulcal effacement, and mass effect as well as   shift of the midline structures from left to right measuring 5.5 mm   appears unchanged. There is no herniation.    CT Head No Cont (11.16.23 @ 18:05)   IMPRESSION: Stable moderate to large left MCA territory infarction   involving the left frontal lobe, left basal ganglia, left insular ribbon,   left parietal occipital lobe, and anterior left temporal lobe with stable   mild hemorrhage in the left basal ganglia. Left-to-right midline shift   measures 5.5 mm.    CT Head No Cont (11.14.23 @ 18:57)   IMPRESSION:  Stable moderate to large acute left MCA infarct with mild hemorrhage    TTE Echo Complete w/o Contrast w/ Doppler (11.14.23 @ 15:57)   Summary:   1. Left ventricular ejection fraction, by visual estimation, is 55 to   60%.   2. Normal global left ventricular systolic function.   3. The mitral in-flow pattern reveals no discernable A-wave, therefore   no comment on diastolic function can be made.   4. Normal right ventricular size and function.   5. Mild to moderately enlarged right atrium.   6. Moderately enlarged left atrium.   7. Trace mitral valve regurgitation.   8. Mild-moderate tricuspid regurgitation.   9. Sclerotic aortic valve with decreased opening.  10. Estimated pulmonary artery systolic pressure is 41.6 mmHg assuming a   right atrial pressure of 5 mmHg, which is consistent with mild pulmonary   hypertension.    CT Head No Cont (11.14.23 @ 11:36)   IMPRESSION: Evolving left MCA infarct is identified with hemorrhagic   transformation now seen with mass effect on left lateral ventricles and   left-to-right shift.    US Duplex Venous Lower Ext Complete, Bilateral (11.14.23 @ 10:38)   IMPRESSION:  No evidence of deep venous thrombosis in either lower extremity.  Left calf veins not visualized.  Small right popliteal fossa cyst.    CT Angio Brain, Neck and perfusion Stroke Protocol  w/ IV Cont (11.13.23 @ 11:18)   CTA BRAIN:  Acute thrombus in the distal left MCA M1 segment. Diminished flow in the   distal branches.  Severe bilateral cavernous carotid artery calcifications. Fetal origins   the bilateral posterior cerebral arteries. The [place] of [place] and   vertebrobasilar system are otherwise unremarkable without evidence of   stenosis, additional occlusion or saccular aneurysm dilation. No evidence   for arterial venous malformation. The vertebral arteries are codominant.    CTA NECK:  Mild bilateral carotid bulb calcifications. Mild stenosis in the left   carotid bulb. The right internal carotid arteries tortuous.  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 There is normal relationship to   the great vessels. The common carotid arteries, internal carotid arteries   and vertebral arteries shows no other evidence of significant stenosis,   occlusion or saccular aneurysm dilation. The vertebral arteries are   codominant.    CT PERFUSION:  Patient has only had less than 2 hours of symptoms may influence the CT   perfusion.  CBF<30% volume: 26 ml left MCA territory.  Tmax>6.0 s volume: 193 ml left MCA territory  Mismatch volume: 167 ml  Mismatch ratio: 7.4  IMPRESSION:  Thrombus in the distal left MCA M1 segment with diminished flow in the   distal MCA segments. 167 mL area of penumbra in the left MCA territory.     CT Brain Stroke Protocol (11.13.23 @ 11:15)   IMPRESSION:  Dense left MCA M1 segment suggesting intraluminal thrombus.   Mild chronic microvascular changes without evidence of an acute   transcortical infarction or hemorrhage.          [facility] Stroke Team  Progress Note     HPI:  79 yo M PMH of HTN, HLD, AFib, CKD, obesity, gout, prostate cancer, DVT/PE, OA s/p multiple orthopedic procedures, spinal stenosis, presented on 11/13/23 for elective left atrial appendage occlusion procedure, and developed a MCA syndrome due to acute L M1 occlusion as demonstrated on CTA Head and Neck. Patient has a long h/o AFib, which is now considered permanent. He is on rate control with metoprolol 25 mg bid, and anticoagulation with Pradaxa.  He has a long Hx of arthritis and had multiple hip replacements, bilateral knee replacements and also has neck and back pain, but is unable to take NSAIDs due to bleeding risks. He has an unsteady gait and uses a cane for assistance.  Patient presented on 11/13 for elective left atrial appendage occlusion with Watchman device/HARJINDER w/ Dr Bobo, and Pradaxa was held for 2 days prior to procedure. 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   Initial NIHSS 26, MRS 1. Patient s/p tenecteplase administration at 11:16 11/13/23 and TICI 2A thrombectomy with Dr. Soliman.    SUBJECTIVE: No events overnight.  No new neurologic complaints.  ROS reported negative unless otherwise noted.    acetaminophen     Tablet .. 650 milliGRAM(s) Oral every 6 hours PRN  albuterol/ipratropium (CFC free) Inhaler. 1 Puff(s) Inhalation four times a day PRN  albuterol/ipratropium for Nebulization 3 milliLiter(s) Nebulizer every 6 hours PRN  allopurinol 300 milliGRAM(s) Oral daily  aspirin  chewable 81 milliGRAM(s) Oral daily  atorvastatin 40 milliGRAM(s) Oral at bedtime  bisacodyl Suppository 10 milliGRAM(s) Rectal daily PRN  chlorhexidine 2% Cloths 1 Application(s) Topical daily  dextrose 5%. 1000 milliLiter(s) IV Continuous <Continuous>  dextrose 5%. 1000 milliLiter(s) IV Continuous <Continuous>  dextrose 50% Injectable 12.5 Gram(s) IV Push once  dextrose 50% Injectable 25 Gram(s) IV Push once  dextrose 50% Injectable 25 Gram(s) IV Push once  dextrose Oral Gel 15 Gram(s) Oral once PRN  DULoxetine 30 milliGRAM(s) Oral daily  famotidine    Tablet 20 milliGRAM(s) Oral two times a day  gabapentin 300 milliGRAM(s) Oral two times a day  glucagon  Injectable 1 milliGRAM(s) IntraMuscular once  heparin  Infusion 1050 Unit(s)/Hr IV Continuous <Continuous>  insulin lispro (ADMELOG) corrective regimen sliding scale   SubCutaneous Before meals and at bedtime  metoprolol tartrate 25 milliGRAM(s) Oral two times a day  nystatin Powder 1 Application(s) Topical two times a day  polyethylene glycol 3350 17 Gram(s) Oral daily  predniSONE   Tablet 5 milliGRAM(s) Oral daily  senna 2 Tablet(s) Oral at bedtime  sodium chloride 0.45%. 1000 milliLiter(s) IV Continuous <Continuous>      PHYSICAL EXAM:   Vital Signs Last 24 Hrs  T(C): 37.6 (28 Nov 2023 08:10), Max: 37.6 (28 Nov 2023 08:10)  T(F): 99.7 (28 Nov 2023 08:10), Max: 99.7 (28 Nov 2023 08:10)  HR: 79 (28 Nov 2023 08:00) (75 - 83)  BP: 113/77 (28 Nov 2023 08:00) (104/51 - 136/88)  BP(mean): 86 (28 Nov 2023 08:00) (64 - 97)  RR: 22 (28 Nov 2023 08:00) (18 - 23)  SpO2: 98% (28 Nov 2023 08:00) (96% - 98%)    Parameters below as of 28 Nov 2023 08:00  Patient On (Oxygen Delivery Method): room air      General: No acute distress.  Wife at bedside.     NEUROLOGICAL EXAM:  Mental status: Awake, globally aphasic, expressive > receptive  with no verbal output, however attempts to make speak. Attends to examiner.  Nods and mimics. Does not follow commands, however appears to intermittently follow commands based on previous exams. Right sided visual willow-neglect present.     Cranial Nerves:  Pupils equal and react symmetrically to light. Blink to threat intermittently on right eye. Blink to threat present out of left eye. Left gaze preference with right gaze palsy that does cross midline. Right facial weakness noted.    Motor exam: There is normal bulk and tone.  There is no tremor.  RUE: 0/5 strength with no movement. Grimaces to noxious stimuli with no movement.  RLE: 0/5,  Triple flexion    Strength is 5/5 in the left arm and leg with no drift.    Sensation: Grimaces to noxious stimuli in all extremities.    Coordination/ Gait: unable to assess dysmetria on finger to nose testing    LABS:                        11.4   10.75 )-----------( 358      ( 28 Nov 2023 05:19 )             36.9    11-28    148<H>  |  109<H>  |  21.2<H>  ----------------------------<  131<H>  3.9   |  30.0<H>  |  0.79    Ca    9.2      28 Nov 2023 05:19    TPro  5.7<L>  /  Alb  3.2<L>  /  TBili  0.6  /  DBili  x   /  AST  24  /  ALT  16  /  AlkPhos  54  11-28  PT/INR - ( 28 Nov 2023 05:19 )   PT: 12.5 sec;   INR: 1.13 ratio         PTT - ( 28 Nov 2023 05:19 )  PTT:64.0 sec    IMAGING: Reviewed by me.   CT Head No Cont (11.28.23 @ 09:57)   IMPRESSION: Acute moderate to large left MCA territory infarct with   slightly increased petechial hemorrhage. No herniation pattern.    CT Head No Cont (11.25.23 @ 10:03)   IMPRESSION: Acute left middle cerebral artery infarct with petechial   hemorrhage without change since 11/24/2023.    CT Head No Cont (11.24.23 @ 11:12)   IMPRESSION:  Redemonstration of acute left frontotemporal infarct.  Decreased conspicuity of subtly increased attenuation of gray matter   within the region of infarction. This may represent spared tissue and/or   petechial hemorrhagic transformation.  Slightly decreased mass effect on the left lateral ventricle and   rightward midline shift, currently1 mm, previously 2 mm.  Basal cisterns are visualized. No hydrocephalus.    US Duplex Venous Lower Ext Complete, Bilateral (11.23.23 @ 18:30)   IMPRESSION:  1. Findings compatible with deep venous thrombosis involving the right   popliteal vein, gastrocnemius vein, posterior tibial and peroneal veins.  2. No evidence for deep venous thrombosis within the left lower extremity   veins.    TTE Echo Limited or F/U (11.22.23 @ 16:32)   Summary:   1. Left ventricular ejection fraction, by visual estimation, is 45 to   50%.   2. Mildly decreased global left ventricular systolic function.   3. The mitral in-flow pattern reveals no discernable A-wave, therefore   no comment on diastolic function can be made.   4. Mildly enlarged right ventricle with mildly reduced RV systolic   function, estimated PASP 31 mmHg.   5. Severely enlarged left atrium.   6. Right atrial enlargement.   7. Mild mitral annular calcification.   8. Mild mitral valve regurgitation.   9. Mild-moderate tricuspid regurgitation.  10. Sclerotic aortic valve with normal opening.  11. There is no evidence of pericardial effusion.  12. Compared to the prior TTE study from 11/14/23, interval reduction in   LV EF.    CT Head No Cont (11.22.23 @ 16:25)   IMPRESSION:  Decreased mass effect on the left lateral ventricle compared   with the CT and MR of 11/16/2023 and 11/19/2023 respectively. There is   residual hemorrhage seen in association with hemorrhagic component of   infarct in the left lentiform nucleus region decreased in conspicuity   compared with the prior. Left insular hemorrhage in association with the   left MCA infarct is also noted consistent with hemorrhagic transformation   of infarct in this region. The hemorrhage in the left insular region does   appear slightly more conspicuous compared with the prior CT 11/16/2023   and is consistent with the findings of the patient's MR 11/19/2023.    CT Heart Left Atrium w/ IV Cont (11.22.23 @ 11:22)   IMPRESSION:  Cardiac:  Multiple large filling defects noted at the distal appendage at least >2   cm and also near the proximal appendage measures 1.5 cm x 1.2 cm,   consistent with left appendage thrombi.  Dilated left atrial appendage windsock in appearance with orifice width   measures 4.5 cm x 3.0 cm  Severely biatrial enlargement.  Non-cardiac:  Right upper lobe pulmonary artery embolism.  Possible pulmonary hypertension.  New right lower lobe superior segment infiltrate (possibly infectious or   inflammatory)    MR Head No Cont (11.19.23 @ 15:52)   IMPRESSION: Redemonstration of an evolving acute/subacute large   left-sided MCA distribution infarction with gross hemorrhagic   transformation in the left basal ganglia as well as evidence of petechial   hemorrhagic transformation within the left frontotemporal and parietal   cortices.  Associated cytotoxic edema, sulcal effacement, and mass effect as well as   shift of the midline structures from left to right measuring 5.5 mm   appears unchanged. There is no herniation.    CT Head No Cont (11.16.23 @ 18:05)   IMPRESSION: Stable moderate to large left MCA territory infarction   involving the left frontal lobe, left basal ganglia, left insular ribbon,   left parietal occipital lobe, and anterior left temporal lobe with stable   mild hemorrhage in the left basal ganglia. Left-to-right midline shift   measures 5.5 mm.    CT Head No Cont (11.14.23 @ 18:57)   IMPRESSION:  Stable moderate to large acute left MCA infarct with mild hemorrhage    TTE Echo Complete w/o Contrast w/ Doppler (11.14.23 @ 15:57)   Summary:   1. Left ventricular ejection fraction, by visual estimation, is 55 to   60%.   2. Normal global left ventricular systolic function.   3. The mitral in-flow pattern reveals no discernable A-wave, therefore   no comment on diastolic function can be made.   4. Normal right ventricular size and function.   5. Mild to moderately enlarged right atrium.   6. Moderately enlarged left atrium.   7. Trace mitral valve regurgitation.   8. Mild-moderate tricuspid regurgitation.   9. Sclerotic aortic valve with decreased opening.  10. Estimated pulmonary artery systolic pressure is 41.6 mmHg assuming a   right atrial pressure of 5 mmHg, which is consistent with mild pulmonary   hypertension.    CT Head No Cont (11.14.23 @ 11:36)   IMPRESSION: Evolving left MCA infarct is identified with hemorrhagic   transformation now seen with mass effect on left lateral ventricles and   left-to-right shift.    US Duplex Venous Lower Ext Complete, Bilateral (11.14.23 @ 10:38)   IMPRESSION:  No evidence of deep venous thrombosis in either lower extremity.  Left calf veins not visualized.  Small right popliteal fossa cyst.    CT Angio Brain, Neck and perfusion Stroke Protocol  w/ IV Cont (11.13.23 @ 11:18)   CTA BRAIN:  Acute thrombus in the distal left MCA M1 segment. Diminished flow in the   distal branches.  Severe bilateral cavernous carotid artery calcifications. Fetal origins   the bilateral posterior cerebral arteries. The [place] and   vertebrobasilar system are otherwise unremarkable without evidence of   stenosis, additional occlusion or saccular aneurysm dilation. No evidence   for arterial venous malformation. The vertebral arteries are codominant.    CTA NECK:  Mild bilateral carotid bulb calcifications. Mild stenosis in the left   carotid bulb. The right internal carotid arteries tortuous.  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 There is normal relationship to   the great vessels. The common carotid arteries, internal carotid arteries   and vertebral arteries shows no other evidence of significant stenosis,   occlusion or saccular aneurysm dilation. The vertebral arteries are   codominant.    CT PERFUSION:  Patient has only had less than 2 hours of symptoms may influence the CT   perfusion.  CBF<30% volume: 26 ml left MCA territory.  Tmax>6.0 s volume: 193 ml left MCA territory  Mismatch volume: 167 ml  Mismatch ratio: 7.4  IMPRESSION:  Thrombus in the distal left MCA M1 segment with diminished flow in the   distal MCA segments. 167 mL area of penumbra in the left MCA territory.     CT Brain Stroke Protocol (11.13.23 @ 11:15)   IMPRESSION:  Dense left MCA M1 segment suggesting intraluminal thrombus.   Mild chronic microvascular changes without evidence of an acute   transcortical infarction or hemorrhage.

## 2023-11-29 LAB
ALBUMIN SERPL ELPH-MCNC: 2.6 G/DL — LOW (ref 3.3–5.2)
ALBUMIN SERPL ELPH-MCNC: 2.6 G/DL — LOW (ref 3.3–5.2)
ALP SERPL-CCNC: 51 U/L — SIGNIFICANT CHANGE UP (ref 40–120)
ALP SERPL-CCNC: 51 U/L — SIGNIFICANT CHANGE UP (ref 40–120)
ALT FLD-CCNC: 12 U/L — SIGNIFICANT CHANGE UP
ALT FLD-CCNC: 12 U/L — SIGNIFICANT CHANGE UP
ANION GAP SERPL CALC-SCNC: 9 MMOL/L — SIGNIFICANT CHANGE UP (ref 5–17)
ANION GAP SERPL CALC-SCNC: 9 MMOL/L — SIGNIFICANT CHANGE UP (ref 5–17)
APTT BLD: 53.4 SEC — HIGH (ref 24.5–35.6)
APTT BLD: 53.4 SEC — HIGH (ref 24.5–35.6)
AST SERPL-CCNC: 17 U/L — SIGNIFICANT CHANGE UP
AST SERPL-CCNC: 17 U/L — SIGNIFICANT CHANGE UP
BILIRUB SERPL-MCNC: 0.8 MG/DL — SIGNIFICANT CHANGE UP (ref 0.4–2)
BILIRUB SERPL-MCNC: 0.8 MG/DL — SIGNIFICANT CHANGE UP (ref 0.4–2)
BUN SERPL-MCNC: 16 MG/DL — SIGNIFICANT CHANGE UP (ref 8–20)
BUN SERPL-MCNC: 16 MG/DL — SIGNIFICANT CHANGE UP (ref 8–20)
CALCIUM SERPL-MCNC: 8.1 MG/DL — LOW (ref 8.4–10.5)
CALCIUM SERPL-MCNC: 8.1 MG/DL — LOW (ref 8.4–10.5)
CHLORIDE SERPL-SCNC: 105 MMOL/L — SIGNIFICANT CHANGE UP (ref 96–108)
CHLORIDE SERPL-SCNC: 105 MMOL/L — SIGNIFICANT CHANGE UP (ref 96–108)
CO2 SERPL-SCNC: 26 MMOL/L — SIGNIFICANT CHANGE UP (ref 22–29)
CO2 SERPL-SCNC: 26 MMOL/L — SIGNIFICANT CHANGE UP (ref 22–29)
CREAT SERPL-MCNC: 0.63 MG/DL — SIGNIFICANT CHANGE UP (ref 0.5–1.3)
CREAT SERPL-MCNC: 0.63 MG/DL — SIGNIFICANT CHANGE UP (ref 0.5–1.3)
EGFR: 97 ML/MIN/1.73M2 — SIGNIFICANT CHANGE UP
EGFR: 97 ML/MIN/1.73M2 — SIGNIFICANT CHANGE UP
GLUCOSE BLDC GLUCOMTR-MCNC: 134 MG/DL — HIGH (ref 70–99)
GLUCOSE BLDC GLUCOMTR-MCNC: 134 MG/DL — HIGH (ref 70–99)
GLUCOSE BLDC GLUCOMTR-MCNC: 151 MG/DL — HIGH (ref 70–99)
GLUCOSE BLDC GLUCOMTR-MCNC: 151 MG/DL — HIGH (ref 70–99)
GLUCOSE BLDC GLUCOMTR-MCNC: 158 MG/DL — HIGH (ref 70–99)
GLUCOSE BLDC GLUCOMTR-MCNC: 158 MG/DL — HIGH (ref 70–99)
GLUCOSE BLDC GLUCOMTR-MCNC: 221 MG/DL — HIGH (ref 70–99)
GLUCOSE BLDC GLUCOMTR-MCNC: 221 MG/DL — HIGH (ref 70–99)
GLUCOSE SERPL-MCNC: 162 MG/DL — HIGH (ref 70–99)
GLUCOSE SERPL-MCNC: 162 MG/DL — HIGH (ref 70–99)
HCT VFR BLD CALC: 34.6 % — LOW (ref 39–50)
HCT VFR BLD CALC: 34.6 % — LOW (ref 39–50)
HGB BLD-MCNC: 10.7 G/DL — LOW (ref 13–17)
HGB BLD-MCNC: 10.7 G/DL — LOW (ref 13–17)
MCHC RBC-ENTMCNC: 30.2 PG — SIGNIFICANT CHANGE UP (ref 27–34)
MCHC RBC-ENTMCNC: 30.2 PG — SIGNIFICANT CHANGE UP (ref 27–34)
MCHC RBC-ENTMCNC: 30.9 GM/DL — LOW (ref 32–36)
MCHC RBC-ENTMCNC: 30.9 GM/DL — LOW (ref 32–36)
MCV RBC AUTO: 97.7 FL — SIGNIFICANT CHANGE UP (ref 80–100)
MCV RBC AUTO: 97.7 FL — SIGNIFICANT CHANGE UP (ref 80–100)
PLATELET # BLD AUTO: 333 K/UL — SIGNIFICANT CHANGE UP (ref 150–400)
PLATELET # BLD AUTO: 333 K/UL — SIGNIFICANT CHANGE UP (ref 150–400)
POTASSIUM SERPL-MCNC: 3.8 MMOL/L — SIGNIFICANT CHANGE UP (ref 3.5–5.3)
POTASSIUM SERPL-MCNC: 3.8 MMOL/L — SIGNIFICANT CHANGE UP (ref 3.5–5.3)
POTASSIUM SERPL-SCNC: 3.8 MMOL/L — SIGNIFICANT CHANGE UP (ref 3.5–5.3)
POTASSIUM SERPL-SCNC: 3.8 MMOL/L — SIGNIFICANT CHANGE UP (ref 3.5–5.3)
PROT SERPL-MCNC: 5.1 G/DL — LOW (ref 6.6–8.7)
PROT SERPL-MCNC: 5.1 G/DL — LOW (ref 6.6–8.7)
RBC # BLD: 3.54 M/UL — LOW (ref 4.2–5.8)
RBC # BLD: 3.54 M/UL — LOW (ref 4.2–5.8)
RBC # FLD: 13.5 % — SIGNIFICANT CHANGE UP (ref 10.3–14.5)
RBC # FLD: 13.5 % — SIGNIFICANT CHANGE UP (ref 10.3–14.5)
SODIUM SERPL-SCNC: 140 MMOL/L — SIGNIFICANT CHANGE UP (ref 135–145)
SODIUM SERPL-SCNC: 140 MMOL/L — SIGNIFICANT CHANGE UP (ref 135–145)
WBC # BLD: 9 K/UL — SIGNIFICANT CHANGE UP (ref 3.8–10.5)
WBC # BLD: 9 K/UL — SIGNIFICANT CHANGE UP (ref 3.8–10.5)
WBC # FLD AUTO: 9 K/UL — SIGNIFICANT CHANGE UP (ref 3.8–10.5)
WBC # FLD AUTO: 9 K/UL — SIGNIFICANT CHANGE UP (ref 3.8–10.5)

## 2023-11-29 PROCEDURE — 99232 SBSQ HOSP IP/OBS MODERATE 35: CPT

## 2023-11-29 RX ADMIN — HEPARIN SODIUM 10.5 UNIT(S)/HR: 5000 INJECTION INTRAVENOUS; SUBCUTANEOUS at 08:51

## 2023-11-29 RX ADMIN — NYSTATIN CREAM 1 APPLICATION(S): 100000 CREAM TOPICAL at 06:36

## 2023-11-29 RX ADMIN — Medication 1: at 08:22

## 2023-11-29 RX ADMIN — GABAPENTIN 300 MILLIGRAM(S): 400 CAPSULE ORAL at 06:35

## 2023-11-29 RX ADMIN — CHLORHEXIDINE GLUCONATE 1 APPLICATION(S): 213 SOLUTION TOPICAL at 06:35

## 2023-11-29 RX ADMIN — Medication 25 MILLIGRAM(S): at 06:35

## 2023-11-29 RX ADMIN — SODIUM CHLORIDE 60 MILLILITER(S): 9 INJECTION, SOLUTION INTRAVENOUS at 15:58

## 2023-11-29 RX ADMIN — SENNA PLUS 2 TABLET(S): 8.6 TABLET ORAL at 22:36

## 2023-11-29 RX ADMIN — GABAPENTIN 300 MILLIGRAM(S): 400 CAPSULE ORAL at 17:55

## 2023-11-29 RX ADMIN — Medication 300 MILLIGRAM(S): at 13:20

## 2023-11-29 RX ADMIN — DULOXETINE HYDROCHLORIDE 30 MILLIGRAM(S): 30 CAPSULE, DELAYED RELEASE ORAL at 13:21

## 2023-11-29 RX ADMIN — ATORVASTATIN CALCIUM 40 MILLIGRAM(S): 80 TABLET, FILM COATED ORAL at 22:36

## 2023-11-29 RX ADMIN — HEPARIN SODIUM 10.5 UNIT(S)/HR: 5000 INJECTION INTRAVENOUS; SUBCUTANEOUS at 19:46

## 2023-11-29 RX ADMIN — Medication 25 MILLIGRAM(S): at 17:55

## 2023-11-29 RX ADMIN — HEPARIN SODIUM 10.5 UNIT(S)/HR: 5000 INJECTION INTRAVENOUS; SUBCUTANEOUS at 07:29

## 2023-11-29 RX ADMIN — NYSTATIN CREAM 1 APPLICATION(S): 100000 CREAM TOPICAL at 17:58

## 2023-11-29 RX ADMIN — FAMOTIDINE 20 MILLIGRAM(S): 10 INJECTION INTRAVENOUS at 06:35

## 2023-11-29 RX ADMIN — FAMOTIDINE 20 MILLIGRAM(S): 10 INJECTION INTRAVENOUS at 17:55

## 2023-11-29 RX ADMIN — Medication 1: at 13:19

## 2023-11-29 RX ADMIN — Medication 2: at 22:36

## 2023-11-29 RX ADMIN — POLYETHYLENE GLYCOL 3350 17 GRAM(S): 17 POWDER, FOR SOLUTION ORAL at 13:21

## 2023-11-29 RX ADMIN — Medication 5 MILLIGRAM(S): at 06:38

## 2023-11-29 RX ADMIN — Medication 81 MILLIGRAM(S): at 13:20

## 2023-11-29 NOTE — PROGRESS NOTE ADULT - ASSESSMENT
78y/oM with HTN, HLD, AFib, CKD, Obesity, Gout, Prostate cancer, DVT/PE, OA s/p multiple orthopedic procedures, Spinal Stenosis admitted for elective FROY ligation (11/13). Developed acute Rt sided CVA with imaging notable for acute Left M1 occlusion. Given TNK with TICI 2A thrombectomy, Subsequently noted to have evolving left MCA CVA with hemorrhagic transformation with mass effect (left to right shift). Intubated and monitored in NICU. Subsequently extubated and transferred to Medicine (11/17). Restarted on AC after imaging confirmed FROY thrombi + PE    Plan:       Left MCA CVA with M1 occlusion s/p TNK and thrombectomy with Hemorrhagic transformation  Likely cardioembolic while AC had been on hold  Repeat CT head noted  Restarted on ASA 81 mg daily   Continue Statin   Continue Neurochecks Q2 with BP goal 130-160. Avoid rapid fluctuations   Continue Heparin ggt goal 50-70  f/u CT done showed acute moderate to large left MCA territory infarct with slightly increased petechial hemorrhage. No herniation pattern.  PT - JHOAN  OT -AR  SLP eval appreciated; continue dysphagia diet   aspiration/fall precautions  can not transition to oral anticoagulation Once ok from Neurology     Atrial Fibrillation:   FROY thrombi  PE  Acute Rt  LE DVT  Doppler LE Rt DVT  FROY Thrombi noted on CTA Heart  on Hep GTT - PTT goal of 50-70  Continue BB  EP on board    Hypernatremia:   goal sodium 145-150 per stroke neuro recs due to cerebral edema  hold torsemide   hold parenteral free water  monitor I/os  got half NS, will d/c  Na is 140 today    will monitor BMP     Anemia likely due to chronic disease  Hb relatively stable  iron studies  monitor CBC closely    Acute Hypoxic Respiratory Failure - resolved  intubated for airway protection on admission   history of ILD; continue ch prednisone   extubated on 11/14  CXR no acute infiltrates  bronchodilators as needed  incentive spirometry     DM  -HbA1c 6.2   -continue ISS  -ADA diet    History of PE/DVT, now with new  [facility] PE and RLE DVT confirmed   NO VCDS on RLE   AC as above    HTN  SBP 130s (goal 130-180 per neuro recs)  continue metoprolol  low sodium diet    GERD  continue famotidine    Gout  continue allopurinol    Neuropathy  continue gabapentin     DVT ppx- heparin drip      Dispo: Patient remains acute  Now on heparin drip - Monitor ptt. f/u cbc  order cth  closely monitor at step down  dw rn.              78y/oM with HTN, HLD, AFib, CKD, Obesity, Gout, Prostate cancer, DVT/PE, OA s/p multiple orthopedic procedures, Spinal Stenosis admitted for elective FROY ligation (11/13). Developed acute Rt sided CVA with imaging notable for acute Left M1 occlusion. Given TNK with TICI 2A thrombectomy, Subsequently noted to have evolving left MCA CVA with hemorrhagic transformation with mass effect (left to right shift). Intubated and monitored in NICU. Subsequently extubated and transferred to Medicine (11/17). Restarted on AC after imaging confirmed FROY thrombi + PE    Plan:       Left MCA CVA with M1 occlusion s/p TNK and thrombectomy with Hemorrhagic transformation  Likely cardioembolic while AC had been on hold  Repeat CT head noted  Restarted on ASA 81 mg daily   Continue Statin   Continue Neurochecks Q2 with BP goal 130-160. Avoid rapid fluctuations   Continue Heparin ggt goal 50-70  f/u CT done showed acute moderate to large left MCA territory infarct with slightly increased petechial hemorrhage. No herniation pattern.  PT - JHOAN  OT -AR  SLP eval appreciated; continue dysphagia diet   aspiration/fall precautions  can not transition to oral anticoagulation Once ok from Neurology   repeat CT scan being done    Atrial Fibrillation:   FROY thrombi  PE  Acute Rt  LE DVT  Doppler LE Rt DVT  FROY Thrombi noted on CTA Heart  on Hep GTT - PTT goal of 50-70  Continue BB  EP on board    Hypernatremia:   goal sodium 145-150 per stroke neuro recs due to cerebral edema  hold torsemide   hold parenteral free water  monitor I/os  got half NS, will d/c  Na is 140 today    will monitor BMP     Anemia likely due to chronic disease  Hb relatively stable  iron studies  monitor CBC closely    Acute Hypoxic Respiratory Failure - resolved  intubated for airway protection on admission   history of ILD; continue ch prednisone   extubated on 11/14  CXR no acute infiltrates  bronchodilators as needed  incentive spirometry     DM  -HbA1c 6.2   -continue ISS  -ADA diet    History of PE/DVT, now with new  [facility] PE and RLE DVT confirmed   NO VCDS on RLE   AC as above    HTN  SBP 130s (goal 130-180 per neuro recs)  continue metoprolol  low sodium diet    GERD  continue famotidine    Gout  continue allopurinol    Neuropathy  continue gabapentin     DVT ppx- heparin drip      Dispo: Patient remains acute  Now on heparin drip - Monitor ptt. f/u cbc  order cth  closely monitor at step down  dw rn.

## 2023-11-29 NOTE — PROGRESS NOTE ADULT - SUBJECTIVE AND OBJECTIVE BOX
YOLIS DWYERKG    [id number]    78y      Male    Patient is a 78y old  Male who presents with a chief complaint of Lt MCA M1 thrombus (28 Nov 2023 13:43)      INTERVAL HPI/OVERNIGHT EVENTS:    No over night issues, Patient has aphasia, looks comofortable      Vital Signs Last 24 Hrs  T(C): 37.5 (29 Nov 2023 08:00), Max: 37.7 (28 Nov 2023 20:00)  T(F): 99.5 (29 Nov 2023 08:00), Max: 99.9 (28 Nov 2023 20:00)  HR: 68 (29 Nov 2023 08:00) (68 - 97)  BP: 115/62 (29 Nov 2023 08:00) (112/72 - 139/94)  BP(mean): 70 (29 Nov 2023 08:00) (69 - 101)  RR: 21 (29 Nov 2023 08:00) (18 - 21)  SpO2: 98% (29 Nov 2023 08:00) (96% - 99%)    Parameters below as of 29 Nov 2023 08:00  Patient On (Oxygen Delivery Method): room air        PHYSICAL EXAM:    GENERAL: Elderly male looking comfortable   HEENT: atraumatic   NECK: soft, Supple, No JVD  CHEST/LUNG: Clear to auscultate bilaterally; No wheezing  HEART: S1S2+, Regular rate and rhythm; No murmurs  ABDOMEN: Soft, Nontender, Nondistended; Bowel sounds present  EXTREMITIES:  1+ Peripheral Pulses, No edema  SKIN: No rashes or lesions  NEURO: Aphasia, not following commends       LABS:                        10.7   9.00  )-----------( 333      ( 29 Nov 2023 07:42 )             34.6     11-29    140  |  105  |  16.0  ----------------------------<  162<H>  3.8   |  26.0  |  0.63    Ca    8.1<L>      29 Nov 2023 07:42    TPro  5.1<L>  /  Alb  2.6<L>  /  TBili  0.8  /  DBili  x   /  AST  17  /  ALT  12  /  AlkPhos  51  11-29    PT/INR - ( 28 Nov 2023 05:19 )   PT: 12.5 sec;   INR: 1.13 ratio         PTT - ( 29 Nov 2023 07:42 )  PTT:53.4 sec      I&O's Summary    28 Nov 2023 07:01  -  29 Nov 2023 07:00  --------------------------------------------------------  IN: 1621.5 mL / OUT: 1026 mL / NET: 595.5 mL        MEDICATIONS  (STANDING):  allopurinol 300 milliGRAM(s) Oral daily  aspirin  chewable 81 milliGRAM(s) Oral daily  atorvastatin 40 milliGRAM(s) Oral at bedtime  chlorhexidine 2% Cloths 1 Application(s) Topical daily  dextrose 5%. 1000 milliLiter(s) (50 mL/Hr) IV Continuous <Continuous>  dextrose 5%. 1000 milliLiter(s) (100 mL/Hr) IV Continuous <Continuous>  dextrose 50% Injectable 25 Gram(s) IV Push once  dextrose 50% Injectable 12.5 Gram(s) IV Push once  dextrose 50% Injectable 25 Gram(s) IV Push once  DULoxetine 30 milliGRAM(s) Oral daily  famotidine    Tablet 20 milliGRAM(s) Oral two times a day  gabapentin 300 milliGRAM(s) Oral two times a day  glucagon  Injectable 1 milliGRAM(s) IntraMuscular once  heparin  Infusion 1050 Unit(s)/Hr (10.5 mL/Hr) IV Continuous <Continuous>  insulin lispro (ADMELOG) corrective regimen sliding scale   SubCutaneous Before meals and at bedtime  metoprolol tartrate 25 milliGRAM(s) Oral two times a day  nystatin Powder 1 Application(s) Topical two times a day  polyethylene glycol 3350 17 Gram(s) Oral daily  predniSONE   Tablet 5 milliGRAM(s) Oral daily  senna 2 Tablet(s) Oral at bedtime  sodium chloride 0.45%. 1000 milliLiter(s) (60 mL/Hr) IV Continuous <Continuous>    MEDICATIONS  (PRN):  acetaminophen     Tablet .. 650 milliGRAM(s) Oral every 6 hours PRN Temp greater or equal to 38C (100.4F), Mild Pain (1 - 3)  albuterol/ipratropium (CFC free) Inhaler. 1 Puff(s) Inhalation four times a day PRN Wheezing  albuterol/ipratropium for Nebulization 3 milliLiter(s) Nebulizer every 6 hours PRN SOB and wheezing  bisacodyl Suppository 10 milliGRAM(s) Rectal daily PRN Constipation  dextrose Oral Gel 15 Gram(s) Oral once PRN Blood Glucose LESS THAN 70 milliGRAM(s)/deciliter

## 2023-11-29 NOTE — CHART NOTE - NSCHARTNOTEFT_GEN_A_CORE
Source: Patient [ ]  Family [x ]   other [ x]    Current Diet: Diet, Regular:   DASH/TLC {Sodium & Cholesterol Restricted} (DASH)  Easy to Chew (EASYTOCHEW)  Mildly Thick Liquids (MILDTHICKLIQS)  Supplement Feeding Modality:  Oral  Glucerna Shake Cans or Servings Per Day:  1       Frequency:  Three Times a day (11-27-23 @ 15:59)      Patient reports [ ] nausea  [ ] vomiting [ ] diarrhea [ ] constipation  [ ]chewing problems [ ] swallowing issues  [ ] other:     PO intake:  < 50% [ ]   50-75%  [ x]   %  [ ]  other :    Source for PO intake [ ] Patient [x ] family [x ] chart [ ] staff [x ] other: visual observation    Current Weight:   (11/29) 280.1 lbs- bedscale weight  (11/14) 275.7 lbs  (11/13) 276.2 lbs  (11/13) 264.5 lbs  ? accuracy of weights, noted with 1+ b/l leg edema, continue to trend and maintain strict Is&Os     Pertinent Medications: MEDICATIONS  (STANDING):  atorvastatin 40 milliGRAM(s) Oral at bedtime  famotidine    Tablet 20 milliGRAM(s) Oral two times a day  insulin lispro (ADMELOG) corrective regimen sliding scale   SubCutaneous Before meals and at bedtime  metoprolol tartrate 25 milliGRAM(s) Oral two times a day  polyethylene glycol 3350 17 Gram(s) Oral daily  predniSONE   Tablet 5 milliGRAM(s) Oral daily  senna 2 Tablet(s) Oral at bedtime  sodium chloride 0.45%. 1000 milliLiter(s) (60 mL/Hr) IV Continuous <Continuous>    MEDICATIONS  (PRN):  bisacodyl Suppository 10 milliGRAM(s) Rectal daily PRN Constipation      Pertinent Labs: CBC Full  -  ( 29 Nov 2023 07:42 )  WBC Count : 9.00 K/uL  RBC Count : 3.54 M/uL  Hemoglobin : 10.7 g/dL  Hematocrit : 34.6 %    11-29 Na140 mmol/L Glu 162 mg/dL<H> K+ 3.8 mmol/L Cr  0.63 mg/dL BUN 16.0 mg/dL Phos n/a   Alb 2.6 g/dL<L> PAB n/a       Skin: Surgical incision to right groin and moisture associated dermatitis per documentation  Alok: 12    Nutrition focused physical exam previously conducted - found signs of malnutrition [ ]absent [ x]present    Subcutaneous fat loss: [ x] Orbital fat pads region, [ ]Buccal fat region, [ ]Triceps region,  [ ]Ribs region    Muscle wasting: [x ]Temples region, [ x]Clavicle region, [ ]Shoulder region, [ ]Scapula region, [ ]Interosseous region,  [ ]thigh region, [ ]Calf region    Estimated Needs:   [x ] no change since previous assessment  [ ] recalculated:     Current Nutrition Diagnosis: Pt remains at high nutrition risk secondary to malnutrition (moderate, acute) related to inability to meet sufficient protein-energy in setting of Left MCA CVA with M1 occlusion, dysphagia, poor appetite as evidenced by meeting <50% nutrient needs >5 days, mild muscle loss of temples and clavicles, mild fat loss of orbitals, +edema.     Aware seen by SLP 11/27 with recommendations for a soft & bite sized diet with mildly thick liquids, +aspiration precautions. Pt AxOx1. Spoke with family member at bedside, pt with fair appetite/po intake. Breakfast tray observed at bedside, ~50% consumed per plate waste. Pt drank ~25% of Glucerna supplement as well. Last BM 11/28 per documentation. RD to follow up.     Recommendations:   1) Continue diet as tolerated/per SLP.  2) Continue Glucerna TID to optimize po intake and provide an additional 220 kcal, 10g protein per serving.   3) Rx: MVI daily.   4) Continue bowel regimen PRN.  5) Encourage po intake, monitor diet tolerance, and provide assistance at meals as needed.  6) Obtain daily weights to monitor trends.   7) Monitor blood sugars and correct PRN.     Monitoring and Evaluation:   [ x] PO intake [ x] Tolerance to diet prescription [X] Weights  [X] Follow up per protocol [X] Labs: chem 8, mg, phos, H/H.

## 2023-11-29 NOTE — CHART NOTE - NSCHARTNOTESELECT_GEN_ALL_CORE
Event Note
Neurointerventional Pre Procedure Note/Event Note
Neurointerventional Surgery Post Procedure Note/Event Note
Nutrition Services
Nutrition Services
Transfer Note
Event Note

## 2023-11-30 LAB
APTT BLD: 53.2 SEC — HIGH (ref 24.5–35.6)
APTT BLD: 53.2 SEC — HIGH (ref 24.5–35.6)
GLUCOSE BLDC GLUCOMTR-MCNC: 113 MG/DL — HIGH (ref 70–99)
GLUCOSE BLDC GLUCOMTR-MCNC: 113 MG/DL — HIGH (ref 70–99)
GLUCOSE BLDC GLUCOMTR-MCNC: 134 MG/DL — HIGH (ref 70–99)
GLUCOSE BLDC GLUCOMTR-MCNC: 134 MG/DL — HIGH (ref 70–99)
GLUCOSE BLDC GLUCOMTR-MCNC: 149 MG/DL — HIGH (ref 70–99)
GLUCOSE BLDC GLUCOMTR-MCNC: 149 MG/DL — HIGH (ref 70–99)
GLUCOSE BLDC GLUCOMTR-MCNC: 188 MG/DL — HIGH (ref 70–99)
GLUCOSE BLDC GLUCOMTR-MCNC: 188 MG/DL — HIGH (ref 70–99)

## 2023-11-30 PROCEDURE — 70450 CT HEAD/BRAIN W/O DYE: CPT | Mod: 26

## 2023-11-30 PROCEDURE — 99232 SBSQ HOSP IP/OBS MODERATE 35: CPT

## 2023-11-30 RX ORDER — CHLORHEXIDINE GLUCONATE 213 G/1000ML
1 SOLUTION TOPICAL DAILY
Refills: 0 | Status: DISCONTINUED | OUTPATIENT
Start: 2023-11-30 | End: 2023-12-04

## 2023-11-30 RX ORDER — NYSTATIN CREAM 100000 [USP'U]/G
1 CREAM TOPICAL
Refills: 0 | Status: DISCONTINUED | OUTPATIENT
Start: 2023-11-30 | End: 2023-12-04

## 2023-11-30 RX ADMIN — GABAPENTIN 300 MILLIGRAM(S): 400 CAPSULE ORAL at 17:36

## 2023-11-30 RX ADMIN — Medication 1: at 17:44

## 2023-11-30 RX ADMIN — NYSTATIN CREAM 1 APPLICATION(S): 100000 CREAM TOPICAL at 17:33

## 2023-11-30 RX ADMIN — Medication 300 MILLIGRAM(S): at 12:50

## 2023-11-30 RX ADMIN — Medication 25 MILLIGRAM(S): at 05:30

## 2023-11-30 RX ADMIN — Medication 25 MILLIGRAM(S): at 17:38

## 2023-11-30 RX ADMIN — CHLORHEXIDINE GLUCONATE 1 APPLICATION(S): 213 SOLUTION TOPICAL at 05:37

## 2023-11-30 RX ADMIN — DULOXETINE HYDROCHLORIDE 30 MILLIGRAM(S): 30 CAPSULE, DELAYED RELEASE ORAL at 12:51

## 2023-11-30 RX ADMIN — ATORVASTATIN CALCIUM 40 MILLIGRAM(S): 80 TABLET, FILM COATED ORAL at 22:04

## 2023-11-30 RX ADMIN — SENNA PLUS 2 TABLET(S): 8.6 TABLET ORAL at 22:07

## 2023-11-30 RX ADMIN — FAMOTIDINE 20 MILLIGRAM(S): 10 INJECTION INTRAVENOUS at 05:30

## 2023-11-30 RX ADMIN — NYSTATIN CREAM 1 APPLICATION(S): 100000 CREAM TOPICAL at 05:30

## 2023-11-30 RX ADMIN — HEPARIN SODIUM 10.5 UNIT(S)/HR: 5000 INJECTION INTRAVENOUS; SUBCUTANEOUS at 00:33

## 2023-11-30 RX ADMIN — FAMOTIDINE 20 MILLIGRAM(S): 10 INJECTION INTRAVENOUS at 17:33

## 2023-11-30 RX ADMIN — SODIUM CHLORIDE 60 MILLILITER(S): 9 INJECTION, SOLUTION INTRAVENOUS at 07:34

## 2023-11-30 RX ADMIN — POLYETHYLENE GLYCOL 3350 17 GRAM(S): 17 POWDER, FOR SOLUTION ORAL at 12:50

## 2023-11-30 RX ADMIN — Medication 81 MILLIGRAM(S): at 12:51

## 2023-11-30 RX ADMIN — CHLORHEXIDINE GLUCONATE 1 APPLICATION(S): 213 SOLUTION TOPICAL at 12:50

## 2023-11-30 RX ADMIN — Medication 5 MILLIGRAM(S): at 05:34

## 2023-11-30 RX ADMIN — HEPARIN SODIUM 10.5 UNIT(S)/HR: 5000 INJECTION INTRAVENOUS; SUBCUTANEOUS at 12:38

## 2023-11-30 RX ADMIN — GABAPENTIN 300 MILLIGRAM(S): 400 CAPSULE ORAL at 05:29

## 2023-11-30 RX ADMIN — HEPARIN SODIUM 10.5 UNIT(S)/HR: 5000 INJECTION INTRAVENOUS; SUBCUTANEOUS at 07:30

## 2023-11-30 RX ADMIN — HEPARIN SODIUM 10.5 UNIT(S)/HR: 5000 INJECTION INTRAVENOUS; SUBCUTANEOUS at 19:37

## 2023-11-30 NOTE — PROGRESS NOTE ADULT - ASSESSMENT
78y/oM with HTN, HLD, AFib, CKD, Obesity, Gout, Prostate cancer, DVT/PE, OA s/p multiple orthopedic procedures, Spinal Stenosis admitted for elective FROY ligation (11/13). Developed acute Rt sided CVA with imaging notable for acute Left M1 occlusion. Given TNK with TICI 2A thrombectomy, Subsequently noted to have evolving left MCA CVA with hemorrhagic transformation with mass effect (left to right shift). Intubated and monitored in NICU. Subsequently extubated and transferred to Medicine (11/17). Restarted on AC after imaging confirmed FROY thrombi + PE    Plan:     Left MCA CVA with M1 occlusion s/p TNK and thrombectomy with Hemorrhagic transformation  Likely cardioembolic while AC had been on hold  Repeat CT head noted  Restarted on ASA 81 mg daily  Continue Statin   Continue Neurochecks Q2 with BP goal 130-160. Avoid rapid fluctuations   Continue Heparin ggt goal 50-70  f/u CT done showed acute moderate to large left MCA territory infarct with slightly increased petechial hemorrhage. No herniation pattern.  PT - JHOAN  OT -AR  SLP eval appreciated; continue dysphagia diet   aspiration/fall precautions  repeat head CT today as per Neuro no no worsening bleed then will transition to oral anticoagulation Once ok from Neurology     Atrial Fibrillation:   FROY thrombi  PE  Acute Rt  LE DVT  Doppler LE Rt DVT  FROY Thrombi noted on CTA Heart  on Hep GTT - PTT goal of 50-70  Continue BB  EP on board    Hypernatremia:   goal sodium 145-150 per stroke neuro recs due to cerebral edema  hold torsemide   hold parenteral free water  monitor I/os  got half NS, will d/c  Na is 140 today    will monitor BMP     Anemia likely due to chronic disease  Hb relatively stable  iron studies  monitor CBC closely    Acute Hypoxic Respiratory Failure - resolved  intubated for airway protection on admission   history of ILD; continue ch prednisone   extubated on 11/14  CXR no acute infiltrates  bronchodilators as needed  incentive spirometry   Sating well on RA     DM  -HbA1c 6.2   -continue ISS  -ADA diet    History of PE/DVT, now with new  [facility] PE and RLE DVT confirmed   NO VCDS on RLE   AC as above    HTN  SBP 130s (goal 130-180 per neuro recs)  continue metoprolol  low sodium diet    GERD  continue famotidine    Gout  continue allopurinol    Neuropathy  continue gabapentin     DVT ppx- heparin drip      Dispo: Patient remains acute  Now on heparin drip - Monitor ptt. f/u cbc  order cth  closely monitor at step down  kush rn.

## 2023-11-30 NOTE — PROGRESS NOTE ADULT - SUBJECTIVE AND OBJECTIVE BOX
YOLIS DWYERKG    [id number]    78y      Male    Patient is a 78y old  Male who presents with a chief complaint of Lt MCA M1 thrombus (29 Nov 2023 10:30)      INTERVAL HPI/OVERNIGHT EVENTS:    patient is doing ok, no over night issues, looks comfortable, no worsening on neuro checks       Vital Signs Last 24 Hrs  T(C): 37.1 (30 Nov 2023 08:00), Max: 37.6 (30 Nov 2023 00:02)  T(F): 98.8 (30 Nov 2023 08:00), Max: 99.7 (30 Nov 2023 00:02)  HR: 79 (30 Nov 2023 10:00) (76 - 89)  BP: 140/74 (30 Nov 2023 10:00) (89/59 - 140/74)  BP(mean): 86 (30 Nov 2023 10:00) (58 - 87)  RR: 22 (30 Nov 2023 10:00) (20 - 26)  SpO2: 98% (30 Nov 2023 10:00) (95% - 98%)    Parameters below as of 30 Nov 2023 10:00  Patient On (Oxygen Delivery Method): room air        PHYSICAL EXAM:    GENERAL: Elderly male looking comfortable   HEENT: atraumatic   NECK: soft, Supple, No JVD  CHEST/LUNG: Clear to auscultate bilaterally; No wheezing  HEART: S1S2+, Regular rate and rhythm; No murmurs  ABDOMEN: Soft, Nontender, Nondistended; Bowel sounds present  EXTREMITIES:  1+ Peripheral Pulses, No edema  SKIN: No rashes or lesions  NEURO: Aphasia, not following commends         LABS:                        10.7   9.00  )-----------( 333      ( 29 Nov 2023 07:42 )             34.6     11-29    140  |  105  |  16.0  ----------------------------<  162<H>  3.8   |  26.0  |  0.63    Ca    8.1<L>      29 Nov 2023 07:42    TPro  5.1<L>  /  Alb  2.6<L>  /  TBili  0.8  /  DBili  x   /  AST  17  /  ALT  12  /  AlkPhos  51  11-29    PTT - ( 30 Nov 2023 10:25 )  PTT:53.2 sec      I&O's Summary    29 Nov 2023 07:01  -  30 Nov 2023 07:00  --------------------------------------------------------  IN: 1741.5 mL / OUT: 520 mL / NET: 1221.5 mL        MEDICATIONS  (STANDING):  allopurinol 300 milliGRAM(s) Oral daily  aspirin  chewable 81 milliGRAM(s) Oral daily  atorvastatin 40 milliGRAM(s) Oral at bedtime  dextrose 5%. 1000 milliLiter(s) (50 mL/Hr) IV Continuous <Continuous>  dextrose 5%. 1000 milliLiter(s) (100 mL/Hr) IV Continuous <Continuous>  dextrose 50% Injectable 12.5 Gram(s) IV Push once  dextrose 50% Injectable 25 Gram(s) IV Push once  dextrose 50% Injectable 25 Gram(s) IV Push once  DULoxetine 30 milliGRAM(s) Oral daily  famotidine    Tablet 20 milliGRAM(s) Oral two times a day  gabapentin 300 milliGRAM(s) Oral two times a day  glucagon  Injectable 1 milliGRAM(s) IntraMuscular once  heparin  Infusion 1050 Unit(s)/Hr (10.5 mL/Hr) IV Continuous <Continuous>  insulin lispro (ADMELOG) corrective regimen sliding scale   SubCutaneous Before meals and at bedtime  metoprolol tartrate 25 milliGRAM(s) Oral two times a day  polyethylene glycol 3350 17 Gram(s) Oral daily  predniSONE   Tablet 5 milliGRAM(s) Oral daily  senna 2 Tablet(s) Oral at bedtime  sodium chloride 0.45%. 1000 milliLiter(s) (60 mL/Hr) IV Continuous <Continuous>    MEDICATIONS  (PRN):  acetaminophen     Tablet .. 650 milliGRAM(s) Oral every 6 hours PRN Temp greater or equal to 38C (100.4F), Mild Pain (1 - 3)  albuterol/ipratropium (CFC free) Inhaler. 1 Puff(s) Inhalation four times a day PRN Wheezing  albuterol/ipratropium for Nebulization 3 milliLiter(s) Nebulizer every 6 hours PRN SOB and wheezing  bisacodyl Suppository 10 milliGRAM(s) Rectal daily PRN Constipation  dextrose Oral Gel 15 Gram(s) Oral once PRN Blood Glucose LESS THAN 70 milliGRAM(s)/deciliter

## 2023-12-01 LAB
ALBUMIN SERPL ELPH-MCNC: 2.1 G/DL — LOW (ref 3.3–5.2)
ALBUMIN SERPL ELPH-MCNC: 2.1 G/DL — LOW (ref 3.3–5.2)
ALP SERPL-CCNC: 50 U/L — SIGNIFICANT CHANGE UP (ref 40–120)
ALP SERPL-CCNC: 50 U/L — SIGNIFICANT CHANGE UP (ref 40–120)
ALT FLD-CCNC: 10 U/L — SIGNIFICANT CHANGE UP
ALT FLD-CCNC: 10 U/L — SIGNIFICANT CHANGE UP
ANION GAP SERPL CALC-SCNC: 9 MMOL/L — SIGNIFICANT CHANGE UP (ref 5–17)
ANION GAP SERPL CALC-SCNC: 9 MMOL/L — SIGNIFICANT CHANGE UP (ref 5–17)
APTT BLD: 65 SEC — HIGH (ref 24.5–35.6)
APTT BLD: 65 SEC — HIGH (ref 24.5–35.6)
AST SERPL-CCNC: 17 U/L — SIGNIFICANT CHANGE UP
AST SERPL-CCNC: 17 U/L — SIGNIFICANT CHANGE UP
BILIRUB SERPL-MCNC: 0.6 MG/DL — SIGNIFICANT CHANGE UP (ref 0.4–2)
BILIRUB SERPL-MCNC: 0.6 MG/DL — SIGNIFICANT CHANGE UP (ref 0.4–2)
BUN SERPL-MCNC: 14.8 MG/DL — SIGNIFICANT CHANGE UP (ref 8–20)
BUN SERPL-MCNC: 14.8 MG/DL — SIGNIFICANT CHANGE UP (ref 8–20)
CALCIUM SERPL-MCNC: 8.6 MG/DL — SIGNIFICANT CHANGE UP (ref 8.4–10.5)
CALCIUM SERPL-MCNC: 8.6 MG/DL — SIGNIFICANT CHANGE UP (ref 8.4–10.5)
CHLORIDE SERPL-SCNC: 108 MMOL/L — SIGNIFICANT CHANGE UP (ref 96–108)
CHLORIDE SERPL-SCNC: 108 MMOL/L — SIGNIFICANT CHANGE UP (ref 96–108)
CO2 SERPL-SCNC: 27 MMOL/L — SIGNIFICANT CHANGE UP (ref 22–29)
CO2 SERPL-SCNC: 27 MMOL/L — SIGNIFICANT CHANGE UP (ref 22–29)
CREAT SERPL-MCNC: 0.69 MG/DL — SIGNIFICANT CHANGE UP (ref 0.5–1.3)
CREAT SERPL-MCNC: 0.69 MG/DL — SIGNIFICANT CHANGE UP (ref 0.5–1.3)
EGFR: 94 ML/MIN/1.73M2 — SIGNIFICANT CHANGE UP
EGFR: 94 ML/MIN/1.73M2 — SIGNIFICANT CHANGE UP
GLUCOSE BLDC GLUCOMTR-MCNC: 129 MG/DL — HIGH (ref 70–99)
GLUCOSE BLDC GLUCOMTR-MCNC: 129 MG/DL — HIGH (ref 70–99)
GLUCOSE BLDC GLUCOMTR-MCNC: 151 MG/DL — HIGH (ref 70–99)
GLUCOSE BLDC GLUCOMTR-MCNC: 151 MG/DL — HIGH (ref 70–99)
GLUCOSE BLDC GLUCOMTR-MCNC: 164 MG/DL — HIGH (ref 70–99)
GLUCOSE BLDC GLUCOMTR-MCNC: 164 MG/DL — HIGH (ref 70–99)
GLUCOSE BLDC GLUCOMTR-MCNC: 206 MG/DL — HIGH (ref 70–99)
GLUCOSE BLDC GLUCOMTR-MCNC: 206 MG/DL — HIGH (ref 70–99)
GLUCOSE SERPL-MCNC: 154 MG/DL — HIGH (ref 70–99)
GLUCOSE SERPL-MCNC: 154 MG/DL — HIGH (ref 70–99)
HCT VFR BLD CALC: 29.7 % — LOW (ref 39–50)
HCT VFR BLD CALC: 29.7 % — LOW (ref 39–50)
HGB BLD-MCNC: 9.3 G/DL — LOW (ref 13–17)
HGB BLD-MCNC: 9.3 G/DL — LOW (ref 13–17)
MCHC RBC-ENTMCNC: 30 PG — SIGNIFICANT CHANGE UP (ref 27–34)
MCHC RBC-ENTMCNC: 30 PG — SIGNIFICANT CHANGE UP (ref 27–34)
MCHC RBC-ENTMCNC: 31.3 GM/DL — LOW (ref 32–36)
MCHC RBC-ENTMCNC: 31.3 GM/DL — LOW (ref 32–36)
MCV RBC AUTO: 95.8 FL — SIGNIFICANT CHANGE UP (ref 80–100)
MCV RBC AUTO: 95.8 FL — SIGNIFICANT CHANGE UP (ref 80–100)
PLATELET # BLD AUTO: 300 K/UL — SIGNIFICANT CHANGE UP (ref 150–400)
PLATELET # BLD AUTO: 300 K/UL — SIGNIFICANT CHANGE UP (ref 150–400)
POTASSIUM SERPL-MCNC: 3.6 MMOL/L — SIGNIFICANT CHANGE UP (ref 3.5–5.3)
POTASSIUM SERPL-MCNC: 3.6 MMOL/L — SIGNIFICANT CHANGE UP (ref 3.5–5.3)
POTASSIUM SERPL-SCNC: 3.6 MMOL/L — SIGNIFICANT CHANGE UP (ref 3.5–5.3)
POTASSIUM SERPL-SCNC: 3.6 MMOL/L — SIGNIFICANT CHANGE UP (ref 3.5–5.3)
PROT SERPL-MCNC: 4.8 G/DL — LOW (ref 6.6–8.7)
PROT SERPL-MCNC: 4.8 G/DL — LOW (ref 6.6–8.7)
RBC # BLD: 3.1 M/UL — LOW (ref 4.2–5.8)
RBC # BLD: 3.1 M/UL — LOW (ref 4.2–5.8)
RBC # FLD: 13.5 % — SIGNIFICANT CHANGE UP (ref 10.3–14.5)
RBC # FLD: 13.5 % — SIGNIFICANT CHANGE UP (ref 10.3–14.5)
SODIUM SERPL-SCNC: 144 MMOL/L — SIGNIFICANT CHANGE UP (ref 135–145)
SODIUM SERPL-SCNC: 144 MMOL/L — SIGNIFICANT CHANGE UP (ref 135–145)
WBC # BLD: 5.11 K/UL — SIGNIFICANT CHANGE UP (ref 3.8–10.5)
WBC # BLD: 5.11 K/UL — SIGNIFICANT CHANGE UP (ref 3.8–10.5)
WBC # FLD AUTO: 5.11 K/UL — SIGNIFICANT CHANGE UP (ref 3.8–10.5)
WBC # FLD AUTO: 5.11 K/UL — SIGNIFICANT CHANGE UP (ref 3.8–10.5)

## 2023-12-01 PROCEDURE — 99232 SBSQ HOSP IP/OBS MODERATE 35: CPT

## 2023-12-01 RX ORDER — SODIUM CHLORIDE 9 MG/ML
500 INJECTION, SOLUTION INTRAVENOUS
Refills: 0 | Status: COMPLETED | OUTPATIENT
Start: 2023-12-01 | End: 2023-12-01

## 2023-12-01 RX ORDER — ALBUMIN HUMAN 25 %
250 VIAL (ML) INTRAVENOUS ONCE
Refills: 0 | Status: COMPLETED | OUTPATIENT
Start: 2023-12-01 | End: 2023-12-01

## 2023-12-01 RX ADMIN — NYSTATIN CREAM 1 APPLICATION(S): 100000 CREAM TOPICAL at 17:23

## 2023-12-01 RX ADMIN — Medication 125 MILLILITER(S): at 13:23

## 2023-12-01 RX ADMIN — SODIUM CHLORIDE 60 MILLILITER(S): 9 INJECTION, SOLUTION INTRAVENOUS at 15:43

## 2023-12-01 RX ADMIN — Medication 1: at 06:31

## 2023-12-01 RX ADMIN — Medication 300 MILLIGRAM(S): at 17:22

## 2023-12-01 RX ADMIN — Medication 2: at 13:24

## 2023-12-01 RX ADMIN — SODIUM CHLORIDE 60 MILLILITER(S): 9 INJECTION, SOLUTION INTRAVENOUS at 17:23

## 2023-12-01 RX ADMIN — Medication 5 MILLIGRAM(S): at 05:27

## 2023-12-01 RX ADMIN — Medication 81 MILLIGRAM(S): at 17:21

## 2023-12-01 RX ADMIN — HEPARIN SODIUM 10.5 UNIT(S)/HR: 5000 INJECTION INTRAVENOUS; SUBCUTANEOUS at 19:31

## 2023-12-01 RX ADMIN — SODIUM CHLORIDE 60 MILLILITER(S): 9 INJECTION, SOLUTION INTRAVENOUS at 01:10

## 2023-12-01 RX ADMIN — SENNA PLUS 2 TABLET(S): 8.6 TABLET ORAL at 21:50

## 2023-12-01 RX ADMIN — NYSTATIN CREAM 1 APPLICATION(S): 100000 CREAM TOPICAL at 05:28

## 2023-12-01 RX ADMIN — SODIUM CHLORIDE 500 MILLILITER(S): 9 INJECTION, SOLUTION INTRAVENOUS at 15:43

## 2023-12-01 RX ADMIN — Medication 1: at 21:50

## 2023-12-01 RX ADMIN — ATORVASTATIN CALCIUM 40 MILLIGRAM(S): 80 TABLET, FILM COATED ORAL at 21:50

## 2023-12-01 RX ADMIN — FAMOTIDINE 20 MILLIGRAM(S): 10 INJECTION INTRAVENOUS at 17:22

## 2023-12-01 RX ADMIN — FAMOTIDINE 20 MILLIGRAM(S): 10 INJECTION INTRAVENOUS at 05:26

## 2023-12-01 RX ADMIN — GABAPENTIN 300 MILLIGRAM(S): 400 CAPSULE ORAL at 05:26

## 2023-12-01 RX ADMIN — HEPARIN SODIUM 10.5 UNIT(S)/HR: 5000 INJECTION INTRAVENOUS; SUBCUTANEOUS at 02:38

## 2023-12-01 RX ADMIN — Medication 25 MILLIGRAM(S): at 05:26

## 2023-12-01 RX ADMIN — HEPARIN SODIUM 10.5 UNIT(S)/HR: 5000 INJECTION INTRAVENOUS; SUBCUTANEOUS at 07:37

## 2023-12-01 RX ADMIN — GABAPENTIN 300 MILLIGRAM(S): 400 CAPSULE ORAL at 17:21

## 2023-12-01 NOTE — PROVIDER CONTACT NOTE (OTHER) - ACTION/TREATMENT ORDERED:
PA states she will take a look in his chart, that his blood pressures have been in this range for the last day, she will look and see if anything needs to be added or changed

## 2023-12-01 NOTE — PROVIDER CONTACT NOTE (OTHER) - ASSESSMENT
pt is hypotensive 91/7(78)   pt is hard to assess mental status because he is not propitiatory in assessment

## 2023-12-01 NOTE — PROGRESS NOTE ADULT - ASSESSMENT
78y/oM with HTN, HLD, AFib, CKD, Obesity, Gout, Prostate cancer, DVT/PE, OA s/p multiple orthopedic procedures, Spinal Stenosis admitted for elective FROY ligation (11/13). Developed acute Rt sided CVA with imaging notable for acute Left M1 occlusion. Given TNK with TICI 2A thrombectomy, Subsequently noted to have evolving left MCA CVA with hemorrhagic transformation with mass effect (left to right shift). Intubated and monitored in NICU. Subsequently extubated and transferred to Medicine (11/17). Restarted on AC after imaging confirmed FROY thrombi + PE      Left MCA CVA with M1 occlusion s/p TNK and thrombectomy with Hemorrhagic transformation  Likely cardioembolic while AC had been on hold  Repeat CT head noted- increasing petechial hges - discussed with Dr Howell and Teo   Restarted on ASA 81 mg daily  Continue Statin   Continue Neurochecks Q2 with BP goal 130-160. Avoid rapid fluctuations   Continue Heparin ggt goal 50-70  f/u CT done showed acute moderate to large left MCA territory infarct with slightly increased petechial hemorrhage. No herniation pattern.  PT - JHOAN  OT -AR  SLP eval appreciated; continue dysphagia diet   aspiration/fall precautions  repeat head CT today as per Neuro no no worsening bleed then will transition to oral anticoagulation Once ok from Neurology     Atrial Fibrillation:   FROY thrombi  PE  Acute Rt  LE DVT  Doppler LE Rt DVT  FROY Thrombi noted on CTA Heart  on Hep GTT - PTT goal of 50-70  Continue BB  EP on board    Hypernatremia:   goal sodium 145-150 per stroke neuro recs due to cerebral edema  hold torsemide   hold parenteral free water  monitor I/os  got half NS, will d/c  Na is 140 today    will monitor BMP     Anemia likely due to chronic disease  Hb relatively stable  iron studies  monitor CBC closely    Acute Hypoxic Respiratory Failure - resolved  intubated for airway protection on admission   history of ILD; continue ch prednisone   extubated on 11/14  CXR no acute infiltrates  bronchodilators as needed  incentive spirometry   Sating well on RA     DM  -HbA1c 6.2   -continue ISS  -ADA diet    History of PE/DVT, now with new  [facility] PE and RLE DVT confirmed   NO VCDS on RLE   AC as above    HTN  SBP 130s (goal 130-180 per neuro recs)  continue metoprolol  low sodium diet    GERD  continue famotidine    Gout  continue allopurinol    Neuropathy  continue gabapentin     DVT ppx- heparin drip      Now on heparin drip - repeat CT on it - last CT showed some petechial hges increasing - discussed with cardio and neuro and family in length     closely monitor at step down   78y/oM with HTN, HLD, AFib, CKD, Obesity, Gout, Prostate cancer, DVT/PE, OA s/p multiple orthopedic procedures, Spinal Stenosis admitted for elective FROY ligation (11/13). Developed acute Rt sided CVA with imaging notable for acute Left M1 occlusion. Given TNK with TICI 2A thrombectomy, Subsequently noted to have evolving left MCA CVA with hemorrhagic transformation with mass effect (left to right shift). Intubated and monitored in NICU. Subsequently extubated and transferred to Medicine (11/17). Restarted on AC after imaging confirmed FROY thrombi + PE      Left MCA CVA with M1 occlusion s/p TNK and thrombectomy with Hemorrhagic transformation  Likely cardioembolic while AC had been on hold  Repeat CT head noted- increasing petechial hges - discussed with Dr Howlel and Teo   Restarted on ASA 81 mg daily  Continue Statin   Continue Neurochecks Q2 with BP goal 130-160. Avoid rapid fluctuations   Continue Heparin ggt goal 50-70  f/u CT done showed acute moderate to large left MCA territory infarct with slightly increased petechial hemorrhage. No herniation pattern.  PT - JHOAN  OT -AR  SLP eval appreciated; continue dysphagia diet   aspiration/fall precautions  repeat head CT today as per Neuro no no worsening bleed then will transition to oral anticoagulation Once ok from Neurology     Atrial Fibrillation: FROY thrombi PE Acute Rt LE DVT  FROY Thrombi noted on CTA Heart  on Hep GTT - PTT goal of 50-70  Continue BB  EP on board    Hypernatremia:   goal sodium 145-150 per stroke neuro recs due to cerebral edema  hold torsemide     Anemia likely due to chronic disease  Hb relatively stable  iron studies  monitor CBC closely    Acute Hypoxic Respiratory Failure - resolved  intubated for airway protection on admission   history of ILD; continue ch prednisone   extubated on 11/14  CXR no acute infiltrates  bronchodilators as needed  incentive spirometry   Sating well on RA     DM  -HbA1c 6.2   -continue ISS  -ADA diet    History of PE/DVT, now with new  [facility] PE and RLE DVT confirmed   NO VCDS on RLE   AC as above    HTN  SBP 130s (goal 130-180 per neuro recs)  continue metoprolol  low sodium diet    GERD  continue famotidine    Gout  continue allopurinol    Neuropathy  continue gabapentin     DVT ppx- heparin drip      Now on heparin drip - repeat CT on it - last CT showed some petechial hges increasing - discussed with cardio and neuro and family in length     closely monitor at step down

## 2023-12-01 NOTE — PROGRESS NOTE ADULT - SUBJECTIVE AND OBJECTIVE BOX
YOLIS CARROLL Patient is a 78y old  Male who presents with a chief complaint of Lt MCA M1 thrombus (30 Nov 2023 12:03)     HPI:   77 yo M PMH of HTN, HLD, AFib, CKD, obesity, gout, prostate cancer, DVT/PE, OA s/p multiple orthopedic procedures, spinal stenosis, presented on 11/13/23 for elective left atrial appendage occlusion procedure, and 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  Patient has a long h/o AFib, which is now considered permanent. He is on rate control with metoprolol 25 mg bid, and anticoagulation with Pradaxa.  He has a long Hx of arthritis and had multiple hip replacements, bilateral knee replacements and also has neck and back pain, but is unable to take NSAIDs due to bleeding risks. He has an unsteady gait and uses a cane for assistance.  Patient presented for elective left atrial appendage occlusion with Watchman device/HARJINDER w/ Dr Bobo, and Pradaxa was held for 2 days prior to procedure. 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   Reported per neurology that on admission, NIHSS 26, MRS 1.  Patient is now s/p TNK administration at 11:16, and TICI 2A thrombectomy with Dr. Soliman, admitted to the NeuroICU intubated and on phenylephrine.         TOTAL SCORE: 26 (13 Nov 2023 16:32)    The patient was seen and evaluated   The patient is in no acute distress.  Denied any fever chest pain, palpitations, shortness of breath, abdominal pain, fever, dysuria, cough, edema       I&O's Summary    30 Nov 2023 07:01  -  01 Dec 2023 07:00  --------------------------------------------------------  IN: 1692 mL / OUT: 1250 mL / NET: 442 mL    01 Dec 2023 07:01  -  01 Dec 2023 15:58  --------------------------------------------------------  IN: 1384.5 mL / OUT: 350 mL / NET: 1034.5 mL      Allergies    adhesives (Rash)  No Known Drug Allergies  grass / pollen (Rhinorrhea; Rhinitis; Sneezing; Eye Irritation)  ADHESIVE TAPE - WOULD PREFER PAPER TAPE (Other)    Intolerances      HEALTH ISSUES - PROBLEM Dx:        PAST MEDICAL & SURGICAL HISTORY:  PC (prostate cancer)  s/p radiation 59y/o      GERD (gastroesophageal reflux disease)      Bilateral leg edema      Seasonal allergies      Spinal stenosis      Herniated lumbar intervertebral disc  10 years ago      Unspecified atrial fibrillation      OA (osteoarthritis)      HTN (hypertension)      HLD (hyperlipidemia)      Chronic kidney disease (CKD)      History of pulmonary embolism      inguinal hernia repair  right 15y/o, 55y/o, left 45y/o      bunionectomy  right 2009      arthroscopy  bilateral knees 60y/o      H/O colonoscopy      H/O endoscopy      H/O spinal fusion  September 15th 2014      S/p bilateral shoulder joint replacement      H/O bilateral hip replacements      H/O total knee replacement, bilateral              Vital Signs Last 24 Hrs  T(C): 36.1 (01 Dec 2023 15:34), Max: 36.8 (01 Dec 2023 08:31)  T(F): 97 (01 Dec 2023 15:34), Max: 98.3 (01 Dec 2023 08:31)  HR: 65 (01 Dec 2023 14:00) (62 - 97)  BP: 99/51 (01 Dec 2023 14:00) (91/74 - 137/97)  BP(mean): 61 (01 Dec 2023 14:00) (60 - 100)  RR: 16 (01 Dec 2023 14:00) (15 - 22)  SpO2: 98% (01 Dec 2023 14:00) (98% - 99%)    Parameters below as of 01 Dec 2023 14:00  Patient On (Oxygen Delivery Method): room air    T(C): 36.1 (12-01-23 @ 15:34), Max: 36.8 (12-01-23 @ 08:31)  HR: 65 (12-01-23 @ 14:00) (62 - 97)  BP: 99/51 (12-01-23 @ 14:00) (91/74 - 137/97)  RR: 16 (12-01-23 @ 14:00) (15 - 22)  SpO2: 98% (12-01-23 @ 14:00) (98% - 99%)  Wt(kg): --    PHYSICAL EXAM:    GENERAL: NAD ill appearing frail elderly   HEAD:  Atraumatic,   EYES: EOMI,  NERVOUS SYSTEM:  lethargic cant Moves upper and lower extremities, aphasic not following commands   CHEST/LUNG: Clear to auscultation bilaterally; No rales, rhonchi, wheezing,   HEART: Regular rate and rhythm; No murmurs,   ABDOMEN: Soft, Nontender, Nondistended; Bowel sounds present  EXTREMITIES:  Peripheral Pulses, No  cyanosis, or edema  psychiatry- lacks Insight and judgement intact     acetaminophen     Tablet .. 650 milliGRAM(s) Oral every 6 hours PRN  albuterol/ipratropium (CFC free) Inhaler. 1 Puff(s) Inhalation four times a day PRN  albuterol/ipratropium for Nebulization 3 milliLiter(s) Nebulizer every 6 hours PRN  allopurinol 300 milliGRAM(s) Oral daily  aspirin  chewable 81 milliGRAM(s) Oral daily  atorvastatin 40 milliGRAM(s) Oral at bedtime  bisacodyl Suppository 10 milliGRAM(s) Rectal daily PRN  chlorhexidine 2% Cloths 1 Application(s) Topical daily  dextrose 5%. 1000 milliLiter(s) IV Continuous <Continuous>  dextrose 5%. 1000 milliLiter(s) IV Continuous <Continuous>  dextrose 50% Injectable 25 Gram(s) IV Push once  dextrose 50% Injectable 12.5 Gram(s) IV Push once  dextrose 50% Injectable 25 Gram(s) IV Push once  dextrose Oral Gel 15 Gram(s) Oral once PRN  DULoxetine 30 milliGRAM(s) Oral daily  famotidine    Tablet 20 milliGRAM(s) Oral two times a day  gabapentin 300 milliGRAM(s) Oral two times a day  glucagon  Injectable 1 milliGRAM(s) IntraMuscular once  heparin  Infusion 1050 Unit(s)/Hr IV Continuous <Continuous>  insulin lispro (ADMELOG) corrective regimen sliding scale   SubCutaneous Before meals and at bedtime  metoprolol tartrate 25 milliGRAM(s) Oral two times a day  nystatin Powder 1 Application(s) Topical two times a day  polyethylene glycol 3350 17 Gram(s) Oral daily  predniSONE   Tablet 5 milliGRAM(s) Oral daily  senna 2 Tablet(s) Oral at bedtime  sodium chloride 0.45%. 1000 milliLiter(s) IV Continuous <Continuous>      LABS:                          9.3    5.11  )-----------( 300      ( 01 Dec 2023 06:38 )             29.7     12-01    144  |  108  |  14.8  ----------------------------<  154<H>  3.6   |  27.0  |  0.69    Ca    8.6      01 Dec 2023 06:38    TPro  4.8<L>  /  Alb  2.1<L>  /  TBili  0.6  /  DBili  x   /  AST  17  /  ALT  10  /  AlkPhos  50  12-01    LIVER FUNCTIONS - ( 01 Dec 2023 06:38 )  Alb: 2.1 g/dL / Pro: 4.8 g/dL / ALK PHOS: 50 U/L / ALT: 10 U/L / AST: 17 U/L / GGT: x           PTT - ( 01 Dec 2023 06:38 )  PTT:65.0 sec      Urinalysis Basic - ( 01 Dec 2023 06:38 )    Color: x / Appearance: x / SG: x / pH: x  Gluc: 154 mg/dL / Ketone: x  / Bili: x / Urobili: x   Blood: x / Protein: x / Nitrite: x   Leuk Esterase: x / RBC: x / WBC x   Sq Epi: x / Non Sq Epi: x / Bacteria: x      CAPILLARY BLOOD GLUCOSE      POCT Blood Glucose.: 206 mg/dL (01 Dec 2023 12:16)  POCT Blood Glucose.: 151 mg/dL (01 Dec 2023 06:12)  POCT Blood Glucose.: 113 mg/dL (30 Nov 2023 21:59)  POCT Blood Glucose.: 188 mg/dL (30 Nov 2023 17:42)      RADIOLOGY & ADDITIONAL TESTS:      Consultant notes reviewed    Case discussed with consultant/provider/ family /patient

## 2023-12-02 LAB
ALBUMIN SERPL ELPH-MCNC: 2.6 G/DL — LOW (ref 3.3–5.2)
ALBUMIN SERPL ELPH-MCNC: 2.6 G/DL — LOW (ref 3.3–5.2)
ANION GAP SERPL CALC-SCNC: 7 MMOL/L — SIGNIFICANT CHANGE UP (ref 5–17)
ANION GAP SERPL CALC-SCNC: 7 MMOL/L — SIGNIFICANT CHANGE UP (ref 5–17)
APTT BLD: 58 SEC — HIGH (ref 24.5–35.6)
APTT BLD: 58 SEC — HIGH (ref 24.5–35.6)
BASOPHILS # BLD AUTO: 0.03 K/UL — SIGNIFICANT CHANGE UP (ref 0–0.2)
BASOPHILS # BLD AUTO: 0.03 K/UL — SIGNIFICANT CHANGE UP (ref 0–0.2)
BASOPHILS NFR BLD AUTO: 0.6 % — SIGNIFICANT CHANGE UP (ref 0–2)
BASOPHILS NFR BLD AUTO: 0.6 % — SIGNIFICANT CHANGE UP (ref 0–2)
BUN SERPL-MCNC: 13.5 MG/DL — SIGNIFICANT CHANGE UP (ref 8–20)
BUN SERPL-MCNC: 13.5 MG/DL — SIGNIFICANT CHANGE UP (ref 8–20)
CALCIUM SERPL-MCNC: 9 MG/DL — SIGNIFICANT CHANGE UP (ref 8.4–10.5)
CALCIUM SERPL-MCNC: 9 MG/DL — SIGNIFICANT CHANGE UP (ref 8.4–10.5)
CHLORIDE SERPL-SCNC: 104 MMOL/L — SIGNIFICANT CHANGE UP (ref 96–108)
CHLORIDE SERPL-SCNC: 104 MMOL/L — SIGNIFICANT CHANGE UP (ref 96–108)
CO2 SERPL-SCNC: 29 MMOL/L — SIGNIFICANT CHANGE UP (ref 22–29)
CO2 SERPL-SCNC: 29 MMOL/L — SIGNIFICANT CHANGE UP (ref 22–29)
CREAT SERPL-MCNC: 0.65 MG/DL — SIGNIFICANT CHANGE UP (ref 0.5–1.3)
CREAT SERPL-MCNC: 0.65 MG/DL — SIGNIFICANT CHANGE UP (ref 0.5–1.3)
EGFR: 96 ML/MIN/1.73M2 — SIGNIFICANT CHANGE UP
EGFR: 96 ML/MIN/1.73M2 — SIGNIFICANT CHANGE UP
EOSINOPHIL # BLD AUTO: 0.15 K/UL — SIGNIFICANT CHANGE UP (ref 0–0.5)
EOSINOPHIL # BLD AUTO: 0.15 K/UL — SIGNIFICANT CHANGE UP (ref 0–0.5)
EOSINOPHIL NFR BLD AUTO: 2.9 % — SIGNIFICANT CHANGE UP (ref 0–6)
EOSINOPHIL NFR BLD AUTO: 2.9 % — SIGNIFICANT CHANGE UP (ref 0–6)
GLUCOSE BLDC GLUCOMTR-MCNC: 142 MG/DL — HIGH (ref 70–99)
GLUCOSE BLDC GLUCOMTR-MCNC: 142 MG/DL — HIGH (ref 70–99)
GLUCOSE BLDC GLUCOMTR-MCNC: 173 MG/DL — HIGH (ref 70–99)
GLUCOSE BLDC GLUCOMTR-MCNC: 173 MG/DL — HIGH (ref 70–99)
GLUCOSE BLDC GLUCOMTR-MCNC: 176 MG/DL — HIGH (ref 70–99)
GLUCOSE BLDC GLUCOMTR-MCNC: 176 MG/DL — HIGH (ref 70–99)
GLUCOSE BLDC GLUCOMTR-MCNC: 184 MG/DL — HIGH (ref 70–99)
GLUCOSE BLDC GLUCOMTR-MCNC: 184 MG/DL — HIGH (ref 70–99)
GLUCOSE SERPL-MCNC: 137 MG/DL — HIGH (ref 70–99)
GLUCOSE SERPL-MCNC: 137 MG/DL — HIGH (ref 70–99)
HCT VFR BLD CALC: 32 % — LOW (ref 39–50)
HCT VFR BLD CALC: 32 % — LOW (ref 39–50)
HGB BLD-MCNC: 9.9 G/DL — LOW (ref 13–17)
HGB BLD-MCNC: 9.9 G/DL — LOW (ref 13–17)
IMM GRANULOCYTES NFR BLD AUTO: 1.7 % — HIGH (ref 0–0.9)
IMM GRANULOCYTES NFR BLD AUTO: 1.7 % — HIGH (ref 0–0.9)
INR BLD: 1.07 RATIO — SIGNIFICANT CHANGE UP (ref 0.85–1.18)
INR BLD: 1.07 RATIO — SIGNIFICANT CHANGE UP (ref 0.85–1.18)
LYMPHOCYTES # BLD AUTO: 1.19 K/UL — SIGNIFICANT CHANGE UP (ref 1–3.3)
LYMPHOCYTES # BLD AUTO: 1.19 K/UL — SIGNIFICANT CHANGE UP (ref 1–3.3)
LYMPHOCYTES # BLD AUTO: 22.7 % — SIGNIFICANT CHANGE UP (ref 13–44)
LYMPHOCYTES # BLD AUTO: 22.7 % — SIGNIFICANT CHANGE UP (ref 13–44)
MCHC RBC-ENTMCNC: 29.4 PG — SIGNIFICANT CHANGE UP (ref 27–34)
MCHC RBC-ENTMCNC: 29.4 PG — SIGNIFICANT CHANGE UP (ref 27–34)
MCHC RBC-ENTMCNC: 30.9 GM/DL — LOW (ref 32–36)
MCHC RBC-ENTMCNC: 30.9 GM/DL — LOW (ref 32–36)
MCV RBC AUTO: 95 FL — SIGNIFICANT CHANGE UP (ref 80–100)
MCV RBC AUTO: 95 FL — SIGNIFICANT CHANGE UP (ref 80–100)
MONOCYTES # BLD AUTO: 0.53 K/UL — SIGNIFICANT CHANGE UP (ref 0–0.9)
MONOCYTES # BLD AUTO: 0.53 K/UL — SIGNIFICANT CHANGE UP (ref 0–0.9)
MONOCYTES NFR BLD AUTO: 10.1 % — SIGNIFICANT CHANGE UP (ref 2–14)
MONOCYTES NFR BLD AUTO: 10.1 % — SIGNIFICANT CHANGE UP (ref 2–14)
NEUTROPHILS # BLD AUTO: 3.25 K/UL — SIGNIFICANT CHANGE UP (ref 1.8–7.4)
NEUTROPHILS # BLD AUTO: 3.25 K/UL — SIGNIFICANT CHANGE UP (ref 1.8–7.4)
NEUTROPHILS NFR BLD AUTO: 62 % — SIGNIFICANT CHANGE UP (ref 43–77)
NEUTROPHILS NFR BLD AUTO: 62 % — SIGNIFICANT CHANGE UP (ref 43–77)
PLATELET # BLD AUTO: 300 K/UL — SIGNIFICANT CHANGE UP (ref 150–400)
PLATELET # BLD AUTO: 300 K/UL — SIGNIFICANT CHANGE UP (ref 150–400)
POTASSIUM SERPL-MCNC: 3.7 MMOL/L — SIGNIFICANT CHANGE UP (ref 3.5–5.3)
POTASSIUM SERPL-MCNC: 3.7 MMOL/L — SIGNIFICANT CHANGE UP (ref 3.5–5.3)
POTASSIUM SERPL-SCNC: 3.7 MMOL/L — SIGNIFICANT CHANGE UP (ref 3.5–5.3)
POTASSIUM SERPL-SCNC: 3.7 MMOL/L — SIGNIFICANT CHANGE UP (ref 3.5–5.3)
PROTHROM AB SERPL-ACNC: 11.9 SEC — SIGNIFICANT CHANGE UP (ref 9.5–13)
PROTHROM AB SERPL-ACNC: 11.9 SEC — SIGNIFICANT CHANGE UP (ref 9.5–13)
RBC # BLD: 3.37 M/UL — LOW (ref 4.2–5.8)
RBC # BLD: 3.37 M/UL — LOW (ref 4.2–5.8)
RBC # FLD: 13.4 % — SIGNIFICANT CHANGE UP (ref 10.3–14.5)
RBC # FLD: 13.4 % — SIGNIFICANT CHANGE UP (ref 10.3–14.5)
SODIUM SERPL-SCNC: 140 MMOL/L — SIGNIFICANT CHANGE UP (ref 135–145)
SODIUM SERPL-SCNC: 140 MMOL/L — SIGNIFICANT CHANGE UP (ref 135–145)
WBC # BLD: 5.24 K/UL — SIGNIFICANT CHANGE UP (ref 3.8–10.5)
WBC # BLD: 5.24 K/UL — SIGNIFICANT CHANGE UP (ref 3.8–10.5)
WBC # FLD AUTO: 5.24 K/UL — SIGNIFICANT CHANGE UP (ref 3.8–10.5)
WBC # FLD AUTO: 5.24 K/UL — SIGNIFICANT CHANGE UP (ref 3.8–10.5)

## 2023-12-02 PROCEDURE — 70450 CT HEAD/BRAIN W/O DYE: CPT | Mod: 26

## 2023-12-02 PROCEDURE — 99232 SBSQ HOSP IP/OBS MODERATE 35: CPT

## 2023-12-02 RX ORDER — APIXABAN 2.5 MG/1
5 TABLET, FILM COATED ORAL EVERY 12 HOURS
Refills: 0 | Status: DISCONTINUED | OUTPATIENT
Start: 2023-12-02 | End: 2023-12-04

## 2023-12-02 RX ADMIN — GABAPENTIN 300 MILLIGRAM(S): 400 CAPSULE ORAL at 17:07

## 2023-12-02 RX ADMIN — APIXABAN 5 MILLIGRAM(S): 2.5 TABLET, FILM COATED ORAL at 17:08

## 2023-12-02 RX ADMIN — Medication 5 MILLIGRAM(S): at 06:42

## 2023-12-02 RX ADMIN — DULOXETINE HYDROCHLORIDE 30 MILLIGRAM(S): 30 CAPSULE, DELAYED RELEASE ORAL at 17:07

## 2023-12-02 RX ADMIN — Medication 1: at 17:07

## 2023-12-02 RX ADMIN — Medication 1: at 12:58

## 2023-12-02 RX ADMIN — NYSTATIN CREAM 1 APPLICATION(S): 100000 CREAM TOPICAL at 17:08

## 2023-12-02 RX ADMIN — FAMOTIDINE 20 MILLIGRAM(S): 10 INJECTION INTRAVENOUS at 17:07

## 2023-12-02 RX ADMIN — NYSTATIN CREAM 1 APPLICATION(S): 100000 CREAM TOPICAL at 06:42

## 2023-12-02 RX ADMIN — FAMOTIDINE 20 MILLIGRAM(S): 10 INJECTION INTRAVENOUS at 06:42

## 2023-12-02 RX ADMIN — ATORVASTATIN CALCIUM 40 MILLIGRAM(S): 80 TABLET, FILM COATED ORAL at 21:36

## 2023-12-02 RX ADMIN — SENNA PLUS 2 TABLET(S): 8.6 TABLET ORAL at 21:36

## 2023-12-02 RX ADMIN — Medication 81 MILLIGRAM(S): at 17:07

## 2023-12-02 RX ADMIN — HEPARIN SODIUM 10.5 UNIT(S)/HR: 5000 INJECTION INTRAVENOUS; SUBCUTANEOUS at 13:46

## 2023-12-02 RX ADMIN — CHLORHEXIDINE GLUCONATE 1 APPLICATION(S): 213 SOLUTION TOPICAL at 17:08

## 2023-12-02 RX ADMIN — GABAPENTIN 300 MILLIGRAM(S): 400 CAPSULE ORAL at 06:42

## 2023-12-02 RX ADMIN — Medication 300 MILLIGRAM(S): at 17:07

## 2023-12-02 RX ADMIN — Medication 1: at 21:36

## 2023-12-02 NOTE — PROGRESS NOTE ADULT - ASSESSMENT
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 Patient presented on 11/13/23 for elective left atrial appendage occlusion with Watchman device/HARJINDER w/ Dr Bobo, and Pradaxa was held for 2 days prior to procedure. Whilst in holding patient developed a left MCA syndrome due to acute left M1 occlusion as demonstrated on CTH & Neck. On admission, NIHSS 26, MRS 1. Patient was given tenecteplase at 11:16 11/13/23, and subsequently underwent mechanical thrombectomy with TICI 2A recannulization with Dr. Soliman. 24 hour repeat CT head s/p tenecteplase administration showed evolving left MCA stroke with hemorrhagic transformation, and mass effect with left to right shift. MRI brain revealed redemonstration of an evolving acute/subacute large left-sided MCA distribution infarction with gross hemorrhagic transformation in the left basal ganglia as well as evidence of petechial hemorrhagic transformation within the left frontotemporal and parietal cortices. Suspected etiology of stroke is cardioembolic from atrial fibrillation when off anticoagulation for recent procedure, 2 cardiac thrombi were appreciated on CTA chest obtained 10 days prior to procedure. These were again seen on repeat CTA chest obtained 11/22/23 as well as new findings of right upper lobe PE.     NEURO:   -Neurologically with no acute changes. Right lower extremity with triple flexion compared to previous exams of slight withdrawal to noxious stimuli Remainder of neurological exam is the same and patient has fluctuated on previous examinations.   -Repeat head CT on 11/28/23 revealed acute moderate to large left MCA territory infarct with slightly increased petechial hemorrhage. No herniation pattern. Head CT on 12/2/23 obtained demonstrating stable petechial hemorrhagic conversion  -Continue close monitoring for neurologic deterioration    -Stroke neuro checks q2hrs  -Neurologically appears to be tolerating SBP of 130s-160s. Can keep this SBP goal and avoid hypotension or rapid fluctuations in blood pressure   -titrate statin to LDL goal less than 70, LDL=54, may resume home regimen of Rosuvastatin 10 mg PO QHS as long as no medical contraindications. 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   -MRI Brain w/o as noted  -Dysphagia screen: pass  -Physical therapy/OT/Speech eval/treatment.   -Neurosurgery input appreciated, patient not a hemicrani candidate     ANTITHROMBOTIC THERAPY:   -Heparin gtt with goal ptt 50-70. Although patient has a large left MCA stroke with hemorrhagic transformation, due to presence of cardiac thrombi on chest CTA, PE, DVT, hx of atrial fibrillation, and recent embolic event, there is a high risk of recurrent stroke without anticoagulation therapy; overall benefit of anticoagulation outweighs risk of hemorrhage.   - Repeat head CT on 12/02/23 is stable and based on these results and neurological examination can plan for transition to DOAC. Optimal agent as per Cardiology.   -No neurological indications for ASA once on PO anticoagulation, however no neurological contraindications if needed from the cardiac/medical standpoint.    -Repeat CT head for any acute change.   -Risks and benefits were discussed with son at bedside at length while also discussing as well as prognosis for neurologic deficit recovery and holistic measures to  who expressed understanding.     CARDIOVASCULAR:  -TTE findings as above, no acute findings; repeat TTE 11/22 with interval decrease in EF   -HARJINDER deferred by anesthesia.   -CTA chest findings as above, see neuro portion for recommendations regarding anticoagulation  -No cardiac intervention at this time as the risks>benefits as per EP  -cardiac monitoring w/ telemetry for now, further evaluation pending findings of noted workup                              HEMATOLOGY:   -H/H 9.9/32.0 Monitor for signs and symptoms of further anemia. Platelets 300. Monitor anemia per primary team, patient should have all age and risk appropriate malignancy screenings with PCP or sooner if clinically suspected   -Bilateral lower extremity venous duplex positive for DVT involving the right   popliteal vein, gastrocnemius vein, posterior tibial and peroneal veins.  -DVT ppx: Heparin s.c [] LMWH [] - hep gtt    PULMONARY:   -Patient extubated 11/14/23  -protecting airway, saturating well     RENAL:   -BUN/Cr 21.2/0.79. monitor urine output, maintain adequate hydration    -Na Goal:  145-150 secondary to cerebral edema. Currently 148    ID:   -afebrile, leukocytosis 10.75. monitor for si/sx of infection     OTHER:    -Discussion plan with patient and family at bedside regarding clinical course    DISPOSITION: Rehab or home depending on PT eval once stable and workup is complete    CORE MEASURES:        Admission NIHSS: 26     Tenecteplase : [x] YES [] NO      LDL/HDL/A1C: 54/67/6.2     Depression Screen- if depression hx and/or present      Statin Therapy: Atorvastatin 40 mg PO Daily      Dysphagia Screen: [x] PASS [] FAIL      Smoking [] YES [x] NO      Afib [x] YES [] NO     Stroke Education [x] YES [] NO    Obtain screening lower extremity venous ultrasound in patients who meet 1 or more of the following criteria as patient is high risk for DVT/PE on admission:   [] History of DVT/PE  []Hypercoagulable states (Factor V Leiden, Cancer, OCP, etc. 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 Patient presented on 11/13/23 for elective left atrial appendage occlusion with Watchman device/HARJINDER w/ Dr Bobo, and Pradaxa was held for 2 days prior to procedure. Whilst in holding patient developed a left MCA syndrome due to acute left M1 occlusion as demonstrated on CTH & Neck. On admission, NIHSS 26, MRS 1. Patient was given tenecteplase at 11:16 11/13/23, and subsequently underwent mechanical thrombectomy with TICI 2A recannulization with Dr. Soliman. 24 hour repeat CT head s/p tenecteplase administration showed evolving left MCA stroke with hemorrhagic transformation, and mass effect with left to right shift. MRI brain revealed redemonstration of an evolving acute/subacute large left-sided MCA distribution infarction with gross hemorrhagic transformation in the left basal ganglia as well as evidence of petechial hemorrhagic transformation within the left frontotemporal and parietal cortices. Suspected etiology of stroke is cardioembolic from atrial fibrillation when off anticoagulation for recent procedure, 2 cardiac thrombi were appreciated on CTA chest obtained 10 days prior to procedure. These were again seen on repeat CTA chest obtained 11/22/23 as well as new findings of right upper lobe PE.     NEURO:   -Neurologically with no acute changes. Right lower extremity with triple flexion compared to previous exams of slight withdrawal to noxious stimuli Remainder of neurological exam is the same and patient has fluctuated on previous examinations.   -Repeat head CT on 11/28/23 revealed acute moderate to large left MCA territory infarct with slightly increased petechial hemorrhage. No herniation pattern. Head CT on 12/2/23 obtained demonstrating stable petechial hemorrhagic conversion  -Continue close monitoring for neurologic deterioration    -Stroke neuro checks q2hrs  -Neurologically appears to be tolerating SBP of 130s-160s. Can keep this SBP goal and avoid hypotension or rapid fluctuations in blood pressure   -titrate statin to LDL goal less than 70, LDL=54, may resume home regimen of Rosuvastatin 10 mg PO QHS as long as no medical contraindications. 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   -MRI Brain w/o as noted  -Dysphagia screen: pass  -Physical therapy/OT/Speech eval/treatment.   -Neurosurgery input appreciated, patient not a hemicrani candidate     ANTITHROMBOTIC THERAPY:   -Heparin gtt with goal ptt 50-70. Although patient has a large left MCA stroke with hemorrhagic transformation, due to presence of cardiac thrombi on chest CTA, PE, DVT, hx of atrial fibrillation, and recent embolic event, there is a high risk of recurrent stroke without anticoagulation therapy; overall benefit of anticoagulation outweighs risk of hemorrhage.   - Repeat head CT on 12/02/23 is stable and based on these results and neurological examination can plan for transition to DOAC. Optimal agent as per Cardiology.   -No neurological indications for ASA once on PO anticoagulation, however no neurological contraindications if needed from the cardiac/medical standpoint.    -Repeat CT head for any acute change.   -Risks and benefits were discussed with son at bedside at length while also discussing as well as prognosis for neurologic deficit recovery who expressed understanding.     CARDIOVASCULAR:  -TTE findings as above, no acute findings; repeat TTE 11/22 with interval decrease in EF   -HARJINDER deferred by anesthesia.   -CTA chest findings as above, see neuro portion for recommendations regarding anticoagulation  -No cardiac intervention at this time as the risks>benefits as per EP  -cardiac monitoring w/ telemetry for now, further evaluation pending findings of noted workup                              HEMATOLOGY:   -H/H 9.9/32.0 Monitor for signs and symptoms of further anemia. Platelets 300. Monitor anemia per primary team, patient should have all age and risk appropriate malignancy screenings with PCP or sooner if clinically suspected   -Bilateral lower extremity venous duplex positive for DVT involving the right   popliteal vein, gastrocnemius vein, posterior tibial and peroneal veins.  -DVT ppx: Heparin s.c [] LMWH [] - hep gtt    PULMONARY:   -Patient extubated 11/14/23  -protecting airway, saturating well     RENAL:   -BUN/Cr 21.2/0.79. monitor urine output, maintain adequate hydration    -Na Goal:  145-150 secondary to cerebral edema. Currently 148    ID:   -afebrile, leukocytosis 10.75. monitor for si/sx of infection     OTHER:    -Discussion plan with patient and family at bedside regarding clinical course    DISPOSITION: Rehab or home depending on PT eval once stable and workup is complete    CORE MEASURES:        Admission NIHSS: 26     Tenecteplase : [x] YES [] NO      LDL/HDL/A1C: 54/67/6.2     Depression Screen- if depression hx and/or present      Statin Therapy: Atorvastatin 40 mg PO Daily      Dysphagia Screen: [x] PASS [] FAIL      Smoking [] YES [x] NO      Afib [x] YES [] NO     Stroke Education [x] YES [] NO    Obtain screening lower extremity venous ultrasound in patients who meet 1 or more of the following criteria as patient is high risk for DVT/PE on admission:   [] History of DVT/PE  []Hypercoagulable states (Factor V Leiden, Cancer, OCP, etc. )  []Prolonged immobility (hemiplegia/hemiparesis/post operative or any other extended immobilization)  [] Transferred from outside facility (Rehab or Long term care)  [] Age </= to 50

## 2023-12-02 NOTE — PROGRESS NOTE ADULT - SUBJECTIVE AND OBJECTIVE BOX
[facility] Stroke Team  Progress Note     HPI:  77 yo M PMH of HTN, HLD, AFib, CKD, obesity, gout, prostate cancer, DVT/PE, OA s/p multiple orthopedic procedures, spinal stenosis, presented on 11/13/23 for elective left atrial appendage occlusion procedure, and developed a MCA syndrome due to acute L M1 occlusion as demonstrated on CTA Head and Neck. Patient has a long h/o AFib, which is now considered permanent. He is on rate control with metoprolol 25 mg bid, and anticoagulation with Pradaxa.  He has a long Hx of arthritis and had multiple hip replacements, bilateral knee replacements and also has neck and back pain, but is unable to take NSAIDs due to bleeding risks. He has an unsteady gait and uses a cane for assistance.  Patient presented on 11/13 for elective left atrial appendage occlusion with Watchman device/HARJINDER w/ Dr Bobo, and Pradaxa was held for 2 days prior to procedure. 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   Initial NIHSS 26, MRS 1. Patient s/p tenecteplase administration at 11:16 11/13/23 and TICI 2A thrombectomy with Dr. Soliman.    SUBJECTIVE: Patient interviewed and examined at the bedside on the morning of 12/2/23. No events overnight.  No new neurologic complaints.  ROS reported negative unless otherwise noted.    acetaminophen     Tablet .. 650 milliGRAM(s) Oral every 6 hours PRN  albuterol/ipratropium (CFC free) Inhaler. 1 Puff(s) Inhalation four times a day PRN  albuterol/ipratropium for Nebulization 3 milliLiter(s) Nebulizer every 6 hours PRN  allopurinol 300 milliGRAM(s) Oral daily  aspirin  chewable 81 milliGRAM(s) Oral daily  atorvastatin 40 milliGRAM(s) Oral at bedtime  bisacodyl Suppository 10 milliGRAM(s) Rectal daily PRN  chlorhexidine 2% Cloths 1 Application(s) Topical daily  dextrose 5%. 1000 milliLiter(s) IV Continuous <Continuous>  dextrose 5%. 1000 milliLiter(s) IV Continuous <Continuous>  dextrose 50% Injectable 12.5 Gram(s) IV Push once  dextrose 50% Injectable 25 Gram(s) IV Push once  dextrose 50% Injectable 25 Gram(s) IV Push once  dextrose Oral Gel 15 Gram(s) Oral once PRN  DULoxetine 30 milliGRAM(s) Oral daily  famotidine    Tablet 20 milliGRAM(s) Oral two times a day  gabapentin 300 milliGRAM(s) Oral two times a day  glucagon  Injectable 1 milliGRAM(s) IntraMuscular once  heparin  Infusion 1050 Unit(s)/Hr IV Continuous <Continuous>  insulin lispro (ADMELOG) corrective regimen sliding scale   SubCutaneous Before meals and at bedtime  metoprolol tartrate 25 milliGRAM(s) Oral two times a day  nystatin Powder 1 Application(s) Topical two times a day  polyethylene glycol 3350 17 Gram(s) Oral daily  predniSONE   Tablet 5 milliGRAM(s) Oral daily  senna 2 Tablet(s) Oral at bedtime  sodium chloride 0.45%. 1000 milliLiter(s) IV Continuous <Continuous>      PHYSICAL EXAM:   Vital Signs Last 24 Hrs  T(C): 36.7 (02 Dec 2023 12:17), Max: 36.8 (01 Dec 2023 19:46)  T(F): 98.1 (02 Dec 2023 12:17), Max: 98.2 (01 Dec 2023 19:46)  HR: 77 (02 Dec 2023 12:00) (65 - 87)  BP: 98/52 (02 Dec 2023 12:00) (98/52 - 127/62)  BP(mean): 60 (02 Dec 2023 12:00) (57 - 78)  RR: 19 (02 Dec 2023 12:00) (16 - 22)  SpO2: 96% (02 Dec 2023 12:00) (94% - 98%)    Parameters below as of 02 Dec 2023 12:00  Patient On (Oxygen Delivery Method): room air        General: No acute distress. Son at bedside    NEUROLOGICAL EXAM:  Mental status: Awake, globally aphasic, expressive > receptive  with no verbal output, however attempts to make speak. Attends to examiner.  Nods and mimics. Does not follow commands, however appears to intermittently follow commands based on previous exams. Right sided visual willow-neglect present.     Cranial Nerves:  Pupils equal and react symmetrically to light. Blink to threat intermittently on right eye. Blink to threat present out of left eye. Left gaze preference with right gaze palsy that does cross midline. Right facial weakness noted.    Motor exam: There is normal bulk and tone.  There is no tremor.  RUE: 0/5 strength with no movement. Grimaces to noxious stimuli with no movement.  RLE: 0/5,  Triple flexion    Strength is 5/5 in the left arm and leg with no drift.    Sensation: Grimaces to noxious stimuli in all extremities.    Coordination/ Gait: unable to assess dysmetria on finger to nose testing    LABS:                                   9.9    5.24  )-----------( 300      ( 02 Dec 2023 06:35 )             32.0     12-02    140  |  104  |  13.5  ----------------------------<  137<H>  3.7   |  29.0  |  0.65    Ca    9.0      02 Dec 2023 06:35    TPro  x   /  Alb  2.6<L>  /  TBili  x   /  DBili  x   /  AST  x   /  ALT  x   /  AlkPhos  x   12-02    CAPILLARY BLOOD GLUCOSE      POCT Blood Glucose.: 173 mg/dL (02 Dec 2023 12:57)  POCT Blood Glucose.: 142 mg/dL (02 Dec 2023 09:56)  POCT Blood Glucose.: 164 mg/dL (01 Dec 2023 21:49)  POCT Blood Glucose.: 129 mg/dL (01 Dec 2023 17:17)    PT/INR - ( 02 Dec 2023 06:35 )   PT: 11.9 sec;   INR: 1.07 ratio         PTT - ( 02 Dec 2023 06:35 )  PTT:58.0 sec  Urinalysis Basic - ( 02 Dec 2023 06:35 )    Color: x / Appearance: x / SG: x / pH: x  Gluc: 137 mg/dL / Ketone: x  / Bili: x / Urobili: x   Blood: x / Protein: x / Nitrite: x   Leuk Esterase: x / RBC: x / WBC x   Sq Epi: x / Non Sq Epi: x / Bacteria: x      I&O's Summary    01 Dec 2023 07:01  -  02 Dec 2023 07:00  --------------------------------------------------------  IN: 1602 mL / OUT: 800 mL / NET: 802 mL    02 Dec 2023 07:01  -  02 Dec 2023 13:45  --------------------------------------------------------  IN: 10.5 mL / OUT: 0 mL / NET: 10.5 mL      IMAGING: Reviewed by me.     CT Head No Cont (12.02.23 @ 13:24)   IMPRESSION: Stable exam.    CT Head No Cont (11.28.23 @ 09:57)   IMPRESSION: Acute moderate to large left MCA territory infarct with   slightly increased petechial hemorrhage. No herniation pattern.    CT Head No Cont (11.25.23 @ 10:03)   IMPRESSION: Acute left middle cerebral artery infarct with petechial   hemorrhage without change since 11/24/2023.    CT Head No Cont (11.24.23 @ 11:12)   IMPRESSION:  Redemonstration of acute left frontotemporal infarct.  Decreased conspicuity of subtly increased attenuation of gray matter   within the region of infarction. This may represent spared tissue and/or   petechial hemorrhagic transformation.  Slightly decreased mass effect on the left lateral ventricle and   rightward midline shift, currently1 mm, previously 2 mm.  Basal cisterns are visualized. No hydrocephalus.    US Duplex Venous Lower Ext Complete, Bilateral (11.23.23 @ 18:30)   IMPRESSION:  1. Findings compatible with deep venous thrombosis involving the right   popliteal vein, gastrocnemius vein, posterior tibial and peroneal veins.  2. No evidence for deep venous thrombosis within the left lower extremity   veins.    TTE Echo Limited or F/U (11.22.23 @ 16:32)   Summary:   1. Left ventricular ejection fraction, by visual estimation, is 45 to   50%.   2. Mildly decreased global left ventricular systolic function.   3. The mitral in-flow pattern reveals no discernable A-wave, therefore   no comment on diastolic function can be made.   4. Mildly enlarged right ventricle with mildly reduced RV systolic   function, estimated PASP 31 mmHg.   5. Severely enlarged left atrium.   6. Right atrial enlargement.   7. Mild mitral annular calcification.   8. Mild mitral valve regurgitation.   9. Mild-moderate tricuspid regurgitation.  10. Sclerotic aortic valve with normal opening.  11. There is no evidence of pericardial effusion.  12. Compared to the prior TTE study from 11/14/23, interval reduction in   LV EF.    CT Head No Cont (11.22.23 @ 16:25)   IMPRESSION:  Decreased mass effect on the left lateral ventricle compared   with the CT and MR of 11/16/2023 and 11/19/2023 respectively. There is   residual hemorrhage seen in association with hemorrhagic component of   infarct in the left lentiform nucleus region decreased in conspicuity   compared with the prior. Left insular hemorrhage in association with the   left MCA infarct is also noted consistent with hemorrhagic transformation   of infarct in this region. The hemorrhage in the left insular region does   appear slightly more conspicuous compared with the prior CT 11/16/2023   and is consistent with the findings of the patient's MR 11/19/2023.    CT Heart Left Atrium w/ IV Cont (11.22.23 @ 11:22)   IMPRESSION:  Cardiac:  Multiple large filling defects noted at the distal appendage at least >2   cm and also near the proximal appendage measures 1.5 cm x 1.2 cm,   consistent with left appendage thrombi.  Dilated left atrial appendage windsock in appearance with orifice width   measures 4.5 cm x 3.0 cm  Severely biatrial enlargement.  Non-cardiac:  Right upper lobe pulmonary artery embolism.  Possible pulmonary hypertension.  New right lower lobe superior segment infiltrate (possibly infectious or   inflammatory)    MR Head No Cont (11.19.23 @ 15:52)   IMPRESSION: Redemonstration of an evolving acute/subacute large   left-sided MCA distribution infarction with gross hemorrhagic   transformation in the left basal ganglia as well as evidence of petechial   hemorrhagic transformation within the left frontotemporal and parietal   cortices.  Associated cytotoxic edema, sulcal effacement, and mass effect as well as   shift of the midline structures from left to right measuring 5.5 mm   appears unchanged. There is no herniation.    CT Head No Cont (11.16.23 @ 18:05)   IMPRESSION: Stable moderate to large left MCA territory infarction   involving the left frontal lobe, left basal ganglia, left insular ribbon,   left parietal occipital lobe, and anterior left temporal lobe with stable   mild hemorrhage in the left basal ganglia. Left-to-right midline shift   measures 5.5 mm.    CT Head No Cont (11.14.23 @ 18:57)   IMPRESSION:  Stable moderate to large acute left MCA infarct with mild hemorrhage    TTE Echo Complete w/o Contrast w/ Doppler (11.14.23 @ 15:57)   Summary:   1. Left ventricular ejection fraction, by visual estimation, is 55 to   60%.   2. Normal global left ventricular systolic function.   3. The mitral in-flow pattern reveals no discernable A-wave, therefore   no comment on diastolic function can be made.   4. Normal right ventricular size and function.   5. Mild to moderately enlarged right atrium.   6. Moderately enlarged left atrium.   7. Trace mitral valve regurgitation.   8. Mild-moderate tricuspid regurgitation.   9. Sclerotic aortic valve with decreased opening.  10. Estimated pulmonary artery systolic pressure is 41.6 mmHg assuming a   right atrial pressure of 5 mmHg, which is consistent with mild pulmonary   hypertension.    CT Head No Cont (11.14.23 @ 11:36)   IMPRESSION: Evolving left MCA infarct is identified with hemorrhagic   transformation now seen with mass effect on left lateral ventricles and   left-to-right shift.    US Duplex Venous Lower Ext Complete, Bilateral (11.14.23 @ 10:38)   IMPRESSION:  No evidence of deep venous thrombosis in either lower extremity.  Left calf veins not visualized.  Small right popliteal fossa cyst.    CT Angio Brain, Neck and perfusion Stroke Protocol  w/ IV Cont (11.13.23 @ 11:18)   CTA BRAIN:  Acute thrombus in the distal left MCA M1 segment. Diminished flow in the   distal branches.  Severe bilateral cavernous carotid artery calcifications. Fetal origins   the bilateral posterior cerebral arteries. The [place] and   vertebrobasilar system are otherwise unremarkable without evidence of   stenosis, additional occlusion or saccular aneurysm dilation. No evidence   for arterial venous malformation. The vertebral arteries are codominant.    CTA NECK:  Mild bilateral carotid bulb calcifications. Mild stenosis in the left   carotid bulb. The right internal carotid arteries tortuous.  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 There is normal relationship to   the great vessels. The common carotid arteries, internal carotid arteries   and vertebral arteries shows no other evidence of significant stenosis,   occlusion or saccular aneurysm dilation. The vertebral arteries are   codominant.    CT PERFUSION:  Patient has only had less than 2 hours of symptoms may influence the CT   perfusion.  CBF<30% volume: 26 ml left MCA territory.  Tmax>6.0 s volume: 193 ml left MCA territory  Mismatch volume: 167 ml  Mismatch ratio: 7.4  IMPRESSION:  Thrombus in the distal left MCA M1 segment with diminished flow in the   distal MCA segments. 167 mL area of penumbra in the left MCA territory.     CT Brain Stroke Protocol (11.13.23 @ 11:15)   IMPRESSION:  Dense left MCA M1 segment suggesting intraluminal thrombus.   Mild chronic microvascular changes without evidence of an acute   transcortical infarction or hemorrhage.

## 2023-12-02 NOTE — PROGRESS NOTE ADULT - SUBJECTIVE AND OBJECTIVE BOX
YOLIS CARROLL Patient is a 78y old  Male who presents with a chief complaint of Lt MCA M1 thrombus (02 Dec 2023 13:43)     HPI:   79 yo M PMH of HTN, HLD, AFib, CKD, obesity, gout, prostate cancer, DVT/PE, OA s/p multiple orthopedic procedures, spinal stenosis, presented on 11/13/23 for elective left atrial appendage occlusion procedure, and 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  Patient has a long h/o AFib, which is now considered permanent. He is on rate control with metoprolol 25 mg bid, and anticoagulation with Pradaxa.  He has a long Hx of arthritis and had multiple hip replacements, bilateral knee replacements and also has neck and back pain, but is unable to take NSAIDs due to bleeding risks. He has an unsteady gait and uses a cane for assistance.  Patient presented for elective left atrial appendage occlusion with Watchman device/HARJINDER w/ Dr Bobo, and Pradaxa was held for 2 days prior to procedure. 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   Reported per neurology that on admission, NIHSS 26, MRS 1.  Patient is now s/p TNK administration at 11:16, and TICI 2A thrombectomy with Dr. Soliman, admitted to the NeuroICU intubated and on phenylephrine.     The patient was seen and evaluated lying in bed follows simple command like squeeze my hand in left hand and close your eyes - but very lethargic and poor concentration wife on the right side- trying to massage and move his right side- states she feels like his right side is more swollen and that her moving it may help with the swelling   The patient is in no acute distress.  Denied any fever chest pain, palpitations, shortness of breath, abdominal pain, fever, dysuria, cough, edema       I&O's Summary    01 Dec 2023 07:01  -  02 Dec 2023 07:00  --------------------------------------------------------  IN: 1602 mL / OUT: 800 mL / NET: 802 mL    02 Dec 2023 07:01  -  02 Dec 2023 15:53  --------------------------------------------------------  IN: 10.5 mL / OUT: 0 mL / NET: 10.5 mL      Allergies    adhesives (Rash)  No Known Drug Allergies  grass / pollen (Rhinorrhea; Rhinitis; Sneezing; Eye Irritation)  ADHESIVE TAPE - WOULD PREFER PAPER TAPE (Other)    Intolerances      HEALTH ISSUES - PROBLEM Dx:        PAST MEDICAL & SURGICAL HISTORY:  PC (prostate cancer)  s/p radiation 61y/o      GERD (gastroesophageal reflux disease)      Bilateral leg edema      Seasonal allergies      Spinal stenosis      Herniated lumbar intervertebral disc  10 years ago      Unspecified atrial fibrillation      OA (osteoarthritis)      HTN (hypertension)      HLD (hyperlipidemia)      Chronic kidney disease (CKD)      History of pulmonary embolism      inguinal hernia repair  right 15y/o, 55y/o, left 47y/o      bunionectomy  right 2009      arthroscopy  bilateral knees 58y/o      H/O colonoscopy      H/O endoscopy      H/O spinal fusion  September 15th 2014      S/p bilateral shoulder joint replacement      H/O bilateral hip replacements      H/O total knee replacement, bilateral              Vital Signs Last 24 Hrs  T(C): 36.7 (02 Dec 2023 12:17), Max: 36.8 (01 Dec 2023 19:46)  T(F): 98.1 (02 Dec 2023 12:17), Max: 98.2 (01 Dec 2023 19:46)  HR: 73 (02 Dec 2023 14:00) (73 - 87)  BP: 114/61 (02 Dec 2023 14:00) (98/52 - 127/62)  BP(mean): 75 (02 Dec 2023 14:00) (57 - 78)  RR: 17 (02 Dec 2023 14:00) (16 - 22)  SpO2: 96% (02 Dec 2023 14:00) (94% - 98%)    Parameters below as of 02 Dec 2023 14:00  Patient On (Oxygen Delivery Method): room air    T(C): 36.7 (12-02-23 @ 12:17), Max: 36.8 (12-01-23 @ 19:46)  HR: 73 (12-02-23 @ 14:00) (73 - 87)  BP: 114/61 (12-02-23 @ 14:00) (98/52 - 127/62)  RR: 17 (12-02-23 @ 14:00) (16 - 22)  SpO2: 96% (12-02-23 @ 14:00) (94% - 98%)  Wt(kg): --    PHYSICAL EXAM:    GENERAL: NAD  HEAD:  Atraumatic, Normocephalic  EYES: EOMI, PERRL, conjunctiva and sclera clear  NERVOUS SYSTEM:  aphasic barely Moves upper and lower left and doesn't move at aLL THE RIGHT JORDI Extremities CNS-II-XII  CHEST/LUNG: Clear to auscultation bilaterally; No rales, rhonchi, wheezing,   HEART: Regular rate and rhythm; No murmurs,   ABDOMEN: Soft, Nontender, Nondistended; Bowel sounds present  EXTREMITIES:  Peripheral Pulses, No  cyanosis, or edema  Psychiatry- cant assess Insight and judgement intact     acetaminophen     Tablet .. 650 milliGRAM(s) Oral every 6 hours PRN  albuterol/ipratropium (CFC free) Inhaler. 1 Puff(s) Inhalation four times a day PRN  albuterol/ipratropium for Nebulization 3 milliLiter(s) Nebulizer every 6 hours PRN  allopurinol 300 milliGRAM(s) Oral daily  aspirin  chewable 81 milliGRAM(s) Oral daily  atorvastatin 40 milliGRAM(s) Oral at bedtime  bisacodyl Suppository 10 milliGRAM(s) Rectal daily PRN  chlorhexidine 2% Cloths 1 Application(s) Topical daily  dextrose 5%. 1000 milliLiter(s) IV Continuous <Continuous>  dextrose 5%. 1000 milliLiter(s) IV Continuous <Continuous>  dextrose 50% Injectable 25 Gram(s) IV Push once  dextrose 50% Injectable 12.5 Gram(s) IV Push once  dextrose 50% Injectable 25 Gram(s) IV Push once  dextrose Oral Gel 15 Gram(s) Oral once PRN  DULoxetine 30 milliGRAM(s) Oral daily  famotidine    Tablet 20 milliGRAM(s) Oral two times a day  gabapentin 300 milliGRAM(s) Oral two times a day  glucagon  Injectable 1 milliGRAM(s) IntraMuscular once  heparin  Infusion 1050 Unit(s)/Hr IV Continuous <Continuous>  insulin lispro (ADMELOG) corrective regimen sliding scale   SubCutaneous Before meals and at bedtime  metoprolol tartrate 25 milliGRAM(s) Oral two times a day  nystatin Powder 1 Application(s) Topical two times a day  polyethylene glycol 3350 17 Gram(s) Oral daily  predniSONE   Tablet 5 milliGRAM(s) Oral daily  senna 2 Tablet(s) Oral at bedtime      LABS:                          9.9    5.24  )-----------( 300      ( 02 Dec 2023 06:35 )             32.0     12-02    140  |  104  |  13.5  ----------------------------<  137<H>  3.7   |  29.0  |  0.65    Ca    9.0      02 Dec 2023 06:35    TPro  x   /  Alb  2.6<L>  /  TBili  x   /  DBili  x   /  AST  x   /  ALT  x   /  AlkPhos  x   12-02    LIVER FUNCTIONS - ( 02 Dec 2023 06:35 )  Alb: 2.6 g/dL / Pro: x     / ALK PHOS: x     / ALT: x     / AST: x     / GGT: x           PT/INR - ( 02 Dec 2023 06:35 )   PT: 11.9 sec;   INR: 1.07 ratio         PTT - ( 02 Dec 2023 06:35 )  PTT:58.0 sec      Urinalysis Basic - ( 02 Dec 2023 06:35 )    Color: x / Appearance: x / SG: x / pH: x  Gluc: 137 mg/dL / Ketone: x  / Bili: x / Urobili: x   Blood: x / Protein: x / Nitrite: x   Leuk Esterase: x / RBC: x / WBC x   Sq Epi: x / Non Sq Epi: x / Bacteria: x      CAPILLARY BLOOD GLUCOSE      POCT Blood Glucose.: 173 mg/dL (02 Dec 2023 12:57)  POCT Blood Glucose.: 142 mg/dL (02 Dec 2023 09:56)  POCT Blood Glucose.: 164 mg/dL (01 Dec 2023 21:49)  POCT Blood Glucose.: 129 mg/dL (01 Dec 2023 17:17)      RADIOLOGY & ADDITIONAL TESTS:      Consultant notes reviewed    Case discussed with consultant/provider/ family /patient

## 2023-12-02 NOTE — PROGRESS NOTE ADULT - ASSESSMENT
78y/oM with HTN, HLD, AFib, CKD, Obesity, Gout, Prostate cancer, DVT/PE, OA s/p multiple orthopedic procedures, Spinal Stenosis admitted for elective FROY ligation (11/13). Developed acute Rt sided CVA with imaging notable for acute Left M1 occlusion. Given TNK with TICI 2A thrombectomy, Subsequently noted to have evolving left MCA CVA with hemorrhagic transformation with mass effect (left to right shift). Intubated and monitored in NICU. Subsequently extubated and transferred to Medicine (11/17). Restarted on AC after imaging confirmed FROY thrombi + PE      Left MCA CVA with M1 occlusion s/p TNK and thrombectomy with Hemorrhagic transformation  Likely cardioembolic while AC had been on hold  Repeat CT head stable petechial hges - discussed with Dr Howell and Teo   Restarted on ASA 81 mg daily  Continue Statin   Continue Neurochecks Q2 with BP goal 130-160. Avoid rapid fluctuations   Continue Heparin ggt goal 50-70  f/u CT done showed acute moderate to large left MCA territory infarct with slightly increased petechial hemorrhage. No herniation pattern.  PT - JHOAN  OT -AR  SLP eval appreciated; continue dysphagia diet   aspiration/fall precautions  repeat head CT today as per Neuro- since no worsening bleed then will transition to oral anticoagulation    Atrial Fibrillation: FROY thrombi PE Acute Rt LE DVT  FROY Thrombi noted on CTA Heart  on Hep GTT - PTT goal of 50-70  Continue BB  EP on board    Hypernatremia:   goal sodium 145-150 per stroke neuro recs due to cerebral edema  hold torsemide     Anemia likely due to chronic disease  Hb relatively stable  iron studies  monitor CBC closely    Acute Hypoxic Respiratory Failure - resolved  intubated for airway protection on admission   history of ILD; continue ch prednisone   extubated on 11/14  CXR no acute infiltrates  bronchodilators as needed  incentive spirometry   Sating well on RA     DM  -HbA1c 6.2   -continue ISS  -ADA diet    History of PE/DVT, now with new  [facility] PE and RLE DVT confirmed   NO VCDS on RLE   AC as above    HTN  SBP 130s (goal 130-180 per neuro recs)  continue metoprolol  low sodium diet    GERD  continue famotidine    Gout  continue allopurinol    Neuropathy  continue gabapentin     DVT ppx- heparin drip      Now on heparin drip - repeat CT on it - last CT showed stable some petechial hges - discussed with cardio and neuro and family in length     closely monitor at step down

## 2023-12-03 LAB
ALBUMIN SERPL ELPH-MCNC: 2.6 G/DL — LOW (ref 3.3–5.2)
ALBUMIN SERPL ELPH-MCNC: 2.6 G/DL — LOW (ref 3.3–5.2)
ALP SERPL-CCNC: 51 U/L — SIGNIFICANT CHANGE UP (ref 40–120)
ALP SERPL-CCNC: 51 U/L — SIGNIFICANT CHANGE UP (ref 40–120)
ALT FLD-CCNC: 12 U/L — SIGNIFICANT CHANGE UP
ALT FLD-CCNC: 12 U/L — SIGNIFICANT CHANGE UP
ANION GAP SERPL CALC-SCNC: 9 MMOL/L — SIGNIFICANT CHANGE UP (ref 5–17)
ANION GAP SERPL CALC-SCNC: 9 MMOL/L — SIGNIFICANT CHANGE UP (ref 5–17)
AST SERPL-CCNC: 20 U/L — SIGNIFICANT CHANGE UP
AST SERPL-CCNC: 20 U/L — SIGNIFICANT CHANGE UP
BILIRUB SERPL-MCNC: 0.4 MG/DL — SIGNIFICANT CHANGE UP (ref 0.4–2)
BILIRUB SERPL-MCNC: 0.4 MG/DL — SIGNIFICANT CHANGE UP (ref 0.4–2)
BUN SERPL-MCNC: 13.4 MG/DL — SIGNIFICANT CHANGE UP (ref 8–20)
BUN SERPL-MCNC: 13.4 MG/DL — SIGNIFICANT CHANGE UP (ref 8–20)
CALCIUM SERPL-MCNC: 8.8 MG/DL — SIGNIFICANT CHANGE UP (ref 8.4–10.5)
CALCIUM SERPL-MCNC: 8.8 MG/DL — SIGNIFICANT CHANGE UP (ref 8.4–10.5)
CHLORIDE SERPL-SCNC: 104 MMOL/L — SIGNIFICANT CHANGE UP (ref 96–108)
CHLORIDE SERPL-SCNC: 104 MMOL/L — SIGNIFICANT CHANGE UP (ref 96–108)
CO2 SERPL-SCNC: 28 MMOL/L — SIGNIFICANT CHANGE UP (ref 22–29)
CO2 SERPL-SCNC: 28 MMOL/L — SIGNIFICANT CHANGE UP (ref 22–29)
CREAT SERPL-MCNC: 0.63 MG/DL — SIGNIFICANT CHANGE UP (ref 0.5–1.3)
CREAT SERPL-MCNC: 0.63 MG/DL — SIGNIFICANT CHANGE UP (ref 0.5–1.3)
EGFR: 97 ML/MIN/1.73M2 — SIGNIFICANT CHANGE UP
EGFR: 97 ML/MIN/1.73M2 — SIGNIFICANT CHANGE UP
GLUCOSE BLDC GLUCOMTR-MCNC: 136 MG/DL — HIGH (ref 70–99)
GLUCOSE BLDC GLUCOMTR-MCNC: 136 MG/DL — HIGH (ref 70–99)
GLUCOSE BLDC GLUCOMTR-MCNC: 143 MG/DL — HIGH (ref 70–99)
GLUCOSE BLDC GLUCOMTR-MCNC: 143 MG/DL — HIGH (ref 70–99)
GLUCOSE BLDC GLUCOMTR-MCNC: 159 MG/DL — HIGH (ref 70–99)
GLUCOSE BLDC GLUCOMTR-MCNC: 159 MG/DL — HIGH (ref 70–99)
GLUCOSE SERPL-MCNC: 138 MG/DL — HIGH (ref 70–99)
GLUCOSE SERPL-MCNC: 138 MG/DL — HIGH (ref 70–99)
HCT VFR BLD CALC: 31.2 % — LOW (ref 39–50)
HCT VFR BLD CALC: 31.2 % — LOW (ref 39–50)
HGB BLD-MCNC: 9.6 G/DL — LOW (ref 13–17)
HGB BLD-MCNC: 9.6 G/DL — LOW (ref 13–17)
MCHC RBC-ENTMCNC: 29.4 PG — SIGNIFICANT CHANGE UP (ref 27–34)
MCHC RBC-ENTMCNC: 29.4 PG — SIGNIFICANT CHANGE UP (ref 27–34)
MCHC RBC-ENTMCNC: 30.8 GM/DL — LOW (ref 32–36)
MCHC RBC-ENTMCNC: 30.8 GM/DL — LOW (ref 32–36)
MCV RBC AUTO: 95.7 FL — SIGNIFICANT CHANGE UP (ref 80–100)
MCV RBC AUTO: 95.7 FL — SIGNIFICANT CHANGE UP (ref 80–100)
PLATELET # BLD AUTO: 286 K/UL — SIGNIFICANT CHANGE UP (ref 150–400)
PLATELET # BLD AUTO: 286 K/UL — SIGNIFICANT CHANGE UP (ref 150–400)
POTASSIUM SERPL-MCNC: 4 MMOL/L — SIGNIFICANT CHANGE UP (ref 3.5–5.3)
POTASSIUM SERPL-MCNC: 4 MMOL/L — SIGNIFICANT CHANGE UP (ref 3.5–5.3)
POTASSIUM SERPL-SCNC: 4 MMOL/L — SIGNIFICANT CHANGE UP (ref 3.5–5.3)
POTASSIUM SERPL-SCNC: 4 MMOL/L — SIGNIFICANT CHANGE UP (ref 3.5–5.3)
PROT SERPL-MCNC: 5.1 G/DL — LOW (ref 6.6–8.7)
PROT SERPL-MCNC: 5.1 G/DL — LOW (ref 6.6–8.7)
RBC # BLD: 3.26 M/UL — LOW (ref 4.2–5.8)
RBC # BLD: 3.26 M/UL — LOW (ref 4.2–5.8)
RBC # FLD: 13.5 % — SIGNIFICANT CHANGE UP (ref 10.3–14.5)
RBC # FLD: 13.5 % — SIGNIFICANT CHANGE UP (ref 10.3–14.5)
SODIUM SERPL-SCNC: 141 MMOL/L — SIGNIFICANT CHANGE UP (ref 135–145)
SODIUM SERPL-SCNC: 141 MMOL/L — SIGNIFICANT CHANGE UP (ref 135–145)
WBC # BLD: 6.29 K/UL — SIGNIFICANT CHANGE UP (ref 3.8–10.5)
WBC # BLD: 6.29 K/UL — SIGNIFICANT CHANGE UP (ref 3.8–10.5)
WBC # FLD AUTO: 6.29 K/UL — SIGNIFICANT CHANGE UP (ref 3.8–10.5)
WBC # FLD AUTO: 6.29 K/UL — SIGNIFICANT CHANGE UP (ref 3.8–10.5)

## 2023-12-03 PROCEDURE — 99232 SBSQ HOSP IP/OBS MODERATE 35: CPT

## 2023-12-03 RX ADMIN — NYSTATIN CREAM 1 APPLICATION(S): 100000 CREAM TOPICAL at 17:49

## 2023-12-03 RX ADMIN — APIXABAN 5 MILLIGRAM(S): 2.5 TABLET, FILM COATED ORAL at 17:48

## 2023-12-03 RX ADMIN — Medication 5 MILLIGRAM(S): at 05:33

## 2023-12-03 RX ADMIN — Medication 81 MILLIGRAM(S): at 12:51

## 2023-12-03 RX ADMIN — GABAPENTIN 300 MILLIGRAM(S): 400 CAPSULE ORAL at 17:48

## 2023-12-03 RX ADMIN — SENNA PLUS 2 TABLET(S): 8.6 TABLET ORAL at 22:09

## 2023-12-03 RX ADMIN — POLYETHYLENE GLYCOL 3350 17 GRAM(S): 17 POWDER, FOR SOLUTION ORAL at 12:52

## 2023-12-03 RX ADMIN — FAMOTIDINE 20 MILLIGRAM(S): 10 INJECTION INTRAVENOUS at 05:33

## 2023-12-03 RX ADMIN — CHLORHEXIDINE GLUCONATE 1 APPLICATION(S): 213 SOLUTION TOPICAL at 13:14

## 2023-12-03 RX ADMIN — GABAPENTIN 300 MILLIGRAM(S): 400 CAPSULE ORAL at 05:33

## 2023-12-03 RX ADMIN — FAMOTIDINE 20 MILLIGRAM(S): 10 INJECTION INTRAVENOUS at 17:48

## 2023-12-03 RX ADMIN — NYSTATIN CREAM 1 APPLICATION(S): 100000 CREAM TOPICAL at 05:34

## 2023-12-03 RX ADMIN — Medication 1: at 12:51

## 2023-12-03 RX ADMIN — Medication 300 MILLIGRAM(S): at 17:48

## 2023-12-03 RX ADMIN — APIXABAN 5 MILLIGRAM(S): 2.5 TABLET, FILM COATED ORAL at 05:33

## 2023-12-03 RX ADMIN — DULOXETINE HYDROCHLORIDE 30 MILLIGRAM(S): 30 CAPSULE, DELAYED RELEASE ORAL at 12:51

## 2023-12-03 RX ADMIN — ATORVASTATIN CALCIUM 40 MILLIGRAM(S): 80 TABLET, FILM COATED ORAL at 22:09

## 2023-12-03 NOTE — PROGRESS NOTE ADULT - ASSESSMENT
78y/oM with HTN, HLD, AFib, CKD, Obesity, Gout, Prostate cancer, DVT/PE, OA s/p multiple orthopedic procedures, Spinal Stenosis admitted for elective FROY ligation (11/13). Developed acute Rt sided CVA with imaging notable for acute Left M1 occlusion. Given TNK with TICI 2A thrombectomy, Subsequently noted to have evolving left MCA CVA with hemorrhagic transformation with mass effect (left to right shift). Intubated and monitored in NICU. Subsequently extubated and transferred to Medicine (11/17). Restarted on AC after imaging confirmed FROY thrombi + PE      Left MCA CVA with M1 occlusion s/p TNK and thrombectomy with Hemorrhagic transformation  Likely cardioembolic while AC had been on hold  Repeat CT head stable petechial hges - discussed with Dr Howell and Teo   Restarted on ASA 81 mg daily  Continue Statin   Continue Neurochecks Q2 with BP goal 130-160. Avoid rapid fluctuations   Continue Heparin ggt goal 50-70  f/u CT done showed acute moderate to large left MCA territory infarct with slightly increased petechial hemorrhage. No herniation pattern.  PT - JHOAN  OT -AR  SLP eval appreciated; continue dysphagia diet   aspiration/fall precautions  repeat head CT today as per Neuro- since no worsening bleed then will transition to oral anticoagulation    Atrial Fibrillation: FROY thrombi PE Acute Rt LE DVT  FROY Thrombi noted on CTA Heart  on Hep GTT - PTT goal of 50-70  Continue BB  EP on board    Hypernatremia:   goal sodium 145-150 per stroke neuro recs due to cerebral edema  hold torsemide     Anemia likely due to chronic disease  Hb relatively stable  iron studies  monitor CBC closely    Acute Hypoxic Respiratory Failure - resolved  intubated for airway protection on admission   history of ILD; continue ch prednisone   extubated on 11/14  CXR no acute infiltrates  bronchodilators as needed  incentive spirometry   Sating well on RA     DM  -HbA1c 6.2   -continue ISS  -ADA diet    History of PE/DVT, now with new  [facility] PE and RLE DVT confirmed   NO VCDS on RLE   AC as above    HTN  SBP 130s (goal 130-180 per neuro recs)  continue metoprolol  low sodium diet    GERD  continue famotidine    Gout  continue allopurinol    Neuropathy  continue gabapentin     DVT ppx- heparin drip      Now on heparin drip - repeat CT on it - last CT showed stable some petechial hges - discussed with cardio and neuro and family in length     closely monitor at step down     78y/oM with HTN, HLD, AFib, CKD, Obesity, Gout, Prostate cancer, DVT/PE, OA s/p multiple orthopedic procedures, Spinal Stenosis admitted for elective FROY ligation (11/13). Developed acute Rt sided CVA with imaging notable for acute Left M1 occlusion. Given TNK with TICI 2A thrombectomy, Subsequently noted to have evolving left MCA CVA with hemorrhagic transformation with mass effect (left to right shift). Intubated and monitored in NICU. Subsequently extubated and transferred to Medicine (11/17). Restarted on AC after imaging confirmed FROY thrombi + PE      #Left MCA CVA with M1 occlusion s/p TNK and thrombectomy with hemorrhagic transformation  Likely cardioembolic while AC had been on hold  Repeat CT head stable petechial hges - discussed with Dr Howell and Teo   C/w ASA 81 mg daily  C/w Statin   Neurochecks q2 with BP goal 130-160. Avoid rapid fluctuations   F/u CT done showed acute moderate to large left MCA territory infarct with slightly increased petechial hemorrhage. No herniation pattern.  PT recommending JHOAN  SLP consulted - recommending easy to chew solids, mildly thick liquids.   Aspiration/fall precautions  Repeat CTH no change, was switched to Eliquis    #Atrial Fibrillation: FROY thrombi   #Acute PE   #Rt LE DVT  FROY Thrombi noted on CTA Heart  C/w BB  EP on board  C/w Eliquis    #Hypernatremia:   goal sodium 145-150 per stroke neuro recs due to cerebral edema  hold torsemide     Anemia likely due to chronic disease  H/H stable. No signs of overt bleed.   Monitor H/H, transfuse Hb<7 if clinically indicated    #Acute Hypoxic Respiratory Failure (resolved)  intubated for airway protection on admission   history of ILD; continue ch prednisone   extubated on 11/14  CXR no acute infiltrates  bronchodilators as needed  incentive spirometry   saturating well on RA     #DM  -HbA1c 6.2   -continue ISS  -ADA diet    #HTN  SBP 130s (goal 130-180 per neuro recs)  continue metoprolol  low sodium diet    #GERD  continue famotidine    #Gout  continue allopurinol    #Neuropathy  continue gabapentin     DVT ppx-eliquis    Dispo: [facility]HO[facility], pending placement

## 2023-12-03 NOTE — PROGRESS NOTE ADULT - NUTRITIONAL ASSESSMENT
This patient has been assessed with a concern for Malnutrition and has been determined to have a diagnosis/diagnoses of Moderate protein-calorie malnutrition.    This patient is being managed with:   Diet DASH/TLC-  Sodium & Cholesterol Restricted  Easy to Chew (EASYTOCHEW)  Mildly Thick Liquids (MILDTHICKLIQS)  Entered: Nov 21 2023  2:10PM  
This patient has been assessed with a concern for Malnutrition and has been determined to have a diagnosis/diagnoses of Moderate protein-calorie malnutrition.    This patient is being managed with:   Diet DASH/TLC-  Sodium & Cholesterol Restricted  Soft and Bite Sized (SOFTBTSZ)  Mildly Thick Liquids (MILDTHICKLIQS)  Supplement Feeding Modality:  Oral  Glucerna Shake Cans or Servings Per Day:  1       Frequency:  Three Times a day  Entered: Nov 29 2023 12:14PM  
This patient has been assessed with a concern for Malnutrition and has been determined to have a diagnosis/diagnoses of Moderate protein-calorie malnutrition.  
This patient has been assessed with a concern for Malnutrition and has been determined to have a diagnosis/diagnoses of Moderate protein-calorie malnutrition.    This patient is being managed with:   Diet DASH/TLC-  Sodium & Cholesterol Restricted  Soft and Bite Sized (SOFTBTSZ)  Mildly Thick Liquids (MILDTHICKLIQS)  Supplement Feeding Modality:  Oral  Glucerna Shake Cans or Servings Per Day:  1       Frequency:  Three Times a day  Entered: Nov 29 2023 12:14PM  
This patient has been assessed with a concern for Malnutrition and has been determined to have a diagnosis/diagnoses of Moderate protein-calorie malnutrition.    This patient is being managed with:   Diet Regular-  DASH/TLC {Sodium & Cholesterol Restricted} (DASH)  Easy to Chew (EASYTOCHEW)  Mildly Thick Liquids (MILDTHICKLIQS)  Supplement Feeding Modality:  Oral  Glucerna Shake Cans or Servings Per Day:  1       Frequency:  Three Times a day  Entered: Nov 27 2023  3:59PM  
This patient has been assessed with a concern for Malnutrition and has been determined to have a diagnosis/diagnoses of Moderate protein-calorie malnutrition.    This patient is being managed with:   Diet DASH/TLC-  Sodium & Cholesterol Restricted  Easy to Chew (EASYTOCHEW)  Mildly Thick Liquids (MILDTHICKLIQS)  Entered: Nov 21 2023  2:10PM  
This patient has been assessed with a concern for Malnutrition and has been determined to have a diagnosis/diagnoses of Moderate protein-calorie malnutrition.    This patient is being managed with:   Diet DASH/TLC-  Sodium & Cholesterol Restricted  Easy to Chew (EASYTOCHEW)  Mildly Thick Liquids (MILDTHICKLIQS)  Entered: Nov 21 2023  2:10PM  
This patient has been assessed with a concern for Malnutrition and has been determined to have a diagnosis/diagnoses of Moderate protein-calorie malnutrition.    This patient is being managed with:   Diet Regular-  DASH/TLC {Sodium & Cholesterol Restricted} (DASH)  Easy to Chew (EASYTOCHEW)  Mildly Thick Liquids (MILDTHICKLIQS)  Supplement Feeding Modality:  Oral  Glucerna Shake Cans or Servings Per Day:  1       Frequency:  Three Times a day  Entered: Nov 24 2023  2:22PM  
This patient has been assessed with a concern for Malnutrition and has been determined to have a diagnosis/diagnoses of Moderate protein-calorie malnutrition.    This patient is being managed with:   Diet Regular-  DASH/TLC {Sodium & Cholesterol Restricted} (DASH)  Easy to Chew (EASYTOCHEW)  Mildly Thick Liquids (MILDTHICKLIQS)  Supplement Feeding Modality:  Oral  Glucerna Shake Cans or Servings Per Day:  1       Frequency:  Three Times a day  Entered: Nov 27 2023  3:59PM  
This patient has been assessed with a concern for Malnutrition and has been determined to have a diagnosis/diagnoses of Moderate protein-calorie malnutrition.    This patient is being managed with:   Diet DASH/TLC-  Sodium & Cholesterol Restricted  Soft and Bite Sized (SOFTBTSZ)  Mildly Thick Liquids (MILDTHICKLIQS)  Supplement Feeding Modality:  Oral  Glucerna Shake Cans or Servings Per Day:  1       Frequency:  Three Times a day  Entered: Nov 29 2023 12:14PM  
This patient has been assessed with a concern for Malnutrition and has been determined to have a diagnosis/diagnoses of Moderate protein-calorie malnutrition.    This patient is being managed with:   Diet Regular-  DASH/TLC {Sodium & Cholesterol Restricted} (DASH)  Easy to Chew (EASYTOCHEW)  Mildly Thick Liquids (MILDTHICKLIQS)  Supplement Feeding Modality:  Oral  Glucerna Shake Cans or Servings Per Day:  1       Frequency:  Three Times a day  Entered: Nov 24 2023  2:22PM  
This patient has been assessed with a concern for Malnutrition and has been determined to have a diagnosis/diagnoses of Moderate protein-calorie malnutrition.    This patient is being managed with:   Diet DASH/TLC-  Sodium & Cholesterol Restricted  Soft and Bite Sized (SOFTBTSZ)  Mildly Thick Liquids (MILDTHICKLIQS)  Supplement Feeding Modality:  Oral  Glucerna Shake Cans or Servings Per Day:  1       Frequency:  Three Times a day  Entered: Nov 29 2023 12:14PM

## 2023-12-03 NOTE — PROGRESS NOTE ADULT - SUBJECTIVE AND OBJECTIVE BOX
[facility] Division of Hospital Medicine    Chief Complaint:      SUBJECTIVE / OVERNIGHT EVENTS:    Patient denies chest pain, SOB, abd pain, N/V, fever, chills, dysuria or any other complaints. All remainder ROS negative.     MEDICATIONS  (STANDING):  allopurinol 300 milliGRAM(s) Oral daily  apixaban 5 milliGRAM(s) Oral every 12 hours  aspirin  chewable 81 milliGRAM(s) Oral daily  atorvastatin 40 milliGRAM(s) Oral at bedtime  chlorhexidine 2% Cloths 1 Application(s) Topical daily  dextrose 5%. 1000 milliLiter(s) (50 mL/Hr) IV Continuous <Continuous>  dextrose 5%. 1000 milliLiter(s) (100 mL/Hr) IV Continuous <Continuous>  dextrose 50% Injectable 25 Gram(s) IV Push once  dextrose 50% Injectable 12.5 Gram(s) IV Push once  dextrose 50% Injectable 25 Gram(s) IV Push once  DULoxetine 30 milliGRAM(s) Oral daily  famotidine    Tablet 20 milliGRAM(s) Oral two times a day  gabapentin 300 milliGRAM(s) Oral two times a day  glucagon  Injectable 1 milliGRAM(s) IntraMuscular once  insulin lispro (ADMELOG) corrective regimen sliding scale   SubCutaneous Before meals and at bedtime  metoprolol tartrate 25 milliGRAM(s) Oral two times a day  nystatin Powder 1 Application(s) Topical two times a day  polyethylene glycol 3350 17 Gram(s) Oral daily  predniSONE   Tablet 5 milliGRAM(s) Oral daily  senna 2 Tablet(s) Oral at bedtime    MEDICATIONS  (PRN):  acetaminophen     Tablet .. 650 milliGRAM(s) Oral every 6 hours PRN Temp greater or equal to 38C (100.4F), Mild Pain (1 - 3)  albuterol/ipratropium (CFC free) Inhaler. 1 Puff(s) Inhalation four times a day PRN Wheezing  albuterol/ipratropium for Nebulization 3 milliLiter(s) Nebulizer every 6 hours PRN SOB and wheezing  bisacodyl Suppository 10 milliGRAM(s) Rectal daily PRN Constipation  dextrose Oral Gel 15 Gram(s) Oral once PRN Blood Glucose LESS THAN 70 milliGRAM(s)/deciliter        I&O's Summary    02 Dec 2023 07:01  -  03 Dec 2023 07:00  --------------------------------------------------------  IN: 94.5 mL / OUT: 750 mL / NET: -655.5 mL        PHYSICAL EXAM:  Vital Signs Last 24 Hrs  T(C): 36.9 (03 Dec 2023 07:44), Max: 37.1 (02 Dec 2023 15:39)  T(F): 98.4 (03 Dec 2023 07:44), Max: 98.8 (02 Dec 2023 15:39)  HR: 78 (03 Dec 2023 06:00) (73 - 88)  BP: 111/42 (03 Dec 2023 06:00) (92/61 - 125/52)  BP(mean): 56 (03 Dec 2023 06:00) (56 - 90)  RR: 20 (03 Dec 2023 06:00) (17 - 20)  SpO2: 95% (03 Dec 2023 06:00) (93% - 99%)    Parameters below as of 03 Dec 2023 06:00  Patient On (Oxygen Delivery Method): room air            CONSTITUTIONAL: NAD, well-developed, well-groomed  ENMT: Moist oral mucosa, no pharyngeal injection or exudates; normal dentition  RESPIRATORY: Normal respiratory effort; lungs are clear to auscultation bilaterally  CARDIOVASCULAR: Regular rate and rhythm, normal S1 and S2, no murmur/rub/gallop; No lower extremity edema; Peripheral pulses are 2+ bilaterally  ABDOMEN: Nontender to palpation, normoactive bowel sounds, no rebound/guarding; No hepatosplenomegaly  MUSCLOSKELETAL:  Normal gait; no clubbing or cyanosis of digits; no joint swelling or tenderness to palpation  PSYCH: A+O to person, place, and time; affect appropriate  NEUROLOGY: CN 2-12 are intact and symmetric; no gross sensory deficits;   SKIN: No rashes; no palpable lesions    LABS:                        9.6    6.29  )-----------( 286      ( 03 Dec 2023 07:05 )             31.2     12-03    141  |  104  |  13.4  ----------------------------<  138<H>  4.0   |  28.0  |  0.63    Ca    8.8      03 Dec 2023 07:05    TPro  5.1<L>  /  Alb  2.6<L>  /  TBili  0.4  /  DBili  x   /  AST  20  /  ALT  12  /  AlkPhos  51  12-03    PT/INR - ( 02 Dec 2023 06:35 )   PT: 11.9 sec;   INR: 1.07 ratio         PTT - ( 02 Dec 2023 06:35 )  PTT:58.0 sec      Urinalysis Basic - ( 03 Dec 2023 07:05 )    Color: x / Appearance: x / SG: x / pH: x  Gluc: 138 mg/dL / Ketone: x  / Bili: x / Urobili: x   Blood: x / Protein: x / Nitrite: x   Leuk Esterase: x / RBC: x / WBC x   Sq Epi: x / Non Sq Epi: x / Bacteria: x        CAPILLARY BLOOD GLUCOSE      POCT Blood Glucose.: 184 mg/dL (02 Dec 2023 21:03)  POCT Blood Glucose.: 176 mg/dL (02 Dec 2023 16:47)  POCT Blood Glucose.: 173 mg/dL (02 Dec 2023 12:57)        RADIOLOGY & ADDITIONAL TESTS:  Results Reviewed:   Imaging Personally Reviewed:  Electrocardiogram Personally Reviewed:                                           [facility] Division of Hospital Medicine    Chief Complaint:      SUBJECTIVE / OVERNIGHT EVENTS:    Patient denies chest pain, SOB, abd pain, N/V, fever, chills, dysuria or any other complaints. All remainder ROS negative.     MEDICATIONS  (STANDING):  allopurinol 300 milliGRAM(s) Oral daily  apixaban 5 milliGRAM(s) Oral every 12 hours  aspirin  chewable 81 milliGRAM(s) Oral daily  atorvastatin 40 milliGRAM(s) Oral at bedtime  chlorhexidine 2% Cloths 1 Application(s) Topical daily  dextrose 5%. 1000 milliLiter(s) (50 mL/Hr) IV Continuous <Continuous>  dextrose 5%. 1000 milliLiter(s) (100 mL/Hr) IV Continuous <Continuous>  dextrose 50% Injectable 25 Gram(s) IV Push once  dextrose 50% Injectable 12.5 Gram(s) IV Push once  dextrose 50% Injectable 25 Gram(s) IV Push once  DULoxetine 30 milliGRAM(s) Oral daily  famotidine    Tablet 20 milliGRAM(s) Oral two times a day  gabapentin 300 milliGRAM(s) Oral two times a day  glucagon  Injectable 1 milliGRAM(s) IntraMuscular once  insulin lispro (ADMELOG) corrective regimen sliding scale   SubCutaneous Before meals and at bedtime  metoprolol tartrate 25 milliGRAM(s) Oral two times a day  nystatin Powder 1 Application(s) Topical two times a day  polyethylene glycol 3350 17 Gram(s) Oral daily  predniSONE   Tablet 5 milliGRAM(s) Oral daily  senna 2 Tablet(s) Oral at bedtime    MEDICATIONS  (PRN):  acetaminophen     Tablet .. 650 milliGRAM(s) Oral every 6 hours PRN Temp greater or equal to 38C (100.4F), Mild Pain (1 - 3)  albuterol/ipratropium (CFC free) Inhaler. 1 Puff(s) Inhalation four times a day PRN Wheezing  albuterol/ipratropium for Nebulization 3 milliLiter(s) Nebulizer every 6 hours PRN SOB and wheezing  bisacodyl Suppository 10 milliGRAM(s) Rectal daily PRN Constipation  dextrose Oral Gel 15 Gram(s) Oral once PRN Blood Glucose LESS THAN 70 milliGRAM(s)/deciliter        I&O's Summary    02 Dec 2023 07:01  -  03 Dec 2023 07:00  --------------------------------------------------------  IN: 94.5 mL / OUT: 750 mL / NET: -655.5 mL        PHYSICAL EXAM:  Vital Signs Last 24 Hrs  T(C): 36.9 (03 Dec 2023 07:44), Max: 37.1 (02 Dec 2023 15:39)  T(F): 98.4 (03 Dec 2023 07:44), Max: 98.8 (02 Dec 2023 15:39)  HR: 78 (03 Dec 2023 06:00) (73 - 88)  BP: 111/42 (03 Dec 2023 06:00) (92/61 - 125/52)  BP(mean): 56 (03 Dec 2023 06:00) (56 - 90)  RR: 20 (03 Dec 2023 06:00) (17 - 20)  SpO2: 95% (03 Dec 2023 06:00) (93% - 99%)    Parameters below as of 03 Dec 2023 06:00  Patient On (Oxygen Delivery Method): room air            CONSTITUTIONAL: NAD, well-developed, well-groomed  ENMT: Moist oral mucosa, no pharyngeal injection or exudates; normal dentition  RESPIRATORY: Normal respiratory effort; lungs are clear to auscultation bilaterally  CARDIOVASCULAR: Regular rate and rhythm, normal S1 and S2, no murmur/rub/gallop; No lower extremity edema; Peripheral pulses are 2+ bilaterally  ABDOMEN: Nontender to palpation, normoactive bowel sounds, no rebound/guarding; No hepatosplenomegaly  MUSCLOSKELETAL:  Normal gait; no clubbing or cyanosis of digits; no joint swelling or tenderness to palpation  PSYCH: A+O to person, place, and time; affect appropriate  NEUROLOGY: CN 2-12 are intact and symmetric; no gross sensory deficits;   SKIN: No rashes; no palpable lesions    LABS:                        9.6    6.29  )-----------( 286      ( 03 Dec 2023 07:05 )             31.2     12-03    141  |  104  |  13.4  ----------------------------<  138<H>  4.0   |  28.0  |  0.63    Ca    8.8      03 Dec 2023 07:05    TPro  5.1<L>  /  Alb  2.6<L>  /  TBili  0.4  /  DBili  x   /  AST  20  /  ALT  12  /  AlkPhos  51  12-03    PT/INR - ( 02 Dec 2023 06:35 )   PT: 11.9 sec;   INR: 1.07 ratio         PTT - ( 02 Dec 2023 06:35 )  PTT:58.0 sec      Urinalysis Basic - ( 03 Dec 2023 07:05 )    Color: x / Appearance: x / SG: x / pH: x  Gluc: 138 mg/dL / Ketone: x  / Bili: x / Urobili: x   Blood: x / Protein: x / Nitrite: x   Leuk Esterase: x / RBC: x / WBC x   Sq Epi: x / Non Sq Epi: x / Bacteria: x        CAPILLARY BLOOD GLUCOSE      POCT Blood Glucose.: 184 mg/dL (02 Dec 2023 21:03)  POCT Blood Glucose.: 176 mg/dL (02 Dec 2023 16:47)  POCT Blood Glucose.: 173 mg/dL (02 Dec 2023 12:57)        RADIOLOGY & ADDITIONAL TESTS:  Results Reviewed:   Imaging Personally Reviewed:  Electrocardiogram Personally Reviewed:                                           [facility] Division of Hospital Medicine    Chief Complaint:      SUBJECTIVE / OVERNIGHT EVENTS:    Patient denies chest pain, SOB, abd pain, N/V, fever, chills, dysuria or any other complaints. All remainder ROS negative.     MEDICATIONS  (STANDING):  allopurinol 300 milliGRAM(s) Oral daily  apixaban 5 milliGRAM(s) Oral every 12 hours  aspirin  chewable 81 milliGRAM(s) Oral daily  atorvastatin 40 milliGRAM(s) Oral at bedtime  chlorhexidine 2% Cloths 1 Application(s) Topical daily  dextrose 5%. 1000 milliLiter(s) (50 mL/Hr) IV Continuous <Continuous>  dextrose 5%. 1000 milliLiter(s) (100 mL/Hr) IV Continuous <Continuous>  dextrose 50% Injectable 25 Gram(s) IV Push once  dextrose 50% Injectable 12.5 Gram(s) IV Push once  dextrose 50% Injectable 25 Gram(s) IV Push once  DULoxetine 30 milliGRAM(s) Oral daily  famotidine    Tablet 20 milliGRAM(s) Oral two times a day  gabapentin 300 milliGRAM(s) Oral two times a day  glucagon  Injectable 1 milliGRAM(s) IntraMuscular once  insulin lispro (ADMELOG) corrective regimen sliding scale   SubCutaneous Before meals and at bedtime  metoprolol tartrate 25 milliGRAM(s) Oral two times a day  nystatin Powder 1 Application(s) Topical two times a day  polyethylene glycol 3350 17 Gram(s) Oral daily  predniSONE   Tablet 5 milliGRAM(s) Oral daily  senna 2 Tablet(s) Oral at bedtime    MEDICATIONS  (PRN):  acetaminophen     Tablet .. 650 milliGRAM(s) Oral every 6 hours PRN Temp greater or equal to 38C (100.4F), Mild Pain (1 - 3)  albuterol/ipratropium (CFC free) Inhaler. 1 Puff(s) Inhalation four times a day PRN Wheezing  albuterol/ipratropium for Nebulization 3 milliLiter(s) Nebulizer every 6 hours PRN SOB and wheezing  bisacodyl Suppository 10 milliGRAM(s) Rectal daily PRN Constipation  dextrose Oral Gel 15 Gram(s) Oral once PRN Blood Glucose LESS THAN 70 milliGRAM(s)/deciliter        I&O's Summary    02 Dec 2023 07:01  -  03 Dec 2023 07:00  --------------------------------------------------------  IN: 94.5 mL / OUT: 750 mL / NET: -655.5 mL        PHYSICAL EXAM:  Vital Signs Last 24 Hrs  T(C): 36.9 (03 Dec 2023 07:44), Max: 37.1 (02 Dec 2023 15:39)  T(F): 98.4 (03 Dec 2023 07:44), Max: 98.8 (02 Dec 2023 15:39)  HR: 78 (03 Dec 2023 06:00) (73 - 88)  BP: 111/42 (03 Dec 2023 06:00) (92/61 - 125/52)  BP(mean): 56 (03 Dec 2023 06:00) (56 - 90)  RR: 20 (03 Dec 2023 06:00) (17 - 20)  SpO2: 95% (03 Dec 2023 06:00) (93% - 99%)    Parameters below as of 03 Dec 2023 06:00  Patient On (Oxygen Delivery Method): room air            CONSTITUTIONAL: NAD, well-developed, well-groomed  ENMT: Moist oral mucosa, no pharyngeal injection or exudates; normal dentition  RESPIRATORY: Normal respiratory effort; lungs are clear to auscultation bilaterally  CARDIOVASCULAR: Regular rate and rhythm, normal S1 and S2, no murmur/rub/gallop; No lower extremity edema; Peripheral pulses are 2+ bilaterally  ABDOMEN: Nontender to palpation, normoactive bowel sounds, no rebound/guarding; No hepatosplenomegaly  MUSCLOSKELETAL:  Normal gait; no clubbing or cyanosis of digits; no joint swelling or tenderness to palpation  PSYCH: A+O to person, place, and time; affect appropriate  NEUROLOGY: CN 2-12 are intact and symmetric; no gross sensory deficits;   SKIN: No rashes; no palpable lesions    LABS:                        9.6    6.29  )-----------( 286      ( 03 Dec 2023 07:05 )             31.2     12-03    141  |  104  |  13.4  ----------------------------<  138<H>  4.0   |  28.0  |  0.63    Ca    8.8      03 Dec 2023 07:05    TPro  5.1<L>  /  Alb  2.6<L>  /  TBili  0.4  /  DBili  x   /  AST  20  /  ALT  12  /  AlkPhos  51  12-03    PT/INR - ( 02 Dec 2023 06:35 )   PT: 11.9 sec;   INR: 1.07 ratio         PTT - ( 02 Dec 2023 06:35 )  PTT:58.0 sec      Urinalysis Basic - ( 03 Dec 2023 07:05 )    Color: x / Appearance: x / SG: x / pH: x  Gluc: 138 mg/dL / Ketone: x  / Bili: x / Urobili: x   Blood: x / Protein: x / Nitrite: x   Leuk Esterase: x / RBC: x / WBC x   Sq Epi: x / Non Sq Epi: x / Bacteria: x        CAPILLARY BLOOD GLUCOSE      POCT Blood Glucose.: 184 mg/dL (02 Dec 2023 21:03)  POCT Blood Glucose.: 176 mg/dL (02 Dec 2023 16:47)  POCT Blood Glucose.: 173 mg/dL (02 Dec 2023 12:57)        RADIOLOGY & ADDITIONAL TESTS:  Results Reviewed:   Imaging Personally Reviewed:  Electrocardiogram Personally Reviewed:                                           [facility] Division of Hospital Medicine    Chief Complaint:  stroke    SUBJECTIVE / OVERNIGHT EVENTS:    No acute events overnight. Pt in no acute distress. Nods yes/no when asked questions. Nods no when asked if CP, SOB, N/V/D, abd pain, fever, chills.    MEDICATIONS  (STANDING):  allopurinol 300 milliGRAM(s) Oral daily  apixaban 5 milliGRAM(s) Oral every 12 hours  aspirin  chewable 81 milliGRAM(s) Oral daily  atorvastatin 40 milliGRAM(s) Oral at bedtime  chlorhexidine 2% Cloths 1 Application(s) Topical daily  dextrose 5%. 1000 milliLiter(s) (50 mL/Hr) IV Continuous <Continuous>  dextrose 5%. 1000 milliLiter(s) (100 mL/Hr) IV Continuous <Continuous>  dextrose 50% Injectable 25 Gram(s) IV Push once  dextrose 50% Injectable 12.5 Gram(s) IV Push once  dextrose 50% Injectable 25 Gram(s) IV Push once  DULoxetine 30 milliGRAM(s) Oral daily  famotidine    Tablet 20 milliGRAM(s) Oral two times a day  gabapentin 300 milliGRAM(s) Oral two times a day  glucagon  Injectable 1 milliGRAM(s) IntraMuscular once  insulin lispro (ADMELOG) corrective regimen sliding scale   SubCutaneous Before meals and at bedtime  metoprolol tartrate 25 milliGRAM(s) Oral two times a day  nystatin Powder 1 Application(s) Topical two times a day  polyethylene glycol 3350 17 Gram(s) Oral daily  predniSONE   Tablet 5 milliGRAM(s) Oral daily  senna 2 Tablet(s) Oral at bedtime    MEDICATIONS  (PRN):  acetaminophen     Tablet .. 650 milliGRAM(s) Oral every 6 hours PRN Temp greater or equal to 38C (100.4F), Mild Pain (1 - 3)  albuterol/ipratropium (CFC free) Inhaler. 1 Puff(s) Inhalation four times a day PRN Wheezing  albuterol/ipratropium for Nebulization 3 milliLiter(s) Nebulizer every 6 hours PRN SOB and wheezing  bisacodyl Suppository 10 milliGRAM(s) Rectal daily PRN Constipation  dextrose Oral Gel 15 Gram(s) Oral once PRN Blood Glucose LESS THAN 70 milliGRAM(s)/deciliter        I&O's Summary    02 Dec 2023 07:01  -  03 Dec 2023 07:00  --------------------------------------------------------  IN: 94.5 mL / OUT: 750 mL / NET: -655.5 mL        PHYSICAL EXAM:  Vital Signs Last 24 Hrs  T(C): 36.9 (03 Dec 2023 07:44), Max: 37.1 (02 Dec 2023 15:39)  T(F): 98.4 (03 Dec 2023 07:44), Max: 98.8 (02 Dec 2023 15:39)  HR: 78 (03 Dec 2023 06:00) (73 - 88)  BP: 111/42 (03 Dec 2023 06:00) (92/61 - 125/52)  BP(mean): 56 (03 Dec 2023 06:00) (56 - 90)  RR: 20 (03 Dec 2023 06:00) (17 - 20)  SpO2: 95% (03 Dec 2023 06:00) (93% - 99%)    Parameters below as of 03 Dec 2023 06:00  Patient On (Oxygen Delivery Method): room air          General: Age-appearing, in no acute distress, intermittently following commands.   Head: Normocephalic, atraumatic  ENMT: PERRLA, no scleral icterus, neck supple, no JVD  Cardiovascular: +S1, S2; Regular rate and rhythm, no murmurs, rubs, gallops  Respiratory: CTAB, no wheezing, no rales, no rhonchi  Gastrointestinal: soft, ND, NT, (+) BS, (-) rebound, (-) fluid wave  Extremities: No clubbing, cyanosis  Vascular: 2+ pulses, cap refill < 2 seconds  Neuro: awake and alert, RUE/RLE 0/5, LLE/LUE 5/5, aphasic.   Musculoskeletal: Normal tone, no deformities  Skin: Warm, dry, no rash, no jaundice  Psych: Calm, cooperative     LABS:                        9.6    6.29  )-----------( 286      ( 03 Dec 2023 07:05 )             31.2     12-03    141  |  104  |  13.4  ----------------------------<  138<H>  4.0   |  28.0  |  0.63    Ca    8.8      03 Dec 2023 07:05    TPro  5.1<L>  /  Alb  2.6<L>  /  TBili  0.4  /  DBili  x   /  AST  20  /  ALT  12  /  AlkPhos  51  12-03    PT/INR - ( 02 Dec 2023 06:35 )   PT: 11.9 sec;   INR: 1.07 ratio         PTT - ( 02 Dec 2023 06:35 )  PTT:58.0 sec      Urinalysis Basic - ( 03 Dec 2023 07:05 )    Color: x / Appearance: x / SG: x / pH: x  Gluc: 138 mg/dL / Ketone: x  / Bili: x / Urobili: x   Blood: x / Protein: x / Nitrite: x   Leuk Esterase: x / RBC: x / WBC x   Sq Epi: x / Non Sq Epi: x / Bacteria: x        CAPILLARY BLOOD GLUCOSE      POCT Blood Glucose.: 184 mg/dL (02 Dec 2023 21:03)  POCT Blood Glucose.: 176 mg/dL (02 Dec 2023 16:47)  POCT Blood Glucose.: 173 mg/dL (02 Dec 2023 12:57)        RADIOLOGY & ADDITIONAL TESTS:  Results Reviewed:   Imaging Personally Reviewed:  Electrocardiogram Personally Reviewed:                                           [facility] Division of Hospital Medicine    Chief Complaint:  stroke    SUBJECTIVE / OVERNIGHT EVENTS:    No acute events overnight. Pt in no acute distress. Nods yes/no when asked questions. Nods no when asked if CP, SOB, N/V/D, abd pain, fever, chills.    MEDICATIONS  (STANDING):  allopurinol 300 milliGRAM(s) Oral daily  apixaban 5 milliGRAM(s) Oral every 12 hours  aspirin  chewable 81 milliGRAM(s) Oral daily  atorvastatin 40 milliGRAM(s) Oral at bedtime  chlorhexidine 2% Cloths 1 Application(s) Topical daily  dextrose 5%. 1000 milliLiter(s) (50 mL/Hr) IV Continuous <Continuous>  dextrose 5%. 1000 milliLiter(s) (100 mL/Hr) IV Continuous <Continuous>  dextrose 50% Injectable 25 Gram(s) IV Push once  dextrose 50% Injectable 12.5 Gram(s) IV Push once  dextrose 50% Injectable 25 Gram(s) IV Push once  DULoxetine 30 milliGRAM(s) Oral daily  famotidine    Tablet 20 milliGRAM(s) Oral two times a day  gabapentin 300 milliGRAM(s) Oral two times a day  glucagon  Injectable 1 milliGRAM(s) IntraMuscular once  insulin lispro (ADMELOG) corrective regimen sliding scale   SubCutaneous Before meals and at bedtime  metoprolol tartrate 25 milliGRAM(s) Oral two times a day  nystatin Powder 1 Application(s) Topical two times a day  polyethylene glycol 3350 17 Gram(s) Oral daily  predniSONE   Tablet 5 milliGRAM(s) Oral daily  senna 2 Tablet(s) Oral at bedtime    MEDICATIONS  (PRN):  acetaminophen     Tablet .. 650 milliGRAM(s) Oral every 6 hours PRN Temp greater or equal to 38C (100.4F), Mild Pain (1 - 3)  albuterol/ipratropium (CFC free) Inhaler. 1 Puff(s) Inhalation four times a day PRN Wheezing  albuterol/ipratropium for Nebulization 3 milliLiter(s) Nebulizer every 6 hours PRN SOB and wheezing  bisacodyl Suppository 10 milliGRAM(s) Rectal daily PRN Constipation  dextrose Oral Gel 15 Gram(s) Oral once PRN Blood Glucose LESS THAN 70 milliGRAM(s)/deciliter        I&O's Summary    02 Dec 2023 07:01  -  03 Dec 2023 07:00  --------------------------------------------------------  IN: 94.5 mL / OUT: 750 mL / NET: -655.5 mL        PHYSICAL EXAM:  Vital Signs Last 24 Hrs  T(C): 36.9 (03 Dec 2023 07:44), Max: 37.1 (02 Dec 2023 15:39)  T(F): 98.4 (03 Dec 2023 07:44), Max: 98.8 (02 Dec 2023 15:39)  HR: 78 (03 Dec 2023 06:00) (73 - 88)  BP: 111/42 (03 Dec 2023 06:00) (92/61 - 125/52)  BP(mean): 56 (03 Dec 2023 06:00) (56 - 90)  RR: 20 (03 Dec 2023 06:00) (17 - 20)  SpO2: 95% (03 Dec 2023 06:00) (93% - 99%)    Parameters below as of 03 Dec 2023 06:00  Patient On (Oxygen Delivery Method): room air          General: Age-appearing, in no acute distress, intermittently following commands.   Head: Normocephalic, atraumatic  ENMT: PERRLA, no scleral icterus, neck supple, no JVD  Cardiovascular: +S1, S2; Regular rate and rhythm, no murmurs, rubs, gallops  Respiratory: CTAB, no wheezing, no rales, no rhonchi  Gastrointestinal: soft, ND, NT, (+) BS, (-) rebound, (-) fluid wave  Extremities: No clubbing, cyanosis  Vascular: 2+ pulses, cap refill < 2 seconds  Neuro: awake and alert, RUE/RLE 0/5, LLE/LUE 5/5, aphasic.   Musculoskeletal: Normal tone, no deformities  Skin: Warm, dry, no rash, no jaundice  Psych: Calm, cooperative     LABS:                        9.6    6.29  )-----------( 286      ( 03 Dec 2023 07:05 )             31.2     12-03    141  |  104  |  13.4  ----------------------------<  138<H>  4.0   |  28.0  |  0.63    Ca    8.8      03 Dec 2023 07:05    TPro  5.1<L>  /  Alb  2.6<L>  /  TBili  0.4  /  DBili  x   /  AST  20  /  ALT  12  /  AlkPhos  51  12-03    PT/INR - ( 02 Dec 2023 06:35 )   PT: 11.9 sec;   INR: 1.07 ratio         PTT - ( 02 Dec 2023 06:35 )  PTT:58.0 sec      Urinalysis Basic - ( 03 Dec 2023 07:05 )    Color: x / Appearance: x / SG: x / pH: x  Gluc: 138 mg/dL / Ketone: x  / Bili: x / Urobili: x   Blood: x / Protein: x / Nitrite: x   Leuk Esterase: x / RBC: x / WBC x   Sq Epi: x / Non Sq Epi: x / Bacteria: x        CAPILLARY BLOOD GLUCOSE      POCT Blood Glucose.: 184 mg/dL (02 Dec 2023 21:03)  POCT Blood Glucose.: 176 mg/dL (02 Dec 2023 16:47)  POCT Blood Glucose.: 173 mg/dL (02 Dec 2023 12:57)        RADIOLOGY & ADDITIONAL TESTS:  Results Reviewed:   Imaging Personally Reviewed:  Electrocardiogram Personally Reviewed:                                           [facility] Division of Hospital Medicine    Chief Complaint:  stroke    SUBJECTIVE / OVERNIGHT EVENTS:    No acute events overnight. Pt in no acute distress. Nods yes/no when asked questions. Nods no when asked if CP, SOB, N/V/D, abd pain, fever, chills.    MEDICATIONS  (STANDING):  allopurinol 300 milliGRAM(s) Oral daily  apixaban 5 milliGRAM(s) Oral every 12 hours  aspirin  chewable 81 milliGRAM(s) Oral daily  atorvastatin 40 milliGRAM(s) Oral at bedtime  chlorhexidine 2% Cloths 1 Application(s) Topical daily  dextrose 5%. 1000 milliLiter(s) (50 mL/Hr) IV Continuous <Continuous>  dextrose 5%. 1000 milliLiter(s) (100 mL/Hr) IV Continuous <Continuous>  dextrose 50% Injectable 25 Gram(s) IV Push once  dextrose 50% Injectable 12.5 Gram(s) IV Push once  dextrose 50% Injectable 25 Gram(s) IV Push once  DULoxetine 30 milliGRAM(s) Oral daily  famotidine    Tablet 20 milliGRAM(s) Oral two times a day  gabapentin 300 milliGRAM(s) Oral two times a day  glucagon  Injectable 1 milliGRAM(s) IntraMuscular once  insulin lispro (ADMELOG) corrective regimen sliding scale   SubCutaneous Before meals and at bedtime  metoprolol tartrate 25 milliGRAM(s) Oral two times a day  nystatin Powder 1 Application(s) Topical two times a day  polyethylene glycol 3350 17 Gram(s) Oral daily  predniSONE   Tablet 5 milliGRAM(s) Oral daily  senna 2 Tablet(s) Oral at bedtime    MEDICATIONS  (PRN):  acetaminophen     Tablet .. 650 milliGRAM(s) Oral every 6 hours PRN Temp greater or equal to 38C (100.4F), Mild Pain (1 - 3)  albuterol/ipratropium (CFC free) Inhaler. 1 Puff(s) Inhalation four times a day PRN Wheezing  albuterol/ipratropium for Nebulization 3 milliLiter(s) Nebulizer every 6 hours PRN SOB and wheezing  bisacodyl Suppository 10 milliGRAM(s) Rectal daily PRN Constipation  dextrose Oral Gel 15 Gram(s) Oral once PRN Blood Glucose LESS THAN 70 milliGRAM(s)/deciliter        I&O's Summary    02 Dec 2023 07:01  -  03 Dec 2023 07:00  --------------------------------------------------------  IN: 94.5 mL / OUT: 750 mL / NET: -655.5 mL        PHYSICAL EXAM:  Vital Signs Last 24 Hrs  T(C): 36.9 (03 Dec 2023 07:44), Max: 37.1 (02 Dec 2023 15:39)  T(F): 98.4 (03 Dec 2023 07:44), Max: 98.8 (02 Dec 2023 15:39)  HR: 78 (03 Dec 2023 06:00) (73 - 88)  BP: 111/42 (03 Dec 2023 06:00) (92/61 - 125/52)  BP(mean): 56 (03 Dec 2023 06:00) (56 - 90)  RR: 20 (03 Dec 2023 06:00) (17 - 20)  SpO2: 95% (03 Dec 2023 06:00) (93% - 99%)    Parameters below as of 03 Dec 2023 06:00  Patient On (Oxygen Delivery Method): room air          General: Age-appearing, in no acute distress, intermittently following commands.   Head: Normocephalic, atraumatic  ENMT: PERRLA, no scleral icterus, neck supple, no JVD  Cardiovascular: +S1, S2; Regular rate and rhythm, no murmurs, rubs, gallops  Respiratory: CTAB, no wheezing, no rales, no rhonchi  Gastrointestinal: soft, ND, NT, (+) BS, (-) rebound, (-) fluid wave  Extremities: No clubbing, cyanosis  Vascular: 2+ pulses, cap refill < 2 seconds  Neuro: awake and alert, RUE/RLE 0/5, LLE/LUE 5/5, aphasic.   Musculoskeletal: Normal tone, no deformities  Skin: Warm, dry, no rash, no jaundice  Psych: Calm, cooperative     LABS:                        9.6    6.29  )-----------( 286      ( 03 Dec 2023 07:05 )             31.2     12-03    141  |  104  |  13.4  ----------------------------<  138<H>  4.0   |  28.0  |  0.63    Ca    8.8      03 Dec 2023 07:05    TPro  5.1<L>  /  Alb  2.6<L>  /  TBili  0.4  /  DBili  x   /  AST  20  /  ALT  12  /  AlkPhos  51  12-03    PT/INR - ( 02 Dec 2023 06:35 )   PT: 11.9 sec;   INR: 1.07 ratio         PTT - ( 02 Dec 2023 06:35 )  PTT:58.0 sec      Urinalysis Basic - ( 03 Dec 2023 07:05 )    Color: x / Appearance: x / SG: x / pH: x  Gluc: 138 mg/dL / Ketone: x  / Bili: x / Urobili: x   Blood: x / Protein: x / Nitrite: x   Leuk Esterase: x / RBC: x / WBC x   Sq Epi: x / Non Sq Epi: x / Bacteria: x        CAPILLARY BLOOD GLUCOSE      POCT Blood Glucose.: 184 mg/dL (02 Dec 2023 21:03)  POCT Blood Glucose.: 176 mg/dL (02 Dec 2023 16:47)  POCT Blood Glucose.: 173 mg/dL (02 Dec 2023 12:57)        RADIOLOGY & ADDITIONAL TESTS:  Results Reviewed:   Imaging Personally Reviewed:  Electrocardiogram Personally Reviewed:

## 2023-12-03 NOTE — PROGRESS NOTE ADULT - SUBJECTIVE AND OBJECTIVE BOX
[facility] Stroke Team  Progress Note     HPI:  77 yo M PMH of HTN, HLD, AFib, CKD, obesity, gout, prostate cancer, DVT/PE, OA s/p multiple orthopedic procedures, spinal stenosis, presented on 11/13/23 for elective left atrial appendage occlusion procedure, and developed a MCA syndrome due to acute L M1 occlusion as demonstrated on CTA Head and Neck. Patient has a long h/o AFib, which is now considered permanent. He is on rate control with metoprolol 25 mg bid, and anticoagulation with Pradaxa.  He has a long Hx of arthritis and had multiple hip replacements, bilateral knee replacements and also has neck and back pain, but is unable to take NSAIDs due to bleeding risks. He has an unsteady gait and uses a cane for assistance.  Patient presented on 11/13 for elective left atrial appendage occlusion with Watchman device/HARJINDER w/ Dr Bobo, and Pradaxa was held for 2 days prior to procedure. 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   Initial NIHSS 26, MRS 1. Patient s/p tenecteplase administration at 11:16 11/13/23 and TICI 2A thrombectomy with Dr. oSliman.    SUBJECTIVE: Patient interviewed and examined at the bedside on the morning of 12/3/23. No events overnight.  No new neurologic complaints.  ROS reported negative unless otherwise noted.        PHYSICAL EXAM:     Vital Signs Last 24 Hrs  T(C): 36.7 (03 Dec 2023 13:16), Max: 37.1 (02 Dec 2023 15:39)  T(F): 98.1 (03 Dec 2023 13:16), Max: 98.8 (02 Dec 2023 15:39)  HR: 100 (03 Dec 2023 10:00) (76 - 100)  BP: 109/48 (03 Dec 2023 12:00) (92/61 - 125/52)  BP(mean): 63 (03 Dec 2023 12:00) (56 - 90)  RR: 24 (03 Dec 2023 12:00) (17 - 24)  SpO2: 89% (03 Dec 2023 12:00) (89% - 99%)    Parameters below as of 03 Dec 2023 12:00  Patient On (Oxygen Delivery Method): room air      General: No acute distress. Son at bedside    NEUROLOGICAL EXAM:  Mental status: Awake, globally aphasic, expressive > receptive  with no verbal output, however attempts to make speak. Attends to examiner.  Nods and mimics. Does not follow commands, however appears to intermittently follow commands based on previous exams. Right sided visual willow-neglect present.     Cranial Nerves:  Pupils equal and react symmetrically to light. Blink to threat intermittently on right eye. Blink to threat present out of left eye. Left gaze preference with right gaze palsy that does cross midline. Right facial weakness noted.    Motor exam: There is normal bulk and tone.  There is no tremor.  RUE: 0/5 strength with no movement. Grimaces to noxious stimuli with no movement.  RLE: 0/5,  Triple flexion    Strength is 5/5 in the left arm and leg with no drift.    Sensation: Grimaces to noxious stimuli in all extremities.    Coordination/ Gait: unable to assess dysmetria on finger to nose testing    LABS:                                              9.6    6.29  )-----------( 286      ( 03 Dec 2023 07:05 )             31.2     12-03    141  |  104  |  13.4  ----------------------------<  138<H>  4.0   |  28.0  |  0.63    Ca    8.8      03 Dec 2023 07:05    TPro  5.1<L>  /  Alb  2.6<L>  /  TBili  0.4  /  DBili  x   /  AST  20  /  ALT  12  /  AlkPhos  51  12-03    CAPILLARY BLOOD GLUCOSE      POCT Blood Glucose.: 159 mg/dL (03 Dec 2023 12:35)  POCT Blood Glucose.: 184 mg/dL (02 Dec 2023 21:03)  POCT Blood Glucose.: 176 mg/dL (02 Dec 2023 16:47)    PT/INR - ( 02 Dec 2023 06:35 )   PT: 11.9 sec;   INR: 1.07 ratio         PTT - ( 02 Dec 2023 06:35 )  PTT:58.0 sec  Urinalysis Basic - ( 03 Dec 2023 07:05 )    Color: x / Appearance: x / SG: x / pH: x  Gluc: 138 mg/dL / Ketone: x  / Bili: x / Urobili: x   Blood: x / Protein: x / Nitrite: x   Leuk Esterase: x / RBC: x / WBC x   Sq Epi: x / Non Sq Epi: x / Bacteria: x      I&O's Summary    02 Dec 2023 07:01  -  03 Dec 2023 07:00  --------------------------------------------------------  IN: 94.5 mL / OUT: 750 mL / NET: -655.5 mL      Vital Signs Last 24 Hrs  T(C): 36.7 (03 Dec 2023 13:16), Max: 37.1 (02 Dec 2023 15:39)  T(F): 98.1 (03 Dec 2023 13:16), Max: 98.8 (02 Dec 2023 15:39)  HR: 100 (03 Dec 2023 10:00) (76 - 100)  BP: 109/48 (03 Dec 2023 12:00) (92/61 - 125/52)  BP(mean): 63 (03 Dec 2023 12:00) (56 - 90)  RR: 24 (03 Dec 2023 12:00) (17 - 24)  SpO2: 89% (03 Dec 2023 12:00) (89% - 99%)    Parameters below as of 03 Dec 2023 12:00  Patient On (Oxygen Delivery Method): room air            IMAGING: Reviewed by me.     CT Head No Cont (12.02.23 @ 13:24)   IMPRESSION: Stable exam.    CT Head No Cont (11.28.23 @ 09:57)   IMPRESSION: Acute moderate to large left MCA territory infarct with   slightly increased petechial hemorrhage. No herniation pattern.    CT Head No Cont (11.25.23 @ 10:03)   IMPRESSION: Acute left middle cerebral artery infarct with petechial   hemorrhage without change since 11/24/2023.    CT Head No Cont (11.24.23 @ 11:12)   IMPRESSION:  Redemonstration of acute left frontotemporal infarct.  Decreased conspicuity of subtly increased attenuation of gray matter   within the region of infarction. This may represent spared tissue and/or   petechial hemorrhagic transformation.  Slightly decreased mass effect on the left lateral ventricle and   rightward midline shift, currently1 mm, previously 2 mm.  Basal cisterns are visualized. No hydrocephalus.    US Duplex Venous Lower Ext Complete, Bilateral (11.23.23 @ 18:30)   IMPRESSION:  1. Findings compatible with deep venous thrombosis involving the right   popliteal vein, gastrocnemius vein, posterior tibial and peroneal veins.  2. No evidence for deep venous thrombosis within the left lower extremity   veins.    TTE Echo Limited or F/U (11.22.23 @ 16:32)   Summary:   1. Left ventricular ejection fraction, by visual estimation, is 45 to   50%.   2. Mildly decreased global left ventricular systolic function.   3. The mitral in-flow pattern reveals no discernable A-wave, therefore   no comment on diastolic function can be made.   4. Mildly enlarged right ventricle with mildly reduced RV systolic   function, estimated PASP 31 mmHg.   5. Severely enlarged left atrium.   6. Right atrial enlargement.   7. Mild mitral annular calcification.   8. Mild mitral valve regurgitation.   9. Mild-moderate tricuspid regurgitation.  10. Sclerotic aortic valve with normal opening.  11. There is no evidence of pericardial effusion.  12. Compared to the prior TTE study from 11/14/23, interval reduction in   LV EF.    CT Head No Cont (11.22.23 @ 16:25)   IMPRESSION:  Decreased mass effect on the left lateral ventricle compared   with the CT and MR of 11/16/2023 and 11/19/2023 respectively. There is   residual hemorrhage seen in association with hemorrhagic component of   infarct in the left lentiform nucleus region decreased in conspicuity   compared with the prior. Left insular hemorrhage in association with the   left MCA infarct is also noted consistent with hemorrhagic transformation   of infarct in this region. The hemorrhage in the left insular region does   appear slightly more conspicuous compared with the prior CT 11/16/2023   and is consistent with the findings of the patient's MR 11/19/2023.    CT Heart Left Atrium w/ IV Cont (11.22.23 @ 11:22)   IMPRESSION:  Cardiac:  Multiple large filling defects noted at the distal appendage at least >2   cm and also near the proximal appendage measures 1.5 cm x 1.2 cm,   consistent with left appendage thrombi.  Dilated left atrial appendage windsock in appearance with orifice width   measures 4.5 cm x 3.0 cm  Severely biatrial enlargement.  Non-cardiac:  Right upper lobe pulmonary artery embolism.  Possible pulmonary hypertension.  New right lower lobe superior segment infiltrate (possibly infectious or   inflammatory)    MR Head No Cont (11.19.23 @ 15:52)   IMPRESSION: Redemonstration of an evolving acute/subacute large   left-sided MCA distribution infarction with gross hemorrhagic   transformation in the left basal ganglia as well as evidence of petechial   hemorrhagic transformation within the left frontotemporal and parietal   cortices.  Associated cytotoxic edema, sulcal effacement, and mass effect as well as   shift of the midline structures from left to right measuring 5.5 mm   appears unchanged. There is no herniation.    CT Head No Cont (11.16.23 @ 18:05)   IMPRESSION: Stable moderate to large left MCA territory infarction   involving the left frontal lobe, left basal ganglia, left insular ribbon,   left parietal occipital lobe, and anterior left temporal lobe with stable   mild hemorrhage in the left basal ganglia. Left-to-right midline shift   measures 5.5 mm.    CT Head No Cont (11.14.23 @ 18:57)   IMPRESSION:  Stable moderate to large acute left MCA infarct with mild hemorrhage    TTE Echo Complete w/o Contrast w/ Doppler (11.14.23 @ 15:57)   Summary:   1. Left ventricular ejection fraction, by visual estimation, is 55 to   60%.   2. Normal global left ventricular systolic function.   3. The mitral in-flow pattern reveals no discernable A-wave, therefore   no comment on diastolic function can be made.   4. Normal right ventricular size and function.   5. Mild to moderately enlarged right atrium.   6. Moderately enlarged left atrium.   7. Trace mitral valve regurgitation.   8. Mild-moderate tricuspid regurgitation.   9. Sclerotic aortic valve with decreased opening.  10. Estimated pulmonary artery systolic pressure is 41.6 mmHg assuming a   right atrial pressure of 5 mmHg, which is consistent with mild pulmonary   hypertension.    CT Head No Cont (11.14.23 @ 11:36)   IMPRESSION: Evolving left MCA infarct is identified with hemorrhagic   transformation now seen with mass effect on left lateral ventricles and   left-to-right shift.    US Duplex Venous Lower Ext Complete, Bilateral (11.14.23 @ 10:38)   IMPRESSION:  No evidence of deep venous thrombosis in either lower extremity.  Left calf veins not visualized.  Small right popliteal fossa cyst.    CT Angio Brain, Neck and perfusion Stroke Protocol  w/ IV Cont (11.13.23 @ 11:18)   CTA BRAIN:  Acute thrombus in the distal left MCA M1 segment. Diminished flow in the   distal branches.  Severe bilateral cavernous carotid artery calcifications. Fetal origins   the bilateral posterior cerebral arteries. The [place] and   vertebrobasilar system are otherwise unremarkable without evidence of   stenosis, additional occlusion or saccular aneurysm dilation. No evidence   for arterial venous malformation. The vertebral arteries are codominant.    CTA NECK:  Mild bilateral carotid bulb calcifications. Mild stenosis in the left   carotid bulb. The right internal carotid arteries tortuous.  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 There is normal relationship to   the great vessels. The common carotid arteries, internal carotid arteries   and vertebral arteries shows no other evidence of significant stenosis,   occlusion or saccular aneurysm dilation. The vertebral arteries are   codominant.    CT PERFUSION:  Patient has only had less than 2 hours of symptoms may influence the CT   perfusion.  CBF<30% volume: 26 ml left MCA territory.  Tmax>6.0 s volume: 193 ml left MCA territory  Mismatch volume: 167 ml  Mismatch ratio: 7.4  IMPRESSION:  Thrombus in the distal left MCA M1 segment with diminished flow in the   distal MCA segments. 167 mL area of penumbra in the left MCA territory.     CT Brain Stroke Protocol (11.13.23 @ 11:15)   IMPRESSION:  Dense left MCA M1 segment suggesting intraluminal thrombus.   Mild chronic microvascular changes without evidence of an acute   transcortical infarction or hemorrhage.          [facility] Stroke Team  Progress Note     HPI:  79 yo M PMH of HTN, HLD, AFib, CKD, obesity, gout, prostate cancer, DVT/PE, OA s/p multiple orthopedic procedures, spinal stenosis, presented on 11/13/23 for elective left atrial appendage occlusion procedure, and developed a MCA syndrome due to acute L M1 occlusion as demonstrated on CTA Head and Neck. Patient has a long h/o AFib, which is now considered permanent. He is on rate control with metoprolol 25 mg bid, and anticoagulation with Pradaxa.  He has a long Hx of arthritis and had multiple hip replacements, bilateral knee replacements and also has neck and back pain, but is unable to take NSAIDs due to bleeding risks. He has an unsteady gait and uses a cane for assistance.  Patient presented on 11/13 for elective left atrial appendage occlusion with Watchman device/HARJINDER w/ Dr Bobo, and Pradaxa was held for 2 days prior to procedure. 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   Initial NIHSS 26, MRS 1. Patient s/p tenecteplase administration at 11:16 11/13/23 and TICI 2A thrombectomy with Dr. Soliman.    SUBJECTIVE: Patient interviewed and examined at the bedside on the morning of 12/3/23. No events overnight.  No new neurologic complaints.  ROS reported negative unless otherwise noted.        PHYSICAL EXAM:     Vital Signs Last 24 Hrs  T(C): 36.7 (03 Dec 2023 13:16), Max: 37.1 (02 Dec 2023 15:39)  T(F): 98.1 (03 Dec 2023 13:16), Max: 98.8 (02 Dec 2023 15:39)  HR: 100 (03 Dec 2023 10:00) (76 - 100)  BP: 109/48 (03 Dec 2023 12:00) (92/61 - 125/52)  BP(mean): 63 (03 Dec 2023 12:00) (56 - 90)  RR: 24 (03 Dec 2023 12:00) (17 - 24)  SpO2: 89% (03 Dec 2023 12:00) (89% - 99%)    Parameters below as of 03 Dec 2023 12:00  Patient On (Oxygen Delivery Method): room air      General: No acute distress. Son at bedside    NEUROLOGICAL EXAM:  Mental status: Awake, globally aphasic, expressive > receptive  with no verbal output, however attempts to make speak. Attends to examiner.  Nods and mimics. Does not follow commands, however appears to intermittently follow commands based on previous exams. Right sided visual willow-neglect present.     Cranial Nerves:  Pupils equal and react symmetrically to light. Blink to threat intermittently on right eye. Blink to threat present out of left eye. Left gaze preference with right gaze palsy that does cross midline. Right facial weakness noted.    Motor exam: There is normal bulk and tone.  There is no tremor.  RUE: 0/5 strength with no movement. Grimaces to noxious stimuli with no movement.  RLE: 0/5,  Triple flexion    Strength is 5/5 in the left arm and leg with no drift.    Sensation: Grimaces to noxious stimuli in all extremities.    Coordination/ Gait: unable to assess dysmetria on finger to nose testing    LABS:                                              9.6    6.29  )-----------( 286      ( 03 Dec 2023 07:05 )             31.2     12-03    141  |  104  |  13.4  ----------------------------<  138<H>  4.0   |  28.0  |  0.63    Ca    8.8      03 Dec 2023 07:05    TPro  5.1<L>  /  Alb  2.6<L>  /  TBili  0.4  /  DBili  x   /  AST  20  /  ALT  12  /  AlkPhos  51  12-03    CAPILLARY BLOOD GLUCOSE      POCT Blood Glucose.: 159 mg/dL (03 Dec 2023 12:35)  POCT Blood Glucose.: 184 mg/dL (02 Dec 2023 21:03)  POCT Blood Glucose.: 176 mg/dL (02 Dec 2023 16:47)    PT/INR - ( 02 Dec 2023 06:35 )   PT: 11.9 sec;   INR: 1.07 ratio         PTT - ( 02 Dec 2023 06:35 )  PTT:58.0 sec  Urinalysis Basic - ( 03 Dec 2023 07:05 )    Color: x / Appearance: x / SG: x / pH: x  Gluc: 138 mg/dL / Ketone: x  / Bili: x / Urobili: x   Blood: x / Protein: x / Nitrite: x   Leuk Esterase: x / RBC: x / WBC x   Sq Epi: x / Non Sq Epi: x / Bacteria: x      I&O's Summary    02 Dec 2023 07:01  -  03 Dec 2023 07:00  --------------------------------------------------------  IN: 94.5 mL / OUT: 750 mL / NET: -655.5 mL      Vital Signs Last 24 Hrs  T(C): 36.7 (03 Dec 2023 13:16), Max: 37.1 (02 Dec 2023 15:39)  T(F): 98.1 (03 Dec 2023 13:16), Max: 98.8 (02 Dec 2023 15:39)  HR: 100 (03 Dec 2023 10:00) (76 - 100)  BP: 109/48 (03 Dec 2023 12:00) (92/61 - 125/52)  BP(mean): 63 (03 Dec 2023 12:00) (56 - 90)  RR: 24 (03 Dec 2023 12:00) (17 - 24)  SpO2: 89% (03 Dec 2023 12:00) (89% - 99%)    Parameters below as of 03 Dec 2023 12:00  Patient On (Oxygen Delivery Method): room air            IMAGING: Reviewed by me.     CT Head No Cont (12.02.23 @ 13:24)   IMPRESSION: Stable exam.    CT Head No Cont (11.28.23 @ 09:57)   IMPRESSION: Acute moderate to large left MCA territory infarct with   slightly increased petechial hemorrhage. No herniation pattern.    CT Head No Cont (11.25.23 @ 10:03)   IMPRESSION: Acute left middle cerebral artery infarct with petechial   hemorrhage without change since 11/24/2023.    CT Head No Cont (11.24.23 @ 11:12)   IMPRESSION:  Redemonstration of acute left frontotemporal infarct.  Decreased conspicuity of subtly increased attenuation of gray matter   within the region of infarction. This may represent spared tissue and/or   petechial hemorrhagic transformation.  Slightly decreased mass effect on the left lateral ventricle and   rightward midline shift, currently1 mm, previously 2 mm.  Basal cisterns are visualized. No hydrocephalus.    US Duplex Venous Lower Ext Complete, Bilateral (11.23.23 @ 18:30)   IMPRESSION:  1. Findings compatible with deep venous thrombosis involving the right   popliteal vein, gastrocnemius vein, posterior tibial and peroneal veins.  2. No evidence for deep venous thrombosis within the left lower extremity   veins.    TTE Echo Limited or F/U (11.22.23 @ 16:32)   Summary:   1. Left ventricular ejection fraction, by visual estimation, is 45 to   50%.   2. Mildly decreased global left ventricular systolic function.   3. The mitral in-flow pattern reveals no discernable A-wave, therefore   no comment on diastolic function can be made.   4. Mildly enlarged right ventricle with mildly reduced RV systolic   function, estimated PASP 31 mmHg.   5. Severely enlarged left atrium.   6. Right atrial enlargement.   7. Mild mitral annular calcification.   8. Mild mitral valve regurgitation.   9. Mild-moderate tricuspid regurgitation.  10. Sclerotic aortic valve with normal opening.  11. There is no evidence of pericardial effusion.  12. Compared to the prior TTE study from 11/14/23, interval reduction in   LV EF.    CT Head No Cont (11.22.23 @ 16:25)   IMPRESSION:  Decreased mass effect on the left lateral ventricle compared   with the CT and MR of 11/16/2023 and 11/19/2023 respectively. There is   residual hemorrhage seen in association with hemorrhagic component of   infarct in the left lentiform nucleus region decreased in conspicuity   compared with the prior. Left insular hemorrhage in association with the   left MCA infarct is also noted consistent with hemorrhagic transformation   of infarct in this region. The hemorrhage in the left insular region does   appear slightly more conspicuous compared with the prior CT 11/16/2023   and is consistent with the findings of the patient's MR 11/19/2023.    CT Heart Left Atrium w/ IV Cont (11.22.23 @ 11:22)   IMPRESSION:  Cardiac:  Multiple large filling defects noted at the distal appendage at least >2   cm and also near the proximal appendage measures 1.5 cm x 1.2 cm,   consistent with left appendage thrombi.  Dilated left atrial appendage windsock in appearance with orifice width   measures 4.5 cm x 3.0 cm  Severely biatrial enlargement.  Non-cardiac:  Right upper lobe pulmonary artery embolism.  Possible pulmonary hypertension.  New right lower lobe superior segment infiltrate (possibly infectious or   inflammatory)    MR Head No Cont (11.19.23 @ 15:52)   IMPRESSION: Redemonstration of an evolving acute/subacute large   left-sided MCA distribution infarction with gross hemorrhagic   transformation in the left basal ganglia as well as evidence of petechial   hemorrhagic transformation within the left frontotemporal and parietal   cortices.  Associated cytotoxic edema, sulcal effacement, and mass effect as well as   shift of the midline structures from left to right measuring 5.5 mm   appears unchanged. There is no herniation.    CT Head No Cont (11.16.23 @ 18:05)   IMPRESSION: Stable moderate to large left MCA territory infarction   involving the left frontal lobe, left basal ganglia, left insular ribbon,   left parietal occipital lobe, and anterior left temporal lobe with stable   mild hemorrhage in the left basal ganglia. Left-to-right midline shift   measures 5.5 mm.    CT Head No Cont (11.14.23 @ 18:57)   IMPRESSION:  Stable moderate to large acute left MCA infarct with mild hemorrhage    TTE Echo Complete w/o Contrast w/ Doppler (11.14.23 @ 15:57)   Summary:   1. Left ventricular ejection fraction, by visual estimation, is 55 to   60%.   2. Normal global left ventricular systolic function.   3. The mitral in-flow pattern reveals no discernable A-wave, therefore   no comment on diastolic function can be made.   4. Normal right ventricular size and function.   5. Mild to moderately enlarged right atrium.   6. Moderately enlarged left atrium.   7. Trace mitral valve regurgitation.   8. Mild-moderate tricuspid regurgitation.   9. Sclerotic aortic valve with decreased opening.  10. Estimated pulmonary artery systolic pressure is 41.6 mmHg assuming a   right atrial pressure of 5 mmHg, which is consistent with mild pulmonary   hypertension.    CT Head No Cont (11.14.23 @ 11:36)   IMPRESSION: Evolving left MCA infarct is identified with hemorrhagic   transformation now seen with mass effect on left lateral ventricles and   left-to-right shift.    US Duplex Venous Lower Ext Complete, Bilateral (11.14.23 @ 10:38)   IMPRESSION:  No evidence of deep venous thrombosis in either lower extremity.  Left calf veins not visualized.  Small right popliteal fossa cyst.    CT Angio Brain, Neck and perfusion Stroke Protocol  w/ IV Cont (11.13.23 @ 11:18)   CTA BRAIN:  Acute thrombus in the distal left MCA M1 segment. Diminished flow in the   distal branches.  Severe bilateral cavernous carotid artery calcifications. Fetal origins   the bilateral posterior cerebral arteries. The [place] and   vertebrobasilar system are otherwise unremarkable without evidence of   stenosis, additional occlusion or saccular aneurysm dilation. No evidence   for arterial venous malformation. The vertebral arteries are codominant.    CTA NECK:  Mild bilateral carotid bulb calcifications. Mild stenosis in the left   carotid bulb. The right internal carotid arteries tortuous.  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 There is normal relationship to   the great vessels. The common carotid arteries, internal carotid arteries   and vertebral arteries shows no other evidence of significant stenosis,   occlusion or saccular aneurysm dilation. The vertebral arteries are   codominant.    CT PERFUSION:  Patient has only had less than 2 hours of symptoms may influence the CT   perfusion.  CBF<30% volume: 26 ml left MCA territory.  Tmax>6.0 s volume: 193 ml left MCA territory  Mismatch volume: 167 ml  Mismatch ratio: 7.4  IMPRESSION:  Thrombus in the distal left MCA M1 segment with diminished flow in the   distal MCA segments. 167 mL area of penumbra in the left MCA territory.     CT Brain Stroke Protocol (11.13.23 @ 11:15)   IMPRESSION:  Dense left MCA M1 segment suggesting intraluminal thrombus.   Mild chronic microvascular changes without evidence of an acute   transcortical infarction or hemorrhage.

## 2023-12-03 NOTE — PROGRESS NOTE ADULT - ASSESSMENT
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 Patient presented on 11/13/23 for elective left atrial appendage occlusion with Watchman device/HARJINDER w/ Dr Bobo, and Pradaxa was held for 2 days prior to procedure. Whilst in holding patient developed a left MCA syndrome due to acute left M1 occlusion as demonstrated on CTH & Neck. On admission, NIHSS 26, MRS 1. Patient was given tenecteplase at 11:16 11/13/23, and subsequently underwent mechanical thrombectomy with TICI 2A recannulization with Dr. Soliman. 24 hour repeat CT head s/p tenecteplase administration showed evolving left MCA stroke with hemorrhagic transformation, and mass effect with left to right shift. MRI brain revealed redemonstration of an evolving acute/subacute large left-sided MCA distribution infarction with gross hemorrhagic transformation in the left basal ganglia as well as evidence of petechial hemorrhagic transformation within the left frontotemporal and parietal cortices. Suspected etiology of stroke is cardioembolic from atrial fibrillation when off anticoagulation for recent procedure, 2 cardiac thrombi were appreciated on CTA chest obtained 10 days prior to procedure. These were again seen on repeat CTA chest obtained 11/22/23 as well as new findings of right upper lobe PE.     NEURO:   -Neurologically with no acute changes. Right lower extremity with triple flexion compared to previous exams of slight withdrawal to noxious stimuli Remainder of neurological exam is the same and patient has fluctuated on previous examinations.   -Repeat head CT on 11/28/23 revealed acute moderate to large left MCA territory infarct with slightly increased petechial hemorrhage. No herniation pattern. Head CT on 12/2/23 obtained demonstrating stable petechial hemorrhagic conversion  -Continue close monitoring for neurologic deterioration    -Stroke neuro checks q2hrs  -Neurologically appears to be tolerating SBP of 130s-160s. Can keep this SBP goal and avoid hypotension or rapid fluctuations in blood pressure   -titrate statin to LDL goal less than 70, LDL=54, may resume home regimen of Rosuvastatin 10 mg PO QHS as long as no medical contraindications. 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   -MRI Brain w/o as noted  -Dysphagia screen: pass  -Physical therapy/OT/Speech eval/treatment.   -Neurosurgery input appreciated, patient not a hemicrani candidate     ANTITHROMBOTIC THERAPY:   - Apixaban 5mg bid po. Although patient has a large left MCA stroke with hemorrhagic transformation, due to presence of cardiac thrombi on chest CTA, PE, DVT, hx of atrial fibrillation, and recent embolic event, there is a high risk of recurrent stroke without anticoagulation therapy; overall benefit of anticoagulation outweighs risk of hemorrhage.   - Repeat head CT on 12/02/23 is stable and based on these results and neurological examination can plan for transition to DOAC. Optimal agent as per Cardiology.   -No neurological indications for ASA once on PO anticoagulation, however no neurological contraindications if needed from the cardiac/medical standpoint.    -Repeat CT head for any acute change.   -Risks and benefits were discussed with wife at bedside at length while also discussing as well as prognosis for neurologic deficit recovery who expressed understanding.     CARDIOVASCULAR:  -TTE findings as above, no acute findings; repeat TTE 11/22 with interval decrease in EF   -HARJINDER deferred by anesthesia.   -CTA chest findings as above, see neuro portion for recommendations regarding anticoagulation  -No cardiac intervention at this time as the risks>benefits as per EP  -cardiac monitoring w/ telemetry for now, further evaluation pending findings of noted workup                              HEMATOLOGY:   -H/H 9.6/31.2 Monitor for signs and symptoms of further anemia. Platelets 286. Monitor anemia per primary team, patient should have all age and risk appropriate malignancy screenings with PCP or sooner if clinically suspected   -Bilateral lower extremity venous duplex positive for DVT involving the right   popliteal vein, gastrocnemius vein, posterior tibial and peroneal veins.  -DVT ppx: Heparin s.c [] LMWH [] Apixaban    PULMONARY:   -Patient extubated 11/14/23  -protecting airway, saturating well     RENAL:   -BUN/Cr 28/0.63. monitor urine output, maintain adequate hydration    -Na Goal:  145-150 secondary to cerebral edema. Currently 141    ID:   -afebrile, leukocytosis 6.29. monitor for si/sx of infection     OTHER:    -Discussion plan with patient and family at bedside regarding clinical course    DISPOSITION: Rehab or home depending on PT eval once stable and workup is complete    CORE MEASURES:        Admission NIHSS: 26     Tenecteplase : [x] YES [] NO      LDL/HDL/A1C: 54/67/6.2     Depression Screen- if depression hx and/or present      Statin Therapy: Atorvastatin 40 mg PO Daily      Dysphagia Screen: [x] PASS [] FAIL      Smoking [] YES [x] NO      Afib [x] YES [] NO     Stroke Education [x] YES [] NO    Obtain screening lower extremity venous ultrasound in patients who meet 1 or more of the following criteria as patient is high risk for DVT/PE on admission:   [] History of DVT/PE  []Hypercoagulable states (Factor V Leiden, Cancer, OCP, etc. )  []Prolonged immobility (hemiplegia/hemiparesis/post operative or any other extended immobilization)  [] Transferred from outside facility (Rehab or Long term care)  [] Age </= to 50

## 2023-12-04 VITALS — TEMPERATURE: 98 F

## 2023-12-04 LAB
ANION GAP SERPL CALC-SCNC: 8 MMOL/L — SIGNIFICANT CHANGE UP (ref 5–17)
ANION GAP SERPL CALC-SCNC: 8 MMOL/L — SIGNIFICANT CHANGE UP (ref 5–17)
BUN SERPL-MCNC: 12.6 MG/DL — SIGNIFICANT CHANGE UP (ref 8–20)
BUN SERPL-MCNC: 12.6 MG/DL — SIGNIFICANT CHANGE UP (ref 8–20)
CALCIUM SERPL-MCNC: 9 MG/DL — SIGNIFICANT CHANGE UP (ref 8.4–10.5)
CALCIUM SERPL-MCNC: 9 MG/DL — SIGNIFICANT CHANGE UP (ref 8.4–10.5)
CHLORIDE SERPL-SCNC: 103 MMOL/L — SIGNIFICANT CHANGE UP (ref 96–108)
CHLORIDE SERPL-SCNC: 103 MMOL/L — SIGNIFICANT CHANGE UP (ref 96–108)
CO2 SERPL-SCNC: 28 MMOL/L — SIGNIFICANT CHANGE UP (ref 22–29)
CO2 SERPL-SCNC: 28 MMOL/L — SIGNIFICANT CHANGE UP (ref 22–29)
CREAT SERPL-MCNC: 0.62 MG/DL — SIGNIFICANT CHANGE UP (ref 0.5–1.3)
CREAT SERPL-MCNC: 0.62 MG/DL — SIGNIFICANT CHANGE UP (ref 0.5–1.3)
EGFR: 97 ML/MIN/1.73M2 — SIGNIFICANT CHANGE UP
EGFR: 97 ML/MIN/1.73M2 — SIGNIFICANT CHANGE UP
GLUCOSE BLDC GLUCOMTR-MCNC: 143 MG/DL — HIGH (ref 70–99)
GLUCOSE BLDC GLUCOMTR-MCNC: 143 MG/DL — HIGH (ref 70–99)
GLUCOSE BLDC GLUCOMTR-MCNC: 176 MG/DL — HIGH (ref 70–99)
GLUCOSE BLDC GLUCOMTR-MCNC: 176 MG/DL — HIGH (ref 70–99)
GLUCOSE SERPL-MCNC: 138 MG/DL — HIGH (ref 70–99)
GLUCOSE SERPL-MCNC: 138 MG/DL — HIGH (ref 70–99)
HCT VFR BLD CALC: 31.6 % — LOW (ref 39–50)
HCT VFR BLD CALC: 31.6 % — LOW (ref 39–50)
HGB BLD-MCNC: 9.8 G/DL — LOW (ref 13–17)
HGB BLD-MCNC: 9.8 G/DL — LOW (ref 13–17)
MCHC RBC-ENTMCNC: 29.2 PG — SIGNIFICANT CHANGE UP (ref 27–34)
MCHC RBC-ENTMCNC: 29.2 PG — SIGNIFICANT CHANGE UP (ref 27–34)
MCHC RBC-ENTMCNC: 31 GM/DL — LOW (ref 32–36)
MCHC RBC-ENTMCNC: 31 GM/DL — LOW (ref 32–36)
MCV RBC AUTO: 94 FL — SIGNIFICANT CHANGE UP (ref 80–100)
MCV RBC AUTO: 94 FL — SIGNIFICANT CHANGE UP (ref 80–100)
PLATELET # BLD AUTO: 306 K/UL — SIGNIFICANT CHANGE UP (ref 150–400)
PLATELET # BLD AUTO: 306 K/UL — SIGNIFICANT CHANGE UP (ref 150–400)
POTASSIUM SERPL-MCNC: 4 MMOL/L — SIGNIFICANT CHANGE UP (ref 3.5–5.3)
POTASSIUM SERPL-MCNC: 4 MMOL/L — SIGNIFICANT CHANGE UP (ref 3.5–5.3)
POTASSIUM SERPL-SCNC: 4 MMOL/L — SIGNIFICANT CHANGE UP (ref 3.5–5.3)
POTASSIUM SERPL-SCNC: 4 MMOL/L — SIGNIFICANT CHANGE UP (ref 3.5–5.3)
RBC # BLD: 3.36 M/UL — LOW (ref 4.2–5.8)
RBC # BLD: 3.36 M/UL — LOW (ref 4.2–5.8)
RBC # FLD: 13.4 % — SIGNIFICANT CHANGE UP (ref 10.3–14.5)
RBC # FLD: 13.4 % — SIGNIFICANT CHANGE UP (ref 10.3–14.5)
SODIUM SERPL-SCNC: 139 MMOL/L — SIGNIFICANT CHANGE UP (ref 135–145)
SODIUM SERPL-SCNC: 139 MMOL/L — SIGNIFICANT CHANGE UP (ref 135–145)
WBC # BLD: 6.25 K/UL — SIGNIFICANT CHANGE UP (ref 3.8–10.5)
WBC # BLD: 6.25 K/UL — SIGNIFICANT CHANGE UP (ref 3.8–10.5)
WBC # FLD AUTO: 6.25 K/UL — SIGNIFICANT CHANGE UP (ref 3.8–10.5)
WBC # FLD AUTO: 6.25 K/UL — SIGNIFICANT CHANGE UP (ref 3.8–10.5)

## 2023-12-04 PROCEDURE — 99239 HOSP IP/OBS DSCHRG MGMT >30: CPT

## 2023-12-04 RX ORDER — POTASSIUM CHLORIDE 20 MEQ
1 PACKET (EA) ORAL
Refills: 0 | DISCHARGE

## 2023-12-04 RX ORDER — ATORVASTATIN CALCIUM 80 MG/1
1 TABLET, FILM COATED ORAL
Qty: 30 | Refills: 0
Start: 2023-12-04 | End: 2024-01-02

## 2023-12-04 RX ORDER — ROSUVASTATIN CALCIUM 5 MG/1
1 TABLET ORAL
Refills: 0 | DISCHARGE

## 2023-12-04 RX ORDER — ASPIRIN/CALCIUM CARB/MAGNESIUM 324 MG
1 TABLET ORAL
Qty: 30 | Refills: 0
Start: 2023-12-04 | End: 2024-01-02

## 2023-12-04 RX ORDER — TRAMADOL HYDROCHLORIDE 50 MG/1
1 TABLET ORAL
Refills: 0 | DISCHARGE

## 2023-12-04 RX ORDER — APIXABAN 2.5 MG/1
1 TABLET, FILM COATED ORAL
Qty: 60 | Refills: 0
Start: 2023-12-04 | End: 2024-01-02

## 2023-12-04 RX ADMIN — Medication 5 MILLIGRAM(S): at 05:15

## 2023-12-04 RX ADMIN — FAMOTIDINE 20 MILLIGRAM(S): 10 INJECTION INTRAVENOUS at 05:16

## 2023-12-04 RX ADMIN — APIXABAN 5 MILLIGRAM(S): 2.5 TABLET, FILM COATED ORAL at 05:15

## 2023-12-04 RX ADMIN — GABAPENTIN 300 MILLIGRAM(S): 400 CAPSULE ORAL at 05:15

## 2023-12-04 RX ADMIN — Medication 300 MILLIGRAM(S): at 13:06

## 2023-12-04 RX ADMIN — NYSTATIN CREAM 1 APPLICATION(S): 100000 CREAM TOPICAL at 05:18

## 2023-12-04 RX ADMIN — POLYETHYLENE GLYCOL 3350 17 GRAM(S): 17 POWDER, FOR SOLUTION ORAL at 11:53

## 2023-12-04 RX ADMIN — DULOXETINE HYDROCHLORIDE 30 MILLIGRAM(S): 30 CAPSULE, DELAYED RELEASE ORAL at 11:53

## 2023-12-04 RX ADMIN — Medication 81 MILLIGRAM(S): at 13:06

## 2023-12-04 RX ADMIN — CHLORHEXIDINE GLUCONATE 1 APPLICATION(S): 213 SOLUTION TOPICAL at 11:53

## 2023-12-04 RX ADMIN — Medication 1: at 11:52

## 2023-12-04 NOTE — PROGRESS NOTE ADULT - PROVIDER SPECIALTY LIST ADULT
Electrophysiology
Hospitalist
Internal Medicine
Neurology
Hospitalist
NSICU
Neurology
Rehab Medicine
Electrophysiology
Electrophysiology
Hospitalist
Internal Medicine
Intervent Radiology
NSICU
Hospitalist
Internal Medicine
NSICU
Neurology
Hospitalist
Hospitalist
Neurology

## 2023-12-04 NOTE — PROGRESS NOTE ADULT - ASSESSMENT
INCOMPLETE  ASSESSMENT:   Patient is a 78 year old male with pmhx of HTN, HLD, AFib, CKD, obesity, gout, prostate cancer, DVT/PE, OA s/p multiple orthopedic procedures, spinal stenosis, presented on 11/13/23 for elective left atrial appendage occlusion procedure, and developed a left MCA syndrome due to acute left M1 occlusion as demonstrated on CT Head and Neck. Patient presented on 11/13/23 for elective left atrial appendage occlusion with Watchman device/HARJINDER w/ Dr Bobo, and Pradaxa was held for 2 days prior to procedure. Whilst in holding patient developed a left MCA syndrome due to acute left M1 occlusion as demonstrated on CTH & Neck. On admission, NIHSS 26, MRS 1. Patient was given tenecteplase at 11:16 11/13/23, and subsequently underwent mechanical thrombectomy with TICI 2A recannulization with Dr. Soliman. 24 hour repeat CT head s/p tenecteplase administration showed evolving left MCA stroke with hemorrhagic transformation, and mass effect with left to right shift. MRI brain revealed redemonstration of an evolving acute/subacute large left-sided MCA distribution infarction with gross hemorrhagic transformation in the left basal ganglia as well as evidence of petechial hemorrhagic transformation within the left frontotemporal and parietal cortices. Suspected etiology of stroke is cardioembolic from atrial fibrillation when off anticoagulation for recent procedure, 2 cardiac thrombi were appreciated on CTA chest obtained 10 days prior to procedure. These were again seen on repeat CTA chest obtained 11/22/23 as well as new findings of right upper lobe PE.     NEURO:   -Neurologically with no acute changes. Right lower extremity with triple flexion compared to previous exams of slight withdrawal to noxious stimuli Remainder of neurological exam is the same and patient has fluctuated on previous examinations.   -Repeat head CT on 11/28/23 revealed acute moderate to large left MCA territory infarct with slightly increased petechial hemorrhage. No herniation pattern. Head CT on 12/2/23 obtained demonstrating stable petechial hemorrhagic conversion  -Continue close monitoring for neurologic deterioration    -Stroke neuro checks q2hrs  -Neurologically appears to be tolerating SBP of 130s-160s. Can keep this SBP goal and avoid hypotension or rapid fluctuations in blood pressure   -titrate statin to LDL goal less than 70, LDL=54, may resume home regimen of Rosuvastatin 10 mg PO QHS as long as no medical contraindications. 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   -MRI Brain w/o as noted  -Dysphagia screen: pass  -Physical therapy/OT/Speech eval/treatment.   -Neurosurgery input appreciated, patient not a hemicrani candidate     ANTITHROMBOTIC THERAPY:   -Heparin gtt with goal ptt 50-70. Although patient has a large left MCA stroke with hemorrhagic transformation, due to presence of cardiac thrombi on chest CTA, PE, DVT, hx of atrial fibrillation, and recent embolic event, there is a high risk of recurrent stroke without anticoagulation therapy; overall benefit of anticoagulation outweighs risk of hemorrhage.   - Repeat head CT on 12/02/23 is stable and based on these results and neurological examination can plan for transition to DOAC. Optimal agent as per Cardiology.   -No neurological indications for ASA once on PO anticoagulation, however no neurological contraindications if needed from the cardiac/medical standpoint.    -Repeat CT head for any acute change.   -Risks and benefits were discussed with son at bedside at length while also discussing as well as prognosis for neurologic deficit recovery who expressed understanding.     CARDIOVASCULAR:  -TTE findings as above, no acute findings; repeat TTE 11/22 with interval decrease in EF   -HARJINDER deferred by anesthesia.   -CTA chest findings as above, see neuro portion for recommendations regarding anticoagulation  -No cardiac intervention at this time as the risks>benefits as per EP  -cardiac monitoring w/ telemetry for now, further evaluation pending findings of noted workup                              HEMATOLOGY:   -H/H 9.8/31.6. Monitor for signs and symptoms of further anemia. Platelets 306. Monitor anemia per primary team, patient should have all age and risk appropriate malignancy screenings with PCP or sooner if clinically suspected   -Bilateral lower extremity venous duplex positive for DVT involving the right   popliteal vein, gastrocnemius vein, posterior tibial and peroneal veins.  -DVT ppx: Heparin s.c [] LMWH [] - Apixaban 5mg BID    PULMONARY:   -Patient extubated 11/14/23  -protecting airway, saturating well     RENAL:   -BUN/Cr 12.6/0.62.  monitor urine output, maintain adequate hydration    -Na Goal:  145-150 secondary to cerebral edema. Currently 139    ID:   -afebrile, no leukocytosis 6.25. monitor for si/sx of infection     OTHER:    -Discussion plan with patient and family at bedside regarding clinical course    DISPOSITION: Rehab or home depending on PT eval once stable and workup is complete    CORE MEASURES:        Admission NIHSS: 26     Tenecteplase : [x] YES [] NO      LDL/HDL/A1C: 54/67/6.2     Depression Screen- if depression hx and/or present      Statin Therapy: Atorvastatin 40 mg PO Daily      Dysphagia Screen: [x] PASS [] FAIL      Smoking [] YES [x] NO      Afib [x] YES [] NO     Stroke Education [x] YES [] NO    Obtain screening lower extremity venous ultrasound in patients who meet 1 or more of the following criteria as patient is high risk for DVT/PE on admission:   [] History of DVT/PE  []Hypercoagulable states (Factor V Leiden, Cancer, OCP, etc. )  []Prolonged immobility (hemiplegia/hemiparesis/post operative or any other extended immobilization)  [] Transferred from outside facility (Rehab or Long term care)  [] Age </= to 50   INCOMPLETE  ASSESSMENT:   Patient is a 78 year old male with pmhx of HTN, HLD, AFib, CKD, obesity, gout, prostate cancer, DVT/PE, OA s/p multiple orthopedic procedures, spinal stenosis, presented on 11/13/23 for elective left atrial appendage occlusion procedure, and developed a left MCA syndrome due to acute left M1 occlusion as demonstrated on CT Head and Neck. Patient presented on 11/13/23 for elective left atrial appendage occlusion with Watchman device/HARJINDER w/ Dr Bobo, and Pradaxa was held for 2 days prior to procedure. Whilst in holding patient developed a left MCA syndrome due to acute left M1 occlusion as demonstrated on CTH & Neck. On admission, NIHSS 26, MRS 1. Patient was given tenecteplase at 11:16 11/13/23, and subsequently underwent mechanical thrombectomy with TICI 2A recannulization with Dr. Soliman. 24 hour repeat CT head s/p tenecteplase administration showed evolving left MCA stroke with hemorrhagic transformation, and mass effect with left to right shift. MRI brain revealed redemonstration of an evolving acute/subacute large left-sided MCA distribution infarction with gross hemorrhagic transformation in the left basal ganglia as well as evidence of petechial hemorrhagic transformation within the left frontotemporal and parietal cortices. Suspected etiology of stroke is cardioembolic from atrial fibrillation when off anticoagulation for recent procedure, 2 cardiac thrombi were appreciated on CTA chest obtained 10 days prior to procedure. These were again seen on repeat CTA chest obtained 11/22/23 as well as new findings of right upper lobe PE.     NEURO:   -Neurologically with no acute changes. Right lower extremity with triple flexion compared to previous exams of slight withdrawal to noxious stimuli Remainder of neurological exam is the same and patient has fluctuated on previous examinations.   -Repeat head CT on 11/28/23 revealed acute moderate to large left MCA territory infarct with slightly increased petechial hemorrhage. No herniation pattern. Head CT on 12/2/23 obtained demonstrating stable petechial hemorrhagic conversion  -Continue close monitoring for neurologic deterioration    -Stroke neuro checks q2hrs  -Neurologically appears to be tolerating SBP of 130s-160s. Can keep this SBP goal and avoid hypotension or rapid fluctuations in blood pressure   -titrate statin to LDL goal less than 70, LDL=54, may resume home regimen of Rosuvastatin 10 mg PO QHS as long as no medical contraindications. 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   -MRI Brain w/o as noted  -Dysphagia screen: pass  -Physical therapy/OT/Speech eval/treatment.   -Neurosurgery input appreciated, patient not a hemicrani candidate     ANTITHROMBOTIC THERAPY:   -Patient transitioned on 12/2/23 to Apixaban 5mg BID in setting of repeat head CT on 12/2/23 being stable and no acute neurological change in examination. Although patient has a large left MCA stroke with hemorrhagic transformation, due to presence of cardiac thrombi on chest CTA, PE, DVT, hx of atrial fibrillation, and recent embolic event, there is a high risk of recurrent stroke without anticoagulation therapy; overall benefit of anticoagulation outweighs risk of hemorrhage.   -No neurological indications for ASA once on PO anticoagulation, however no neurological contraindications if needed from the cardiac/medical standpoint.    -Repeat CT head for any acute change.   -Risks and benefits were discussed with son at bedside at length while also discussing as well as prognosis for neurologic deficit recovery who expressed understanding.     CARDIOVASCULAR:  -TTE findings as above, no acute findings; repeat TTE 11/22 with interval decrease in EF   -HARJINDER deferred by anesthesia.   -CTA chest findings as above, see neuro portion for recommendations regarding anticoagulation  -No cardiac intervention at this time as the risks>benefits as per EP  -cardiac monitoring w/ telemetry for now, further evaluation pending findings of noted workup                              HEMATOLOGY:   -H/H 9.8/31.6. Monitor for signs and symptoms of further anemia. Platelets 306. Monitor anemia per primary team, patient should have all age and risk appropriate malignancy screenings with PCP or sooner if clinically suspected   -Bilateral lower extremity venous duplex positive for DVT involving the right   popliteal vein, gastrocnemius vein, posterior tibial and peroneal veins.  -DVT ppx: Heparin s.c [] LMWH [] - Apixaban 5mg BID    PULMONARY:   -Patient extubated 11/14/23  -protecting airway, saturating well     RENAL:   -BUN/Cr 12.6/0.62.  monitor urine output, maintain adequate hydration    -Na Goal:  145-150 secondary to cerebral edema. Currently 139    ID:   -afebrile, no leukocytosis 6.25. monitor for si/sx of infection     OTHER:    -Discussion plan with patient and family at bedside regarding clinical course    DISPOSITION: Rehab or home depending on PT eval once stable and workup is complete    CORE MEASURES:        Admission NIHSS: 26     Tenecteplase : [x] YES [] NO      LDL/HDL/A1C: 54/67/6.2     Depression Screen- if depression hx and/or present      Statin Therapy: Atorvastatin 40 mg PO Daily      Dysphagia Screen: [x] PASS [] FAIL      Smoking [] YES [x] NO      Afib [x] YES [] NO     Stroke Education [x] YES [] NO    Obtain screening lower extremity venous ultrasound in patients who meet 1 or more of the following criteria as patient is high risk for DVT/PE on admission:   [] History of DVT/PE  []Hypercoagulable states (Factor V Leiden, Cancer, OCP, etc. )  []Prolonged immobility (hemiplegia/hemiparesis/post operative or any other extended immobilization)  [] Transferred from outside facility (Rehab or Long term care)  [] Age </= to 50   ASSESSMENT:   Patient is a 78 year old male with pmhx of HTN, HLD, AFib, CKD, obesity, gout, prostate cancer, DVT/PE, OA s/p multiple orthopedic procedures, spinal stenosis, presented on 11/13/23 for elective left atrial appendage occlusion procedure, and developed a left MCA syndrome due to acute left M1 occlusion as demonstrated on CT Head and Neck. Patient presented on 11/13/23 for elective left atrial appendage occlusion with Watchman device/HARJINDER w/ Dr Bobo, and Pradaxa was held for 2 days prior to procedure. Whilst in holding patient developed a left MCA syndrome due to acute left M1 occlusion as demonstrated on CTH & Neck. On admission, NIHSS 26, MRS 1. Patient was given tenecteplase at 11:16 11/13/23, and subsequently underwent mechanical thrombectomy with TICI 2A recannulization with Dr. Soliman. 24 hour repeat CT head s/p tenecteplase administration showed evolving left MCA stroke with hemorrhagic transformation, and mass effect with left to right shift. MRI brain revealed redemonstration of an evolving acute/subacute large left-sided MCA distribution infarction with gross hemorrhagic transformation in the left basal ganglia as well as evidence of petechial hemorrhagic transformation within the left frontotemporal and parietal cortices. Suspected etiology of stroke is cardioembolic from atrial fibrillation when off anticoagulation for recent procedure, 2 cardiac thrombi were appreciated on CTA chest obtained 10 days prior to procedure. These were again seen on repeat CTA chest obtained 11/22/23 as well as new findings of right upper lobe PE.     NEURO:   -Neurologically with no acute changes.   -Repeat head CT on 11/28/23 revealed acute moderate to large left MCA territory infarct with slightly increased petechial hemorrhage. No herniation pattern. Head CT on 12/2/23 obtained demonstrating stable petechial hemorrhagic conversion  -Continue close monitoring for neurologic deterioration    -Stroke neuro checks q2hrs  -Neurologically appears to be tolerating SBP of 130s-160s. Can keep this SBP goal and avoid hypotension or rapid fluctuations in blood pressure   -titrate statin to LDL goal less than 70, LDL=54, may resume home regimen of Rosuvastatin 10 mg PO QHS as long as no medical contraindications. 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   -MRI Brain w/o as noted  -Dysphagia screen: pass  -Physical therapy/OT/Speech eval/treatment.   -Neurosurgery input appreciated, patient not a hemicrani candidate     ANTITHROMBOTIC THERAPY:   -Patient transitioned on 12/2/23 to Apixaban 5mg BID in setting of repeat head CT on 12/2/23 being stable and no acute neurological change in examination. Although patient has a large left MCA stroke with hemorrhagic transformation, due to presence of cardiac thrombi on chest CTA, PE, DVT, hx of atrial fibrillation, and recent embolic event, there is a high risk of recurrent stroke without anticoagulation therapy; overall benefit of anticoagulation outweighs risk of hemorrhage.   -No neurological indications for ASA once on PO anticoagulation, however no neurological contraindications if needed from the cardiac/medical standpoint.    -Repeat CT head for any acute change.   -Risks and benefits were discussed with son at bedside at length while also discussing as well as prognosis for neurologic deficit recovery who expressed understanding.     CARDIOVASCULAR:  -TTE findings as above, no acute findings; repeat TTE 11/22 with interval decrease in EF   -HARJINDER deferred by anesthesia.   -CTA chest findings as above, see neuro portion for recommendations regarding anticoagulation  -No cardiac intervention at this time as the risks>benefits as per EP  -cardiac monitoring w/ telemetry for now, further evaluation pending findings of noted workup                              HEMATOLOGY:   -H/H 9.8/31.6. Monitor for signs and symptoms of further anemia. Platelets 306. Monitor anemia per primary team, patient should have all age and risk appropriate malignancy screenings with PCP or sooner if clinically suspected   -Bilateral lower extremity venous duplex positive for DVT involving the right   popliteal vein, gastrocnemius vein, posterior tibial and peroneal veins.  -DVT ppx: Heparin s.c [] LMWH [] - Apixaban 5mg BID    PULMONARY:   -Patient extubated 11/14/23  -protecting airway, saturating well     RENAL:   -BUN/Cr 12.6/0.62.  monitor urine output, maintain adequate hydration    -Na Goal:  135-145. Currently 139    ID:   -afebrile, no leukocytosis 6.25. monitor for si/sx of infection     OTHER:    -Discussion plan with patient and family at bedside regarding clinical course    DISPOSITION: Rehab or home depending on PT eval once stable and workup is complete    CORE MEASURES:        Admission NIHSS: 26     Tenecteplase : [x] YES [] NO      LDL/HDL/A1C: 54/67/6.2     Depression Screen- if depression hx and/or present      Statin Therapy: Atorvastatin 40 mg PO Daily      Dysphagia Screen: [x] PASS [] FAIL      Smoking [] YES [x] NO      Afib [x] YES [] NO     Stroke Education [x] YES [] NO    Obtain screening lower extremity venous ultrasound in patients who meet 1 or more of the following criteria as patient is high risk for DVT/PE on admission:   [] History of DVT/PE  []Hypercoagulable states (Factor V Leiden, Cancer, OCP, etc. )  []Prolonged immobility (hemiplegia/hemiparesis/post operative or any other extended immobilization)  [] Transferred from outside facility (Rehab or Long term care)  [] Age </= to 50

## 2023-12-04 NOTE — PROGRESS NOTE ADULT - REASON FOR ADMISSION
Lt MCA M1 thrombus

## 2023-12-04 NOTE — PROGRESS NOTE ADULT - NS ATTEND AMEND GEN_ALL_CORE FT
neurologically stable for discharge to rehab today  continue AC     Skye Sierra DO  Vascular Neurology  Office [phone] neurologically stable for discharge to rehab today  continue AC     Skye Sierra DO  Vascular Neurology  Office [phone]

## 2023-12-04 NOTE — PROGRESS NOTE ADULT - SUBJECTIVE AND OBJECTIVE BOX
Preliminary note, offical recommendations pending attending review/signature   [facility] Stroke Team  Progress Note     HPI:   79 yo M PMH of HTN, HLD, AFib, CKD, obesity, gout, prostate cancer, DVT/PE, OA s/p multiple orthopedic procedures, spinal stenosis, presented on 11/13/23 for elective left atrial appendage occlusion procedure, and 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  Patient has a long h/o AFib, which is now considered permanent. He is on rate control with metoprolol 25 mg bid, and anticoagulation with Pradaxa.  He has a long Hx of arthritis and had multiple hip replacements, bilateral knee replacements and also has neck and back pain, but is unable to take NSAIDs due to bleeding risks. He has an unsteady gait and uses a cane for assistance.  Patient presented for elective left atrial appendage occlusion with Watchman device/HARJINDER w/ Dr Bobo, and Pradaxa was held for 2 days prior to procedure. 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   Reported per neurology that on admission, NIHSS 26, MRS 1.  Patient is now s/p TNK administration at 11:16, and TICI 2A thrombectomy with Dr. Soliman, admitted to the NeuroICU intubated and on phenylephrine.     NIH SS: 11/13 at 10:53 per neurology  1A: Level of consciousness (0-3): 1  1B: Questions (0-2): 2  1C: Commands (0-2): 2  2: Gaze (0-2): 2  3: Visual fields (0-3): 2  4: Facial palsy (0-3): 1  MOTOR:  5A: Left arm motor drift (0-4): 0  5B: Right arm motor drift (0-4): 4  6A: Left leg motor drift (0-4): 0  6B: Right leg motor drift (0-4): 4  7: Limb ataxia (0-2): 0  SENSORY:  8: Sensation (0-2): 2  SPEECH:  9: Language (0-3): 3  10: Dysarthria (0-2): 2  EXTINCTION:  11: Extinction/inattention (0-2): 2    TOTAL SCORE: 26 (13 Nov 2023 16:32)      Admission NIHSS  Pre-MRS  ICH Score (if applicable)    SUBJECTIVE: No events overnight.  No new neurologic complaints.  ROS reported negative unless otherwise noted.    acetaminophen     Tablet .. 650 milliGRAM(s) Oral every 6 hours PRN  albuterol/ipratropium (CFC free) Inhaler. 1 Puff(s) Inhalation four times a day PRN  albuterol/ipratropium for Nebulization 3 milliLiter(s) Nebulizer every 6 hours PRN  allopurinol 300 milliGRAM(s) Oral daily  apixaban 5 milliGRAM(s) Oral every 12 hours  aspirin  chewable 81 milliGRAM(s) Oral daily  atorvastatin 40 milliGRAM(s) Oral at bedtime  bisacodyl Suppository 10 milliGRAM(s) Rectal daily PRN  chlorhexidine 2% Cloths 1 Application(s) Topical daily  dextrose 5%. 1000 milliLiter(s) IV Continuous <Continuous>  dextrose 5%. 1000 milliLiter(s) IV Continuous <Continuous>  dextrose 50% Injectable 12.5 Gram(s) IV Push once  dextrose 50% Injectable 25 Gram(s) IV Push once  dextrose 50% Injectable 25 Gram(s) IV Push once  dextrose Oral Gel 15 Gram(s) Oral once PRN  DULoxetine 30 milliGRAM(s) Oral daily  famotidine    Tablet 20 milliGRAM(s) Oral two times a day  gabapentin 300 milliGRAM(s) Oral two times a day  glucagon  Injectable 1 milliGRAM(s) IntraMuscular once  insulin lispro (ADMELOG) corrective regimen sliding scale   SubCutaneous Before meals and at bedtime  metoprolol tartrate 25 milliGRAM(s) Oral two times a day  nystatin Powder 1 Application(s) Topical two times a day  polyethylene glycol 3350 17 Gram(s) Oral daily  predniSONE   Tablet 5 milliGRAM(s) Oral daily  senna 2 Tablet(s) Oral at bedtime      PHYSICAL EXAM:   Vital Signs Last 24 Hrs  T(C): 36.6 (04 Dec 2023 07:28), Max: 37.2 (03 Dec 2023 19:34)  T(F): 97.8 (04 Dec 2023 07:28), Max: 99 (03 Dec 2023 19:34)  HR: 72 (04 Dec 2023 08:00) (68 - 100)  BP: 116/46 (04 Dec 2023 08:00) (103/41 - 120/49)  BP(mean): 63 (04 Dec 2023 08:00) (56 - 78)  RR: 21 (04 Dec 2023 08:00) (18 - 24)  SpO2: 93% (04 Dec 2023 08:00) (89% - 96%)    Parameters below as of 04 Dec 2023 08:00  Patient On (Oxygen Delivery Method): room air        General: No acute distress    NEUROLOGICAL EXAM:  Mental status: Awake, alert, oriented x3, speech fluent, follows commands, no neglect, normal memory   Cranial Nerves: No facial asymmetry, no nystagmus, no dysarthria,  tongue midline  Motor exam: Normal tone, no drift, 5/5 RUE, 5/5 RLE, 5/5 LUE, 5/5 LLE, normal fine finger movements.  Sensation: Intact to light touch   Coordination/ Gait: No dysmetria, gait not tested    LABS:                        9.8    6.25  )-----------( 306      ( 04 Dec 2023 05:40 )             31.6    12-04    139  |  103  |  12.6  ----------------------------<  138<H>  4.0   |  28.0  |  0.62    Ca    9.0      04 Dec 2023 05:40    TPro  5.1<L>  /  Alb  2.6<L>  /  TBili  0.4  /  DBili  x   /  AST  20  /  ALT  12  /  AlkPhos  51  12-03        IMAGING: Reviewed by me.      Preliminary note, offical recommendations pending attending review/signature   [facility] Stroke Team  Progress Note     HPI:   77 yo M PMH of HTN, HLD, AFib, CKD, obesity, gout, prostate cancer, DVT/PE, OA s/p multiple orthopedic procedures, spinal stenosis, presented on 11/13/23 for elective left atrial appendage occlusion procedure, and 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  Patient has a long h/o AFib, which is now considered permanent. He is on rate control with metoprolol 25 mg bid, and anticoagulation with Pradaxa.  He has a long Hx of arthritis and had multiple hip replacements, bilateral knee replacements and also has neck and back pain, but is unable to take NSAIDs due to bleeding risks. He has an unsteady gait and uses a cane for assistance.  Patient presented for elective left atrial appendage occlusion with Watchman device/HARJINDER w/ Dr Bobo, and Pradaxa was held for 2 days prior to procedure. 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   Reported per neurology that on admission, NIHSS 26, MRS 1.  Patient is now s/p TNK administration at 11:16, and TICI 2A thrombectomy with Dr. Soliman, admitted to the NeuroICU intubated and on phenylephrine.     NIH SS: 11/13 at 10:53 per neurology  1A: Level of consciousness (0-3): 1  1B: Questions (0-2): 2  1C: Commands (0-2): 2  2: Gaze (0-2): 2  3: Visual fields (0-3): 2  4: Facial palsy (0-3): 1  MOTOR:  5A: Left arm motor drift (0-4): 0  5B: Right arm motor drift (0-4): 4  6A: Left leg motor drift (0-4): 0  6B: Right leg motor drift (0-4): 4  7: Limb ataxia (0-2): 0  SENSORY:  8: Sensation (0-2): 2  SPEECH:  9: Language (0-3): 3  10: Dysarthria (0-2): 2  EXTINCTION:  11: Extinction/inattention (0-2): 2    TOTAL SCORE: 26 (13 Nov 2023 16:32)      Admission NIHSS  Pre-MRS  ICH Score (if applicable)    SUBJECTIVE: No events overnight.  No new neurologic complaints.  ROS reported negative unless otherwise noted.    acetaminophen     Tablet .. 650 milliGRAM(s) Oral every 6 hours PRN  albuterol/ipratropium (CFC free) Inhaler. 1 Puff(s) Inhalation four times a day PRN  albuterol/ipratropium for Nebulization 3 milliLiter(s) Nebulizer every 6 hours PRN  allopurinol 300 milliGRAM(s) Oral daily  apixaban 5 milliGRAM(s) Oral every 12 hours  aspirin  chewable 81 milliGRAM(s) Oral daily  atorvastatin 40 milliGRAM(s) Oral at bedtime  bisacodyl Suppository 10 milliGRAM(s) Rectal daily PRN  chlorhexidine 2% Cloths 1 Application(s) Topical daily  dextrose 5%. 1000 milliLiter(s) IV Continuous <Continuous>  dextrose 5%. 1000 milliLiter(s) IV Continuous <Continuous>  dextrose 50% Injectable 12.5 Gram(s) IV Push once  dextrose 50% Injectable 25 Gram(s) IV Push once  dextrose 50% Injectable 25 Gram(s) IV Push once  dextrose Oral Gel 15 Gram(s) Oral once PRN  DULoxetine 30 milliGRAM(s) Oral daily  famotidine    Tablet 20 milliGRAM(s) Oral two times a day  gabapentin 300 milliGRAM(s) Oral two times a day  glucagon  Injectable 1 milliGRAM(s) IntraMuscular once  insulin lispro (ADMELOG) corrective regimen sliding scale   SubCutaneous Before meals and at bedtime  metoprolol tartrate 25 milliGRAM(s) Oral two times a day  nystatin Powder 1 Application(s) Topical two times a day  polyethylene glycol 3350 17 Gram(s) Oral daily  predniSONE   Tablet 5 milliGRAM(s) Oral daily  senna 2 Tablet(s) Oral at bedtime      PHYSICAL EXAM:   Vital Signs Last 24 Hrs  T(C): 36.6 (04 Dec 2023 07:28), Max: 37.2 (03 Dec 2023 19:34)  T(F): 97.8 (04 Dec 2023 07:28), Max: 99 (03 Dec 2023 19:34)  HR: 72 (04 Dec 2023 08:00) (68 - 100)  BP: 116/46 (04 Dec 2023 08:00) (103/41 - 120/49)  BP(mean): 63 (04 Dec 2023 08:00) (56 - 78)  RR: 21 (04 Dec 2023 08:00) (18 - 24)  SpO2: 93% (04 Dec 2023 08:00) (89% - 96%)    Parameters below as of 04 Dec 2023 08:00  Patient On (Oxygen Delivery Method): room air        General: No acute distress    NEUROLOGICAL EXAM:  Mental status: Awake, alert, oriented x3, speech fluent, follows commands, no neglect, normal memory   Cranial Nerves: No facial asymmetry, no nystagmus, no dysarthria,  tongue midline  Motor exam: Normal tone, no drift, 5/5 RUE, 5/5 RLE, 5/5 LUE, 5/5 LLE, normal fine finger movements.  Sensation: Intact to light touch   Coordination/ Gait: No dysmetria, gait not tested    LABS:                        9.8    6.25  )-----------( 306      ( 04 Dec 2023 05:40 )             31.6    12-04    139  |  103  |  12.6  ----------------------------<  138<H>  4.0   |  28.0  |  0.62    Ca    9.0      04 Dec 2023 05:40    TPro  5.1<L>  /  Alb  2.6<L>  /  TBili  0.4  /  DBili  x   /  AST  20  /  ALT  12  /  AlkPhos  51  12-03        IMAGING: Reviewed by me.      Preliminary note, offical recommendations pending attending review/signature   [facility] Stroke Team  Progress Note     HPI:  77 yo M PMH of HTN, HLD, AFib, CKD, obesity, gout, prostate cancer, DVT/PE, OA s/p multiple orthopedic procedures, spinal stenosis, presented on 11/13/23 for elective left atrial appendage occlusion procedure, and developed a MCA syndrome due to acute L M1 occlusion as demonstrated on CTA Head and Neck. Patient has a long h/o AFib, which is now considered permanent. He is on rate control with metoprolol 25 mg bid, and anticoagulation with Pradaxa.  He has a long Hx of arthritis and had multiple hip replacements, bilateral knee replacements and also has neck and back pain, but is unable to take NSAIDs due to bleeding risks. He has an unsteady gait and uses a cane for assistance.  Patient presented on 11/13 for elective left atrial appendage occlusion with Watchman device/HARJINDER w/ Dr Bobo, and Pradaxa was held for 2 days prior to procedure. 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   Initial NIHSS 26, MRS 1. Patient s/p tenecteplase administration at 11:16 11/13/23 and TICI 2A thrombectomy with Dr. Soliman.    SUBJECTIVE: No events overnight.  No new neurologic complaints.  ROS reported negative unless otherwise noted.    acetaminophen     Tablet .. 650 milliGRAM(s) Oral every 6 hours PRN  albuterol/ipratropium (CFC free) Inhaler. 1 Puff(s) Inhalation four times a day PRN  albuterol/ipratropium for Nebulization 3 milliLiter(s) Nebulizer every 6 hours PRN  allopurinol 300 milliGRAM(s) Oral daily  apixaban 5 milliGRAM(s) Oral every 12 hours  aspirin  chewable 81 milliGRAM(s) Oral daily  atorvastatin 40 milliGRAM(s) Oral at bedtime  bisacodyl Suppository 10 milliGRAM(s) Rectal daily PRN  chlorhexidine 2% Cloths 1 Application(s) Topical daily  dextrose 5%. 1000 milliLiter(s) IV Continuous <Continuous>  dextrose 5%. 1000 milliLiter(s) IV Continuous <Continuous>  dextrose 50% Injectable 12.5 Gram(s) IV Push once  dextrose 50% Injectable 25 Gram(s) IV Push once  dextrose 50% Injectable 25 Gram(s) IV Push once  dextrose Oral Gel 15 Gram(s) Oral once PRN  DULoxetine 30 milliGRAM(s) Oral daily  famotidine    Tablet 20 milliGRAM(s) Oral two times a day  gabapentin 300 milliGRAM(s) Oral two times a day  glucagon  Injectable 1 milliGRAM(s) IntraMuscular once  insulin lispro (ADMELOG) corrective regimen sliding scale   SubCutaneous Before meals and at bedtime  metoprolol tartrate 25 milliGRAM(s) Oral two times a day  nystatin Powder 1 Application(s) Topical two times a day  polyethylene glycol 3350 17 Gram(s) Oral daily  predniSONE   Tablet 5 milliGRAM(s) Oral daily  senna 2 Tablet(s) Oral at bedtime      PHYSICAL EXAM:   Vital Signs Last 24 Hrs  T(C): 36.6 (04 Dec 2023 07:28), Max: 37.2 (03 Dec 2023 19:34)  T(F): 97.8 (04 Dec 2023 07:28), Max: 99 (03 Dec 2023 19:34)  HR: 72 (04 Dec 2023 08:00) (68 - 100)  BP: 116/46 (04 Dec 2023 08:00) (103/41 - 120/49)  BP(mean): 63 (04 Dec 2023 08:00) (56 - 78)  RR: 21 (04 Dec 2023 08:00) (18 - 24)  SpO2: 93% (04 Dec 2023 08:00) (89% - 96%)    Parameters below as of 04 Dec 2023 08:00  Patient On (Oxygen Delivery Method): room air    PENDING  General: No acute distress. Son at bedside    NEUROLOGICAL EXAM:  Mental status: Awake, globally aphasic, expressive > receptive  with no verbal output, however attempts to make speak. Attends to examiner.  Nods and mimics. Does not follow commands, however appears to intermittently follow commands based on previous exams. Right sided visual willow-neglect present.     Cranial Nerves:  Pupils equal and react symmetrically to light. Blink to threat intermittently on right eye. Blink to threat present out of left eye. Left gaze preference with right gaze palsy that does cross midline. Right facial weakness noted.    Motor exam: There is normal bulk and tone.  There is no tremor.  RUE: 0/5 strength with no movement. Grimaces to noxious stimuli with no movement.  RLE: 0/5,  Triple flexion    Strength is 5/5 in the left arm and leg with no drift.    Sensation: Grimaces to noxious stimuli in all extremities.    Coordination/ Gait: unable to assess dysmetria on finger to nose testing    LABS:                        9.8    6.25  )-----------( 306      ( 04 Dec 2023 05:40 )             31.6    12-04    139  |  103  |  12.6  ----------------------------<  138<H>  4.0   |  28.0  |  0.62    Ca    9.0      04 Dec 2023 05:40    TPro  5.1<L>  /  Alb  2.6<L>  /  TBili  0.4  /  DBili  x   /  AST  20  /  ALT  12  /  AlkPhos  51  12-03    IMAGING: Reviewed by me.   CT Head No Cont (12.02.23 @ 13:24)   IMPRESSION: Stable exam.    CT Head No Cont (11.28.23 @ 09:57)   IMPRESSION: Acute moderate to large left MCA territory infarct with   slightly increased petechial hemorrhage. No herniation pattern.    CT Head No Cont (11.25.23 @ 10:03)   IMPRESSION: Acute left middle cerebral artery infarct with petechial   hemorrhage without change since 11/24/2023.    CT Head No Cont (11.24.23 @ 11:12)   IMPRESSION:  Redemonstration of acute left frontotemporal infarct.  Decreased conspicuity of subtly increased attenuation of gray matter   within the region of infarction. This may represent spared tissue and/or   petechial hemorrhagic transformation.  Slightly decreased mass effect on the left lateral ventricle and   rightward midline shift, currently1 mm, previously 2 mm.  Basal cisterns are visualized. No hydrocephalus.    US Duplex Venous Lower Ext Complete, Bilateral (11.23.23 @ 18:30)   IMPRESSION:  1. Findings compatible with deep venous thrombosis involving the right   popliteal vein, gastrocnemius vein, posterior tibial and peroneal veins.  2. No evidence for deep venous thrombosis within the left lower extremity   veins.    TTE Echo Limited or F/U (11.22.23 @ 16:32)   Summary:   1. Left ventricular ejection fraction, by visual estimation, is 45 to   50%.   2. Mildly decreased global left ventricular systolic function.   3. The mitral in-flow pattern reveals no discernable A-wave, therefore   no comment on diastolic function can be made.   4. Mildly enlarged right ventricle with mildly reduced RV systolic   function, estimated PASP 31 mmHg.   5. Severely enlarged left atrium.   6. Right atrial enlargement.   7. Mild mitral annular calcification.   8. Mild mitral valve regurgitation.   9. Mild-moderate tricuspid regurgitation.  10. Sclerotic aortic valve with normal opening.  11. There is no evidence of pericardial effusion.  12. Compared to the prior TTE study from 11/14/23, interval reduction in   LV EF.    CT Head No Cont (11.22.23 @ 16:25)   IMPRESSION:  Decreased mass effect on the left lateral ventricle compared   with the CT and MR of 11/16/2023 and 11/19/2023 respectively. There is   residual hemorrhage seen in association with hemorrhagic component of   infarct in the left lentiform nucleus region decreased in conspicuity   compared with the prior. Left insular hemorrhage in association with the   left MCA infarct is also noted consistent with hemorrhagic transformation   of infarct in this region. The hemorrhage in the left insular region does   appear slightly more conspicuous compared with the prior CT 11/16/2023   and is consistent with the findings of the patient's MR 11/19/2023.    CT Heart Left Atrium w/ IV Cont (11.22.23 @ 11:22)   IMPRESSION:  Cardiac:  Multiple large filling defects noted at the distal appendage at least >2   cm and also near the proximal appendage measures 1.5 cm x 1.2 cm,   consistent with left appendage thrombi.  Dilated left atrial appendage windsock in appearance with orifice width   measures 4.5 cm x 3.0 cm  Severely biatrial enlargement.  Non-cardiac:  Right upper lobe pulmonary artery embolism.  Possible pulmonary hypertension.  New right lower lobe superior segment infiltrate (possibly infectious or   inflammatory)    MR Head No Cont (11.19.23 @ 15:52)   IMPRESSION: Redemonstration of an evolving acute/subacute large   left-sided MCA distribution infarction with gross hemorrhagic   transformation in the left basal ganglia as well as evidence of petechial   hemorrhagic transformation within the left frontotemporal and parietal   cortices.  Associated cytotoxic edema, sulcal effacement, and mass effect as well as   shift of the midline structures from left to right measuring 5.5 mm   appears unchanged. There is no herniation.    CT Head No Cont (11.16.23 @ 18:05)   IMPRESSION: Stable moderate to large left MCA territory infarction   involving the left frontal lobe, left basal ganglia, left insular ribbon,   left parietal occipital lobe, and anterior left temporal lobe with stable   mild hemorrhage in the left basal ganglia. Left-to-right midline shift   measures 5.5 mm.    CT Head No Cont (11.14.23 @ 18:57)   IMPRESSION:  Stable moderate to large acute left MCA infarct with mild hemorrhage    TTE Echo Complete w/o Contrast w/ Doppler (11.14.23 @ 15:57)   Summary:   1. Left ventricular ejection fraction, by visual estimation, is 55 to   60%.   2. Normal global left ventricular systolic function.   3. The mitral in-flow pattern reveals no discernable A-wave, therefore   no comment on diastolic function can be made.   4. Normal right ventricular size and function.   5. Mild to moderately enlarged right atrium.   6. Moderately enlarged left atrium.   7. Trace mitral valve regurgitation.   8. Mild-moderate tricuspid regurgitation.   9. Sclerotic aortic valve with decreased opening.  10. Estimated pulmonary artery systolic pressure is 41.6 mmHg assuming a   right atrial pressure of 5 mmHg, which is consistent with mild pulmonary   hypertension.    CT Head No Cont (11.14.23 @ 11:36)   IMPRESSION: Evolving left MCA infarct is identified with hemorrhagic   transformation now seen with mass effect on left lateral ventricles and   left-to-right shift.    US Duplex Venous Lower Ext Complete, Bilateral (11.14.23 @ 10:38)   IMPRESSION:  No evidence of deep venous thrombosis in either lower extremity.  Left calf veins not visualized.  Small right popliteal fossa cyst.    CT Angio Brain, Neck and perfusion Stroke Protocol  w/ IV Cont (11.13.23 @ 11:18)   CTA BRAIN:  Acute thrombus in the distal left MCA M1 segment. Diminished flow in the   distal branches.  Severe bilateral cavernous carotid artery calcifications. Fetal origins   the bilateral posterior cerebral arteries. The [place] and   vertebrobasilar system are otherwise unremarkable without evidence of   stenosis, additional occlusion or saccular aneurysm dilation. No evidence   for arterial venous malformation. The vertebral arteries are codominant.    CTA NECK:  Mild bilateral carotid bulb calcifications. Mild stenosis in the left   carotid bulb. The right internal carotid arteries tortuous.  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 There is normal relationship to   the great vessels. The common carotid arteries, internal carotid arteries   and vertebral arteries shows no other evidence of significant stenosis,   occlusion or saccular aneurysm dilation. The vertebral arteries are   codominant.    CT PERFUSION:  Patient has only had less than 2 hours of symptoms may influence the CT   perfusion.  CBF<30% volume: 26 ml left MCA territory.  Tmax>6.0 s volume: 193 ml left MCA territory  Mismatch volume: 167 ml  Mismatch ratio: 7.4  IMPRESSION:  Thrombus in the distal left MCA M1 segment with diminished flow in the   distal MCA segments. 167 mL area of penumbra in the left MCA territory.     CT Brain Stroke Protocol (11.13.23 @ 11:15)   IMPRESSION:  Dense left MCA M1 segment suggesting intraluminal thrombus.   Mild chronic microvascular changes without evidence of an acute   transcortical infarction or hemorrhage.       Preliminary note, offical recommendations pending attending review/signature   [facility] Stroke Team  Progress Note     HPI:  79 yo M PMH of HTN, HLD, AFib, CKD, obesity, gout, prostate cancer, DVT/PE, OA s/p multiple orthopedic procedures, spinal stenosis, presented on 11/13/23 for elective left atrial appendage occlusion procedure, and developed a MCA syndrome due to acute L M1 occlusion as demonstrated on CTA Head and Neck. Patient has a long h/o AFib, which is now considered permanent. He is on rate control with metoprolol 25 mg bid, and anticoagulation with Pradaxa.  He has a long Hx of arthritis and had multiple hip replacements, bilateral knee replacements and also has neck and back pain, but is unable to take NSAIDs due to bleeding risks. He has an unsteady gait and uses a cane for assistance.  Patient presented on 11/13 for elective left atrial appendage occlusion with Watchman device/HARJINDER w/ Dr Bobo, and Pradaxa was held for 2 days prior to procedure. 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   Initial NIHSS 26, MRS 1. Patient s/p tenecteplase administration at 11:16 11/13/23 and TICI 2A thrombectomy with Dr. Soliman.    SUBJECTIVE: No events overnight.  No new neurologic complaints.  ROS reported negative unless otherwise noted.    acetaminophen     Tablet .. 650 milliGRAM(s) Oral every 6 hours PRN  albuterol/ipratropium (CFC free) Inhaler. 1 Puff(s) Inhalation four times a day PRN  albuterol/ipratropium for Nebulization 3 milliLiter(s) Nebulizer every 6 hours PRN  allopurinol 300 milliGRAM(s) Oral daily  apixaban 5 milliGRAM(s) Oral every 12 hours  aspirin  chewable 81 milliGRAM(s) Oral daily  atorvastatin 40 milliGRAM(s) Oral at bedtime  bisacodyl Suppository 10 milliGRAM(s) Rectal daily PRN  chlorhexidine 2% Cloths 1 Application(s) Topical daily  dextrose 5%. 1000 milliLiter(s) IV Continuous <Continuous>  dextrose 5%. 1000 milliLiter(s) IV Continuous <Continuous>  dextrose 50% Injectable 12.5 Gram(s) IV Push once  dextrose 50% Injectable 25 Gram(s) IV Push once  dextrose 50% Injectable 25 Gram(s) IV Push once  dextrose Oral Gel 15 Gram(s) Oral once PRN  DULoxetine 30 milliGRAM(s) Oral daily  famotidine    Tablet 20 milliGRAM(s) Oral two times a day  gabapentin 300 milliGRAM(s) Oral two times a day  glucagon  Injectable 1 milliGRAM(s) IntraMuscular once  insulin lispro (ADMELOG) corrective regimen sliding scale   SubCutaneous Before meals and at bedtime  metoprolol tartrate 25 milliGRAM(s) Oral two times a day  nystatin Powder 1 Application(s) Topical two times a day  polyethylene glycol 3350 17 Gram(s) Oral daily  predniSONE   Tablet 5 milliGRAM(s) Oral daily  senna 2 Tablet(s) Oral at bedtime      PHYSICAL EXAM:   Vital Signs Last 24 Hrs  T(C): 36.6 (04 Dec 2023 07:28), Max: 37.2 (03 Dec 2023 19:34)  T(F): 97.8 (04 Dec 2023 07:28), Max: 99 (03 Dec 2023 19:34)  HR: 72 (04 Dec 2023 08:00) (68 - 100)  BP: 116/46 (04 Dec 2023 08:00) (103/41 - 120/49)  BP(mean): 63 (04 Dec 2023 08:00) (56 - 78)  RR: 21 (04 Dec 2023 08:00) (18 - 24)  SpO2: 93% (04 Dec 2023 08:00) (89% - 96%)    Parameters below as of 04 Dec 2023 08:00  Patient On (Oxygen Delivery Method): room air    PENDING  General: No acute distress. Son at bedside    NEUROLOGICAL EXAM:  Mental status: Awake, globally aphasic, expressive > receptive  with no verbal output, however attempts to make speak. Attends to examiner.  Nods and mimics. Does not follow commands, however appears to intermittently follow commands based on previous exams. Right sided visual willow-neglect present.     Cranial Nerves:  Pupils equal and react symmetrically to light. Blink to threat intermittently on right eye. Blink to threat present out of left eye. Left gaze preference with right gaze palsy that does cross midline. Right facial weakness noted.    Motor exam: There is normal bulk and tone.  There is no tremor.  RUE: 0/5 strength with no movement. Grimaces to noxious stimuli with no movement.  RLE: 0/5,  Triple flexion    Strength is 5/5 in the left arm and leg with no drift.    Sensation: Grimaces to noxious stimuli in all extremities.    Coordination/ Gait: unable to assess dysmetria on finger to nose testing    LABS:                        9.8    6.25  )-----------( 306      ( 04 Dec 2023 05:40 )             31.6    12-04    139  |  103  |  12.6  ----------------------------<  138<H>  4.0   |  28.0  |  0.62    Ca    9.0      04 Dec 2023 05:40    TPro  5.1<L>  /  Alb  2.6<L>  /  TBili  0.4  /  DBili  x   /  AST  20  /  ALT  12  /  AlkPhos  51  12-03    IMAGING: Reviewed by me.   CT Head No Cont (12.02.23 @ 13:24)   IMPRESSION: Stable exam.    CT Head No Cont (11.28.23 @ 09:57)   IMPRESSION: Acute moderate to large left MCA territory infarct with   slightly increased petechial hemorrhage. No herniation pattern.    CT Head No Cont (11.25.23 @ 10:03)   IMPRESSION: Acute left middle cerebral artery infarct with petechial   hemorrhage without change since 11/24/2023.    CT Head No Cont (11.24.23 @ 11:12)   IMPRESSION:  Redemonstration of acute left frontotemporal infarct.  Decreased conspicuity of subtly increased attenuation of gray matter   within the region of infarction. This may represent spared tissue and/or   petechial hemorrhagic transformation.  Slightly decreased mass effect on the left lateral ventricle and   rightward midline shift, currently1 mm, previously 2 mm.  Basal cisterns are visualized. No hydrocephalus.    US Duplex Venous Lower Ext Complete, Bilateral (11.23.23 @ 18:30)   IMPRESSION:  1. Findings compatible with deep venous thrombosis involving the right   popliteal vein, gastrocnemius vein, posterior tibial and peroneal veins.  2. No evidence for deep venous thrombosis within the left lower extremity   veins.    TTE Echo Limited or F/U (11.22.23 @ 16:32)   Summary:   1. Left ventricular ejection fraction, by visual estimation, is 45 to   50%.   2. Mildly decreased global left ventricular systolic function.   3. The mitral in-flow pattern reveals no discernable A-wave, therefore   no comment on diastolic function can be made.   4. Mildly enlarged right ventricle with mildly reduced RV systolic   function, estimated PASP 31 mmHg.   5. Severely enlarged left atrium.   6. Right atrial enlargement.   7. Mild mitral annular calcification.   8. Mild mitral valve regurgitation.   9. Mild-moderate tricuspid regurgitation.  10. Sclerotic aortic valve with normal opening.  11. There is no evidence of pericardial effusion.  12. Compared to the prior TTE study from 11/14/23, interval reduction in   LV EF.    CT Head No Cont (11.22.23 @ 16:25)   IMPRESSION:  Decreased mass effect on the left lateral ventricle compared   with the CT and MR of 11/16/2023 and 11/19/2023 respectively. There is   residual hemorrhage seen in association with hemorrhagic component of   infarct in the left lentiform nucleus region decreased in conspicuity   compared with the prior. Left insular hemorrhage in association with the   left MCA infarct is also noted consistent with hemorrhagic transformation   of infarct in this region. The hemorrhage in the left insular region does   appear slightly more conspicuous compared with the prior CT 11/16/2023   and is consistent with the findings of the patient's MR 11/19/2023.    CT Heart Left Atrium w/ IV Cont (11.22.23 @ 11:22)   IMPRESSION:  Cardiac:  Multiple large filling defects noted at the distal appendage at least >2   cm and also near the proximal appendage measures 1.5 cm x 1.2 cm,   consistent with left appendage thrombi.  Dilated left atrial appendage windsock in appearance with orifice width   measures 4.5 cm x 3.0 cm  Severely biatrial enlargement.  Non-cardiac:  Right upper lobe pulmonary artery embolism.  Possible pulmonary hypertension.  New right lower lobe superior segment infiltrate (possibly infectious or   inflammatory)    MR Head No Cont (11.19.23 @ 15:52)   IMPRESSION: Redemonstration of an evolving acute/subacute large   left-sided MCA distribution infarction with gross hemorrhagic   transformation in the left basal ganglia as well as evidence of petechial   hemorrhagic transformation within the left frontotemporal and parietal   cortices.  Associated cytotoxic edema, sulcal effacement, and mass effect as well as   shift of the midline structures from left to right measuring 5.5 mm   appears unchanged. There is no herniation.    CT Head No Cont (11.16.23 @ 18:05)   IMPRESSION: Stable moderate to large left MCA territory infarction   involving the left frontal lobe, left basal ganglia, left insular ribbon,   left parietal occipital lobe, and anterior left temporal lobe with stable   mild hemorrhage in the left basal ganglia. Left-to-right midline shift   measures 5.5 mm.    CT Head No Cont (11.14.23 @ 18:57)   IMPRESSION:  Stable moderate to large acute left MCA infarct with mild hemorrhage    TTE Echo Complete w/o Contrast w/ Doppler (11.14.23 @ 15:57)   Summary:   1. Left ventricular ejection fraction, by visual estimation, is 55 to   60%.   2. Normal global left ventricular systolic function.   3. The mitral in-flow pattern reveals no discernable A-wave, therefore   no comment on diastolic function can be made.   4. Normal right ventricular size and function.   5. Mild to moderately enlarged right atrium.   6. Moderately enlarged left atrium.   7. Trace mitral valve regurgitation.   8. Mild-moderate tricuspid regurgitation.   9. Sclerotic aortic valve with decreased opening.  10. Estimated pulmonary artery systolic pressure is 41.6 mmHg assuming a   right atrial pressure of 5 mmHg, which is consistent with mild pulmonary   hypertension.    CT Head No Cont (11.14.23 @ 11:36)   IMPRESSION: Evolving left MCA infarct is identified with hemorrhagic   transformation now seen with mass effect on left lateral ventricles and   left-to-right shift.    US Duplex Venous Lower Ext Complete, Bilateral (11.14.23 @ 10:38)   IMPRESSION:  No evidence of deep venous thrombosis in either lower extremity.  Left calf veins not visualized.  Small right popliteal fossa cyst.    CT Angio Brain, Neck and perfusion Stroke Protocol  w/ IV Cont (11.13.23 @ 11:18)   CTA BRAIN:  Acute thrombus in the distal left MCA M1 segment. Diminished flow in the   distal branches.  Severe bilateral cavernous carotid artery calcifications. Fetal origins   the bilateral posterior cerebral arteries. The [place] and   vertebrobasilar system are otherwise unremarkable without evidence of   stenosis, additional occlusion or saccular aneurysm dilation. No evidence   for arterial venous malformation. The vertebral arteries are codominant.    CTA NECK:  Mild bilateral carotid bulb calcifications. Mild stenosis in the left   carotid bulb. The right internal carotid arteries tortuous.  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 There is normal relationship to   the great vessels. The common carotid arteries, internal carotid arteries   and vertebral arteries shows no other evidence of significant stenosis,   occlusion or saccular aneurysm dilation. The vertebral arteries are   codominant.    CT PERFUSION:  Patient has only had less than 2 hours of symptoms may influence the CT   perfusion.  CBF<30% volume: 26 ml left MCA territory.  Tmax>6.0 s volume: 193 ml left MCA territory  Mismatch volume: 167 ml  Mismatch ratio: 7.4  IMPRESSION:  Thrombus in the distal left MCA M1 segment with diminished flow in the   distal MCA segments. 167 mL area of penumbra in the left MCA territory.     CT Brain Stroke Protocol (11.13.23 @ 11:15)   IMPRESSION:  Dense left MCA M1 segment suggesting intraluminal thrombus.   Mild chronic microvascular changes without evidence of an acute   transcortical infarction or hemorrhage.       Preliminary note, offical recommendations pending attending review/signature   [facility] Stroke Team  Progress Note     HPI:  77 yo M PMH of HTN, HLD, AFib, CKD, obesity, gout, prostate cancer, DVT/PE, OA s/p multiple orthopedic procedures, spinal stenosis, presented on 11/13/23 for elective left atrial appendage occlusion procedure, and developed a MCA syndrome due to acute L M1 occlusion as demonstrated on CTA Head and Neck. Patient has a long h/o AFib, which is now considered permanent. He is on rate control with metoprolol 25 mg bid, and anticoagulation with Pradaxa.  He has a long Hx of arthritis and had multiple hip replacements, bilateral knee replacements and also has neck and back pain, but is unable to take NSAIDs due to bleeding risks. He has an unsteady gait and uses a cane for assistance.  Patient presented on 11/13 for elective left atrial appendage occlusion with Watchman device/HARJINDER w/ Dr Bobo, and Pradaxa was held for 2 days prior to procedure. 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   Initial NIHSS 26, MRS 1. Patient s/p tenecteplase administration at 11:16 11/13/23 and TICI 2A thrombectomy with Dr. Soliman.    SUBJECTIVE: No events overnight.  No new neurologic complaints.  ROS reported negative unless otherwise noted.    acetaminophen     Tablet .. 650 milliGRAM(s) Oral every 6 hours PRN  albuterol/ipratropium (CFC free) Inhaler. 1 Puff(s) Inhalation four times a day PRN  albuterol/ipratropium for Nebulization 3 milliLiter(s) Nebulizer every 6 hours PRN  allopurinol 300 milliGRAM(s) Oral daily  apixaban 5 milliGRAM(s) Oral every 12 hours  aspirin  chewable 81 milliGRAM(s) Oral daily  atorvastatin 40 milliGRAM(s) Oral at bedtime  bisacodyl Suppository 10 milliGRAM(s) Rectal daily PRN  chlorhexidine 2% Cloths 1 Application(s) Topical daily  dextrose 5%. 1000 milliLiter(s) IV Continuous <Continuous>  dextrose 5%. 1000 milliLiter(s) IV Continuous <Continuous>  dextrose 50% Injectable 12.5 Gram(s) IV Push once  dextrose 50% Injectable 25 Gram(s) IV Push once  dextrose 50% Injectable 25 Gram(s) IV Push once  dextrose Oral Gel 15 Gram(s) Oral once PRN  DULoxetine 30 milliGRAM(s) Oral daily  famotidine    Tablet 20 milliGRAM(s) Oral two times a day  gabapentin 300 milliGRAM(s) Oral two times a day  glucagon  Injectable 1 milliGRAM(s) IntraMuscular once  insulin lispro (ADMELOG) corrective regimen sliding scale   SubCutaneous Before meals and at bedtime  metoprolol tartrate 25 milliGRAM(s) Oral two times a day  nystatin Powder 1 Application(s) Topical two times a day  polyethylene glycol 3350 17 Gram(s) Oral daily  predniSONE   Tablet 5 milliGRAM(s) Oral daily  senna 2 Tablet(s) Oral at bedtime      PHYSICAL EXAM:   Vital Signs Last 24 Hrs  T(C): 36.6 (04 Dec 2023 07:28), Max: 37.2 (03 Dec 2023 19:34)  T(F): 97.8 (04 Dec 2023 07:28), Max: 99 (03 Dec 2023 19:34)  HR: 72 (04 Dec 2023 08:00) (68 - 100)  BP: 116/46 (04 Dec 2023 08:00) (103/41 - 120/49)  BP(mean): 63 (04 Dec 2023 08:00) (56 - 78)  RR: 21 (04 Dec 2023 08:00) (18 - 24)  SpO2: 93% (04 Dec 2023 08:00) (89% - 96%)    Parameters below as of 04 Dec 2023 08:00  Patient On (Oxygen Delivery Method): room air      General: No acute distress.     NEUROLOGICAL EXAM:  Mental status: Awake, globally aphasic, expressive > receptive  with no verbal output, however attempts to make speak. Attends to examiner.  Nods and mimics examiner. Perseverates Does close eyes on command, however does not follow any other commands.  Right sided visual willow-neglect present.     Cranial Nerves:  Pupils equal and react symmetrically to light. Blink to threat intermittently on right eye. Blink to threat present out of left eye. Left gaze preference with right gaze palsy that does cross midline. Right facial weakness noted.    Motor exam: There is normal bulk and tone.  There is no tremor.  RUE: 0/5 strength with no movement. Grimaces to noxious stimuli with no movement.  RLE: Trace movement within bed.    Strength is 5/5 in the left arm and leg with no drift.    Sensation: Grimaces to noxious stimuli in all extremities.    Coordination/ Gait: unable to assess dysmetria on finger to nose testing    LABS:                        9.8    6.25  )-----------( 306      ( 04 Dec 2023 05:40 )             31.6    12-04    139  |  103  |  12.6  ----------------------------<  138<H>  4.0   |  28.0  |  0.62    Ca    9.0      04 Dec 2023 05:40    TPro  5.1<L>  /  Alb  2.6<L>  /  TBili  0.4  /  DBili  x   /  AST  20  /  ALT  12  /  AlkPhos  51  12-03    IMAGING: Reviewed by me.   CT Head No Cont (12.02.23 @ 13:24)   IMPRESSION: Stable exam.    CT Head No Cont (11.28.23 @ 09:57)   IMPRESSION: Acute moderate to large left MCA territory infarct with   slightly increased petechial hemorrhage. No herniation pattern.    CT Head No Cont (11.25.23 @ 10:03)   IMPRESSION: Acute left middle cerebral artery infarct with petechial   hemorrhage without change since 11/24/2023.    CT Head No Cont (11.24.23 @ 11:12)   IMPRESSION:  Redemonstration of acute left frontotemporal infarct.  Decreased conspicuity of subtly increased attenuation of gray matter   within the region of infarction. This may represent spared tissue and/or   petechial hemorrhagic transformation.  Slightly decreased mass effect on the left lateral ventricle and   rightward midline shift, currently1 mm, previously 2 mm.  Basal cisterns are visualized. No hydrocephalus.    US Duplex Venous Lower Ext Complete, Bilateral (11.23.23 @ 18:30)   IMPRESSION:  1. Findings compatible with deep venous thrombosis involving the right   popliteal vein, gastrocnemius vein, posterior tibial and peroneal veins.  2. No evidence for deep venous thrombosis within the left lower extremity   veins.    TTE Echo Limited or F/U (11.22.23 @ 16:32)   Summary:   1. Left ventricular ejection fraction, by visual estimation, is 45 to   50%.   2. Mildly decreased global left ventricular systolic function.   3. The mitral in-flow pattern reveals no discernable A-wave, therefore   no comment on diastolic function can be made.   4. Mildly enlarged right ventricle with mildly reduced RV systolic   function, estimated PASP 31 mmHg.   5. Severely enlarged left atrium.   6. Right atrial enlargement.   7. Mild mitral annular calcification.   8. Mild mitral valve regurgitation.   9. Mild-moderate tricuspid regurgitation.  10. Sclerotic aortic valve with normal opening.  11. There is no evidence of pericardial effusion.  12. Compared to the prior TTE study from 11/14/23, interval reduction in   LV EF.    CT Head No Cont (11.22.23 @ 16:25)   IMPRESSION:  Decreased mass effect on the left lateral ventricle compared   with the CT and MR of 11/16/2023 and 11/19/2023 respectively. There is   residual hemorrhage seen in association with hemorrhagic component of   infarct in the left lentiform nucleus region decreased in conspicuity   compared with the prior. Left insular hemorrhage in association with the   left MCA infarct is also noted consistent with hemorrhagic transformation   of infarct in this region. The hemorrhage in the left insular region does   appear slightly more conspicuous compared with the prior CT 11/16/2023   and is consistent with the findings of the patient's MR 11/19/2023.    CT Heart Left Atrium w/ IV Cont (11.22.23 @ 11:22)   IMPRESSION:  Cardiac:  Multiple large filling defects noted at the distal appendage at least >2   cm and also near the proximal appendage measures 1.5 cm x 1.2 cm,   consistent with left appendage thrombi.  Dilated left atrial appendage windsock in appearance with orifice width   measures 4.5 cm x 3.0 cm  Severely biatrial enlargement.  Non-cardiac:  Right upper lobe pulmonary artery embolism.  Possible pulmonary hypertension.  New right lower lobe superior segment infiltrate (possibly infectious or   inflammatory)    MR Head No Cont (11.19.23 @ 15:52)   IMPRESSION: Redemonstration of an evolving acute/subacute large   left-sided MCA distribution infarction with gross hemorrhagic   transformation in the left basal ganglia as well as evidence of petechial   hemorrhagic transformation within the left frontotemporal and parietal   cortices.  Associated cytotoxic edema, sulcal effacement, and mass effect as well as   shift of the midline structures from left to right measuring 5.5 mm   appears unchanged. There is no herniation.    CT Head No Cont (11.16.23 @ 18:05)   IMPRESSION: Stable moderate to large left MCA territory infarction   involving the left frontal lobe, left basal ganglia, left insular ribbon,   left parietal occipital lobe, and anterior left temporal lobe with stable   mild hemorrhage in the left basal ganglia. Left-to-right midline shift   measures 5.5 mm.    CT Head No Cont (11.14.23 @ 18:57)   IMPRESSION:  Stable moderate to large acute left MCA infarct with mild hemorrhage    TTE Echo Complete w/o Contrast w/ Doppler (11.14.23 @ 15:57)   Summary:   1. Left ventricular ejection fraction, by visual estimation, is 55 to   60%.   2. Normal global left ventricular systolic function.   3. The mitral in-flow pattern reveals no discernable A-wave, therefore   no comment on diastolic function can be made.   4. Normal right ventricular size and function.   5. Mild to moderately enlarged right atrium.   6. Moderately enlarged left atrium.   7. Trace mitral valve regurgitation.   8. Mild-moderate tricuspid regurgitation.   9. Sclerotic aortic valve with decreased opening.  10. Estimated pulmonary artery systolic pressure is 41.6 mmHg assuming a   right atrial pressure of 5 mmHg, which is consistent with mild pulmonary   hypertension.    CT Head No Cont (11.14.23 @ 11:36)   IMPRESSION: Evolving left MCA infarct is identified with hemorrhagic   transformation now seen with mass effect on left lateral ventricles and   left-to-right shift.    US Duplex Venous Lower Ext Complete, Bilateral (11.14.23 @ 10:38)   IMPRESSION:  No evidence of deep venous thrombosis in either lower extremity.  Left calf veins not visualized.  Small right popliteal fossa cyst.    CT Angio Brain, Neck and perfusion Stroke Protocol  w/ IV Cont (11.13.23 @ 11:18)   CTA BRAIN:  Acute thrombus in the distal left MCA M1 segment. Diminished flow in the   distal branches.  Severe bilateral cavernous carotid artery calcifications. Fetal origins   the bilateral posterior cerebral arteries. The [place] and   vertebrobasilar system are otherwise unremarkable without evidence of   stenosis, additional occlusion or saccular aneurysm dilation. No evidence   for arterial venous malformation. The vertebral arteries are codominant.    CTA NECK:  Mild bilateral carotid bulb calcifications. Mild stenosis in the left   carotid bulb. The right internal carotid arteries tortuous.  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 There is normal relationship to   the great vessels. The common carotid arteries, internal carotid arteries   and vertebral arteries shows no other evidence of significant stenosis,   occlusion or saccular aneurysm dilation. The vertebral arteries are   codominant.    CT PERFUSION:  Patient has only had less than 2 hours of symptoms may influence the CT   perfusion.  CBF<30% volume: 26 ml left MCA territory.  Tmax>6.0 s volume: 193 ml left MCA territory  Mismatch volume: 167 ml  Mismatch ratio: 7.4  IMPRESSION:  Thrombus in the distal left MCA M1 segment with diminished flow in the   distal MCA segments. 167 mL area of penumbra in the left MCA territory.     CT Brain Stroke Protocol (11.13.23 @ 11:15)   IMPRESSION:  Dense left MCA M1 segment suggesting intraluminal thrombus.   Mild chronic microvascular changes without evidence of an acute   transcortical infarction or hemorrhage.       Preliminary note, offical recommendations pending attending review/signature   [facility] Stroke Team  Progress Note     HPI:  77 yo M PMH of HTN, HLD, AFib, CKD, obesity, gout, prostate cancer, DVT/PE, OA s/p multiple orthopedic procedures, spinal stenosis, presented on 11/13/23 for elective left atrial appendage occlusion procedure, and developed a MCA syndrome due to acute L M1 occlusion as demonstrated on CTA Head and Neck. Patient has a long h/o AFib, which is now considered permanent. He is on rate control with metoprolol 25 mg bid, and anticoagulation with Pradaxa.  He has a long Hx of arthritis and had multiple hip replacements, bilateral knee replacements and also has neck and back pain, but is unable to take NSAIDs due to bleeding risks. He has an unsteady gait and uses a cane for assistance.  Patient presented on 11/13 for elective left atrial appendage occlusion with Watchman device/HARJINDER w/ Dr Bobo, and Pradaxa was held for 2 days prior to procedure. 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   Initial NIHSS 26, MRS 1. Patient s/p tenecteplase administration at 11:16 11/13/23 and TICI 2A thrombectomy with Dr. Soliman.    SUBJECTIVE: No events overnight.  No new neurologic complaints.  ROS reported negative unless otherwise noted.    acetaminophen     Tablet .. 650 milliGRAM(s) Oral every 6 hours PRN  albuterol/ipratropium (CFC free) Inhaler. 1 Puff(s) Inhalation four times a day PRN  albuterol/ipratropium for Nebulization 3 milliLiter(s) Nebulizer every 6 hours PRN  allopurinol 300 milliGRAM(s) Oral daily  apixaban 5 milliGRAM(s) Oral every 12 hours  aspirin  chewable 81 milliGRAM(s) Oral daily  atorvastatin 40 milliGRAM(s) Oral at bedtime  bisacodyl Suppository 10 milliGRAM(s) Rectal daily PRN  chlorhexidine 2% Cloths 1 Application(s) Topical daily  dextrose 5%. 1000 milliLiter(s) IV Continuous <Continuous>  dextrose 5%. 1000 milliLiter(s) IV Continuous <Continuous>  dextrose 50% Injectable 12.5 Gram(s) IV Push once  dextrose 50% Injectable 25 Gram(s) IV Push once  dextrose 50% Injectable 25 Gram(s) IV Push once  dextrose Oral Gel 15 Gram(s) Oral once PRN  DULoxetine 30 milliGRAM(s) Oral daily  famotidine    Tablet 20 milliGRAM(s) Oral two times a day  gabapentin 300 milliGRAM(s) Oral two times a day  glucagon  Injectable 1 milliGRAM(s) IntraMuscular once  insulin lispro (ADMELOG) corrective regimen sliding scale   SubCutaneous Before meals and at bedtime  metoprolol tartrate 25 milliGRAM(s) Oral two times a day  nystatin Powder 1 Application(s) Topical two times a day  polyethylene glycol 3350 17 Gram(s) Oral daily  predniSONE   Tablet 5 milliGRAM(s) Oral daily  senna 2 Tablet(s) Oral at bedtime      PHYSICAL EXAM:   Vital Signs Last 24 Hrs  T(C): 36.6 (04 Dec 2023 07:28), Max: 37.2 (03 Dec 2023 19:34)  T(F): 97.8 (04 Dec 2023 07:28), Max: 99 (03 Dec 2023 19:34)  HR: 72 (04 Dec 2023 08:00) (68 - 100)  BP: 116/46 (04 Dec 2023 08:00) (103/41 - 120/49)  BP(mean): 63 (04 Dec 2023 08:00) (56 - 78)  RR: 21 (04 Dec 2023 08:00) (18 - 24)  SpO2: 93% (04 Dec 2023 08:00) (89% - 96%)    Parameters below as of 04 Dec 2023 08:00  Patient On (Oxygen Delivery Method): room air      General: No acute distress.     NEUROLOGICAL EXAM:  Mental status: Awake, globally aphasic, expressive > receptive  with no verbal output, however attempts to make speak. Attends to examiner.  Nods and mimics examiner. Perseverates Does close eyes on command, however does not follow any other commands.  Right sided visual willow-neglect present.     Cranial Nerves:  Pupils equal and react symmetrically to light. Blink to threat intermittently on right eye. Blink to threat present out of left eye. Left gaze preference with right gaze palsy that does cross midline. Right facial weakness noted.    Motor exam: There is normal bulk and tone.  There is no tremor.  RUE: 0/5 strength with no movement. Grimaces to noxious stimuli with no movement.  RLE: Trace movement within bed.    Strength is 5/5 in the left arm and leg with no drift.    Sensation: Grimaces to noxious stimuli in all extremities.    Coordination/ Gait: unable to assess dysmetria on finger to nose testing    LABS:                        9.8    6.25  )-----------( 306      ( 04 Dec 2023 05:40 )             31.6    12-04    139  |  103  |  12.6  ----------------------------<  138<H>  4.0   |  28.0  |  0.62    Ca    9.0      04 Dec 2023 05:40    TPro  5.1<L>  /  Alb  2.6<L>  /  TBili  0.4  /  DBili  x   /  AST  20  /  ALT  12  /  AlkPhos  51  12-03    IMAGING: Reviewed by me.   CT Head No Cont (12.02.23 @ 13:24)   IMPRESSION: Stable exam.    CT Head No Cont (11.28.23 @ 09:57)   IMPRESSION: Acute moderate to large left MCA territory infarct with   slightly increased petechial hemorrhage. No herniation pattern.    CT Head No Cont (11.25.23 @ 10:03)   IMPRESSION: Acute left middle cerebral artery infarct with petechial   hemorrhage without change since 11/24/2023.    CT Head No Cont (11.24.23 @ 11:12)   IMPRESSION:  Redemonstration of acute left frontotemporal infarct.  Decreased conspicuity of subtly increased attenuation of gray matter   within the region of infarction. This may represent spared tissue and/or   petechial hemorrhagic transformation.  Slightly decreased mass effect on the left lateral ventricle and   rightward midline shift, currently1 mm, previously 2 mm.  Basal cisterns are visualized. No hydrocephalus.    US Duplex Venous Lower Ext Complete, Bilateral (11.23.23 @ 18:30)   IMPRESSION:  1. Findings compatible with deep venous thrombosis involving the right   popliteal vein, gastrocnemius vein, posterior tibial and peroneal veins.  2. No evidence for deep venous thrombosis within the left lower extremity   veins.    TTE Echo Limited or F/U (11.22.23 @ 16:32)   Summary:   1. Left ventricular ejection fraction, by visual estimation, is 45 to   50%.   2. Mildly decreased global left ventricular systolic function.   3. The mitral in-flow pattern reveals no discernable A-wave, therefore   no comment on diastolic function can be made.   4. Mildly enlarged right ventricle with mildly reduced RV systolic   function, estimated PASP 31 mmHg.   5. Severely enlarged left atrium.   6. Right atrial enlargement.   7. Mild mitral annular calcification.   8. Mild mitral valve regurgitation.   9. Mild-moderate tricuspid regurgitation.  10. Sclerotic aortic valve with normal opening.  11. There is no evidence of pericardial effusion.  12. Compared to the prior TTE study from 11/14/23, interval reduction in   LV EF.    CT Head No Cont (11.22.23 @ 16:25)   IMPRESSION:  Decreased mass effect on the left lateral ventricle compared   with the CT and MR of 11/16/2023 and 11/19/2023 respectively. There is   residual hemorrhage seen in association with hemorrhagic component of   infarct in the left lentiform nucleus region decreased in conspicuity   compared with the prior. Left insular hemorrhage in association with the   left MCA infarct is also noted consistent with hemorrhagic transformation   of infarct in this region. The hemorrhage in the left insular region does   appear slightly more conspicuous compared with the prior CT 11/16/2023   and is consistent with the findings of the patient's MR 11/19/2023.    CT Heart Left Atrium w/ IV Cont (11.22.23 @ 11:22)   IMPRESSION:  Cardiac:  Multiple large filling defects noted at the distal appendage at least >2   cm and also near the proximal appendage measures 1.5 cm x 1.2 cm,   consistent with left appendage thrombi.  Dilated left atrial appendage windsock in appearance with orifice width   measures 4.5 cm x 3.0 cm  Severely biatrial enlargement.  Non-cardiac:  Right upper lobe pulmonary artery embolism.  Possible pulmonary hypertension.  New right lower lobe superior segment infiltrate (possibly infectious or   inflammatory)    MR Head No Cont (11.19.23 @ 15:52)   IMPRESSION: Redemonstration of an evolving acute/subacute large   left-sided MCA distribution infarction with gross hemorrhagic   transformation in the left basal ganglia as well as evidence of petechial   hemorrhagic transformation within the left frontotemporal and parietal   cortices.  Associated cytotoxic edema, sulcal effacement, and mass effect as well as   shift of the midline structures from left to right measuring 5.5 mm   appears unchanged. There is no herniation.    CT Head No Cont (11.16.23 @ 18:05)   IMPRESSION: Stable moderate to large left MCA territory infarction   involving the left frontal lobe, left basal ganglia, left insular ribbon,   left parietal occipital lobe, and anterior left temporal lobe with stable   mild hemorrhage in the left basal ganglia. Left-to-right midline shift   measures 5.5 mm.    CT Head No Cont (11.14.23 @ 18:57)   IMPRESSION:  Stable moderate to large acute left MCA infarct with mild hemorrhage    TTE Echo Complete w/o Contrast w/ Doppler (11.14.23 @ 15:57)   Summary:   1. Left ventricular ejection fraction, by visual estimation, is 55 to   60%.   2. Normal global left ventricular systolic function.   3. The mitral in-flow pattern reveals no discernable A-wave, therefore   no comment on diastolic function can be made.   4. Normal right ventricular size and function.   5. Mild to moderately enlarged right atrium.   6. Moderately enlarged left atrium.   7. Trace mitral valve regurgitation.   8. Mild-moderate tricuspid regurgitation.   9. Sclerotic aortic valve with decreased opening.  10. Estimated pulmonary artery systolic pressure is 41.6 mmHg assuming a   right atrial pressure of 5 mmHg, which is consistent with mild pulmonary   hypertension.    CT Head No Cont (11.14.23 @ 11:36)   IMPRESSION: Evolving left MCA infarct is identified with hemorrhagic   transformation now seen with mass effect on left lateral ventricles and   left-to-right shift.    US Duplex Venous Lower Ext Complete, Bilateral (11.14.23 @ 10:38)   IMPRESSION:  No evidence of deep venous thrombosis in either lower extremity.  Left calf veins not visualized.  Small right popliteal fossa cyst.    CT Angio Brain, Neck and perfusion Stroke Protocol  w/ IV Cont (11.13.23 @ 11:18)   CTA BRAIN:  Acute thrombus in the distal left MCA M1 segment. Diminished flow in the   distal branches.  Severe bilateral cavernous carotid artery calcifications. Fetal origins   the bilateral posterior cerebral arteries. The [place] of [place] and   vertebrobasilar system are otherwise unremarkable without evidence of   stenosis, additional occlusion or saccular aneurysm dilation. No evidence   for arterial venous malformation. The vertebral arteries are codominant.    CTA NECK:  Mild bilateral carotid bulb calcifications. Mild stenosis in the left   carotid bulb. The right internal carotid arteries tortuous.  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 There is normal relationship to   the great vessels. The common carotid arteries, internal carotid arteries   and vertebral arteries shows no other evidence of significant stenosis,   occlusion or saccular aneurysm dilation. The vertebral arteries are   codominant.    CT PERFUSION:  Patient has only had less than 2 hours of symptoms may influence the CT   perfusion.  CBF<30% volume: 26 ml left MCA territory.  Tmax>6.0 s volume: 193 ml left MCA territory  Mismatch volume: 167 ml  Mismatch ratio: 7.4  IMPRESSION:  Thrombus in the distal left MCA M1 segment with diminished flow in the   distal MCA segments. 167 mL area of penumbra in the left MCA territory.     CT Brain Stroke Protocol (11.13.23 @ 11:15)   IMPRESSION:  Dense left MCA M1 segment suggesting intraluminal thrombus.   Mild chronic microvascular changes without evidence of an acute   transcortical infarction or hemorrhage.       [facility] Stroke Team  Progress Note     HPI:  77 yo M PMH of HTN, HLD, AFib, CKD, obesity, gout, prostate cancer, DVT/PE, OA s/p multiple orthopedic procedures, spinal stenosis, presented on 11/13/23 for elective left atrial appendage occlusion procedure, and developed a MCA syndrome due to acute L M1 occlusion as demonstrated on CTA Head and Neck. Patient has a long h/o AFib, which is now considered permanent. He is on rate control with metoprolol 25 mg bid, and anticoagulation with Pradaxa.  He has a long Hx of arthritis and had multiple hip replacements, bilateral knee replacements and also has neck and back pain, but is unable to take NSAIDs due to bleeding risks. He has an unsteady gait and uses a cane for assistance.  Patient presented on 11/13 for elective left atrial appendage occlusion with Watchman device/HARJINDER w/ Dr Bobo, and Pradaxa was held for 2 days prior to procedure. 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   Initial NIHSS 26, MRS 1. Patient s/p tenecteplase administration at 11:16 11/13/23 and TICI 2A thrombectomy with Dr. Soliman.    SUBJECTIVE: No events overnight.  No new neurologic complaints.  ROS reported negative unless otherwise noted.    acetaminophen     Tablet .. 650 milliGRAM(s) Oral every 6 hours PRN  albuterol/ipratropium (CFC free) Inhaler. 1 Puff(s) Inhalation four times a day PRN  albuterol/ipratropium for Nebulization 3 milliLiter(s) Nebulizer every 6 hours PRN  allopurinol 300 milliGRAM(s) Oral daily  apixaban 5 milliGRAM(s) Oral every 12 hours  aspirin  chewable 81 milliGRAM(s) Oral daily  atorvastatin 40 milliGRAM(s) Oral at bedtime  bisacodyl Suppository 10 milliGRAM(s) Rectal daily PRN  chlorhexidine 2% Cloths 1 Application(s) Topical daily  dextrose 5%. 1000 milliLiter(s) IV Continuous <Continuous>  dextrose 5%. 1000 milliLiter(s) IV Continuous <Continuous>  dextrose 50% Injectable 12.5 Gram(s) IV Push once  dextrose 50% Injectable 25 Gram(s) IV Push once  dextrose 50% Injectable 25 Gram(s) IV Push once  dextrose Oral Gel 15 Gram(s) Oral once PRN  DULoxetine 30 milliGRAM(s) Oral daily  famotidine    Tablet 20 milliGRAM(s) Oral two times a day  gabapentin 300 milliGRAM(s) Oral two times a day  glucagon  Injectable 1 milliGRAM(s) IntraMuscular once  insulin lispro (ADMELOG) corrective regimen sliding scale   SubCutaneous Before meals and at bedtime  metoprolol tartrate 25 milliGRAM(s) Oral two times a day  nystatin Powder 1 Application(s) Topical two times a day  polyethylene glycol 3350 17 Gram(s) Oral daily  predniSONE   Tablet 5 milliGRAM(s) Oral daily  senna 2 Tablet(s) Oral at bedtime      PHYSICAL EXAM:   Vital Signs Last 24 Hrs  T(C): 36.6 (04 Dec 2023 07:28), Max: 37.2 (03 Dec 2023 19:34)  T(F): 97.8 (04 Dec 2023 07:28), Max: 99 (03 Dec 2023 19:34)  HR: 72 (04 Dec 2023 08:00) (68 - 100)  BP: 116/46 (04 Dec 2023 08:00) (103/41 - 120/49)  BP(mean): 63 (04 Dec 2023 08:00) (56 - 78)  RR: 21 (04 Dec 2023 08:00) (18 - 24)  SpO2: 93% (04 Dec 2023 08:00) (89% - 96%)    Parameters below as of 04 Dec 2023 08:00  Patient On (Oxygen Delivery Method): room air      General: No acute distress.     NEUROLOGICAL EXAM:  Mental status: Awake, globally aphasic, expressive > receptive  with no verbal output, however attempts to make speak. Attends to examiner.  Nods and mimics examiner. Perseverates Does close eyes on command, however does not follow any other commands.  Right sided visual willow-neglect present.     Cranial Nerves:  Pupils equal and react symmetrically to light. Blink to threat intermittently on right eye. Blink to threat present out of left eye. Left gaze preference with right gaze palsy that does cross midline. Right facial weakness noted.    Motor exam: There is normal bulk and tone.  There is no tremor.  RUE: 0/5 strength with no movement. Grimaces to noxious stimuli with no movement.  RLE: Trace movement within bed.    Strength is 5/5 in the left arm and leg with no drift.    Sensation: Grimaces to noxious stimuli in all extremities.    Coordination/ Gait: unable to assess dysmetria on finger to nose testing    LABS:                        9.8    6.25  )-----------( 306      ( 04 Dec 2023 05:40 )             31.6    12-04    139  |  103  |  12.6  ----------------------------<  138<H>  4.0   |  28.0  |  0.62    Ca    9.0      04 Dec 2023 05:40    TPro  5.1<L>  /  Alb  2.6<L>  /  TBili  0.4  /  DBili  x   /  AST  20  /  ALT  12  /  AlkPhos  51  12-03    IMAGING: Reviewed by me.   CT Head No Cont (12.02.23 @ 13:24)   IMPRESSION: Stable exam.    CT Head No Cont (11.28.23 @ 09:57)   IMPRESSION: Acute moderate to large left MCA territory infarct with   slightly increased petechial hemorrhage. No herniation pattern.    CT Head No Cont (11.25.23 @ 10:03)   IMPRESSION: Acute left middle cerebral artery infarct with petechial   hemorrhage without change since 11/24/2023.    CT Head No Cont (11.24.23 @ 11:12)   IMPRESSION:  Redemonstration of acute left frontotemporal infarct.  Decreased conspicuity of subtly increased attenuation of gray matter   within the region of infarction. This may represent spared tissue and/or   petechial hemorrhagic transformation.  Slightly decreased mass effect on the left lateral ventricle and   rightward midline shift, currently1 mm, previously 2 mm.  Basal cisterns are visualized. No hydrocephalus.    US Duplex Venous Lower Ext Complete, Bilateral (11.23.23 @ 18:30)   IMPRESSION:  1. Findings compatible with deep venous thrombosis involving the right   popliteal vein, gastrocnemius vein, posterior tibial and peroneal veins.  2. No evidence for deep venous thrombosis within the left lower extremity   veins.    TTE Echo Limited or F/U (11.22.23 @ 16:32)   Summary:   1. Left ventricular ejection fraction, by visual estimation, is 45 to   50%.   2. Mildly decreased global left ventricular systolic function.   3. The mitral in-flow pattern reveals no discernable A-wave, therefore   no comment on diastolic function can be made.   4. Mildly enlarged right ventricle with mildly reduced RV systolic   function, estimated PASP 31 mmHg.   5. Severely enlarged left atrium.   6. Right atrial enlargement.   7. Mild mitral annular calcification.   8. Mild mitral valve regurgitation.   9. Mild-moderate tricuspid regurgitation.  10. Sclerotic aortic valve with normal opening.  11. There is no evidence of pericardial effusion.  12. Compared to the prior TTE study from 11/14/23, interval reduction in   LV EF.    CT Head No Cont (11.22.23 @ 16:25)   IMPRESSION:  Decreased mass effect on the left lateral ventricle compared   with the CT and MR of 11/16/2023 and 11/19/2023 respectively. There is   residual hemorrhage seen in association with hemorrhagic component of   infarct in the left lentiform nucleus region decreased in conspicuity   compared with the prior. Left insular hemorrhage in association with the   left MCA infarct is also noted consistent with hemorrhagic transformation   of infarct in this region. The hemorrhage in the left insular region does   appear slightly more conspicuous compared with the prior CT 11/16/2023   and is consistent with the findings of the patient's MR 11/19/2023.    CT Heart Left Atrium w/ IV Cont (11.22.23 @ 11:22)   IMPRESSION:  Cardiac:  Multiple large filling defects noted at the distal appendage at least >2   cm and also near the proximal appendage measures 1.5 cm x 1.2 cm,   consistent with left appendage thrombi.  Dilated left atrial appendage windsock in appearance with orifice width   measures 4.5 cm x 3.0 cm  Severely biatrial enlargement.  Non-cardiac:  Right upper lobe pulmonary artery embolism.  Possible pulmonary hypertension.  New right lower lobe superior segment infiltrate (possibly infectious or   inflammatory)    MR Head No Cont (11.19.23 @ 15:52)   IMPRESSION: Redemonstration of an evolving acute/subacute large   left-sided MCA distribution infarction with gross hemorrhagic   transformation in the left basal ganglia as well as evidence of petechial   hemorrhagic transformation within the left frontotemporal and parietal   cortices.  Associated cytotoxic edema, sulcal effacement, and mass effect as well as   shift of the midline structures from left to right measuring 5.5 mm   appears unchanged. There is no herniation.    CT Head No Cont (11.16.23 @ 18:05)   IMPRESSION: Stable moderate to large left MCA territory infarction   involving the left frontal lobe, left basal ganglia, left insular ribbon,   left parietal occipital lobe, and anterior left temporal lobe with stable   mild hemorrhage in the left basal ganglia. Left-to-right midline shift   measures 5.5 mm.    CT Head No Cont (11.14.23 @ 18:57)   IMPRESSION:  Stable moderate to large acute left MCA infarct with mild hemorrhage    TTE Echo Complete w/o Contrast w/ Doppler (11.14.23 @ 15:57)   Summary:   1. Left ventricular ejection fraction, by visual estimation, is 55 to   60%.   2. Normal global left ventricular systolic function.   3. The mitral in-flow pattern reveals no discernable A-wave, therefore   no comment on diastolic function can be made.   4. Normal right ventricular size and function.   5. Mild to moderately enlarged right atrium.   6. Moderately enlarged left atrium.   7. Trace mitral valve regurgitation.   8. Mild-moderate tricuspid regurgitation.   9. Sclerotic aortic valve with decreased opening.  10. Estimated pulmonary artery systolic pressure is 41.6 mmHg assuming a   right atrial pressure of 5 mmHg, which is consistent with mild pulmonary   hypertension.    CT Head No Cont (11.14.23 @ 11:36)   IMPRESSION: Evolving left MCA infarct is identified with hemorrhagic   transformation now seen with mass effect on left lateral ventricles and   left-to-right shift.    US Duplex Venous Lower Ext Complete, Bilateral (11.14.23 @ 10:38)   IMPRESSION:  No evidence of deep venous thrombosis in either lower extremity.  Left calf veins not visualized.  Small right popliteal fossa cyst.    CT Angio Brain, Neck and perfusion Stroke Protocol  w/ IV Cont (11.13.23 @ 11:18)   CTA BRAIN:  Acute thrombus in the distal left MCA M1 segment. Diminished flow in the   distal branches.  Severe bilateral cavernous carotid artery calcifications. Fetal origins   the bilateral posterior cerebral arteries. The [place] of [place] and   vertebrobasilar system are otherwise unremarkable without evidence of   stenosis, additional occlusion or saccular aneurysm dilation. No evidence   for arterial venous malformation. The vertebral arteries are codominant.    CTA NECK:  Mild bilateral carotid bulb calcifications. Mild stenosis in the left   carotid bulb. The right internal carotid arteries tortuous.  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 There is normal relationship to   the great vessels. The common carotid arteries, internal carotid arteries   and vertebral arteries shows no other evidence of significant stenosis,   occlusion or saccular aneurysm dilation. The vertebral arteries are   codominant.    CT PERFUSION:  Patient has only had less than 2 hours of symptoms may influence the CT   perfusion.  CBF<30% volume: 26 ml left MCA territory.  Tmax>6.0 s volume: 193 ml left MCA territory  Mismatch volume: 167 ml  Mismatch ratio: 7.4  IMPRESSION:  Thrombus in the distal left MCA M1 segment with diminished flow in the   distal MCA segments. 167 mL area of penumbra in the left MCA territory.     CT Brain Stroke Protocol (11.13.23 @ 11:15)   IMPRESSION:  Dense left MCA M1 segment suggesting intraluminal thrombus.   Mild chronic microvascular changes without evidence of an acute   transcortical infarction or hemorrhage.       [facility] Stroke Team  Progress Note     HPI:  77 yo M PMH of HTN, HLD, AFib, CKD, obesity, gout, prostate cancer, DVT/PE, OA s/p multiple orthopedic procedures, spinal stenosis, presented on 11/13/23 for elective left atrial appendage occlusion procedure, and developed a MCA syndrome due to acute L M1 occlusion as demonstrated on CTA Head and Neck. Patient has a long h/o AFib, which is now considered permanent. He is on rate control with metoprolol 25 mg bid, and anticoagulation with Pradaxa.  He has a long Hx of arthritis and had multiple hip replacements, bilateral knee replacements and also has neck and back pain, but is unable to take NSAIDs due to bleeding risks. He has an unsteady gait and uses a cane for assistance.  Patient presented on 11/13 for elective left atrial appendage occlusion with Watchman device/HARJINDER w/ Dr Bobo, and Pradaxa was held for 2 days prior to procedure. 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   Initial NIHSS 26, MRS 1. Patient s/p tenecteplase administration at 11:16 11/13/23 and TICI 2A thrombectomy with Dr. Soliman.    SUBJECTIVE: No events overnight.  No new neurologic complaints.  ROS reported negative unless otherwise noted.    acetaminophen     Tablet .. 650 milliGRAM(s) Oral every 6 hours PRN  albuterol/ipratropium (CFC free) Inhaler. 1 Puff(s) Inhalation four times a day PRN  albuterol/ipratropium for Nebulization 3 milliLiter(s) Nebulizer every 6 hours PRN  allopurinol 300 milliGRAM(s) Oral daily  apixaban 5 milliGRAM(s) Oral every 12 hours  aspirin  chewable 81 milliGRAM(s) Oral daily  atorvastatin 40 milliGRAM(s) Oral at bedtime  bisacodyl Suppository 10 milliGRAM(s) Rectal daily PRN  chlorhexidine 2% Cloths 1 Application(s) Topical daily  dextrose 5%. 1000 milliLiter(s) IV Continuous <Continuous>  dextrose 5%. 1000 milliLiter(s) IV Continuous <Continuous>  dextrose 50% Injectable 12.5 Gram(s) IV Push once  dextrose 50% Injectable 25 Gram(s) IV Push once  dextrose 50% Injectable 25 Gram(s) IV Push once  dextrose Oral Gel 15 Gram(s) Oral once PRN  DULoxetine 30 milliGRAM(s) Oral daily  famotidine    Tablet 20 milliGRAM(s) Oral two times a day  gabapentin 300 milliGRAM(s) Oral two times a day  glucagon  Injectable 1 milliGRAM(s) IntraMuscular once  insulin lispro (ADMELOG) corrective regimen sliding scale   SubCutaneous Before meals and at bedtime  metoprolol tartrate 25 milliGRAM(s) Oral two times a day  nystatin Powder 1 Application(s) Topical two times a day  polyethylene glycol 3350 17 Gram(s) Oral daily  predniSONE   Tablet 5 milliGRAM(s) Oral daily  senna 2 Tablet(s) Oral at bedtime      PHYSICAL EXAM:   Vital Signs Last 24 Hrs  T(C): 36.6 (04 Dec 2023 07:28), Max: 37.2 (03 Dec 2023 19:34)  T(F): 97.8 (04 Dec 2023 07:28), Max: 99 (03 Dec 2023 19:34)  HR: 72 (04 Dec 2023 08:00) (68 - 100)  BP: 116/46 (04 Dec 2023 08:00) (103/41 - 120/49)  BP(mean): 63 (04 Dec 2023 08:00) (56 - 78)  RR: 21 (04 Dec 2023 08:00) (18 - 24)  SpO2: 93% (04 Dec 2023 08:00) (89% - 96%)    Parameters below as of 04 Dec 2023 08:00  Patient On (Oxygen Delivery Method): room air      General: No acute distress.     NEUROLOGICAL EXAM:  Mental status: Awake, globally aphasic, expressive > receptive  with no verbal output, however attempts to make speak. Attends to examiner.  Nods and mimics examiner. Perseverates Does close eyes on command, however does not follow any other commands.  Right sided visual willow-neglect present.     Cranial Nerves:  Pupils equal and react symmetrically to light. Blink to threat intermittently on right eye. Blink to threat present out of left eye. Left gaze preference with right gaze palsy that does cross midline. Right facial weakness noted.    Motor exam: There is normal bulk and tone.  There is no tremor.  RUE: 0/5 strength with no movement. Grimaces to noxious stimuli with no movement.  RLE: Trace movement within bed.    Strength is 5/5 in the left arm and leg with no drift.    Sensation: Grimaces to noxious stimuli in all extremities.    Coordination/ Gait: unable to assess dysmetria on finger to nose testing    LABS:                        9.8    6.25  )-----------( 306      ( 04 Dec 2023 05:40 )             31.6    12-04    139  |  103  |  12.6  ----------------------------<  138<H>  4.0   |  28.0  |  0.62    Ca    9.0      04 Dec 2023 05:40    TPro  5.1<L>  /  Alb  2.6<L>  /  TBili  0.4  /  DBili  x   /  AST  20  /  ALT  12  /  AlkPhos  51  12-03    IMAGING: Reviewed by me.   CT Head No Cont (12.02.23 @ 13:24)   IMPRESSION: Stable exam.    CT Head No Cont (11.28.23 @ 09:57)   IMPRESSION: Acute moderate to large left MCA territory infarct with   slightly increased petechial hemorrhage. No herniation pattern.    CT Head No Cont (11.25.23 @ 10:03)   IMPRESSION: Acute left middle cerebral artery infarct with petechial   hemorrhage without change since 11/24/2023.    CT Head No Cont (11.24.23 @ 11:12)   IMPRESSION:  Redemonstration of acute left frontotemporal infarct.  Decreased conspicuity of subtly increased attenuation of gray matter   within the region of infarction. This may represent spared tissue and/or   petechial hemorrhagic transformation.  Slightly decreased mass effect on the left lateral ventricle and   rightward midline shift, currently1 mm, previously 2 mm.  Basal cisterns are visualized. No hydrocephalus.    US Duplex Venous Lower Ext Complete, Bilateral (11.23.23 @ 18:30)   IMPRESSION:  1. Findings compatible with deep venous thrombosis involving the right   popliteal vein, gastrocnemius vein, posterior tibial and peroneal veins.  2. No evidence for deep venous thrombosis within the left lower extremity   veins.    TTE Echo Limited or F/U (11.22.23 @ 16:32)   Summary:   1. Left ventricular ejection fraction, by visual estimation, is 45 to   50%.   2. Mildly decreased global left ventricular systolic function.   3. The mitral in-flow pattern reveals no discernable A-wave, therefore   no comment on diastolic function can be made.   4. Mildly enlarged right ventricle with mildly reduced RV systolic   function, estimated PASP 31 mmHg.   5. Severely enlarged left atrium.   6. Right atrial enlargement.   7. Mild mitral annular calcification.   8. Mild mitral valve regurgitation.   9. Mild-moderate tricuspid regurgitation.  10. Sclerotic aortic valve with normal opening.  11. There is no evidence of pericardial effusion.  12. Compared to the prior TTE study from 11/14/23, interval reduction in   LV EF.    CT Head No Cont (11.22.23 @ 16:25)   IMPRESSION:  Decreased mass effect on the left lateral ventricle compared   with the CT and MR of 11/16/2023 and 11/19/2023 respectively. There is   residual hemorrhage seen in association with hemorrhagic component of   infarct in the left lentiform nucleus region decreased in conspicuity   compared with the prior. Left insular hemorrhage in association with the   left MCA infarct is also noted consistent with hemorrhagic transformation   of infarct in this region. The hemorrhage in the left insular region does   appear slightly more conspicuous compared with the prior CT 11/16/2023   and is consistent with the findings of the patient's MR 11/19/2023.    CT Heart Left Atrium w/ IV Cont (11.22.23 @ 11:22)   IMPRESSION:  Cardiac:  Multiple large filling defects noted at the distal appendage at least >2   cm and also near the proximal appendage measures 1.5 cm x 1.2 cm,   consistent with left appendage thrombi.  Dilated left atrial appendage windsock in appearance with orifice width   measures 4.5 cm x 3.0 cm  Severely biatrial enlargement.  Non-cardiac:  Right upper lobe pulmonary artery embolism.  Possible pulmonary hypertension.  New right lower lobe superior segment infiltrate (possibly infectious or   inflammatory)    MR Head No Cont (11.19.23 @ 15:52)   IMPRESSION: Redemonstration of an evolving acute/subacute large   left-sided MCA distribution infarction with gross hemorrhagic   transformation in the left basal ganglia as well as evidence of petechial   hemorrhagic transformation within the left frontotemporal and parietal   cortices.  Associated cytotoxic edema, sulcal effacement, and mass effect as well as   shift of the midline structures from left to right measuring 5.5 mm   appears unchanged. There is no herniation.    CT Head No Cont (11.16.23 @ 18:05)   IMPRESSION: Stable moderate to large left MCA territory infarction   involving the left frontal lobe, left basal ganglia, left insular ribbon,   left parietal occipital lobe, and anterior left temporal lobe with stable   mild hemorrhage in the left basal ganglia. Left-to-right midline shift   measures 5.5 mm.    CT Head No Cont (11.14.23 @ 18:57)   IMPRESSION:  Stable moderate to large acute left MCA infarct with mild hemorrhage    TTE Echo Complete w/o Contrast w/ Doppler (11.14.23 @ 15:57)   Summary:   1. Left ventricular ejection fraction, by visual estimation, is 55 to   60%.   2. Normal global left ventricular systolic function.   3. The mitral in-flow pattern reveals no discernable A-wave, therefore   no comment on diastolic function can be made.   4. Normal right ventricular size and function.   5. Mild to moderately enlarged right atrium.   6. Moderately enlarged left atrium.   7. Trace mitral valve regurgitation.   8. Mild-moderate tricuspid regurgitation.   9. Sclerotic aortic valve with decreased opening.  10. Estimated pulmonary artery systolic pressure is 41.6 mmHg assuming a   right atrial pressure of 5 mmHg, which is consistent with mild pulmonary   hypertension.    CT Head No Cont (11.14.23 @ 11:36)   IMPRESSION: Evolving left MCA infarct is identified with hemorrhagic   transformation now seen with mass effect on left lateral ventricles and   left-to-right shift.    US Duplex Venous Lower Ext Complete, Bilateral (11.14.23 @ 10:38)   IMPRESSION:  No evidence of deep venous thrombosis in either lower extremity.  Left calf veins not visualized.  Small right popliteal fossa cyst.    CT Angio Brain, Neck and perfusion Stroke Protocol  w/ IV Cont (11.13.23 @ 11:18)   CTA BRAIN:  Acute thrombus in the distal left MCA M1 segment. Diminished flow in the   distal branches.  Severe bilateral cavernous carotid artery calcifications. Fetal origins   the bilateral posterior cerebral arteries. The [place] and   vertebrobasilar system are otherwise unremarkable without evidence of   stenosis, additional occlusion or saccular aneurysm dilation. No evidence   for arterial venous malformation. The vertebral arteries are codominant.    CTA NECK:  Mild bilateral carotid bulb calcifications. Mild stenosis in the left   carotid bulb. The right internal carotid arteries tortuous.  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 There is normal relationship to   the great vessels. The common carotid arteries, internal carotid arteries   and vertebral arteries shows no other evidence of significant stenosis,   occlusion or saccular aneurysm dilation. The vertebral arteries are   codominant.    CT PERFUSION:  Patient has only had less than 2 hours of symptoms may influence the CT   perfusion.  CBF<30% volume: 26 ml left MCA territory.  Tmax>6.0 s volume: 193 ml left MCA territory  Mismatch volume: 167 ml  Mismatch ratio: 7.4  IMPRESSION:  Thrombus in the distal left MCA M1 segment with diminished flow in the   distal MCA segments. 167 mL area of penumbra in the left MCA territory.     CT Brain Stroke Protocol (11.13.23 @ 11:15)   IMPRESSION:  Dense left MCA M1 segment suggesting intraluminal thrombus.   Mild chronic microvascular changes without evidence of an acute   transcortical infarction or hemorrhage.

## 2023-12-13 PROCEDURE — 82728 ASSAY OF FERRITIN: CPT

## 2023-12-13 PROCEDURE — 94640 AIRWAY INHALATION TREATMENT: CPT

## 2023-12-13 PROCEDURE — 82962 GLUCOSE BLOOD TEST: CPT

## 2023-12-13 PROCEDURE — 84100 ASSAY OF PHOSPHORUS: CPT

## 2023-12-13 PROCEDURE — 84443 ASSAY THYROID STIM HORMONE: CPT

## 2023-12-13 PROCEDURE — P9045: CPT

## 2023-12-13 PROCEDURE — C1769: CPT

## 2023-12-13 PROCEDURE — 82040 ASSAY OF SERUM ALBUMIN: CPT

## 2023-12-13 PROCEDURE — 93005 ELECTROCARDIOGRAM TRACING: CPT

## 2023-12-13 PROCEDURE — 0042T: CPT

## 2023-12-13 PROCEDURE — 93306 TTE W/DOPPLER COMPLETE: CPT

## 2023-12-13 PROCEDURE — 92526 ORAL FUNCTION THERAPY: CPT

## 2023-12-13 PROCEDURE — 80053 COMPREHEN METABOLIC PANEL: CPT

## 2023-12-13 PROCEDURE — 97110 THERAPEUTIC EXERCISES: CPT

## 2023-12-13 PROCEDURE — C1757: CPT

## 2023-12-13 PROCEDURE — 70551 MRI BRAIN STEM W/O DYE: CPT

## 2023-12-13 PROCEDURE — 94003 VENT MGMT INPAT SUBQ DAY: CPT

## 2023-12-13 PROCEDURE — 70496 CT ANGIOGRAPHY HEAD: CPT

## 2023-12-13 PROCEDURE — 97112 NEUROMUSCULAR REEDUCATION: CPT

## 2023-12-13 PROCEDURE — 76380 CAT SCAN FOLLOW-UP STUDY: CPT

## 2023-12-13 PROCEDURE — 80061 LIPID PANEL: CPT

## 2023-12-13 PROCEDURE — 75572 CT HRT W/3D IMAGE: CPT

## 2023-12-13 PROCEDURE — 87641 MR-STAPH DNA AMP PROBE: CPT

## 2023-12-13 PROCEDURE — 61645 PERQ ART M-THROMBECT &/NFS: CPT

## 2023-12-13 PROCEDURE — 93970 EXTREMITY STUDY: CPT

## 2023-12-13 PROCEDURE — 97167 OT EVAL HIGH COMPLEX 60 MIN: CPT

## 2023-12-13 PROCEDURE — 92507 TX SP LANG VOICE COMM INDIV: CPT

## 2023-12-13 PROCEDURE — 93308 TTE F-UP OR LMTD: CPT

## 2023-12-13 PROCEDURE — C1887: CPT

## 2023-12-13 PROCEDURE — 86900 BLOOD TYPING SEROLOGIC ABO: CPT

## 2023-12-13 PROCEDURE — 83735 ASSAY OF MAGNESIUM: CPT

## 2023-12-13 PROCEDURE — 70450 CT HEAD/BRAIN W/O DYE: CPT

## 2023-12-13 PROCEDURE — 94760 N-INVAS EAR/PLS OXIMETRY 1: CPT

## 2023-12-13 PROCEDURE — 86850 RBC ANTIBODY SCREEN: CPT

## 2023-12-13 PROCEDURE — 83550 IRON BINDING TEST: CPT

## 2023-12-13 PROCEDURE — 85027 COMPLETE CBC AUTOMATED: CPT

## 2023-12-13 PROCEDURE — 85730 THROMBOPLASTIN TIME PARTIAL: CPT

## 2023-12-13 PROCEDURE — 83036 HEMOGLOBIN GLYCOSYLATED A1C: CPT

## 2023-12-13 PROCEDURE — 71045 X-RAY EXAM CHEST 1 VIEW: CPT

## 2023-12-13 PROCEDURE — 85610 PROTHROMBIN TIME: CPT

## 2023-12-13 PROCEDURE — C1894: CPT

## 2023-12-13 PROCEDURE — 84466 ASSAY OF TRANSFERRIN: CPT

## 2023-12-13 PROCEDURE — 97530 THERAPEUTIC ACTIVITIES: CPT

## 2023-12-13 PROCEDURE — 86901 BLOOD TYPING SEROLOGIC RH(D): CPT

## 2023-12-13 PROCEDURE — 84484 ASSAY OF TROPONIN QUANT: CPT

## 2023-12-13 PROCEDURE — C1760: CPT

## 2023-12-13 PROCEDURE — 85025 COMPLETE CBC W/AUTO DIFF WBC: CPT

## 2023-12-13 PROCEDURE — 74230 X-RAY XM SWLNG FUNCJ C+: CPT

## 2023-12-13 PROCEDURE — 94002 VENT MGMT INPAT INIT DAY: CPT

## 2023-12-13 PROCEDURE — 80048 BASIC METABOLIC PNL TOTAL CA: CPT

## 2023-12-13 PROCEDURE — 0225U NFCT DS DNA&RNA 21 SARSCOV2: CPT

## 2023-12-13 PROCEDURE — 87640 STAPH A DNA AMP PROBE: CPT

## 2023-12-13 PROCEDURE — 70498 CT ANGIOGRAPHY NECK: CPT

## 2023-12-13 PROCEDURE — 36415 COLL VENOUS BLD VENIPUNCTURE: CPT

## 2023-12-13 PROCEDURE — 93926 LOWER EXTREMITY STUDY: CPT

## 2023-12-13 PROCEDURE — 82550 ASSAY OF CK (CPK): CPT

## 2023-12-13 PROCEDURE — 83540 ASSAY OF IRON: CPT

## 2024-11-01 ENCOUNTER — APPOINTMENT (OUTPATIENT)
Dept: HOME HEALTH SERVICES | Facility: HOME HEALTH | Age: 79
End: 2024-11-01
Payer: MEDICARE

## 2024-11-01 VITALS
TEMPERATURE: 97.7 F | RESPIRATION RATE: 16 BRPM | WEIGHT: 250 LBS | HEART RATE: 68 BPM | HEIGHT: 67 IN | DIASTOLIC BLOOD PRESSURE: 60 MMHG | SYSTOLIC BLOOD PRESSURE: 110 MMHG | OXYGEN SATURATION: 98 % | BODY MASS INDEX: 39.24 KG/M2

## 2024-11-01 DIAGNOSIS — Z74.01 BED CONFINEMENT STATUS: ICD-10-CM

## 2024-11-01 DIAGNOSIS — R15.9 FULL INCONTINENCE OF FECES: ICD-10-CM

## 2024-11-01 DIAGNOSIS — I50.20 UNSPECIFIED SYSTOLIC (CONGESTIVE) HEART FAILURE: ICD-10-CM

## 2024-11-01 DIAGNOSIS — I10 ESSENTIAL (PRIMARY) HYPERTENSION: ICD-10-CM

## 2024-11-01 DIAGNOSIS — M15.9 POLYOSTEOARTHRITIS, UNSPECIFIED: ICD-10-CM

## 2024-11-01 DIAGNOSIS — I63.512 CEREBRAL INFARCTION DUE TO UNSPECIFIED OCCLUSION OR STENOSIS OF LEFT MIDDLE CEREBRAL ARTERY: ICD-10-CM

## 2024-11-01 DIAGNOSIS — Z01.818 ENCOUNTER FOR OTHER PREPROCEDURAL EXAMINATION: ICD-10-CM

## 2024-11-01 DIAGNOSIS — Z74.9 PROBLEM RELATED TO CARE PROVIDER DEPENDENCY, UNSPECIFIED: ICD-10-CM

## 2024-11-01 DIAGNOSIS — Z95.818 PRESENCE OF OTHER CARDIAC IMPLANTS AND GRAFTS: ICD-10-CM

## 2024-11-01 DIAGNOSIS — I48.91 UNSPECIFIED ATRIAL FIBRILLATION: ICD-10-CM

## 2024-11-01 DIAGNOSIS — R53.2 FUNCTIONAL QUADRIPLEGIA: ICD-10-CM

## 2024-11-01 DIAGNOSIS — E78.5 HYPERLIPIDEMIA, UNSPECIFIED: ICD-10-CM

## 2024-11-01 DIAGNOSIS — I61.9 NONTRAUMATIC INTRACEREBRAL HEMORRHAGE, UNSPECIFIED: ICD-10-CM

## 2024-11-01 DIAGNOSIS — I69.359 HEMIPLEGIA AND HEMIPARESIS FOLLOWING CEREBRAL INFARCTION AFFECTING UNSPECIFIED SIDE: ICD-10-CM

## 2024-11-01 DIAGNOSIS — R39.81 FUNCTIONAL URINARY INCONTINENCE: ICD-10-CM

## 2024-11-01 DIAGNOSIS — K21.9 GASTRO-ESOPHAGEAL REFLUX DISEASE W/OUT ESOPHAGITIS: ICD-10-CM

## 2024-11-01 DIAGNOSIS — Z87.891 PERSONAL HISTORY OF NICOTINE DEPENDENCE: ICD-10-CM

## 2024-11-01 DIAGNOSIS — Z87.898 PERSONAL HISTORY OF OTHER SPECIFIED CONDITIONS: ICD-10-CM

## 2024-11-01 DIAGNOSIS — Z23 ENCOUNTER FOR IMMUNIZATION: ICD-10-CM

## 2024-11-01 DIAGNOSIS — I25.10 ATHEROSCLEROTIC HEART DISEASE OF NATIVE CORONARY ARTERY W/OUT ANGINA PECTORIS: ICD-10-CM

## 2024-11-01 PROCEDURE — 99345 HOME/RES VST NEW HIGH MDM 75: CPT

## 2024-11-01 PROCEDURE — G0008: CPT

## 2024-11-01 PROCEDURE — 99497 ADVNCD CARE PLAN 30 MIN: CPT

## 2024-11-01 PROCEDURE — 90662 IIV NO PRSV INCREASED AG IM: CPT

## 2024-11-01 PROCEDURE — G0506: CPT

## 2024-11-01 RX ORDER — ELECTROLYTES/DEXTROSE
SOLUTION, ORAL ORAL DAILY
Qty: 90 | Refills: 3 | Status: ACTIVE | COMMUNITY
Start: 2024-11-01

## 2024-11-01 RX ORDER — CLOPIDOGREL BISULFATE 75 MG/1
75 TABLET, FILM COATED ORAL
Qty: 90 | Refills: 3 | Status: ACTIVE | COMMUNITY
Start: 2024-11-01 | End: 2024-12-05

## 2024-11-01 RX ORDER — ASPIRIN 81 MG/1
81 TABLET, CHEWABLE ORAL
Qty: 180 | Refills: 3 | Status: ACTIVE | COMMUNITY
Start: 2024-11-01

## 2024-11-01 RX ORDER — ATORVASTATIN CALCIUM 40 MG/1
40 TABLET, FILM COATED ORAL
Qty: 1 | Refills: 3 | Status: ACTIVE | COMMUNITY
Start: 2024-11-01

## 2024-11-05 PROBLEM — I63.512 ACUTE ISCHEMIC LEFT MCA STROKE: Status: RESOLVED | Noted: 2024-11-01 | Resolved: 2024-11-05

## 2024-11-05 PROBLEM — I61.9 HEMORRHAGIC CEREBROVASCULAR ACCIDENT (CVA): Status: RESOLVED | Noted: 2024-11-01 | Resolved: 2024-11-05

## 2024-11-05 PROBLEM — R53.2 FUNCTIONAL QUADRIPLEGIA: Status: ACTIVE | Noted: 2024-11-05

## 2024-11-15 ENCOUNTER — MED ADMIN CHARGE (OUTPATIENT)
Age: 79
End: 2024-11-15

## 2024-11-15 ENCOUNTER — APPOINTMENT (OUTPATIENT)
Dept: HOME HEALTH SERVICES | Facility: HOME HEALTH | Age: 79
End: 2024-11-15

## 2024-11-15 VITALS
TEMPERATURE: 97.9 F | RESPIRATION RATE: 17 BRPM | OXYGEN SATURATION: 98 % | SYSTOLIC BLOOD PRESSURE: 118 MMHG | HEART RATE: 58 BPM | DIASTOLIC BLOOD PRESSURE: 70 MMHG

## 2024-11-29 ENCOUNTER — NON-APPOINTMENT (OUTPATIENT)
Age: 79
End: 2024-11-29

## 2024-12-06 ENCOUNTER — APPOINTMENT (OUTPATIENT)
Dept: HOME HEALTH SERVICES | Facility: HOME HEALTH | Age: 79
End: 2024-12-06

## 2024-12-06 ENCOUNTER — MED ADMIN CHARGE (OUTPATIENT)
Age: 79
End: 2024-12-06

## 2024-12-06 VITALS
OXYGEN SATURATION: 97 % | DIASTOLIC BLOOD PRESSURE: 58 MMHG | SYSTOLIC BLOOD PRESSURE: 106 MMHG | RESPIRATION RATE: 18 BRPM | HEART RATE: 68 BPM | TEMPERATURE: 97.3 F

## 2024-12-11 ENCOUNTER — APPOINTMENT (OUTPATIENT)
Dept: HOME HEALTH SERVICES | Facility: HOME HEALTH | Age: 79
End: 2024-12-11

## 2024-12-11 VITALS
TEMPERATURE: 97.9 F | HEART RATE: 60 BPM | RESPIRATION RATE: 18 BRPM | OXYGEN SATURATION: 95 % | SYSTOLIC BLOOD PRESSURE: 110 MMHG | DIASTOLIC BLOOD PRESSURE: 60 MMHG

## 2024-12-11 DIAGNOSIS — Z23 ENCOUNTER FOR IMMUNIZATION: ICD-10-CM

## 2025-01-24 ENCOUNTER — APPOINTMENT (OUTPATIENT)
Dept: HOME HEALTH SERVICES | Facility: HOME HEALTH | Age: 80
End: 2025-01-24
Payer: MEDICARE

## 2025-01-24 VITALS
TEMPERATURE: 97.4 F | HEART RATE: 60 BPM | OXYGEN SATURATION: 95 % | RESPIRATION RATE: 16 BRPM | DIASTOLIC BLOOD PRESSURE: 72 MMHG | SYSTOLIC BLOOD PRESSURE: 128 MMHG

## 2025-01-24 DIAGNOSIS — I63.512 CEREBRAL INFARCTION DUE TO UNSPECIFIED OCCLUSION OR STENOSIS OF LEFT MIDDLE CEREBRAL ARTERY: ICD-10-CM

## 2025-01-24 DIAGNOSIS — I48.91 UNSPECIFIED ATRIAL FIBRILLATION: ICD-10-CM

## 2025-01-24 DIAGNOSIS — I25.10 ATHEROSCLEROTIC HEART DISEASE OF NATIVE CORONARY ARTERY W/OUT ANGINA PECTORIS: ICD-10-CM

## 2025-01-24 DIAGNOSIS — I69.359 HEMIPLEGIA AND HEMIPARESIS FOLLOWING CEREBRAL INFARCTION AFFECTING UNSPECIFIED SIDE: ICD-10-CM

## 2025-01-24 DIAGNOSIS — I10 ESSENTIAL (PRIMARY) HYPERTENSION: ICD-10-CM

## 2025-01-24 DIAGNOSIS — I50.20 UNSPECIFIED SYSTOLIC (CONGESTIVE) HEART FAILURE: ICD-10-CM

## 2025-01-24 DIAGNOSIS — R53.2 FUNCTIONAL QUADRIPLEGIA: ICD-10-CM

## 2025-01-24 DIAGNOSIS — Z79.899 OTHER LONG TERM (CURRENT) DRUG THERAPY: ICD-10-CM

## 2025-01-24 PROCEDURE — 99349 HOME/RES VST EST MOD MDM 40: CPT

## 2025-01-27 ENCOUNTER — LABORATORY RESULT (OUTPATIENT)
Age: 80
End: 2025-01-27

## 2025-01-31 DIAGNOSIS — R73.03 PREDIABETES.: ICD-10-CM

## 2025-03-20 ENCOUNTER — APPOINTMENT (OUTPATIENT)
Dept: HOME HEALTH SERVICES | Facility: HOME HEALTH | Age: 80
End: 2025-03-20

## 2025-03-20 VITALS
SYSTOLIC BLOOD PRESSURE: 128 MMHG | HEART RATE: 54 BPM | DIASTOLIC BLOOD PRESSURE: 78 MMHG | RESPIRATION RATE: 17 BRPM | OXYGEN SATURATION: 99 % | TEMPERATURE: 98.1 F

## 2025-04-07 ENCOUNTER — NON-APPOINTMENT (OUTPATIENT)
Age: 80
End: 2025-04-07

## 2025-04-07 ENCOUNTER — TRANSCRIPTION ENCOUNTER (OUTPATIENT)
Age: 80
End: 2025-04-07

## 2025-04-11 ENCOUNTER — TRANSCRIPTION ENCOUNTER (OUTPATIENT)
Age: 80
End: 2025-04-11

## 2025-04-11 ENCOUNTER — NON-APPOINTMENT (OUTPATIENT)
Age: 80
End: 2025-04-11

## 2025-04-23 ENCOUNTER — APPOINTMENT (OUTPATIENT)
Dept: HOME HEALTH SERVICES | Facility: HOME HEALTH | Age: 80
End: 2025-04-23

## 2025-04-23 VITALS
RESPIRATION RATE: 16 BRPM | TEMPERATURE: 97.4 F | DIASTOLIC BLOOD PRESSURE: 70 MMHG | OXYGEN SATURATION: 100 % | HEART RATE: 68 BPM | SYSTOLIC BLOOD PRESSURE: 138 MMHG

## 2025-04-23 DIAGNOSIS — I61.9 NONTRAUMATIC INTRACEREBRAL HEMORRHAGE, UNSPECIFIED: ICD-10-CM

## 2025-04-23 DIAGNOSIS — B02.9 ZOSTER W/OUT COMPLICATIONS: ICD-10-CM

## 2025-04-23 DIAGNOSIS — I50.20 UNSPECIFIED SYSTOLIC (CONGESTIVE) HEART FAILURE: ICD-10-CM

## 2025-04-23 DIAGNOSIS — I69.359 HEMIPLEGIA AND HEMIPARESIS FOLLOWING CEREBRAL INFARCTION AFFECTING UNSPECIFIED SIDE: ICD-10-CM

## 2025-04-23 DIAGNOSIS — I48.91 UNSPECIFIED ATRIAL FIBRILLATION: ICD-10-CM

## 2025-04-23 PROCEDURE — 99349 HOME/RES VST EST MOD MDM 40: CPT

## 2025-04-23 RX ORDER — VALACYCLOVIR 1 G/1
1 TABLET, FILM COATED ORAL 3 TIMES DAILY
Qty: 21 | Refills: 0 | Status: ACTIVE | COMMUNITY
Start: 2025-04-23 | End: 1900-01-01

## 2025-04-28 PROBLEM — B02.9 SHINGLES: Status: ACTIVE | Noted: 2025-04-23

## 2025-04-28 PROBLEM — I61.9 HEMORRHAGIC CEREBROVASCULAR ACCIDENT (CVA): Status: RESOLVED | Noted: 2024-11-01 | Resolved: 2025-04-28

## 2025-06-09 NOTE — DISCHARGE NOTE ADULT - MEDICATION SUMMARY - MEDICATIONS TO STOP TAKING
English I will STOP taking the medications listed below when I get home from the hospital:    traMADol 50 mg oral tablet  -- 0.5 tab(s) by mouth once a day

## 2025-06-11 ENCOUNTER — APPOINTMENT (OUTPATIENT)
Dept: HOME HEALTH SERVICES | Facility: HOME HEALTH | Age: 80
End: 2025-06-11

## 2025-06-11 VITALS
DIASTOLIC BLOOD PRESSURE: 60 MMHG | OXYGEN SATURATION: 99 % | TEMPERATURE: 97.6 F | SYSTOLIC BLOOD PRESSURE: 110 MMHG | HEART RATE: 52 BPM | RESPIRATION RATE: 17 BRPM

## 2025-06-11 RX ORDER — POTASSIUM CHLORIDE 20 MEQ/15ML
20 MEQ/15ML SOLUTION ORAL
Qty: 3 | Refills: 3 | Status: ACTIVE | COMMUNITY
Start: 2025-06-11 | End: 1900-01-01

## 2025-06-12 ENCOUNTER — NON-APPOINTMENT (OUTPATIENT)
Age: 80
End: 2025-06-12

## 2025-07-03 ENCOUNTER — NON-APPOINTMENT (OUTPATIENT)
Age: 80
End: 2025-07-03

## 2025-07-03 ENCOUNTER — TRANSCRIPTION ENCOUNTER (OUTPATIENT)
Age: 80
End: 2025-07-03

## 2025-07-03 RX ORDER — MUPIROCIN 20 MG/G
2 OINTMENT TOPICAL TWICE DAILY
Qty: 1 | Refills: 0 | Status: ACTIVE | COMMUNITY
Start: 2025-07-03 | End: 1900-01-01

## 2025-07-11 ENCOUNTER — APPOINTMENT (OUTPATIENT)
Dept: HOME HEALTH SERVICES | Facility: HOME HEALTH | Age: 80
End: 2025-07-11
Payer: MEDICARE

## 2025-07-11 VITALS
HEART RATE: 60 BPM | DIASTOLIC BLOOD PRESSURE: 60 MMHG | RESPIRATION RATE: 16 BRPM | SYSTOLIC BLOOD PRESSURE: 120 MMHG | OXYGEN SATURATION: 100 % | TEMPERATURE: 97.7 F

## 2025-07-11 PROCEDURE — 99349 HOME/RES VST EST MOD MDM 40: CPT

## 2025-07-11 RX ORDER — CEPHALEXIN 500 MG/1
500 CAPSULE ORAL
Qty: 14 | Refills: 0 | Status: DISCONTINUED | COMMUNITY
Start: 2025-07-03 | End: 2025-07-11

## 2025-07-11 RX ORDER — CLOTRIMAZOLE 10 MG/ML
1 SOLUTION TOPICAL TWICE DAILY
Qty: 1 | Refills: 2 | Status: ACTIVE | COMMUNITY
Start: 2025-07-11 | End: 1900-01-01

## 2025-07-13 PROBLEM — R13.10 DYSPHAGIA: Status: ACTIVE | Noted: 2025-07-13

## 2025-07-13 PROBLEM — B35.1 FUNGAL NAIL INFECTION: Status: ACTIVE | Noted: 2025-07-11

## 2025-07-13 PROBLEM — L03.012 CELLULITIS OF FINGER OF LEFT HAND: Status: RESOLVED | Noted: 2025-07-03 | Resolved: 2025-07-13

## 2025-07-15 LAB
ALBUMIN SERPL ELPH-MCNC: 3 G/DL
ALP BLD-CCNC: 80 U/L
ALT SERPL-CCNC: 9 U/L
ANION GAP SERPL CALC-SCNC: 18 MMOL/L
AST SERPL-CCNC: 14 U/L
BILIRUB SERPL-MCNC: 0.4 MG/DL
BUN SERPL-MCNC: 19 MG/DL
CALCIUM SERPL-MCNC: 9 MG/DL
CHLORIDE SERPL-SCNC: 101 MMOL/L
CO2 SERPL-SCNC: 27 MMOL/L
CREAT SERPL-MCNC: 0.98 MG/DL
EGFRCR SERPLBLD CKD-EPI 2021: 77 ML/MIN/1.73M2
ESTIMATED AVERAGE GLUCOSE: 143 MG/DL
GLUCOSE SERPL-MCNC: 180 MG/DL
HBA1C MFR BLD HPLC: 6.6 %
HCT VFR BLD CALC: 33.9 %
HGB BLD-MCNC: 10.4 G/DL
MCHC RBC-ENTMCNC: 29.1 PG
MCHC RBC-ENTMCNC: 30.7 G/DL
MCV RBC AUTO: 94.7 FL
NT-PROBNP SERPL-MCNC: 1623 PG/ML
PLATELET # BLD AUTO: 306 K/UL
POTASSIUM SERPL-SCNC: 4.2 MMOL/L
PROT SERPL-MCNC: 5.6 G/DL
RBC # BLD: 3.58 M/UL
RBC # FLD: 14 %
SODIUM SERPL-SCNC: 146 MMOL/L
WBC # FLD AUTO: 7.13 K/UL

## 2025-07-25 DIAGNOSIS — R73.03 PREDIABETES.: ICD-10-CM

## 2025-07-25 DIAGNOSIS — E11.9 TYPE 2 DIABETES MELLITUS W/OUT COMPLICATIONS: ICD-10-CM

## 2025-08-09 ENCOUNTER — RX RENEWAL (OUTPATIENT)
Age: 80
End: 2025-08-09

## 2025-09-09 ENCOUNTER — APPOINTMENT (OUTPATIENT)
Dept: HOME HEALTH SERVICES | Facility: HOME HEALTH | Age: 80
End: 2025-09-09

## 2025-09-09 VITALS
DIASTOLIC BLOOD PRESSURE: 70 MMHG | TEMPERATURE: 97.2 F | HEART RATE: 60 BPM | RESPIRATION RATE: 16 BRPM | SYSTOLIC BLOOD PRESSURE: 110 MMHG | OXYGEN SATURATION: 100 %

## 2025-09-09 DIAGNOSIS — I48.91 UNSPECIFIED ATRIAL FIBRILLATION: ICD-10-CM

## 2025-09-09 DIAGNOSIS — R53.2 FUNCTIONAL QUADRIPLEGIA: ICD-10-CM

## 2025-09-09 DIAGNOSIS — I69.322 DYSARTHRIA FOLLOWING CEREBRAL INFARCTION: ICD-10-CM

## 2025-09-09 DIAGNOSIS — E11.9 TYPE 2 DIABETES MELLITUS W/OUT COMPLICATIONS: ICD-10-CM

## 2025-09-09 DIAGNOSIS — I69.359 HEMIPLEGIA AND HEMIPARESIS FOLLOWING CEREBRAL INFARCTION AFFECTING UNSPECIFIED SIDE: ICD-10-CM

## 2025-09-09 PROCEDURE — 99349 HOME/RES VST EST MOD MDM 40: CPT

## 2025-09-11 PROBLEM — I69.322 DYSARTHRIA, POST-STROKE: Status: ACTIVE | Noted: 2025-09-11
